# Patient Record
Sex: FEMALE | Race: WHITE | Employment: OTHER | URBAN - METROPOLITAN AREA
[De-identification: names, ages, dates, MRNs, and addresses within clinical notes are randomized per-mention and may not be internally consistent; named-entity substitution may affect disease eponyms.]

---

## 2017-01-11 ENCOUNTER — TELEPHONE (OUTPATIENT)
Dept: INTERNAL MEDICINE CLINIC | Age: 82
End: 2017-01-11

## 2017-01-11 NOTE — TELEPHONE ENCOUNTER
Pt's daughter(Marisol Orozco) stated pt is feeling weak all over and would like an appt for this afternoon.  Best contact number  [phone].        Message received & copied from [facility]

## 2017-01-12 NOTE — TELEPHONE ENCOUNTER
Unable to reach daughter. [place] Vargas I spoke with states she was [facility]. I agree with plan.

## 2017-03-03 ENCOUNTER — OFFICE VISIT (OUTPATIENT)
Dept: INTERNAL MEDICINE CLINIC | Age: 82
End: 2017-03-03

## 2017-03-03 VITALS
HEIGHT: 60 IN | OXYGEN SATURATION: 96 % | WEIGHT: 127 LBS | DIASTOLIC BLOOD PRESSURE: 75 MMHG | SYSTOLIC BLOOD PRESSURE: 150 MMHG | BODY MASS INDEX: 24.94 KG/M2 | TEMPERATURE: 97.7 F | HEART RATE: 96 BPM

## 2017-03-03 DIAGNOSIS — R05.9 COUGH: Primary | ICD-10-CM

## 2017-03-03 RX ORDER — AZITHROMYCIN 250 MG/1
250 TABLET, FILM COATED ORAL SEE ADMIN INSTRUCTIONS
Qty: 6 TAB | Refills: 0 | Status: SHIPPED | OUTPATIENT
Start: 2017-03-03 | End: 2017-03-08

## 2017-03-03 NOTE — PROGRESS NOTES
HISTORY OF PRESENT ILLNESS  Ramandeep Hunter is a 80 y.o. female. HPI   Pt here with daughter c/o dry cough x 1 week  Some sob in mornings d/t frequent cough  No f/c  Some mild sore throat and hoarseness but improving  Taking mucinex otc  Never a smoker  Appetite ok    Patient Active Problem List    Diagnosis Date Noted    Laceration of left arm with complication 06/00/4107    Dementia without behavioral disturbance 04/01/2016    Hypothyroidism 01/02/2014    Depression 01/02/2014     Current Outpatient Prescriptions   Medication Sig Dispense Refill    azithromycin (ZITHROMAX) 250 mg tablet Take 1 Tab by mouth See Admin Instructions for 5 days. 6 Tab 0    levothyroxine (SYNTHROID) 75 mcg tablet TAKE 1 TABLET DAILY BEFORE BREAKFAST 90 Tab 1    clonazePAM (KLONOPIN) 0.5 mg tablet Take 0.25 mg by mouth nightly.  PARoxetine (PAXIL) 30 mg tablet Take 40 mg by mouth nightly.  lithium CR (ESKALITH CR) 450 mg CR tablet Take 450 mg by mouth nightly.  traMADol (ULTRAM) 50 mg tablet Take 1 Tab by mouth every six (6) hours as needed for Pain. Max Daily Amount: 200 mg. 40 Tab 0    acetaminophen (TYLENOL EXTRA STRENGTH) 500 mg tablet Take 500 mg by mouth every six (6) hours as needed for Pain. Allergies   Allergen Reactions    Sulfa (Sulfonamide Antibiotics) Hives           ROS    Physical Exam   Constitutional: She appears well-developed and well-nourished. Appears stated age, frail elderly, nad   HENT:   Mouth/Throat: Oropharynx is clear and moist.   Cardiovascular: Normal rate, regular rhythm and normal heart sounds. Exam reveals no gallop and no friction rub. No murmur heard. Pulmonary/Chest: Effort normal and breath sounds normal. No respiratory distress. She has no wheezes. She has no rales. She exhibits no tenderness. Abdominal: Soft. Bowel sounds are normal.   Musculoskeletal: She exhibits no edema. Neurological: She is alert. Psychiatric: She has a normal mood and affect. Nursing note and vitals reviewed. ASSESSMENT and PLAN  Florentino Ramirez was seen today for cough and hoarse. Diagnoses and all orders for this visit:    Cough   zpak   Continue otc mucinex   To call or return if not improved   Advised f/u PCP in 2 weeks and get pneumonia vaccine    Other orders  -     azithromycin (ZITHROMAX) 250 mg tablet; Take 1 Tab by mouth See Admin Instructions for 5 days. Follow-up Disposition:  Return in about 2 weeks (around 3/17/2017) for f/u cough, pneumonia shot.

## 2017-03-03 NOTE — MR AVS SNAPSHOT
Visit Information Date & Time Provider Department Dept. Phone Encounter #  
 3/3/2017 11:15 AM Brianan Ferrer, [address] [facility] [phone] [id number] Follow-up Instructions Return in about 2 weeks (around 3/17/2017) for f/u cough, pneumonia shot. Upcoming Health Maintenance Date Due ZOSTER VACCINE AGE 60> 7/22/1986 GLAUCOMA SCREENING Q2Y 7/22/1991 OSTEOPOROSIS SCREENING (DEXA) 7/22/1991 Pneumococcal 65+ Low/Medium Risk (1 of 2 - PCV13) 7/22/1991 MEDICARE YEARLY EXAM 7/22/1991 INFLUENZA AGE 9 TO ADULT 8/1/2016 DTaP/Tdap/Td series (2 - Td) 7/25/2026 Allergies as of 3/3/2017  Review Complete On: 3/3/2017 By: Brianna Ferrer MD  
  
 Severity Noted Reaction Type Reactions Sulfa (Sulfonamide Antibiotics)  07/28/2010    Hives Current Immunizations  Never Reviewed Name Date Tdap 7/25/2016  1:23 PM  
  
 Not reviewed this visit You Were Diagnosed With   
  
 Codes Comments Cough    -  Primary ICD-10-CM: C32 ICD-9-CM: 798. 2 Vitals BP  
  
  
  
  
  
 150/75 (BP 1 Location: Left arm, BP Patient Position: Sitting) BMI and BSA Data Body Mass Index Body Surface Area  
 24.8 kg/m 2 1.56 m 2 Preferred Pharmacy Pharmacy Name Phone [facility] [address]  [address] [phone] Your Updated Medication List  
  
   
This list is accurate as of: 3/3/17 11:57 AM.  Always use your most recent med list.  
  
  
  
  
 azithromycin 250 mg tablet Commonly known as:  Arthea Bruch Take 1 Tab by mouth See Admin Instructions for 5 days. KlonoPIN 0.5 mg tablet Generic drug:  clonazePAM  
Take 0.25 mg by mouth nightly. levothyroxine 75 mcg tablet Commonly known as:  SYNTHROID  
TAKE 1 TABLET DAILY BEFORE BREAKFAST  
  
 lithium carbonate  mg CR tablet Commonly known as:  ESKALITH CR Take 450 mg by mouth nightly. PAXIL 30 mg tablet Generic drug:  PARoxetine Take 40 mg by mouth nightly. traMADol 50 mg tablet Commonly known as:  ULTRAM  
Take 1 Tab by mouth every six (6) hours as needed for Pain. Max Daily Amount: 200 mg.  
  
 TYLENOL EXTRA STRENGTH 500 mg tablet Generic drug:  acetaminophen Take 500 mg by mouth every six (6) hours as needed for Pain. Prescriptions Sent to Pharmacy Refills  
 azithromycin (ZITHROMAX) 250 mg tablet 0 Sig: Take 1 Tab by mouth See Admin Instructions for 5 days. Class: Normal  
 Pharmacy: [facility] [address] [place] [address] #: [phone] Route: Oral  
  
Follow-up Instructions Return in about 2 weeks (around 3/17/2017) for f/u cough, pneumonia shot. Introducing [facility]! Aniya Mac introduces [employer] patient portal. Now you can access parts of your medical record, email your doctor's office, and request medication refills online. 1. In your internet browser, go to [email or web address] 2. Click on the First Time User? Click Here link in the Sign In box. You will see the New Member Sign Up page. 3. Enter your [employer] Access Code exactly as it appears below. You will not need to use this code after youve completed the sign-up process. If you do not sign up before the expiration date, you must request a new code. · [employer] Access Code: [email or web address] Expires: 3/15/2017  1:11 PM 
 
4. Enter the last four digits of your Social Security Number (xxxx) and Date of Birth (mm/dd/yyyy) as indicated and click Submit. You will be taken to the next sign-up page. 5. Create a [employer] ID. This will be your [employer] login ID and cannot be changed, so think of one that is secure and easy to remember. 6. Create a [employer] password. You can change your password at any time. 7. Enter your Password Reset Question and Answer. This can be used at a later time if you forget your password. 8. Enter your e-mail address. You will receive e-mail notification when new information is available in [address] 9. Click Sign Up. You can now view and download portions of your medical record. 10. Click the Download Summary menu link to download a portable copy of your medical information. If you have questions, please visit the Frequently Asked Questions section of the [employer] website. Remember, [employer] is NOT to be used for urgent needs. For medical emergencies, dial 911. Now available from your iPhone and Android! Please provide this summary of care documentation to your next provider. Your primary care clinician is listed as Javan David. If you have any questions after today's visit, please call [phone].

## 2017-03-06 ENCOUNTER — OFFICE VISIT (OUTPATIENT)
Dept: INTERNAL MEDICINE CLINIC | Age: 82
End: 2017-03-06

## 2017-03-06 VITALS
DIASTOLIC BLOOD PRESSURE: 88 MMHG | BODY MASS INDEX: 25.32 KG/M2 | TEMPERATURE: 97.9 F | OXYGEN SATURATION: 94 % | RESPIRATION RATE: 14 BRPM | HEART RATE: 69 BPM | SYSTOLIC BLOOD PRESSURE: 152 MMHG | HEIGHT: 60 IN | WEIGHT: 129 LBS

## 2017-03-06 DIAGNOSIS — Z00.00 ROUTINE GENERAL MEDICAL EXAMINATION AT A HEALTH CARE FACILITY: Primary | ICD-10-CM

## 2017-03-06 DIAGNOSIS — Z23 ENCOUNTER FOR IMMUNIZATION: ICD-10-CM

## 2017-03-06 RX ORDER — CHOLECALCIFEROL (VITAMIN D3) 125 MCG
1 CAPSULE ORAL DAILY
COMMUNITY
End: 2019-06-25

## 2017-03-06 RX ORDER — CLONAZEPAM 0.5 MG/1
0.5 TABLET ORAL EVERY EVENING
Status: ON HOLD | COMMUNITY
End: 2019-03-21 | Stop reason: SDUPTHER

## 2017-03-06 RX ORDER — BISMUTH SUBSALICYLATE 262 MG
1 TABLET,CHEWABLE ORAL DAILY
COMMUNITY
End: 2020-01-16

## 2017-03-06 RX ORDER — PAROXETINE HYDROCHLORIDE 40 MG/1
40 TABLET, FILM COATED ORAL DAILY
COMMUNITY
Start: 2017-01-24 | End: 2019-06-25 | Stop reason: DRUGHIGH

## 2017-03-06 RX ORDER — BENZONATATE 200 MG/1
200 CAPSULE ORAL
Qty: 21 CAP | Refills: 0 | Status: SHIPPED | OUTPATIENT
Start: 2017-03-06 | End: 2017-03-13

## 2017-03-06 NOTE — PATIENT INSTRUCTIONS
Medicare Part B Preventive Services Limitations Recommendation Scheduled   Bone Mass Measurement  (age 72 & older, biennial) Requires diagnosis related to osteoporosis or estrogen deficiency. Biennial benefit unless patient has history of long-term glucocorticoid tx or baseline is needed because initial test was by other method Last:      Every 2 years Completed   Cardiovascular Screening Blood Tests (every 5 years)  Total cholesterol, HDL, Triglycerides Order as a panel if possible Last:    Every 5 years Next:  Consider   Colorectal Cancer Screening  -Fecal occult blood test (annual)  -Flexible sigmoidoscopy (5y)  -Screening colonoscopy (10y)  -Barium Enema  Last:    Every 5-10 years depending on your colonoscopy result Next:  Completed   Counseling to Prevent Tobacco Use (up to 8 sessions per year)  - Counseling greater than 3 and up to 10 minutes  - Counseling greater than 10 minutes Patients must be asymptomatic of tobacco-related conditions to receive as preventive service N/A N/A   Diabetes Screening Tests (at least every 3 years, Medicare covers annually or at 6-month intervals for prediabetic patients)    Fasting blood sugar (FBS) or glucose tolerance test (GTT) Patient must be diagnosed with one of the following:  -Hypertension, Dyslipidemia, obesity, previous impaired FBS or GTT  Or any two of the following: overweight, FH of diabetes, age ? 72, history of gestational diabetes, birth of baby weighing more than 9 pounds Last: 3/31/2016    Every 3 years, but checked yearly with labs Next: 3/2017   Diabetes Self-Management Training (DSMT) (no USPSTF recommendation) Requires referral by treating physician for patient with diabetes or renal disease. 10 hours of initial DSMT session of no less than 30 minutes each in a continuous 12-month period. 2 hours of follow-up DSMT in subsequent years.  N/A N/A   Glaucoma Screening (no USPSTF recommendation) Diabetes mellitus, family history, , age 48 or over,  American, age 72 or over Last:     Every year Next:  No longer interested   Human Immunodeficiency Virus (HIV) Screening (annually for increased risk patients)  HIV-1 and HIV-2 by EIA, WENCESLAO, rapid antibody test, or oral mucosa transudate Patient must be at increased risk for HIV infection per USPSTF guidelines or pregnant. Tests covered annually for patients at increased risk. Pregnant patients may receive up to 3 test during pregnancy. N/A N/A   Medical Nutrition Therapy (MNT) (for diabetes or renal disease not recommended schedule) Requires referral by treating physician for patient with diabetes or renal disease. Can be provided in same year as diabetes self-management training (DSMT), and CMS recommends medical nutrition therapy take place after DSMT. Up to 3 hours for initial year and 2 hours in subsequent years. N/A  N/A   Seasonal Influenza Vaccination (annually)  Last: 10/12/2016    Every Fall Please get one this Fall   Shingles Vaccination A shingles vaccine is also recommended once in a lifetime after age 61   Next:   Pneumococcal Vaccination (once after 72)  Last:  Next:  Prevnar 15 due, 1 year later Pneumovax due   Hepatitis B Vaccinations (if medium/high risk) Medium/high risk factors:  End-stage renal disease,  Hemophiliacs who received Factor VIII or IX concentrates, Clients of institutions for the mentally retarded, Persons who live in the same house as a HepB virus carrier, Homosexual men, Illicit injectable drug abusers. N/A N/A   Screening Mammography (biennial age 54-69)? Annually (age 36 or over) Completed Completed   Screening Pap Tests and Pelvic Examination (up to age 72 and after 72 if unknown history or abnormal study last 10 years) Every 25 months except high risk Completed Completed   Ultrasound Screening for Abdominal Aortic Aneurysm (AAA) (once) Patient must be referred through IPPE and not have had a screening for abdominal aortic aneurysm before under Medicare. Limited to patients who meet one of the following criteria:  - Men who are 73-68 years old and have smoked more than 100 cigarettes in their lifetime.  -Anyone with a FH of AAA  -Anyone recommended for screening by USPSTF N/A N/A   N/A:  Not applicable   Vaccine Information Statement    Pneumococcal Polysaccharide Vaccine: What You Need to Know    Many Vaccine Information Statements are available in [place] and other languages. See www.immunize.org/vis. Hojas de información Sobre Vacunas están disponibles en español y en muchos otros idiomas. [facility]ite [email or web address]. 1. Why get vaccinated? Vaccination can protect older adults (and some children and younger adults) from pneumococcal disease. Pneumococcal disease is caused by bacteria that can spread from person to person through close contact. It can cause ear infections, and it can also lead to more serious infections of the:   Lungs (pneumonia),   Blood (bacteremia), and   Covering of the brain and spinal cord (meningitis). Meningitis can cause deafness and brain damage, and it can be fatal.      Anyone can get pneumococcal disease, but children under 3years of age, people with certain medical conditions, adults over 72years of age, and cigarette smokers are at the highest risk. About 18,000 older adults die each year from pneumococcal disease in the [place]. Treatment of pneumococcal infections with penicillin and other drugs used to be more effective. But some strains of the disease have become resistant to these drugs. This makes prevention of the disease, through vaccination, even more important. 2. Pneumococcal polysaccharide vaccine (PPSV23)    Pneumococcal polysaccharide vaccine (PPSV23) protects against 23 types of pneumococcal bacteria. It will not prevent all pneumococcal disease.     PPSV23 is recommended for:   All adults 72years of age and older,   Anyone 2 through 59years of age with certain long-term health problems,   Anyone 2 through 59years of age with a weakened immune system,   Adults 23 through 59years of age who smoke cigarettes or have asthma. Most people need only one dose of PPSV. A second dose is recommended for certain high-risk groups. People 72 and older should get a dose even if they have gotten one or more doses of the vaccine before they turned 65. Your healthcare provider can give you more information about these recommendations. Most healthy adults develop protection within 2 to 3 weeks of getting the shot. 3. Some people should not get this vaccine     Anyone who has had a life-threatening allergic reaction to PPSV should not get another dose.  Anyone who has a severe allergy to any component of PPSV should not receive it. Tell your provider if you have any severe allergies.  Anyone who is moderately or severely ill when the shot is scheduled may be asked to wait until they recover before getting the vaccine. Someone with a mild illness can usually be vaccinated.  Children less than 3years of age should not receive this vaccine.  There is no evidence that PPSV is harmful to either a pregnant woman or to her fetus. However, as a precaution, women who need the vaccine should be vaccinated before becoming pregnant, if possible. 4. Risks of a vaccine reaction    With any medicine, including vaccines, there is a chance of side effects. These are usually mild and go away on their own, but serious reactions are also possible. About half of people who get PPSV have mild side effects, such as redness or pain where the shot is given, which go away within about two days. Less than 1 out of 100 people develop a fever, muscle aches, or more severe local reactions. Problems that could happen after any vaccine:     People sometimes faint after a medical procedure, including vaccination.  Sitting or lying down for about 15 minutes can help prevent fainting, and injuries caused by a fall. Tell your doctor if you feel dizzy, or have vision changes or ringing in the ears.  Some people get severe pain in the shoulder and have difficulty moving the arm where a shot was given. This happens very rarely.  Any medication can cause a severe allergic reaction. Such reactions from a vaccine are very rare, estimated at about 1 in a million doses, and would happen within a few minutes to a few hours after the vaccination. As with any medicine, there is a very remote chance of a vaccine causing a serious injury or death. The safety of vaccines is always being monitored. For more information, visit: www.cdc.gov/vaccinesafety/     5. What if there is a serious reaction? What should I look for? Look for anything that concerns you, such as signs of a severe allergic reaction, very high fever, or unusual behavior. Signs of a severe allergic reaction can include hives, swelling of the face and throat, difficulty breathing, a fast heartbeat, dizziness, and weakness. These would usually start a few minutes to a few hours after the vaccination. What should I do? If you think it is a severe allergic reaction or other emergency that cant wait, call 9-1-1 or get to the nearest hospital. Otherwise, call your doctor. Afterward, the reaction should be reported to the Vaccine Adverse Event Reporting System (VAERS). Your doctor might file this report, or you can do it yourself through the VAERS web site at www.vaers. hhs.gov, or by calling 1-288.489.4590. VAERS does not give medical advice. 6. How can I learn more?  Ask your doctor. He or she can give you the vaccine package insert or suggest other sources of information.  Call your local or state health department.    Contact the Centers for Disease Control and Prevention (CDC):  - Call 3-114.613.4281 (1-800-CDC-INFO) or  - Visit CDCs website at www.cdc.gov/vaccines    Vaccine Information Statement PPSV   04/24/2015    Department of Health and Human Services  Centers for Disease Control and Prevention    Office Use Only    Vaccine Information Statement     Pneumococcal Conjugate Vaccine (PCV13): What You Need to Know    Many Vaccine Information Statements are available in Filipino and other languages. See www.immunize.org/vis. Hojas de información Sobre Vacunas están disponibles en español y en muchos otros idiomas. Visite www.immunize.org/vis. 1. Why get vaccinated? Vaccination can protect both children and adults from pneumococcal disease. Pneumococcal disease is caused by bacteria that can spread from person to person through close contact. It can cause ear infections, and it can also lead to more serious infections of the:   Lungs (pneumonia),   Blood (bacteremia), and   Covering of the brain and spinal cord (meningitis). Pneumococcal pneumonia is most common among adults. Pneumococcal meningitis can cause deafness and brain damage, and it kills about 1 child in 10 who get it. Anyone can get pneumococcal disease, but children under 3years of age and adults 72 years and older, people with certain medical conditions, and cigarette smokers are at the highest risk. Before there was a vaccine, the [facility] saw:   more than 700 cases of meningitis,   about 13,000 blood infections,   about 5 million ear infections, and   about 200 deaths  in children under 5 each year from pneumococcal disease. Since vaccine became available, severe pneumococcal disease in these children has fallen by 88%. About 18,000 older adults die of pneumococcal disease each year in the [place]. Treatment of pneumococcal infections with penicillin and other drugs is not as effective as it used to be, because some strains of the disease have become resistant to these drugs. This makes prevention of the disease, through vaccination, even more important.     2. PCV13 vaccine    Pneumococcal conjugate vaccine (called PCV13) protects against 13 types of pneumococcal bacteria. PCV13 is routinely given to children at 2, 4, 6, and 1515 months of age. It is also recommended for children and adults 3to 59years of age with certain health conditions, and for all adults 72years of age and older. Your doctor can give you details. 3. Some people should not get this vaccine    Anyone who has ever had a life-threatening allergic reaction to a dose of this vaccine, to an earlier pneumococcal vaccine called PCV7, or to any vaccine containing diphtheria toxoid (for example, DTaP), should not get PCV13. Anyone with a severe allergy to any component of PCV13 should not get the vaccine. Tell your doctor if the person being vaccinated has any severe allergies. If the person scheduled for vaccination is not feeling well, your healthcare provider might decide to reschedule the shot on another day. 4. Risks of a vaccine reaction    With any medicine, including vaccines, there is a chance of reactions. These are usually mild and go away on their own, but serious reactions are also possible. Problems reported following PCV13 varied by age and dose in the series. The most common problems reported among children were:    About half became drowsy after the shot, had a temporary loss of appetite, or had redness or tenderness where the shot was given.  About 1 out of 3 had swelling where the shot was given.  About 1 out of 3 had a mild fever, and about 1 in 20 had a fever over 102.2°F.   Up to about 8 out of 10 became fussy or irritable. Adults have reported pain, redness, and swelling where the shot was given; also mild fever, fatigue, headache, chills, or muscle pain. [employer] children who get PCV13 along with inactivated flu vaccine at the same time may be at increased risk for seizures caused by fever. Ask your doctor for more information.      Problems that could happen after any vaccine:     People sometimes faint after a medical procedure, including vaccination. Sitting or lying down for about 15 minutes can help prevent fainting, and injuries caused by a fall. Tell your doctor if you feel dizzy, or have vision changes or ringing in the ears.  Some older children and adults get severe pain in the shoulder and have difficulty moving the arm where a shot was given. This happens very rarely.  Any medication can cause a severe allergic reaction. Such reactions from a vaccine are very rare, estimated at about 1 in a million doses, and would happen within a few minutes to a few hours after the vaccination. As with any medicine, there is a very small chance of a vaccine causing a serious injury or death. The safety of vaccines is always being monitored. For more information, visit: www.cdc.gov/vaccinesafety/     5. What if there is a serious reaction? What should I look for?  Look for anything that concerns you, such as signs of a severe allergic reaction, very high fever, or unusual behavior. Signs of a severe allergic reaction can include hives, swelling of the face and throat, difficulty breathing, a fast heartbeat, dizziness, and weakness  usually within a few minutes to a few hours after the vaccination. What should I do?  If you think it is a severe allergic reaction or other emergency that cant wait, call 9-1-1 or get the person to the nearest hospital. Otherwise, call your doctor. Reactions should be reported to the Vaccine Adverse Event Reporting System (VAERS). Your doctor should file this report, or you can do it yourself through the VAERS web site at www.vaers. hhs.gov, or by calling 8-288.539.5321. VAERS does not give medical advice. 6. The National Vaccine Injury Compensation Program    The [facility] Vaccine Injury Compensation Program (VICP) is a federal program that was created to compensate people who may have been injured by certain vaccines.     Persons who believe they may have been injured by a vaccine can learn about the program and about filing a claim by calling 7-843.731.9567 or visiting the [address] website at www.[facility].gov/vaccinecompensation. There is a time limit to file a claim for compensation. 7. How can I learn more?  Ask your healthcare provider. He or she can give you the vaccine package insert or suggest other sources of information.  Call your local or state health department.  Contact the Centers for Disease Control and Prevention (CDC):  - Call 3-158.646.3762 (1-800-CDC-INFO) or  - Visit CDCs website at www.cdc.gov/vaccines    Vaccine Information Statement   PCV13 Vaccine   11/5/2015   [address] RADHA Houser [id number]    Department of Health and Human Services  Centers for Disease Control and Prevention    Office Use Only       Shingles Vaccine: What You Need to Know  What is shingles? Shingles is a painful skin rash, often with blisters. It is also called herpes zoster, or just zoster. A shingles rash usually appears on one side of the face or body and lasts from 2 to 4 weeks. Its main symptom is pain, which can be quite severe. Other symptoms of shingles can include fever, headache, chills and upset stomach. Very rarely, a shingles infection can lead to pneumonia, hearing problems, blindness, brain inflammation (encephalitis) or death. For about 1 person in 5, severe pain can continue even long after the rash clears up. This is called post-herpetic neuralgia. Shingles is caused by the varicella zoster virus, the same virus that causes chickenpox. Only someone who has had chickenpoxor, rarely, has gotten chickenpox vaccinecan get shingles. The virus stays in your body, and can cause shingles many years later. You can't catch shingles from another person with shingles. However, a person who has never had chickenpox (or chickenpox vaccine) could get chickenpox from someone with shingles. This is not very common.   Shingles is far more common in people 48years of age and older than in younger people. It is also more common in people whose immune systems are weakened because of a disease such as cancer, or drugs such as steroids or chemotherapy. At least 1 million people a year in the [facility] get shingles. Shingles vaccine  A vaccine for shingles was licensed in 6139. In clinical trials, the vaccine reduced the risk of shingles by 50%. It can also reduce pain in people who still get shingles after being vaccinated. A single dose of shingles vaccine is recommended for adults 61years of age and older. Some people should not get shingles vaccine or should wait  A person should not get shingles vaccine who:  · Has ever had a life-threatening allergic reaction to gelatin, the antibiotic neomycin, or any other component of shingles vaccine. Tell your doctor if you have any severe allergies. · Has a weakened immune system because of current:  ¨ AIDS or another disease that affects the immune system. ¨ Treatment with drugs that affect the immune system, such as prolonged use of high-dose steroids. ¨ Cancer treatment such as radiation or chemotherapy. ¨ Cancer affecting the bone marrow or lymphatic system, such as leukemia or lymphoma. · Is pregnant, or might be pregnant. Women should not become pregnant until at least 4 weeks after getting shingles vaccine. Someone with a minor acute illness, such as a cold, may be vaccinated. But anyone with a moderate or severe acute illness should usually wait until they recover before getting the vaccine. This includes anyone with a temperature of 101.3° F or higher. What are the risks from shingles vaccine? A vaccine, like any medicine, could possibly cause serious problems, such as severe allergic reactions. However, the risk of a vaccine causing serious harm, or death, is extremely small. No serious problems have been identified with shingles vaccine.   Mild problems  · Redness, soreness, swelling, or itching at the site of the injection (about 1 person in 3)  · Headache (about 1 person in 79)  Like all vaccines, shingles vaccine is being closely monitored for unusual or severe problems. What if there is a serious reaction? What should I look for? · Look for anything that concerns you, such as signs of a severe allergic reaction, very high fever, or behavior changes. Signs of a severe allergic reaction can include hives, swelling of the face and throat, difficulty breathing, a fast heartbeat, dizziness, and weakness. These would start a few minutes to a few hours after the vaccination. What should I do? · If you think it is a severe allergic reaction or other emergency that can't wait, call 9-1-1 or get the person to the nearest hospital. Otherwise, call your doctor. · Afterward, the reaction should be reported to the Vaccine Adverse Event Reporting System (VAERS). Your doctor might file this report, or you can do it yourself through the VAERS web site at www.vaers. [facility].gov, or by calling 8-300.108.3969. VAERS is only for reporting reactions. They do not give medical advice. How can I learn more? · Ask your doctor. · Call your local or state health department. · Contact the Centers for Disease Control and Prevention (CDC):  ¨ Call 2-579.178.8789 (1-800-CDC-INFO) or  ¨ Visit CDC's website at www.cdc.gov/vaccines  Vaccine Information Statement  Shingles Vaccine  (10/6/2009)  Department of Health and Human Services  Centers for Disease Control and Prevention  Many Vaccine Information Statements are available in Malagasy and other languages. See www.immunize.org/vis. Muchas hojas de información sobre vacunas están disponibles en español y en otros idiomas. Visite www.immunize.org/vis.   Care instructions adapted under license by your healthcare professional. If you have questions about a medical condition or this instruction, always ask your healthcare professional. [employer] disclaims any warranty or liability for your use of this information.

## 2017-03-06 NOTE — PROGRESS NOTES
Dr. Daniel Bustos referred , 7/22/1926, a 80 y.o. female for a Medicare Annual Wellness Visit (AWV). Patient verbalized agreement for her daughter, Alvin Abdi, to participate. This is an Initial Medicare Annual Wellness Exam (AWV) (Performed 12 months after IPPE or effective date of Medicare Part B enrollment, Once in a lifetime)    I have reviewed the patient's medical history in detail and updated the computerized patient record. History     Past Medical History:   Diagnosis Date    Dementia     Depression     psychiatrist: Dr Harsha Dickey (on Lithium)    Fracture of wrist     slipped and fx left wrist, surgery scheduled 12/21/16    Full dentures     upper and lower    Hypothyroid       Past Surgical History:   Procedure Laterality Date    HX KYPHOPLASTY  2013   [place] [employer]  12/2016    Wrist surgery     Current Outpatient Prescriptions   Medication Sig Dispense Refill    clonazePAM (KLONOPIN) 0.5 mg tablet Take 0.5 mg by mouth every evening.  multivitamin (ONE A DAY) tablet Take 1 Tab by mouth daily.  cholecalciferol, vitamin D3, (VITAMIN D3) 2,000 unit tab Take 1 Tab by mouth daily.  azithromycin (ZITHROMAX) 250 mg tablet Take 1 Tab by mouth See Admin Instructions for 5 days. 6 Tab 0    levothyroxine (SYNTHROID) 75 mcg tablet TAKE 1 TABLET DAILY BEFORE BREAKFAST 90 Tab 1    lithium CR (ESKALITH CR) 450 mg CR tablet Take 450 mg by mouth nightly.  PARoxetine (PAXIL) 40 mg tablet Take 40 mg by mouth daily.        Allergies   Allergen Reactions    Sulfa (Sulfonamide Antibiotics) Hives     Family History   Problem Relation Age of Onset    Other Mother      Inefficient wound healing    Cancer Sister      Stomach CA    Diabetes Brother     Alzheimer Sister     Hypertension Daughter     Thyroid Disease Daughter     Other Son      Hip replacement    Depression Son     Liver Disease Son     Diabetes Son     Heart Disease Son     Depression Son      Social History   Substance Use Topics    Smoking status: Former Smoker    Smokeless tobacco: Never Used    Alcohol use No     Patient Active Problem List   Diagnosis Code    Hypothyroidism E03.9    Depression F32.9    Dementia without behavioral disturbance F03.90    Laceration of left arm with complication U44.586U         Depression Risk Factor Screening:   No flowsheet data found. Current diagnosis of Depression, being treated with Lithium 450 mg/day and Paroxetine 40 mg/day. Alcohol Risk Factor Screening: On any occasion during the past 3 months, have you had more than 3 drinks containing alcohol? No    Do you average more than 7 drinks per week? No    Functional Ability and Level of Safety:     Hearing Loss   mild-to-moderate  States that she has had her hearing evaluated but did not want to pursue. Activities of Daily Living   Partial assistance. Requires assistance with: see table. Patient stated that she can take her medications, but her daughter assists with putting them into a pill box and giving them to her. ADL Assessment 3/6/2017   Feeding yourself No Help Needed   Getting from bed to chair No Help Needed   Getting dressed No Help Needed   Bathing or showering No Help Needed   Walk across the room (includes cane/walker) No Help Needed   Using the telphone No Help Needed   Taking your medications Help Needed   Preparing meals Help Needed   Managing money (expenses/bills) Help Needed   Moderately strenuous housework (laundry) No Help Needed   Shopping for personal items (toiletries/medicines) Help Needed   Shopping for groceries Help Needed   Driving Help Needed   Climbing a flight of stairs No Help Needed   Getting to places beyond walking distances No Help Needed       Fall Risk     Fall Risk Assessment, last 12 mths 3/6/2017   Able to walk? Yes   Fall in past 12 months? Yes   Fall with injury?  Yes   Number of falls in past 12 months 3   Fall Risk Score 4     Abuse Screen   Patient is not abused   Abuse Screening Questionnaire 3/6/2017   Do you ever feel afraid of your partner? N   Are you in a relationship with someone who physically or mentally threatens you? N   Is it safe for you to go home? Y       Review of Systems   Not required    Physical Examination     No exam data present    Evaluation of Cognitive Function:  Mood/affect:  neutral  Appearance: age appropriate, casually dressed and within normal Limits  Family member/caregiver input: Daughter was with patient. Patient has a documented diagnosis of dementia. No exam performed today, Medicare Annual Wellness Visit. Patient Care Team:  Nelson Jay MD as PCP - General (Family Practice)  Larry Mckinley MD as Surgeon (General Surgery)  [facility]. Jory Meadows MD (Orthopedic Surgery)  Dr. Margie Lazaro (Psychiatry)    Advice/Referrals/Counseling   Education and counseling provided:  Pneumococcal Vaccine  Cardiovascular screening blood test  Bone mass measurement (DEXA)  Screening for glaucoma  Diabetes screening test      Assessment/Plan       ICD-10-CM ICD-9-CM    1. Routine general medical examination at a health care facility Z00.00 V70.0    2. Encounter for immunization Z23 V03.89 ADMIN PNEUMOCOCCAL VACCINE      PNEUMOCOCCAL CONJ VACCINE 13 VALENT IM   3. Reviewed medications and side effects in detail. A.  Med Rec completed  Medications Discontinued During This Encounter   Medication Reason    traMADol (ULTRAM) 50 mg tablet Not A Current Medication    acetaminophen (TYLENOL EXTRA STRENGTH) 500 mg tablet Not A Current Medication    clonazePAM (KLONOPIN) 0.5 mg tablet Formulary Change    PARoxetine (PAXIL) 30 mg tablet Formulary Change     4. Immunizations:   A.  PCV13:  Recommended to receive today during the visit. Provided to patient. Tolerated injection. Administered in the left deltoid, 0.5 ml PCV13. B.  PPSV23:  Due 1 year from today. C. Influenza:  Received 10/2016. D.  Shingles:  Recommended for patient to consider. Provided information in the AVS.  5.  Preventive Screenings:   A. DEXA:  Declines further screenings. B.  Glaucoma Screening:  Declines further screenings. C.  Mammogram/Pap/Pelvic:  Declines further screenings. 6.  Labs:   A.  A1C:  Due at next visit. B.  BMP:  Due at next visit. C.  Lipid Panel:  Consider checking at next visit. 7.   Advanced Directive: On file, 12/20/16. 8.  Cough:  Patient's last day of azithromycin is tomorrow. Patient and daughter state that she is still coughing. Predominantly in the morning, but denies sputum production. No fever today. States that she is feeling better. Discussed with Alexandra Hernández MD.  Christel Arreola from Dr. Alexandra Hernández to send Tessalon Pearls 200 mg, 1 cap tid prn cough, qty 21 to the pharmacy ([facility] on [address]). Provided education to patient that the cough medicine may make her sleepy. Patient's daughter stated that at times patient has difficulty swallowing medications. Requested for her daughter to contact the office if she has difficulty following. She sees Dr. Gloria Jones next week for 2 week follow up from the acute visit. Patient and daughter verbalized understanding of information presented. Answered all of the patient and daughter's questions. AVS handed and reviewed with patient and daughter.     Татьяна Camarillo, PHARMD, BCACP

## 2017-03-06 NOTE — MR AVS SNAPSHOT
Visit Information Date & Time Provider Department Dept. Phone Encounter #  
 3/6/2017  3:00 PM Kim Lane, [address] [phone] [id number] Your Appointments 3/17/2017  2:00 PM  
ACUTE CARE with Liz Shah MD  
[facility]) Appt Note: 2 week follow up cough pneumonia shot  
 [facility] [address] [id number]  
[address] [facility] [address] [place] [address] Upcoming Health Maintenance Date Due ZOSTER VACCINE AGE 60> 7/22/1986 GLAUCOMA SCREENING Q2Y 7/22/1991 OSTEOPOROSIS SCREENING (DEXA) 7/22/1991 Pneumococcal 65+ Low/Medium Risk (1 of 2 - PCV13) 7/22/1991 INFLUENZA AGE 9 TO ADULT 8/1/2016 MEDICARE YEARLY EXAM 3/7/2018 DTaP/Tdap/Td series (2 - Td) 7/25/2026 Allergies as of 3/6/2017  Review Complete On: 3/6/2017 By: Kim Lane, PHARMD  
  
 Severity Noted Reaction Type Reactions Sulfa (Sulfonamide Antibiotics)  07/28/2010    Hives Current Immunizations  Reviewed on 3/6/2017 Name Date Pneumococcal Conjugate (PCV-13) 3/6/2017 Tdap 7/25/2016  1:23 PM  
  
 Reviewed by Kim Lane, PHARMD on 3/6/2017 at  3:25 PM  
 Reviewed by Kim Lane PHARMD on 3/6/2017 at  3:49 PM  
You Were Diagnosed With   
  
 Codes Comments Routine general medical examination at a health care facility    -  Primary ICD-10-CM: Z00.00 ICD-9-CM: V70.0 Encounter for immunization     ICD-10-CM: X70 ICD-9-CM: V03.89 Vitals BP Pulse Temp Resp Height(growth percentile) Weight(growth percentile) 152/88 (BP 1 Location: Left arm, BP Patient Position: Sitting) 69 97.9 °F (36.6 °C) (Oral) 14 5' (1.524 m) 129 lb (58.5 kg) SpO2 BMI OB Status Smoking Status 94% 25.19 kg/m2 Postmenopausal Former Smoker BMI and BSA Data  Body Mass Index Body Surface Area  
 25.19 kg/m 2 1.57 m 2  
  
  
 Preferred Pharmacy Pharmacy Name Phone [facility] [address], [address] [place] [address] Aric Mello [phone] Your Updated Medication List  
  
   
This list is accurate as of: 3/6/17  3:50 PM.  Always use your most recent med list.  
  
  
  
  
 azithromycin 250 mg tablet Commonly known as:  Debora Abu Take 1 Tab by mouth See Admin Instructions for 5 days. benzonatate 200 mg capsule Commonly known as:  TESSALON Take 1 Cap by mouth three (3) times daily as needed for Cough for up to 7 days. clonazePAM 0.5 mg tablet Commonly known as:  Eliza Broaden Take 0.5 mg by mouth every evening. levothyroxine 75 mcg tablet Commonly known as:  SYNTHROID  
TAKE 1 TABLET DAILY BEFORE BREAKFAST  
  
 lithium carbonate  mg CR tablet Commonly known as:  ESKALITH CR Take 450 mg by mouth nightly. multivitamin tablet Commonly known as:  ONE A DAY Take 1 Tab by mouth daily. PARoxetine 40 mg tablet Commonly known as:  PAXIL Take 40 mg by mouth daily. VITAMIN D3 2,000 unit Tab Generic drug:  cholecalciferol (vitamin D3) Take 1 Tab by mouth daily. Prescriptions Sent to Pharmacy Refills  
 benzonatate (TESSALON) 200 mg capsule 0 Sig: Take 1 Cap by mouth three (3) times daily as needed for Cough for up to 7 days. Class: Normal  
 Pharmacy: [facility] [address] [place] [address] Ph #: [phone] Route: Oral  
  
We Performed the Following ADMIN PNEUMOCOCCAL VACCINE [ [facility]] PNEUMOCOCCAL CONJ VACCINE 13 VALENT IM [id number] CPT(R)] Patient Instructions Medicare Part B Preventive Services Limitations Recommendation Scheduled Bone Mass Measurement 
(age 72 & older, biennial) Requires diagnosis related to osteoporosis or estrogen deficiency.  Biennial benefit unless patient has history of long-term glucocorticoid tx or baseline is needed because initial test was by other method Last:   
 
Every 2 years Completed Cardiovascular Screening Blood Tests (every 5 years) Total cholesterol, HDL, Triglycerides Order as a panel if possible Last: 
 
Every 5 years Next:  Consider Colorectal Cancer Screening 
-Fecal occult blood test (annual) -Flexible sigmoidoscopy (5y) 
-Screening colonoscopy (10y) -Barium Enema  Last: 
 
Every 5-10 years depending on your colonoscopy result Next:  Completed Counseling to Prevent Tobacco Use (up to 8 sessions per year) - Counseling greater than 3 and up to 10 minutes - Counseling greater than 10 minutes Patients must be asymptomatic of tobacco-related conditions to receive as preventive service N/A N/A Diabetes Screening Tests (at least every 3 years, Medicare covers annually or at 6-month intervals for prediabetic patients) Fasting blood sugar (FBS) or glucose tolerance test (GTT) Patient must be diagnosed with one of the following: 
-Hypertension, Dyslipidemia, obesity, previous impaired FBS or GTT 
Or any two of the following: overweight, FH of diabetes, age ? 72, history of gestational diabetes, birth of baby weighing more than 9 pounds Last: 3/31/2016 Every 3 years, but checked yearly with labs Next: 3/2017 Diabetes Self-Management Training (DSMT) (no USPSTF recommendation) Requires referral by treating physician for patient with diabetes or renal disease. 10 hours of initial DSMT session of no less than 30 minutes each in a continuous 12-month period. 2 hours of follow-up DSMT in subsequent years. N/A N/A Glaucoma Screening (no USPSTF recommendation) Diabetes mellitus, family history, , age 48 or over,  American, age 72 or over Last:  
 
Every year Next:  No longer interested Human Immunodeficiency Virus (HIV) Screening (annually for increased risk patients) HIV-1 and HIV-2 by EIA, WENCESLAO, rapid antibody test, or oral mucosa transudate Patient must be at increased risk for HIV infection per USPSTF guidelines or pregnant. Tests covered annually for patients at increased risk. Pregnant patients may receive up to 3 test during pregnancy. N/A N/A Medical Nutrition Therapy (MNT) (for diabetes or renal disease not recommended schedule) Requires referral by treating physician for patient with diabetes or renal disease. Can be provided in same year as diabetes self-management training (DSMT), and CMS recommends medical nutrition therapy take place after DSMT. Up to 3 hours for initial year and 2 hours in subsequent years. N/A  N/A Seasonal Influenza Vaccination (annually)  Last: 10/12/2016 Every Fall Please get one this Fall Shingles Vaccination A shingles vaccine is also recommended once in a lifetime after age 61   Next:  
Pneumococcal Vaccination (once after 72)  Last:  Next:  Prevnar 13 due, 1 year later Pneumovax due Hepatitis B Vaccinations (if medium/high risk) Medium/high risk factors:  End-stage renal disease, Hemophiliacs who received Factor VIII or IX concentrates, Clients of institutions for the mentally retarded, Persons who live in the same house as a HepB virus carrier, Homosexual men, Illicit injectable drug abusers. N/A N/A Screening Mammography (biennial age 54-69)? Annually (age 36 or over) Completed Completed Screening Pap Tests and Pelvic Examination (up to age 72 and after 72 if unknown history or abnormal study last 10 years) Every 24 months except high risk Completed Completed Ultrasound Screening for Abdominal Aortic Aneurysm (AAA) (once) Patient must be referred through IPPE and not have had a screening for abdominal aortic aneurysm before under Medicare.   Limited to patients who meet one of the following criteria: 
- Men who are 73-68 years old and have smoked more than 100 cigarettes in their lifetime. 
-Anyone with a FH of AAA 
-Anyone recommended for screening by USPSTF N/A N/A  
 N/A:  Not applicable Vaccine Information Statement Pneumococcal Polysaccharide Vaccine: What You Need to Know Many Vaccine Information Statements are available in Thai and other languages. See www.immunize.org/vis. Hojas de información Sobre Vacunas están disponibles en español y en muchos otros idiomas. Visite Jennifer.si. 1. Why get vaccinated? Vaccination can protect older adults (and some children and younger adults) from pneumococcal disease. Pneumococcal disease is caused by bacteria that can spread from person to person through close contact. It can cause ear infections, and it can also lead to more serious infections of the: 
 Lungs (pneumonia),  Blood (bacteremia), and 
 Covering of the brain and spinal cord (meningitis). Meningitis can cause deafness and brain damage, and it can be fatal.   
 
Anyone can get pneumococcal disease, but children under 3years of age, people with certain medical conditions, adults over 72years of age, and cigarette smokers are at the highest risk. About 18,000 older adults die each year from pneumococcal disease in the [place]. Treatment of pneumococcal infections with penicillin and other drugs used to be more effective. But some strains of the disease have become resistant to these drugs. This makes prevention of the disease, through vaccination, even more important. 2. Pneumococcal polysaccharide vaccine (PPSV23) Pneumococcal polysaccharide vaccine (PPSV23) protects against 23 types of pneumococcal bacteria. It will not prevent all pneumococcal disease. PPSV23 is recommended for:  All adults 72years of age and older,  Anyone 2 through 59years of age with certain long-term health problems, 
 Anyone 2 through 59years of age with a weakened immune system, 
 Adults 23 through 59years of age who smoke cigarettes or have asthma. Most people need only one dose of PPSV.   A second dose is recommended for certain high-risk groups. People 72 and older should get a dose even if they have gotten one or more doses of the vaccine before they turned 65. Your healthcare provider can give you more information about these recommendations. Most healthy adults develop protection within 2 to 3 weeks of getting the shot. 3. Some people should not get this vaccine  Anyone who has had a life-threatening allergic reaction to PPSV should not get another dose.  Anyone who has a severe allergy to any component of PPSV should not receive it. Tell your provider if you have any severe allergies.  Anyone who is moderately or severely ill when the shot is scheduled may be asked to wait until they recover before getting the vaccine. Someone with a mild illness can usually be vaccinated.  Children less than 3years of age should not receive this vaccine.  There is no evidence that PPSV is harmful to either a pregnant woman or to her fetus. However, as a precaution, women who need the vaccine should be vaccinated before becoming pregnant, if possible. 4. Risks of a vaccine reaction With any medicine, including vaccines, there is a chance of side effects. These are usually mild and go away on their own, but serious reactions are also possible. About half of people who get PPSV have mild side effects, such as redness or pain where the shot is given, which go away within about two days. Less than 1 out of 100 people develop a fever, muscle aches, or more severe local reactions. Problems that could happen after any vaccine:  People sometimes faint after a medical procedure, including vaccination. Sitting or lying down for about 15 minutes can help prevent fainting, and injuries caused by a fall. Tell your doctor if you feel dizzy, or have vision changes or ringing in the ears.  
 
 Some people get severe pain in the shoulder and have difficulty moving the arm where a shot was given. This happens very rarely.  Any medication can cause a severe allergic reaction. Such reactions from a vaccine are very rare, estimated at about 1 in a million doses, and would happen within a few minutes to a few hours after the vaccination. As with any medicine, there is a very remote chance of a vaccine causing a serious injury or death. The safety of vaccines is always being monitored. For more information, visit: www.cdc.gov/vaccinesafety/  
 
5. What if there is a serious reaction? What should I look for? Look for anything that concerns you, such as signs of a severe allergic reaction, very high fever, or unusual behavior. Signs of a severe allergic reaction can include hives, swelling of the face and throat, difficulty breathing, a fast heartbeat, dizziness, and weakness. These would usually start a few minutes to a few hours after the vaccination. What should I do? If you think it is a severe allergic reaction or other emergency that cant wait, call 9-1-1 or get to the nearest hospital. Otherwise, call your doctor. Afterward, the reaction should be reported to the Vaccine Adverse Event Reporting System (VAERS). Your doctor might file this report, or you can do it yourself through the VAERS web site at www.vaers. hhs.gov, or by calling 3-248.601.1122. VAERS does not give medical advice. 6. How can I learn more?  Ask your doctor. He or she can give you the vaccine package insert or suggest other sources of information.  Call your local or state health department.  Contact the Centers for Disease Control and Prevention (CDC): 
- Call 2-140.105.4664 (1-800-CDC-INFO) or 
- Visit CDCs website at www.cdc.gov/vaccines Vaccine Information Statement PPSV  
04/24/2015 Department of [facility] and [employer] Centers for Disease Control and Prevention Office Use Only Vaccine Information Statement Pneumococcal Conjugate Vaccine (PCV13): What You Need to Know Many Vaccine Information Statements are available in Malian and other languages. See www.immunize.org/vis. Hojas de información Sobre Vacunas están disponibles en español y en muchos otros idiomas. Visite www.immunize.org/vis. 1. Why get vaccinated? Vaccination can protect both children and adults from pneumococcal disease. Pneumococcal disease is caused by bacteria that can spread from person to person through close contact. It can cause ear infections, and it can also lead to more serious infections of the: 
 Lungs (pneumonia),  Blood (bacteremia), and 
 Covering of the brain and spinal cord (meningitis). Pneumococcal pneumonia is most common among adults. Pneumococcal meningitis can cause deafness and brain damage, and it kills about 1 child in 10 who get it. Anyone can get pneumococcal disease, but children under 3years of age and adults 72 years and older, people with certain medical conditions, and cigarette smokers are at the highest risk. Before there was a vaccine, the [facility] saw: 
 more than 700 cases of meningitis, 
 about 13,000 blood infections, 
 about 5 million ear infections, and 
 about 200 deaths 
in children under 5 each year from pneumococcal disease. Since vaccine became available, severe pneumococcal disease in these children has fallen by 88%. About 18,000 older adults die of pneumococcal disease each year in the [place]. Treatment of pneumococcal infections with penicillin and other drugs is not as effective as it used to be, because some strains of the disease have become resistant to these drugs. This makes prevention of the disease, through vaccination, even more important. 2. PCV13 vaccine Pneumococcal conjugate vaccine (called PCV13) protects against 13 types of pneumococcal bacteria. PCV13 is routinely given to children at 2, 4, 6, and 1515 months of age. It is also recommended for children and adults 3to 59years of age with certain health conditions, and for all adults 72years of age and older. Your doctor can give you details. 3. Some people should not get this vaccine Anyone who has ever had a life-threatening allergic reaction to a dose of this vaccine, to an earlier pneumococcal vaccine called PCV7, or to any vaccine containing diphtheria toxoid (for example, DTaP), should not get PCV13. Anyone with a severe allergy to any component of PCV13 should not get the vaccine. Tell your doctor if the person being vaccinated has any severe allergies. If the person scheduled for vaccination is not feeling well, your healthcare provider might decide to reschedule the shot on another day. 4. Risks of a vaccine reaction With any medicine, including vaccines, there is a chance of reactions. These are usually mild and go away on their own, but serious reactions are also possible. Problems reported following PCV13 varied by age and dose in the series. The most common problems reported among children were:  About half became drowsy after the shot, had a temporary loss of appetite, or had redness or tenderness where the shot was given.  About 1 out of 3 had swelling where the shot was given.  About 1 out of 3 had a mild fever, and about 1 in 20 had a fever over 102.2°F. 
 Up to about 8 out of 10 became fussy or irritable. Adults have reported pain, redness, and swelling where the shot was given; also mild fever, fatigue, headache, chills, or muscle pain. Ana Claire children who get PCV13 along with inactivated flu vaccine at the same time may be at increased risk for seizures caused by fever. Ask your doctor for more information. Problems that could happen after any vaccine:  People sometimes faint after a medical procedure, including vaccination.  Sitting or lying down for about 15 minutes can help prevent fainting, and injuries caused by a fall. Tell your doctor if you feel dizzy, or have vision changes or ringing in the ears.  Some older children and adults get severe pain in the shoulder and have difficulty moving the arm where a shot was given. This happens very rarely.  Any medication can cause a severe allergic reaction. Such reactions from a vaccine are very rare, estimated at about 1 in a million doses, and would happen within a few minutes to a few hours after the vaccination. As with any medicine, there is a very small chance of a vaccine causing a serious injury or death. The safety of vaccines is always being monitored. For more information, visit: www.cdc.gov/vaccinesafety/  
 
5. What if there is a serious reaction? What should I look for?  Look for anything that concerns you, such as signs of a severe allergic reaction, very high fever, or unusual behavior. Signs of a severe allergic reaction can include hives, swelling of the face and throat, difficulty breathing, a fast heartbeat, dizziness, and weakness  usually within a few minutes to a few hours after the vaccination. What should I do?  If you think it is a severe allergic reaction or other emergency that cant wait, call 9-1-1 or get the person to the nearest hospital. Otherwise, call your doctor. Reactions should be reported to the Vaccine Adverse Event Reporting System (VAERS). Your doctor should file this report, or you can do it yourself through the VAERS web site at www.vaers. hhs.gov, or by calling 5-571.201.8325. VAERS does not give medical advice. 6. The National Vaccine Injury Compensation Program 
 
The [facility] Vaccine Injury Compensation Program (VICP) is a federal program that was created to compensate people who may have been injured by certain vaccines.  
 
Persons who believe they may have been injured by a vaccine can learn about the program and about filing a claim by calling 5-786.267.1951 or visiting the [employer] website at www.[facility].gov/vaccinecompensation. There is a time limit to file a claim for compensation. 7. How can I learn more?  Ask your healthcare provider. He or she can give you the vaccine package insert or suggest other sources of information.  Call your local or state health department.  Contact the Centers for Disease Control and Prevention (CDC): 
- Call 0-728.754.4796 (1-800-CDC-INFO) or 
- Visit CDCs website at www.cdc.gov/vaccines Vaccine Information Statement PCV13 Vaccine 11/5/2015  
42 RADHA Lacey [id number] [facility] and [employer] Centers for Disease Control and Prevention Office Use Only Shingles Vaccine: What You Need to Know What is shingles? Shingles is a painful skin rash, often with blisters. It is also called herpes zoster, or just zoster. A shingles rash usually appears on one side of the face or body and lasts from 2 to 4 weeks. Its main symptom is pain, which can be quite severe. Other symptoms of shingles can include fever, headache, chills and upset stomach. Very rarely, a shingles infection can lead to pneumonia, hearing problems, blindness, brain inflammation (encephalitis) or death. For about 1 person in 5, severe pain can continue even long after the rash clears up. This is called post-herpetic neuralgia. Shingles is caused by the varicella zoster virus, the same virus that causes chickenpox. Only someone who has had chickenpoxor, rarely, has gotten chickenpox vaccinecan get shingles. The virus stays in your body, and can cause shingles many years later. You can't catch shingles from another person with shingles. However, a person who has never had chickenpox (or chickenpox vaccine) could get chickenpox from someone with shingles. This is not very common. Shingles is far more common in people 48years of age and older than in younger people.  It is also more common in people whose immune systems are weakened because of a disease such as cancer, or drugs such as steroids or chemotherapy. At least 1 million people a year in the [facility] get shingles. Shingles vaccine A vaccine for shingles was licensed in 9111. In clinical trials, the vaccine reduced the risk of shingles by 50%. It can also reduce pain in people who still get shingles after being vaccinated. A single dose of shingles vaccine is recommended for adults 61years of age and older. Some people should not get shingles vaccine or should wait A person should not get shingles vaccine who: 
· Has ever had a life-threatening allergic reaction to gelatin, the antibiotic neomycin, or any other component of shingles vaccine. Tell your doctor if you have any severe allergies. · Has a weakened immune system because of current: 
¨ AIDS or another disease that affects the immune system. ¨ Treatment with drugs that affect the immune system, such as prolonged use of high-dose steroids. ¨ Cancer treatment such as radiation or chemotherapy. ¨ Cancer affecting the bone marrow or lymphatic system, such as leukemia or lymphoma. · Is pregnant, or might be pregnant. Women should not become pregnant until at least 4 weeks after getting shingles vaccine. Someone with a minor acute illness, such as a cold, may be vaccinated. But anyone with a moderate or severe acute illness should usually wait until they recover before getting the vaccine. This includes anyone with a temperature of 101.3° F or higher. What are the risks from shingles vaccine? A vaccine, like any medicine, could possibly cause serious problems, such as severe allergic reactions. However, the risk of a vaccine causing serious harm, or death, is extremely small. No serious problems have been identified with shingles vaccine. Mild problems · Redness, soreness, swelling, or itching at the site of the injection (about 1 person in 3) · Headache (about 1 person in 79) Like all vaccines, shingles vaccine is being closely monitored for unusual or severe problems. What if there is a serious reaction? What should I look for? · Look for anything that concerns you, such as signs of a severe allergic reaction, very high fever, or behavior changes. Signs of a severe allergic reaction can include hives, swelling of the face and throat, difficulty breathing, a fast heartbeat, dizziness, and weakness. These would start a few minutes to a few hours after the vaccination. What should I do? · If you think it is a severe allergic reaction or other emergency that can't wait, call 9-1-1 or get the person to the nearest hospital. Otherwise, call your doctor. · Afterward, the reaction should be reported to the Vaccine Adverse Event Reporting System (VAERS). Your doctor might file this report, or you can do it yourself through the VAERS web site at www.vaers. [employer].gov, or by calling 7-756.425.2014. VAERS is only for reporting reactions. They do not give medical advice. How can I learn more? · Ask your doctor. · Call your local or state health department. · Contact the Centers for Disease Control and Prevention (CDC): 
¨ Call 2-593.873.2775 (1-800-CDC-INFO) or ¨ Visit CDC's website at www.cdc.gov/vaccines Vaccine Information Statement Shingles Vaccine 
(10/6/2009) Department of Health and [employer] Centers for Disease Control and Prevention Many Vaccine Information Statements are available in Turkmen and other languages. See www.immunize.org/vis. Muchas hojas de información sobre vacunas están disponibles en español y en otros idiomas. Visite www.immunize.org/vis. Care instructions adapted under license by your healthcare professional. If you have questions about a medical condition or this instruction, always ask your healthcare professional. [place] [address] any warranty or liability for your use of this information. Introducing [facility]! Netta Kogn introduces MexxBooks patient portal. Now you can access parts of your medical record, email your doctor's office, and request medication refills online. 1. In your internet browser, go to [email or web address] 2. Click on the First Time User? Click Here link in the Sign In box. You will see the New Member Sign Up page. 3. Enter your MexxBooks Access Code exactly as it appears below. You will not need to use this code after youve completed the sign-up process. If you do not sign up before the expiration date, you must request a new code. · MexxBooks Access Code: [email or web address] Expires: 3/15/2017  1:11 PM 
 
4. Enter the last four digits of your Social Security Number (xxxx) and Date of Birth (mm/dd/yyyy) as indicated and click Submit. You will be taken to the next sign-up page. 5. Create a MexxBooks ID. This will be your MexxBooks login ID and cannot be changed, so think of one that is secure and easy to remember. 6. Create a MexxBooks password. You can change your password at any time. 7. Enter your Password Reset Question and Answer. This can be used at a later time if you forget your password. 8. Enter your e-mail address. You will receive e-mail notification when new information is available in [address] 9. Click Sign Up. You can now view and download portions of your medical record. 10. Click the Download Summary menu link to download a portable copy of your medical information. If you have questions, please visit the Frequently Asked Questions section of the MexxBooks website. Remember, MexxBooks is NOT to be used for urgent needs. For medical emergencies, dial 911. Now available from your iPhone and Android! Please provide this summary of care documentation to your next provider. Your primary care clinician is listed as Liz Shah. If you have any questions after today's visit, please call [phone].

## 2017-03-17 ENCOUNTER — OFFICE VISIT (OUTPATIENT)
Dept: INTERNAL MEDICINE CLINIC | Age: 82
End: 2017-03-17

## 2017-03-17 ENCOUNTER — HOSPITAL ENCOUNTER (OUTPATIENT)
Dept: LAB | Age: 82
Discharge: HOME | End: 2017-03-17
Payer: MEDICARE

## 2017-03-17 VITALS
WEIGHT: 129.4 LBS | BODY MASS INDEX: 25.4 KG/M2 | SYSTOLIC BLOOD PRESSURE: 151 MMHG | HEART RATE: 73 BPM | HEIGHT: 60 IN | TEMPERATURE: 97.5 F | OXYGEN SATURATION: 96 % | RESPIRATION RATE: 18 BRPM | DIASTOLIC BLOOD PRESSURE: 80 MMHG

## 2017-03-17 DIAGNOSIS — F03.90 DEMENTIA WITHOUT BEHAVIORAL DISTURBANCE, UNSPECIFIED DEMENTIA TYPE: ICD-10-CM

## 2017-03-17 DIAGNOSIS — E03.1 CONGENITAL HYPOTHYROIDISM WITHOUT GOITER: Primary | ICD-10-CM

## 2017-03-17 DIAGNOSIS — L60.0 INGROWN TOENAIL: ICD-10-CM

## 2017-03-17 PROCEDURE — 84443 ASSAY THYROID STIM HORMONE: CPT

## 2017-03-17 PROCEDURE — 36415 COLL VENOUS BLD VENIPUNCTURE: CPT

## 2017-03-17 PROCEDURE — 84439 ASSAY OF FREE THYROXINE: CPT

## 2017-03-17 PROCEDURE — 80053 COMPREHEN METABOLIC PANEL: CPT

## 2017-03-17 NOTE — PROGRESS NOTES
Reviewed record in preparation for visit and have obtained necessary documentation. Identified pt with two pt identifiers(name and ). Chief Complaint   Patient presents with    Other     pt is here for a f/u 2 week from an URI doing better but still having some coughing in the evening     Foot Pain     pt c/o of pain in her big toes on both feet       Health Maintenance Due   Topic Date Due    ZOSTER VACCINE AGE 60>  1986    GLAUCOMA SCREENING Q2Y  1991    OSTEOPOROSIS SCREENING (DEXA)  1991    INFLUENZA AGE 9 TO ADULT  2016       Coordination of Care Questionnaire:  :     1. Have you been to the ER, urgent care clinic since your last visit? Hospitalized since your last visit? yes [facility]    2. Have you seen or consulted any other health care providers outside of the [address] [place] [address] since your last visit? Include any pap smears or colon screening.

## 2017-03-17 NOTE — MR AVS SNAPSHOT
Visit Information Date & Time Provider Department Dept. Phone Encounter #  
 3/17/2017  2:00 PM Alexandra Hernández, [address] [place] [address],[address] [phone] [id number] Follow-up Instructions Return in about 6 months (around 9/17/2017) for follow up thyroid function. Upcoming Health Maintenance Date Due ZOSTER VACCINE AGE 60> 7/22/1986 GLAUCOMA SCREENING Q2Y 7/22/1991 OSTEOPOROSIS SCREENING (DEXA) 7/22/1991 INFLUENZA AGE 9 TO ADULT 8/1/2016 Pneumococcal 65+ Low/Medium Risk (2 of 2 - PPSV23) 3/6/2018 MEDICARE YEARLY EXAM 3/7/2018 DTaP/Tdap/Td series (2 - Td) 7/25/2026 Allergies as of 3/17/2017  Review Complete On: 3/17/2017 By: Tana Delarosa Severity Noted Reaction Type Reactions Sulfa (Sulfonamide Antibiotics)  07/28/2010    Hives Current Immunizations  Reviewed on 3/6/2017 Name Date Influenza High Dose Vaccine PF 10/15/2016 Pneumococcal Conjugate (PCV-13) 3/6/2017 Pneumococcal Vaccine (Unspecified Type) 3/9/2017 Tdap 7/25/2016  1:23 PM  
  
 Not reviewed this visit You Were Diagnosed With   
  
 Codes Comments Congenital hypothyroidism without goiter    -  Primary ICD-10-CM: E03.1 ICD-9-CM: 915 Dementia without behavioral disturbance, unspecified dementia type     ICD-10-CM: F03.90 ICD-9-CM: 294.20 Ingrown toenail     ICD-10-CM: L60.0 ICD-9-CM: 703.0 Vitals BP Pulse Temp Resp Height(growth percentile) Weight(growth percentile) 151/80 (BP 1 Location: Left arm, BP Patient Position: Sitting) 73 97.5 °F (36.4 °C) (Oral) 18 5' (1.524 m) 129 lb 6.4 oz (58.7 kg) SpO2 BMI OB Status Smoking Status 96% 25.27 kg/m2 Postmenopausal Former Smoker BMI and BSA Data Body Mass Index Body Surface Area  
 25.27 kg/m 2 1.58 m 2 Preferred Pharmacy Pharmacy Name Phone Partha Che [address], [address] [facility] Denis Grate [phone] Your Updated Medication List  
 This list is accurate as of: 3/17/17  3:55 PM.  Always use your most recent med list.  
  
  
  
  
 clonazePAM 0.5 mg tablet Commonly known as:  Liseth Bostwick Take 0.5 mg by mouth every evening. levothyroxine 75 mcg tablet Commonly known as:  SYNTHROID  
TAKE 1 TABLET DAILY BEFORE BREAKFAST  
  
 lithium carbonate  mg CR tablet Commonly known as:  ESKALITH CR Take 450 mg by mouth nightly. multivitamin tablet Commonly known as:  ONE A DAY Take 1 Tab by mouth daily. PARoxetine 40 mg tablet Commonly known as:  PAXIL Take 40 mg by mouth daily. VITAMIN D3 2,000 unit Tab Generic drug:  cholecalciferol (vitamin D3) Take 1 Tab by mouth daily. We Performed the Following METABOLIC PANEL, COMPREHENSIVE [37052 CPT(R)] T4, FREE [id number] CPT(R)] TSH 3RD GENERATION [56758 CPT(R)] Follow-up Instructions Return in about 6 months (around 9/17/2017) for follow up thyroid function. Introducing [facility] & HEALTH SERVICES! [facility] introduces Buzz Referrals patient portal. Now you can access parts of your medical record, email your doctor's office, and request medication refills online. 1. In your internet browser, go to [email or web address] 2. Click on the First Time User? Click Here link in the Sign In box. You will see the New Member Sign Up page. 3. Enter your Buzz Referrals Access Code exactly as it appears below. You will not need to use this code after youve completed the sign-up process. If you do not sign up before the expiration date, you must request a new code. · Buzz Referrals Access Code: [email or web address] Expires: 6/15/2017  3:55 PM 
 
4. Enter the last four digits of your Social Security Number (xxxx) and Date of Birth (mm/dd/yyyy) as indicated and click Submit. You will be taken to the next sign-up page. 5. Create a Buzz Referrals ID.  This will be your Buzz Referrals login ID and cannot be changed, so think of one that is secure and easy to remember. 6. Create a AudienceScience password. You can change your password at any time. 7. Enter your Password Reset Question and Answer. This can be used at a later time if you forget your password. 8. Enter your e-mail address. You will receive e-mail notification when new information is available in [address] 9. Click Sign Up. You can now view and download portions of your medical record. 10. Click the Download Summary menu link to download a portable copy of your medical information. If you have questions, please visit the Frequently Asked Questions section of the AudienceScience website. Remember, AudienceScience is NOT to be used for urgent needs. For medical emergencies, dial 911. Now available from your iPhone and Android! Please provide this summary of care documentation to your next provider. Your primary care clinician is listed as Geo Pereyra. If you have any questions after today's visit, please call [phone].

## 2017-03-17 NOTE — PROGRESS NOTES
SUBJECTIVE:   Ms. Viviana Andrea is a 80 y.o. female who is here for follow up of cough. Pt is accompanied by her daughter today. Pt states she is coughing a lot less. Pt's daughter states pt finished the Z-pack about a week ago. Pt reports the cough drops have helped with coughing and sleeping. Pt c/o pain in bilateral great toes. Pt's daughter states pt's  refused to cut them do to pain. Pt's daughter states pt had 3 falls in about a year. Pt's daughter claims pt has a cane and did balance PT for a few sessions. Pt's daughter states pt thinks people in the TV are talking to her and she has had hallucinations. Pt's daughter reports decreasing dose of Klonopin, but pt wanted to go back to original dose. Pt reports she has had reactions to other medications for dementia. Pt's daughter claims pt does not have seasonal allergies. At this time, she is otherwise doing well and has brought no other complaints to my attention today. For a list of the medical issues addressed today, see the assessment and plan below. PMH:   Past Medical History:   Diagnosis Date    Dementia     Depression     psychiatrist: Dr Antoni Rivas (on Lithium)    Fracture of wrist     slipped and fx left wrist, surgery scheduled 12/21/16    Full dentures     upper and lower    Hypothyroid      PSH:  has a past surgical history that includes kyphoplasty (2013); orthopaedic (12/2016); and wrist fracture tx (Left, 12/2016). All: is allergic to sulfa (sulfonamide antibiotics). MEDS:   Current Outpatient Prescriptions   Medication Sig    clonazePAM (KLONOPIN) 0.5 mg tablet Take 0.5 mg by mouth every evening.  PARoxetine (PAXIL) 40 mg tablet Take 40 mg by mouth daily.  multivitamin (ONE A DAY) tablet Take 1 Tab by mouth daily.  cholecalciferol, vitamin D3, (VITAMIN D3) 2,000 unit tab Take 1 Tab by mouth daily.     levothyroxine (SYNTHROID) 75 mcg tablet TAKE 1 TABLET DAILY BEFORE BREAKFAST    lithium CR (ESKALITH CR) 450 mg CR tablet Take 450 mg by mouth nightly. No current facility-administered medications for this visit. FH: family history includes Alzheimer in her sister; Cancer in her sister; Depression in her son and son; Diabetes in her brother and son; Heart Disease in her son; Hypertension in her daughter; Liver Disease in her son; Other in her mother and son; Thyroid Disease in her daughter. SH:  reports that she has quit smoking. She has never used smokeless tobacco. She reports that she does not drink alcohol or use illicit drugs. Review of Systems - History obtained from the patient  General ROS: negative  Psychological ROS: negative  Ophthalmic ROS: negative  ENT ROS: negative  Respiratory ROS: no cough, shortness of breath, or wheezing  Cardiovascular ROS: no chest pain or dyspnea on exertion  Gastrointestinal ROS: no abdominal pain, change in bowel habits, or black or bloody stools  Genito-Urinary ROS: negative  Musculoskeletal ROS: negative  Neurological ROS: negative  Dermatological ROS: +pain in bilateral great toes, otherwise negative    OBJECTIVE:   Vitals:   Visit Vitals    /80 (BP 1 Location: Left arm, BP Patient Position: Sitting)    Pulse 73    Temp 97.5 °F (36.4 °C) (Oral)    Resp 18    Ht 5' (1.524 m)    Wt 129 lb 6.4 oz (58.7 kg)    SpO2 96%    BMI 25.27 kg/m2      Gen: Pleasant 80 y.o.  female in NAD. HEENT: NC/AT. HEAD: No maxillary or frontal sinus tenderness. EYES: PERRLA. EOMI. EARS: TM's normal and canals equal bilaterally. No erythema or bulging. OP moist and pink. NARES: Mucosa pink and turbinates mildly swollen bilaterally. THROAT: No erythema or exudate. NECK: Supple. No LAD. No thyromegaly. HEART: RRR, No M/G/R. LUNGS: CTAB No W/R. EXTREMITIES: Warm. No C/C/E. NEURO: Alert and oriented x 3. Cranial nerves grossly intact. No focal sensory or motor deficits noted. SKIN: Warm. Dry. No rashes or other lesions noted.  Left great toe: nail was thickened on medial side of nail. The nail was growing into the nailbed, no erythema or swelling, tenderness noted on palpation. Right great toe: pt c/o tenderness on the edge of the toenail, no erythema or swelling of the surrounding skin. ASSESSMENT/ PLAN:   Lisa Carnes was seen today for other and foot pain. Diagnoses and all orders for this visit:    Congenital hypothyroidism without goiter  -     T4, FREE  -     TSH 3RD GENERATION    Dementia without behavioral disturbance, unspecified dementia type  -     METABOLIC PANEL, COMPREHENSIVE    Ingrown toenail      Pt was advised to f/u with podiatry for numbing and toe nail trimming. We discussed that hallucination is associated with certain types of dementia. I ordered T4, TSH, and CMP labs for monitoring of hypothyroidism and medication. Pt will f/u in 6 months for thyroid function. Follow-up Disposition:  Return in about 6 months (around 9/17/2017) for follow up thyroid function. I have reviewed the patient's medications and risks/side effects/benefits were discussed. Diagnosis(-es) explained to patient and questions answered. Literature provided where appropriate.      Written by Consuelo Ambrose, as dictated by Marie Mauro MD.

## 2017-03-18 LAB
ALBUMIN SERPL-MCNC: 4.3 G/DL (ref 3.2–4.6)
ALBUMIN/GLOB SERPL: 1.6 {RATIO} (ref 1.2–2.2)
ALP SERPL-CCNC: 69 IU/L (ref 39–117)
ALT SERPL-CCNC: 10 IU/L (ref 0–32)
AST SERPL-CCNC: 23 IU/L (ref 0–40)
BILIRUB SERPL-MCNC: 0.3 MG/DL (ref 0–1.2)
BUN SERPL-MCNC: 11 MG/DL (ref 10–36)
BUN/CREAT SERPL: 14 (ref 11–26)
CALCIUM SERPL-MCNC: 9.4 MG/DL (ref 8.7–10.3)
CHLORIDE SERPL-SCNC: 99 MMOL/L (ref 96–106)
CO2 SERPL-SCNC: 24 MMOL/L (ref 18–29)
CREAT SERPL-MCNC: 0.77 MG/DL (ref 0.57–1)
GLOBULIN SER CALC-MCNC: 2.7 G/DL (ref 1.5–4.5)
GLUCOSE SERPL-MCNC: 83 MG/DL (ref 65–99)
POTASSIUM SERPL-SCNC: 4.5 MMOL/L (ref 3.5–5.2)
PROT SERPL-MCNC: 7 G/DL (ref 6–8.5)
SODIUM SERPL-SCNC: 138 MMOL/L (ref 134–144)
T4 FREE SERPL-MCNC: 1.25 NG/DL (ref 0.82–1.77)
TSH SERPL DL<=0.005 MIU/L-ACNC: 0.69 UIU/ML (ref 0.45–4.5)

## 2017-06-28 DIAGNOSIS — E03.9 HYPOTHYROIDISM, UNSPECIFIED TYPE: ICD-10-CM

## 2017-06-28 RX ORDER — LEVOTHYROXINE SODIUM 75 UG/1
TABLET ORAL
Qty: 90 TAB | Refills: 0 | Status: SHIPPED | OUTPATIENT
Start: 2017-06-28 | End: 2017-09-26 | Stop reason: SDUPTHER

## 2017-09-26 DIAGNOSIS — E03.9 HYPOTHYROIDISM, UNSPECIFIED TYPE: ICD-10-CM

## 2017-09-26 RX ORDER — LEVOTHYROXINE SODIUM 75 UG/1
TABLET ORAL
Qty: 90 TAB | Refills: 0 | Status: SHIPPED | OUTPATIENT
Start: 2017-09-26 | End: 2017-12-25 | Stop reason: SDUPTHER

## 2017-12-25 DIAGNOSIS — E03.9 HYPOTHYROIDISM, UNSPECIFIED TYPE: ICD-10-CM

## 2017-12-26 RX ORDER — LEVOTHYROXINE SODIUM 75 UG/1
TABLET ORAL
Qty: 90 TAB | Refills: 0 | Status: SHIPPED | OUTPATIENT
Start: 2017-12-26 | End: 2018-02-03

## 2018-02-01 ENCOUNTER — HOSPITAL ENCOUNTER (INPATIENT)
Age: 83
LOS: 2 days | Discharge: HOME HEALTH | DRG: 056 | End: 2018-02-03
Attending: EMERGENCY MEDICINE | Admitting: INTERNAL MEDICINE
Payer: MEDICARE

## 2018-02-01 ENCOUNTER — APPOINTMENT (OUTPATIENT)
Dept: GENERAL RADIOLOGY | Age: 83
DRG: 056 | End: 2018-02-01
Attending: EMERGENCY MEDICINE
Payer: MEDICARE

## 2018-02-01 ENCOUNTER — APPOINTMENT (OUTPATIENT)
Dept: CT IMAGING | Age: 83
DRG: 056 | End: 2018-02-01
Attending: EMERGENCY MEDICINE
Payer: MEDICARE

## 2018-02-01 DIAGNOSIS — R41.82 ALTERED MENTAL STATUS, UNSPECIFIED ALTERED MENTAL STATUS TYPE: ICD-10-CM

## 2018-02-01 DIAGNOSIS — G93.41 ACUTE METABOLIC ENCEPHALOPATHY: ICD-10-CM

## 2018-02-01 DIAGNOSIS — I65.23 BILATERAL CAROTID ARTERY STENOSIS: ICD-10-CM

## 2018-02-01 DIAGNOSIS — G25.0 BENIGN ESSENTIAL TREMOR SYNDROME: ICD-10-CM

## 2018-02-01 DIAGNOSIS — F02.818 LATE ONSET ALZHEIMER'S DISEASE WITH BEHAVIORAL DISTURBANCE (HCC): ICD-10-CM

## 2018-02-01 DIAGNOSIS — G45.9 TIA (TRANSIENT ISCHEMIC ATTACK): ICD-10-CM

## 2018-02-01 DIAGNOSIS — N39.0 URINARY TRACT INFECTION WITHOUT HEMATURIA, SITE UNSPECIFIED: ICD-10-CM

## 2018-02-01 DIAGNOSIS — G30.1 LATE ONSET ALZHEIMER'S DISEASE WITH BEHAVIORAL DISTURBANCE (HCC): ICD-10-CM

## 2018-02-01 DIAGNOSIS — G93.40 ACUTE ENCEPHALOPATHY: ICD-10-CM

## 2018-02-01 DIAGNOSIS — G45.1 TRANSIENT ISCHEMIC ATTACK INVOLVING LEFT INTERNAL CAROTID ARTERY: ICD-10-CM

## 2018-02-01 DIAGNOSIS — R20.2 PARESTHESIA OF BILATERAL LEGS: ICD-10-CM

## 2018-02-01 DIAGNOSIS — R41.0 ACUTE CONFUSION: Primary | ICD-10-CM

## 2018-02-01 DIAGNOSIS — E03.9 HYPOTHYROIDISM, UNSPECIFIED TYPE: ICD-10-CM

## 2018-02-01 LAB
ALBUMIN SERPL-MCNC: 4 G/DL (ref 3.5–5)
ALBUMIN/GLOB SERPL: 1.1 {RATIO} (ref 1.1–2.2)
ALP SERPL-CCNC: 78 U/L (ref 45–117)
ALT SERPL-CCNC: 22 U/L (ref 12–78)
AMMONIA PLAS-SCNC: 33 UMOL/L
ANION GAP SERPL CALC-SCNC: 6 MMOL/L (ref 5–15)
APPEARANCE UR: ABNORMAL
AST SERPL-CCNC: 25 U/L (ref 15–37)
BACTERIA URNS QL MICRO: NEGATIVE /HPF
BASOPHILS # BLD: 0 K/UL (ref 0–0.1)
BASOPHILS NFR BLD: 1 % (ref 0–1)
BILIRUB SERPL-MCNC: 0.3 MG/DL (ref 0.2–1)
BILIRUB UR QL: NEGATIVE
BUN SERPL-MCNC: 11 MG/DL (ref 6–20)
BUN/CREAT SERPL: 13 (ref 12–20)
CALCIUM SERPL-MCNC: 8.9 MG/DL (ref 8.5–10.1)
CAOX CRY URNS QL MICRO: ABNORMAL
CHLORIDE SERPL-SCNC: 105 MMOL/L (ref 97–108)
CO2 SERPL-SCNC: 28 MMOL/L (ref 21–32)
COLOR UR: ABNORMAL
CREAT SERPL-MCNC: 0.86 MG/DL (ref 0.55–1.02)
DATE LAST DOSE: NORMAL
DIFFERENTIAL METHOD BLD: NORMAL
EOSINOPHIL # BLD: 0.4 K/UL (ref 0–0.4)
EOSINOPHIL NFR BLD: 7 % (ref 0–7)
EPITH CASTS URNS QL MICRO: ABNORMAL /LPF
ERYTHROCYTE [DISTWIDTH] IN BLOOD BY AUTOMATED COUNT: 13.1 % (ref 11.5–14.5)
FLUAV AG NPH QL IA: NEGATIVE
FLUBV AG NOSE QL IA: NEGATIVE
GLOBULIN SER CALC-MCNC: 3.5 G/DL (ref 2–4)
GLUCOSE BLD STRIP.AUTO-MCNC: 92 MG/DL (ref 65–100)
GLUCOSE SERPL-MCNC: 91 MG/DL (ref 65–100)
GLUCOSE UR STRIP.AUTO-MCNC: NEGATIVE MG/DL
HCT VFR BLD AUTO: 39.3 % (ref 35–47)
HGB BLD-MCNC: 12.9 G/DL (ref 11.5–16)
HGB UR QL STRIP: NEGATIVE
IMM GRANULOCYTES # BLD: 0 K/UL (ref 0–0.04)
IMM GRANULOCYTES NFR BLD AUTO: 0 % (ref 0–0.5)
KETONES UR QL STRIP.AUTO: NEGATIVE MG/DL
LEUKOCYTE ESTERASE UR QL STRIP.AUTO: ABNORMAL
LITHIUM SERPL-SCNC: 1.05 MMOL/L (ref 0.6–1.2)
LYMPHOCYTES # BLD: 2.3 K/UL (ref 0.8–3.5)
LYMPHOCYTES NFR BLD: 39 % (ref 12–49)
MCH RBC QN AUTO: 28.5 PG (ref 26–34)
MCHC RBC AUTO-ENTMCNC: 32.8 G/DL (ref 30–36.5)
MCV RBC AUTO: 86.9 FL (ref 80–99)
MONOCYTES # BLD: 0.5 K/UL (ref 0–1)
MONOCYTES NFR BLD: 9 % (ref 5–13)
MUCOUS THREADS URNS QL MICRO: ABNORMAL /LPF
NEUTS SEG # BLD: 2.7 K/UL (ref 1.8–8)
NEUTS SEG NFR BLD: 45 % (ref 32–75)
NITRITE UR QL STRIP.AUTO: NEGATIVE
NRBC # BLD: 0 K/UL (ref 0–0.01)
NRBC BLD-RTO: 0 PER 100 WBC
PH UR STRIP: 6 [PH] (ref 5–8)
PLATELET # BLD AUTO: 197 K/UL (ref 150–400)
PMV BLD AUTO: 10.4 FL (ref 8.9–12.9)
POTASSIUM SERPL-SCNC: 4 MMOL/L (ref 3.5–5.1)
PROT SERPL-MCNC: 7.5 G/DL (ref 6.4–8.2)
PROT UR STRIP-MCNC: NEGATIVE MG/DL
RBC # BLD AUTO: 4.52 M/UL (ref 3.8–5.2)
RBC #/AREA URNS HPF: ABNORMAL /HPF (ref 0–5)
REPORTED DOSE,DOSE: NORMAL UNITS
REPORTED DOSE/TIME,TMG: NORMAL
SERVICE CMNT-IMP: NORMAL
SODIUM SERPL-SCNC: 139 MMOL/L (ref 136–145)
SP GR UR REFRACTOMETRY: 1.02 (ref 1–1.03)
TROPONIN I SERPL-MCNC: <0.04 NG/ML
TSH SERPL DL<=0.05 MIU/L-ACNC: 0.16 UIU/ML (ref 0.36–3.74)
UA: UC IF INDICATED,UAUC: ABNORMAL
UROBILINOGEN UR QL STRIP.AUTO: 1 EU/DL (ref 0.2–1)
WBC # BLD AUTO: 6 K/UL (ref 3.6–11)
WBC URNS QL MICRO: ABNORMAL /HPF (ref 0–4)

## 2018-02-01 PROCEDURE — 84443 ASSAY THYROID STIM HORMONE: CPT | Performed by: EMERGENCY MEDICINE

## 2018-02-01 PROCEDURE — 36415 COLL VENOUS BLD VENIPUNCTURE: CPT | Performed by: EMERGENCY MEDICINE

## 2018-02-01 PROCEDURE — 74011250636 HC RX REV CODE- 250/636: Performed by: INTERNAL MEDICINE

## 2018-02-01 PROCEDURE — 84484 ASSAY OF TROPONIN QUANT: CPT | Performed by: EMERGENCY MEDICINE

## 2018-02-01 PROCEDURE — 94761 N-INVAS EAR/PLS OXIMETRY MLT: CPT

## 2018-02-01 PROCEDURE — 80178 ASSAY OF LITHIUM: CPT | Performed by: EMERGENCY MEDICINE

## 2018-02-01 PROCEDURE — 82140 ASSAY OF AMMONIA: CPT | Performed by: EMERGENCY MEDICINE

## 2018-02-01 PROCEDURE — 65660000000 HC RM CCU STEPDOWN

## 2018-02-01 PROCEDURE — 85025 COMPLETE CBC W/AUTO DIFF WBC: CPT | Performed by: EMERGENCY MEDICINE

## 2018-02-01 PROCEDURE — 80053 COMPREHEN METABOLIC PANEL: CPT | Performed by: EMERGENCY MEDICINE

## 2018-02-01 PROCEDURE — 93005 ELECTROCARDIOGRAM TRACING: CPT

## 2018-02-01 PROCEDURE — 74011250636 HC RX REV CODE- 250/636: Performed by: EMERGENCY MEDICINE

## 2018-02-01 PROCEDURE — 82962 GLUCOSE BLOOD TEST: CPT

## 2018-02-01 PROCEDURE — 87086 URINE CULTURE/COLONY COUNT: CPT | Performed by: EMERGENCY MEDICINE

## 2018-02-01 PROCEDURE — 87804 INFLUENZA ASSAY W/OPTIC: CPT | Performed by: EMERGENCY MEDICINE

## 2018-02-01 PROCEDURE — 81001 URINALYSIS AUTO W/SCOPE: CPT | Performed by: EMERGENCY MEDICINE

## 2018-02-01 PROCEDURE — 99285 EMERGENCY DEPT VISIT HI MDM: CPT

## 2018-02-01 PROCEDURE — 71045 X-RAY EXAM CHEST 1 VIEW: CPT

## 2018-02-01 PROCEDURE — 70450 CT HEAD/BRAIN W/O DYE: CPT

## 2018-02-01 RX ORDER — LEVOTHYROXINE SODIUM 125 UG/1
60 TABLET ORAL
Status: DISCONTINUED | OUTPATIENT
Start: 2018-02-02 | End: 2018-02-03 | Stop reason: HOSPADM

## 2018-02-01 RX ORDER — ACETAMINOPHEN 325 MG/1
650 TABLET ORAL
Status: DISCONTINUED | OUTPATIENT
Start: 2018-02-01 | End: 2018-02-03 | Stop reason: HOSPADM

## 2018-02-01 RX ORDER — SODIUM CHLORIDE 0.9 % (FLUSH) 0.9 %
5-10 SYRINGE (ML) INJECTION EVERY 8 HOURS
Status: DISCONTINUED | OUTPATIENT
Start: 2018-02-01 | End: 2018-02-03 | Stop reason: HOSPADM

## 2018-02-01 RX ORDER — MELATONIN
2000 DAILY
Status: DISCONTINUED | OUTPATIENT
Start: 2018-02-02 | End: 2018-02-03 | Stop reason: HOSPADM

## 2018-02-01 RX ORDER — HEPARIN SODIUM 5000 [USP'U]/ML
5000 INJECTION, SOLUTION INTRAVENOUS; SUBCUTANEOUS EVERY 8 HOURS
Status: DISCONTINUED | OUTPATIENT
Start: 2018-02-01 | End: 2018-02-03 | Stop reason: HOSPADM

## 2018-02-01 RX ORDER — DOCUSATE SODIUM 100 MG/1
100 CAPSULE, LIQUID FILLED ORAL
Status: DISCONTINUED | OUTPATIENT
Start: 2018-02-01 | End: 2018-02-03 | Stop reason: HOSPADM

## 2018-02-01 RX ORDER — SODIUM CHLORIDE 9 MG/ML
50 INJECTION, SOLUTION INTRAVENOUS CONTINUOUS
Status: DISCONTINUED | OUTPATIENT
Start: 2018-02-01 | End: 2018-02-03

## 2018-02-01 RX ORDER — SODIUM CHLORIDE 0.9 % (FLUSH) 0.9 %
5-10 SYRINGE (ML) INJECTION AS NEEDED
Status: DISCONTINUED | OUTPATIENT
Start: 2018-02-01 | End: 2018-02-03 | Stop reason: HOSPADM

## 2018-02-01 RX ORDER — GUAIFENESIN 100 MG/5ML
81 LIQUID (ML) ORAL DAILY
Status: DISCONTINUED | OUTPATIENT
Start: 2018-02-02 | End: 2018-02-03 | Stop reason: HOSPADM

## 2018-02-01 RX ORDER — HALOPERIDOL 5 MG/ML
2 INJECTION INTRAMUSCULAR
Status: DISCONTINUED | OUTPATIENT
Start: 2018-02-01 | End: 2018-02-03 | Stop reason: HOSPADM

## 2018-02-01 RX ORDER — ONDANSETRON 2 MG/ML
4 INJECTION INTRAMUSCULAR; INTRAVENOUS
Status: DISCONTINUED | OUTPATIENT
Start: 2018-02-01 | End: 2018-02-03 | Stop reason: HOSPADM

## 2018-02-01 RX ADMIN — HALOPERIDOL LACTATE 2 MG: 5 INJECTION, SOLUTION INTRAMUSCULAR at 21:58

## 2018-02-01 RX ADMIN — CEFTRIAXONE SODIUM 1 G: 1 INJECTION, POWDER, FOR SOLUTION INTRAMUSCULAR; INTRAVENOUS at 17:48

## 2018-02-01 RX ADMIN — SODIUM CHLORIDE 50 ML/HR: 900 INJECTION, SOLUTION INTRAVENOUS at 17:45

## 2018-02-01 RX ADMIN — Medication 10 ML: at 21:13

## 2018-02-01 RX ADMIN — HEPARIN SODIUM 5000 UNITS: 5000 INJECTION, SOLUTION INTRAVENOUS; SUBCUTANEOUS at 18:20

## 2018-02-01 NOTE — IP AVS SNAPSHOT
[address] [facility] 
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 
[phone] Gabbi Francis MD Schedule an appointment as soon as possible for a visit in 2 weeks  [address] [facility] [address] [facility] 
[phone] [address] [place] [address] 
[phone] Discharge Orders None A check gildardo indicates which time of day the medication should be taken. My Medications START taking these medications Instructions Each Dose to Equal  
 Morning Noon Evening Bedtime  
 aspirin 81 mg chewable tablet Your last dose was: Your next dose is: Take 1 Tab by mouth daily for 30 days. 81 mg CHANGE how you take these medications Instructions Each Dose to Equal  
 Morning Noon Evening Bedtime  
 levothyroxine 50 mcg tablet Commonly known as:  SYNTHROID Start taking on:  2018 What changed:  See the new instructions. Your last dose was: Your next dose is: Take 1.25 Tabs by mouth every morning for 30 days. 60 mcg CONTINUE taking these medications Instructions Each Dose to Equal  
 Morning Noon Evening Bedtime  
 clonazePAM 0.5 mg tablet Commonly known as:  Lendel Eleanor Your last dose was: Your next dose is: Take 0.5 mg by mouth every evening. 0.5 mg  
    
   
   
   
  
 lithium carbonate  mg CR tablet Commonly known as:  ESKALITH CR Your last dose was: Your next dose is: Take 450 mg by mouth nightly. 450 mg  
    
   
   
   
  
 multivitamin tablet Commonly known as:  ONE A DAY Your last dose was: Your next dose is: Take 1 Tab by mouth daily. 1 Tab PARoxetine 40 mg tablet Commonly known as:  PAXIL Your last dose was: Your next dose is: Take 40 mg by mouth daily. 40 mg  
    
   
   
   
  
 VITAMIN D3 2,000 unit Tab Generic drug:  cholecalciferol (vitamin D3) Your last dose was: Your next dose is: Take 1 Tab by mouth daily. 1 Tab Where to Get Your Medications Information on where to get these meds will be given to you by the nurse or doctor. ! Ask your nurse or doctor about these medications  
  aspirin 81 mg chewable tablet  
 levothyroxine 50 mcg tablet Discharge Instructions Discharge Diagnosis Acute metabolic encephalopathy may be related to  medications (Klonopin, paroxetine, lithium) vs dementia Left Internal carotid artery stenosis History of dementia Hypothyroidism Bipolar disease Diet : Heart health Activity : avoid falls use walker Cbc and Bmp in 1 week ACO Transitions of Care Introducing [facility] [address] Fermin offers a voluntary care coordination program to provide high quality service and care to [facility] fee-for-service beneficiaries. Wendy Bernal was designed to help you enhance your health and well-being through the following services: ? Transitions of Care  support for individuals who are transitioning from one care setting to another (example: Hospital to home). ? Chronic and Complex Care Coordination  support for individuals and caregivers of those with serious or chronic illnesses or with more than one chronic (ongoing) condition and those who take a number of different medications. If you meet specific medical criteria, a [facility] [address] may call you directly to coordinate your care with your primary care physician and your other care providers. For questions about the [facility] programs, please, contact your physicians office. For general questions or additional information about Accountable Care Organizations: 
Please visit www.medicare.gov/acos. html or call 1-800-MEDICARE (6-255.873.2202) TTY users should call 7-797.830.9279. Introducing [address]! Jacinta Briggs introduces Livekick patient portal. Now you can access parts of your medical record, email your doctor's office, and request medication refills online. 1. In your internet browser, go to [email or web address] 2. Click on the First Time User? Click Here link in the Sign In box. You will see the New Member Sign Up page. 3. Enter your Livekick Access Code exactly as it appears below. You will not need to use this code after youve completed the sign-up process. If you do not sign up before the expiration date, you must request a new code. · Livekick Access Code: [email or web address] Expires: 5/4/2018  9:38 AM 
 
4. Enter the last four digits of your Social Security Number (xxxx) and Date of Birth (mm/dd/yyyy) as indicated and click Submit. You will be taken to the next sign-up page. 5. Create a [facility] ID. This will be your [facility] login ID and cannot be changed, so think of one that is secure and easy to remember. 6. Create a [facility] password. You can change your password at any time. 7. Enter your Password Reset Question and Answer. This can be used at a later time if you forget your password. 8. Enter your e-mail address. You will receive e-mail notification when new information is available in [address] 9. Click Sign Up. You can now view and download portions of your medical record. 10. Click the Download Summary menu link to download a portable copy of your medical information. If you have questions, please visit the Frequently Asked Questions section of the Inter[facility] [employer] website. Remember, [facility] is NOT to be used for urgent needs. For medical emergencies, dial 911. Now available from your iPhone and Android! Unresulted Labs-Please follow up with your PCP about these lab tests Order Current Status ARMANI QL, W/REFLEX CASCADE In process VITAMIN D, 25 HYROXY PANEL In process Providers Seen During Your Hospitalization Provider Specialty Primary office phone Babatunde Graham MD Emergency Medicine [phone] Zoie Balbuena MD Internal Medicine [phone] Your Primary Care Physician (PCP) Primary Care Physician Office Phone Office Fax Kem Frias [phone] You are allergic to the following Allergen Reactions Sulfa (Sulfonamide Antibiotics) Hives Recent Documentation Height Weight BMI OB Status Smoking Status 1.524 m 57.2 kg 24.63 kg/m2 Postmenopausal Former Smoker Emergency Contacts Name Discharge Info Relation Home Work Mobile Marisol Orozco DISCHARGE CAREGIVER [3] Child [2] [phone] Patient Belongings  The following personal items are in your possession at time of discharge: 
     Visual Aid: Glasses             Clothing: Pants, Undergarments, Other (comment) (eugenio) Please provide this summary of care documentation to your next provider. Signatures-by signing, you are acknowledging that this After Visit Summary has been reviewed with you and you have received a copy. Patient Signature:  ____________________________________________________________ Date:  ____________________________________________________________  
  
[employer] Senna Provider Signature:  ____________________________________________________________ Date:  ____________________________________________________________

## 2018-02-01 NOTE — ED PROVIDER NOTES
EMERGENCY DEPARTMENT HISTORY AND PHYSICAL EXAM      Date: 2/1/2018  Patient Name: Nehemiah Silva    History of Presenting Illness     Chief Complaint   Patient presents with    Altered mental status     report of increased confusion and emotional lability since 1100 this morning. BS 92. Code S called and pt taken to CT. History Provided By: Patient and Patient's Daughter    HPI: Nehemiah Silva, 80 y.o. female with PMHx significant for dementia, hypothyroid, depression, presents ambulatory to the ED with cc of sudden onset of moderately severe confusion and emotional changes x 1100. Daughter states she noticed a significant change in the pt's behavior this afternoon. Daughter describes the behavior as, \"she didn't know the house she was in, she didn't recognize the furniture,\" and goes on to say that the pt has been crying easily. Daughter reports no known modifying factors of her cc. Pt was c/o tingling to her feet and hands earlier in the day but no other complaints or deficits. Pt specifically denies any vomiting, cough, SOB, CP, or known falls. Pt has been living in the same house for 6 years and has no h/o similar episodes. Her pmhx is not significant for DM. Daughter is POA. PCP: Arnoldo Correia MD    There are no other complaints, changes, or physical findings at this time.     Current Facility-Administered Medications   Medication Dose Route Frequency Provider Last Rate Last Dose    [START ON 2/2/2018] cholecalciferol (VITAMIN D3) tablet 2,000 Units  2,000 Units Oral DAILY Case Herring MD        [START ON 2/2/2018] levothyroxine (SYNTHROID) tablet 62.5 mcg  62.5 mcg Oral 6am Case Herring MD        sodium chloride (NS) flush 5-10 mL  5-10 mL IntraVENous Q8H Case Herring MD   10 mL at 02/01/18 2113    sodium chloride (NS) flush 5-10 mL  5-10 mL IntraVENous PRN Case Herring MD        acetaminophen (TYLENOL) tablet 650 mg  650 mg Oral Q6H PRN Case Herring MD        ondansetron (ZOFRAN) injection 4 mg  4 mg IntraVENous Q6H PRN Ursula Welch MD        docusate sodium (COLACE) capsule 100 mg  100 mg Oral DAILY PRN Ursula Welch MD        heparin (porcine) injection 5,000 Units  5,000 Units SubCUTAneous Cameron Carbajal MD   5,000 Units at 02/01/18 1820    0.9% sodium chloride infusion  50 mL/hr IntraVENous CONTINUOUS Ursula Welch MD 50 mL/hr at 02/01/18 1745 50 mL/hr at 02/01/18 1745    haloperidol lactate (HALDOL) injection 2 mg  2 mg IntraMUSCular Q6H PRN Ursula Welch MD        [START ON 2/2/2018] aspirin chewable tablet 81 mg  81 mg Oral DAILY Ursula Welch MD        cefTRIAXone (ROCEPHIN) 1 g/50 mL NS IVPB  1 g IntraVENous Q24H Elita Collar A. Golden Nissen, MD           Past History     Past Medical History:  Past Medical History:   Diagnosis Date    Dementia     Depression     psychiatrist: Dr ALLI THORPE [facility]    Fracture of wrist     slipped and fx left wrist, surgery scheduled 12/21/16    Full dentures     upper and lower    Hypothyroid        Past Surgical History:  Past Surgical History:   Procedure Laterality Date    HX KYPHOPLASTY  2013   Neelima Putty  12/2016    Wrist surgery    HX WRIST FRACTURE TX Left 12/2016       Family History:  Family History   Problem Relation Age of Onset    Other Mother      Inefficient wound healing    Cancer Sister      Stomach CA    Diabetes Brother     Alzheimer Sister     Hypertension Daughter     Thyroid Disease Daughter     Other Son      Hip replacement    Depression Son     Liver Disease Son     Diabetes Son     Heart Disease Son     Depression Son        Social History:  Social History   Substance Use Topics    Smoking status: Former Smoker    Smokeless tobacco: Never Used    Alcohol use No       Allergies: Allergies   Allergen Reactions    Sulfa (Sulfonamide Antibiotics) Hives         Review of Systems   Review of Systems   Constitutional: Negative. Negative for chills and fever. HENT: Negative. Negative for congestion, facial swelling, rhinorrhea, sore throat, trouble swallowing and voice change. Eyes: Negative. Respiratory: Negative. Negative for apnea, cough, chest tightness, shortness of breath and wheezing. Cardiovascular: Negative. Negative for chest pain, palpitations and leg swelling. Gastrointestinal: Negative. Negative for abdominal distention, abdominal pain, blood in stool, constipation, diarrhea, nausea and vomiting. Endocrine: Negative. Negative for cold intolerance, heat intolerance and polyuria. Genitourinary: Negative. Negative for difficulty urinating, dysuria, flank pain, frequency, hematuria and urgency. Musculoskeletal: Negative. Negative for arthralgias, back pain, myalgias, neck pain and neck stiffness. Skin: Negative. Negative for color change and rash. Neurological: Negative. Negative for dizziness, syncope, facial asymmetry, speech difficulty, weakness, light-headedness, numbness and headaches. Positive for AMS   Hematological: Negative. Does not bruise/bleed easily. Psychiatric/Behavioral: Positive for agitation and confusion. Negative for self-injury. The patient is not nervous/anxious. Physical Exam   Physical Exam   Constitutional: She appears well-developed and well-nourished. No distress. HENT:   Head: Normocephalic and atraumatic. Mouth/Throat: Oropharynx is clear and moist. No oropharyngeal exudate. Eyes: Conjunctivae and EOM are normal. Pupils are equal, round, and reactive to light. Pupils 3 mm   Neck: Normal range of motion. Cardiovascular: Normal rate, regular rhythm and normal heart sounds. Exam reveals no gallop and no friction rub. No murmur heard. Pulmonary/Chest: Effort normal and breath sounds normal. No respiratory distress. She has no wheezes. She has no rales. She exhibits no tenderness. Abdominal: Soft. Bowel sounds are normal. She exhibits no distension and no mass. There is no tenderness.  There is no rebound and no guarding. Musculoskeletal: Normal range of motion. She exhibits no edema, tenderness or deformity. Neurological: She is alert. She displays normal reflexes. No cranial nerve deficit. She exhibits normal muscle tone. Coordination normal.   Oriented to person   Skin: Skin is warm. No rash noted. She is not diaphoretic. Psychiatric: She has a normal mood and affect. Nursing note and vitals reviewed. Diagnostic Study Results     Labs -     Recent Results (from the past 12 hour(s))   GLUCOSE, POC    Collection Time: 02/01/18  2:54 PM   Result Value Ref Range    Glucose (POC) 92 65 - 100 mg/dL    Performed by Vanessa Hartley    CBC WITH AUTOMATED DIFF    Collection Time: 02/01/18  3:31 PM   Result Value Ref Range    WBC 6.0 3.6 - 11.0 K/uL    RBC 4.52 3.80 - 5.20 M/uL    HGB 12.9 11.5 - 16.0 g/dL    HCT 39.3 35.0 - 47.0 %    MCV 86.9 80.0 - 99.0 FL    MCH 28.5 26.0 - 34.0 PG    MCHC 32.8 30.0 - 36.5 g/dL    RDW 13.1 11.5 - 14.5 %    PLATELET 005 079 - 435 K/uL    MPV 10.4 8.9 - 12.9 FL    NRBC 0.0 0  WBC    ABSOLUTE NRBC 0.00 0.00 - 0.01 K/uL    NEUTROPHILS 45 32 - 75 %    LYMPHOCYTES 39 12 - 49 %    MONOCYTES 9 5 - 13 %    EOSINOPHILS 7 0 - 7 %    BASOPHILS 1 0 - 1 %    IMMATURE GRANULOCYTES 0 0.0 - 0.5 %    ABS. NEUTROPHILS 2.7 1.8 - 8.0 K/UL    ABS. LYMPHOCYTES 2.3 0.8 - 3.5 K/UL    ABS. MONOCYTES 0.5 0.0 - 1.0 K/UL    ABS. EOSINOPHILS 0.4 0.0 - 0.4 K/UL    ABS. BASOPHILS 0.0 0.0 - 0.1 K/UL    ABS. IMM.  GRANS. 0.0 0.00 - 0.04 K/UL    DF AUTOMATED     METABOLIC PANEL, COMPREHENSIVE    Collection Time: 02/01/18  3:31 PM   Result Value Ref Range    Sodium 139 136 - 145 mmol/L    Potassium 4.0 3.5 - 5.1 mmol/L    Chloride 105 97 - 108 mmol/L    CO2 28 21 - 32 mmol/L    Anion gap 6 5 - 15 mmol/L    Glucose 91 65 - 100 mg/dL    BUN 11 6 - 20 MG/DL    Creatinine 0.86 0.55 - 1.02 MG/DL    BUN/Creatinine ratio 13 12 - 20      GFR est AA >60 >60 ml/min/1.73m2    GFR est non-AA >60 >60 ml/min/1.73m2    Calcium 8.9 8.5 - 10.1 MG/DL    Bilirubin, total 0.3 0.2 - 1.0 MG/DL    ALT (SGPT) 22 12 - 78 U/L    AST (SGOT) 25 15 - 37 U/L    Alk.  phosphatase 78 45 - 117 U/L    Protein, total 7.5 6.4 - 8.2 g/dL    Albumin 4.0 3.5 - 5.0 g/dL    Globulin 3.5 2.0 - 4.0 g/dL    A-G Ratio 1.1 1.1 - 2.2     URINALYSIS W/ REFLEX CULTURE    Collection Time: 02/01/18  3:31 PM   Result Value Ref Range    Color YELLOW/STRAW      Appearance CLOUDY (A) CLEAR      Specific gravity 1.024 1.003 - 1.030      pH (UA) 6.0 5.0 - 8.0      Protein NEGATIVE  NEG mg/dL    Glucose NEGATIVE  NEG mg/dL    Ketone NEGATIVE  NEG mg/dL    Bilirubin NEGATIVE  NEG      Blood NEGATIVE  NEG      Urobilinogen 1.0 0.2 - 1.0 EU/dL    Nitrites NEGATIVE  NEG      Leukocyte Esterase MODERATE (A) NEG      WBC 20-50 0 - 4 /hpf    RBC 0-5 0 - 5 /hpf    Epithelial cells FEW FEW /lpf    Bacteria NEGATIVE  NEG /hpf    UA:UC IF INDICATED URINE CULTURE ORDERED (A) CNI      Mucus 1+ (A) NEG /lpf    CA Oxalate crystals 1+ (A) NEG   TROPONIN I    Collection Time: 02/01/18  3:31 PM   Result Value Ref Range    Troponin-I, Qt. <0.04 <0.05 ng/mL   AMMONIA    Collection Time: 02/01/18  3:40 PM   Result Value Ref Range    Ammonia 33 (H) <32 UMOL/L   INFLUENZA A & B AG (RAPID TEST)    Collection Time: 02/01/18  4:14 PM   Result Value Ref Range    Influenza A Antigen NEGATIVE  NEG      Influenza B Antigen NEGATIVE  NEG     TSH 3RD GENERATION    Collection Time: 02/01/18  4:14 PM   Result Value Ref Range    TSH 0.16 (L) 0.36 - 3.74 uIU/mL   EKG, 12 LEAD, SUBSEQUENT    Collection Time: 02/01/18  6:03 PM   Result Value Ref Range    Ventricular Rate 82 BPM    Atrial Rate 82 BPM    P-R Interval 212 ms    QRS Duration 84 ms    Q-T Interval 384 ms    QTC Calculation (Bezet) 448 ms    Calculated P Axis -8 degrees    Calculated R Axis -7 degrees    Calculated T Axis 1 degrees    Diagnosis       Sinus rhythm with 1st degree AV block  Nonspecific ST and T wave abnormality  Abnormal ECG  When compared with ECG of 20-DEC-2016 11:07,  No significant change was found         Radiologic Studies -   XR CHEST SNGL V   Final Result      CT CODE NEURO HEAD WO CONTRAST   Final Result      MRI BRAIN WO CONT    (Results Pending)   DUPLEX CAROTID BILATERAL    (Results Pending)     CT Results  (Last 48 hours)               02/01/18 1507  CT CODE NEURO HEAD WO CONTRAST Final result    Impression:  Impression:    1. No evidence of acute infarct or intracranial hemorrhage. 2. Minimal periventricular white matter disease is likely secondary to chronic   small vessel ischemic changes. Narrative: Indication:  increased confusion since 1100        Comparison: CT 7/25/2016       Findings: 5 mm axial images were obtained from the skull base through the   vertex. CT dose reduction was achieved through the use of a standardized protocol   tailored for this examination and automatic exposure control for dose   modulation. The ventricles and cortical sulci are prominent, compatible with age related   volume loss. There is no evidence of intracranial hemorrhage, mass, mass effect,   or acute infarct. There is periventricular white matter disease. No extra-axial   fluid collections are seen. The visualized paranasal sinuses and mastoid air   cells are clear. The orbital structures are unremarkable. No osseous   abnormalities are seen. CXR Results  (Last 48 hours)               02/01/18 1607  XR CHEST SNGL V Final result    Impression:  IMPRESSION: No evidence of acute cardiopulmonary process. Narrative:  INDICATION:  AMS        COMPARISON: 10/1/2013       FINDINGS: Single AP portable view of the chest obtained at 348 demonstrates a   stable cardiomediastinal silhouette. There is a large hiatal hernia. The lungs   are clear bilaterally. No osseous abnormalities are seen.                    Medical Decision Making   I am the first provider for this patient. I reviewed the vital signs, available nursing notes, past medical history, past surgical history, family history and social history. Vital Signs-Reviewed the patient's vital signs. Patient Vitals for the past 12 hrs:   Temp Pulse Resp BP SpO2   02/01/18 1942 96.8 °F (36 °C) (!) 102 18 - 95 %   02/01/18 1909 - 80 18 133/67 98 %   02/01/18 1837 - 81 20 - 95 %   02/01/18 1800 - 83 - 164/59 94 %   02/01/18 1702 - - - - 97 %   02/01/18 1700 - - - 158/86 93 %   02/01/18 1600 - - - 147/52 98 %   02/01/18 1520 97.4 °F (36.3 °C) 83 16 189/68 100 %       Pulse Oximetry Analysis - 100% on RA    Cardiac Monitor:   Rate: 80 bpm  Rhythm: Normal Sinus Rhythm      EKG interpretation:   EKG, 12 LEAD, SUBSEQUENT    Collection Time: 02/01/18  6:03 PM   Result Value Ref Range    Ventricular Rate 82 BPM    Atrial Rate 82 BPM    P-R Interval 212 ms    QRS Duration 84 ms    Q-T Interval 384 ms    QTC Calculation (Bezet) 448 ms    Calculated P Axis -8 degrees    Calculated R Axis -7 degrees    Calculated T Axis 1 degrees    Diagnosis       Sinus rhythm with 1st degree AV block  Nonspecific ST and T wave abnormality  Abnormal ECG  When compared with ECG of 20-DEC-2016 11:07,  No significant change was found         Written by Claudean Carrier, ED Scribe, as dictated by Adriana Curran MD.    Records Reviewed: Nursing Notes, Old Medical Records, Previous electrocardiograms, Previous Radiology Studies and Previous Laboratory Studies    Provider Notes (Medical Decision Making):   DDx: CVA, TIA, UTI, metabolic encephalopathy; electrolyte disturbance, medication reaction    Pt is a 80 y.o. F with h/o dementia presenting with acute encephalopathy, resolved slurring of speech and emotional lability; code S called in triage; no focal deficits appreciated; will proceed with CTH, labs, UA, EKG and consult Neuro for additional recs. Pt is on several sedative meds which could be a factor in altered sensorium.     ED Course:   Initial assessment performed. The patients presenting problems have been discussed, and they are in agreement with the care plan formulated and outlined with them. I have encouraged them to ask questions as they arise throughout their visit. Tabatha Evans  7/22/1926  Arrival time to ED: [address]: 3359  Physician at Bedside: [address] Time: 3603  ACT Page: [id number]  ACT Call Back: [phone]  Cancel Code S: n/a    Consult Note:  3:37 PM  De Pantoja MD spoke with Silvia Amanda MD,  Specialty: neurology  Discussed pt's hx, disposition, and available diagnostic and imaging results. Reviewed care plans. Consultant agrees with plans as outlined. Dr. Vikki Layton states pt is not a tPA candidate d/t other comorbidity, likely metabolic encephalopathy vs delirium, less likely TIA/CVA as pt without gross neuro deficits. Written by Estee Rivera, ED Scribe, as dictated by De Pantoja MD.    Consult Note:   4:55 PM  De Pantoja MD spoke with Mellissa Alexis MD   Specialty: Hospitalist  Discussed pt's hx, disposition, and available diagnostic and imaging results. Reviewed care plans. Consultant will evaluate pt for admission. Written by Estee Rivera, JOHN Scribe, as dictated by De Pantoja MD.        CRITICAL CARE NOTE :    11:00 AM      IMPENDING DETERIORATION -CNS, Metabolic and Renal    ASSOCIATED RISK FACTORS - Dysrhythmia, Metabolic changes and CNS Decompensation    MANAGEMENT- Bedside Assessment and Supervision of Care    INTERPRETATION -  Xrays, CT Scan, ECG, Blood Pressure and Cardiac Output Measures     INTERVENTIONS - vascular control, Neurologic interventions  and Metobolic interventions    CASE REVIEW - Hospitalist, Medical Sub-Specialist, Nursing and Family    TREATMENT RESPONSE -Stable    PERFORMED BY - Self      NOTES   :      I have spent 45 minutes of critical care time involved in lab review, consultations with specialist, family decision- making, bedside attention and documentation.  During this entire length of time I was immediately available to the patient . Drew Pearce MD    Critical Care: The reason for providing this level of medical care for this critically ill patient was due to a critical illness that impaired one or more vital organ systems, such that there was a high probability of imminent or life threatening deterioration in the patient's condition. This care involved high complexity decision making to assess, manipulate, and support vital system functions, to treat this degree of vital organ system failure, and to prevent further life threatening deterioration of the patients condition. Drew Pearce MD    Disposition:    1. Admit to Matt Issa MD.    Admit Note:  4:56 PM  Pt is being admitted by Dr. Matt Issa, hospitalist. The results of their tests and reason(s) for their admission have been discussed with pt and/or available family. They convey agreement and understanding for the need to be admitted and for admission diagnosis. PLAN:  1. Current Discharge Medication List        2. Follow-up Information     None        Return to ED if worse     Diagnosis     Clinical Impression:   1. Acute confusion    2. Acute encephalopathy    3. Urinary tract infection without hematuria, site unspecified    4. Paresthesia of bilateral legs    5. Hypothyroidism, unspecified type        Attestations: This note is prepared by Evelina Mcdermott, acting as Scribe for Drew Pearce MD.       The scribe's documentation has been prepared under my direction and personally reviewed by me in its entirety. I confirm that the note above accurately reflects all work, treatment, procedures, and medical decision making performed by me. This note will not be viewable in [address]

## 2018-02-01 NOTE — H&P
Brief note    Acute metabolic encephalopathy  ?  UTI  R/o TIA although less likely  Iatrogenic hyperthyroid

## 2018-02-01 NOTE — ED TRIAGE NOTES
Pt with hx of moderately advanced dementia at baseline has been experiencing increased confusion and emotoinal lability. Onset 1100 this morning. Pt is easily angered or sad. Pt does not recognize her home or her room. Pt may or may not recognize people at baseline, but she did identify her daughter by name.

## 2018-02-02 ENCOUNTER — APPOINTMENT (OUTPATIENT)
Dept: CT IMAGING | Age: 83
DRG: 056 | End: 2018-02-02
Attending: PSYCHIATRY & NEUROLOGY
Payer: MEDICARE

## 2018-02-02 ENCOUNTER — APPOINTMENT (OUTPATIENT)
Dept: MRI IMAGING | Age: 83
DRG: 056 | End: 2018-02-02
Attending: INTERNAL MEDICINE
Payer: MEDICARE

## 2018-02-02 PROBLEM — I65.23 BILATERAL CAROTID ARTERY STENOSIS: Status: ACTIVE | Noted: 2018-02-02

## 2018-02-02 PROBLEM — F02.818 LATE ONSET ALZHEIMER'S DISEASE WITH BEHAVIORAL DISTURBANCE (HCC): Status: ACTIVE | Noted: 2018-02-02

## 2018-02-02 PROBLEM — R41.82 ALTERED MENTAL STATUS, UNSPECIFIED: Status: ACTIVE | Noted: 2018-02-02

## 2018-02-02 PROBLEM — G25.0 BENIGN ESSENTIAL TREMOR SYNDROME: Status: ACTIVE | Noted: 2018-02-02

## 2018-02-02 PROBLEM — G45.1 TRANSIENT ISCHEMIC ATTACK INVOLVING LEFT INTERNAL CAROTID ARTERY: Status: ACTIVE | Noted: 2018-02-02

## 2018-02-02 PROBLEM — G30.1 LATE ONSET ALZHEIMER'S DISEASE WITH BEHAVIORAL DISTURBANCE (HCC): Status: ACTIVE | Noted: 2018-02-02

## 2018-02-02 LAB
ANION GAP SERPL CALC-SCNC: 9 MMOL/L (ref 5–15)
ATRIAL RATE: 82 BPM
BASOPHILS # BLD: 0 K/UL (ref 0–0.1)
BASOPHILS NFR BLD: 1 % (ref 0–1)
BUN SERPL-MCNC: 10 MG/DL (ref 6–20)
BUN/CREAT SERPL: 13 (ref 12–20)
CALCIUM SERPL-MCNC: 8.6 MG/DL (ref 8.5–10.1)
CALCULATED P AXIS, ECG09: -8 DEGREES
CALCULATED R AXIS, ECG10: -7 DEGREES
CALCULATED T AXIS, ECG11: 1 DEGREES
CHLORIDE SERPL-SCNC: 109 MMOL/L (ref 97–108)
CO2 SERPL-SCNC: 24 MMOL/L (ref 21–32)
CREAT SERPL-MCNC: 0.75 MG/DL (ref 0.55–1.02)
DATE LAST DOSE: NORMAL
DIAGNOSIS, 93000: NORMAL
DIFFERENTIAL METHOD BLD: ABNORMAL
EOSINOPHIL # BLD: 0.3 K/UL (ref 0–0.4)
EOSINOPHIL NFR BLD: 4 % (ref 0–7)
ERYTHROCYTE [DISTWIDTH] IN BLOOD BY AUTOMATED COUNT: 12.8 % (ref 11.5–14.5)
ERYTHROCYTE [SEDIMENTATION RATE] IN BLOOD: 7 MM/HR (ref 0–30)
FOLATE SERPL-MCNC: 40 NG/ML (ref 5–21)
GLUCOSE SERPL-MCNC: 97 MG/DL (ref 65–100)
HCT VFR BLD AUTO: 34.9 % (ref 35–47)
HGB BLD-MCNC: 11.4 G/DL (ref 11.5–16)
IMM GRANULOCYTES # BLD: 0 K/UL (ref 0–0.04)
IMM GRANULOCYTES NFR BLD AUTO: 0 % (ref 0–0.5)
LITHIUM SERPL-SCNC: 0.82 MMOL/L (ref 0.6–1.2)
LYMPHOCYTES # BLD: 2.3 K/UL (ref 0.8–3.5)
LYMPHOCYTES NFR BLD: 35 % (ref 12–49)
MCH RBC QN AUTO: 28.1 PG (ref 26–34)
MCHC RBC AUTO-ENTMCNC: 32.7 G/DL (ref 30–36.5)
MCV RBC AUTO: 86.2 FL (ref 80–99)
MONOCYTES # BLD: 0.6 K/UL (ref 0–1)
MONOCYTES NFR BLD: 9 % (ref 5–13)
NEUTS SEG # BLD: 3.4 K/UL (ref 1.8–8)
NEUTS SEG NFR BLD: 52 % (ref 32–75)
NRBC # BLD: 0 K/UL (ref 0–0.01)
NRBC BLD-RTO: 0 PER 100 WBC
P-R INTERVAL, ECG05: 212 MS
PLATELET # BLD AUTO: 185 K/UL (ref 150–400)
PMV BLD AUTO: 10.3 FL (ref 8.9–12.9)
POTASSIUM SERPL-SCNC: 3.9 MMOL/L (ref 3.5–5.1)
Q-T INTERVAL, ECG07: 384 MS
QRS DURATION, ECG06: 84 MS
QTC CALCULATION (BEZET), ECG08: 448 MS
RBC # BLD AUTO: 4.05 M/UL (ref 3.8–5.2)
REPORTED DOSE,DOSE: NORMAL UNITS
REPORTED DOSE/TIME,TMG: NORMAL
SODIUM SERPL-SCNC: 142 MMOL/L (ref 136–145)
VENTRICULAR RATE, ECG03: 82 BPM
VIT B12 SERPL-MCNC: 493 PG/ML (ref 211–911)
WBC # BLD AUTO: 6.6 K/UL (ref 3.6–11)

## 2018-02-02 PROCEDURE — 85652 RBC SED RATE AUTOMATED: CPT | Performed by: PSYCHIATRY & NEUROLOGY

## 2018-02-02 PROCEDURE — 36415 COLL VENOUS BLD VENIPUNCTURE: CPT | Performed by: INTERNAL MEDICINE

## 2018-02-02 PROCEDURE — 82746 ASSAY OF FOLIC ACID SERUM: CPT | Performed by: PSYCHIATRY & NEUROLOGY

## 2018-02-02 PROCEDURE — 74011250637 HC RX REV CODE- 250/637: Performed by: INTERNAL MEDICINE

## 2018-02-02 PROCEDURE — 80048 BASIC METABOLIC PNL TOTAL CA: CPT | Performed by: INTERNAL MEDICINE

## 2018-02-02 PROCEDURE — 93880 EXTRACRANIAL BILAT STUDY: CPT

## 2018-02-02 PROCEDURE — 70450 CT HEAD/BRAIN W/O DYE: CPT

## 2018-02-02 PROCEDURE — 86038 ANTINUCLEAR ANTIBODIES: CPT | Performed by: PSYCHIATRY & NEUROLOGY

## 2018-02-02 PROCEDURE — 85025 COMPLETE CBC W/AUTO DIFF WBC: CPT | Performed by: INTERNAL MEDICINE

## 2018-02-02 PROCEDURE — 97535 SELF CARE MNGMENT TRAINING: CPT | Performed by: OCCUPATIONAL THERAPIST

## 2018-02-02 PROCEDURE — 74011250636 HC RX REV CODE- 250/636: Performed by: INTERNAL MEDICINE

## 2018-02-02 PROCEDURE — 74011250636 HC RX REV CODE- 250/636: Performed by: PSYCHIATRY & NEUROLOGY

## 2018-02-02 PROCEDURE — 82607 VITAMIN B-12: CPT | Performed by: PSYCHIATRY & NEUROLOGY

## 2018-02-02 PROCEDURE — 93306 TTE W/DOPPLER COMPLETE: CPT

## 2018-02-02 PROCEDURE — 82306 VITAMIN D 25 HYDROXY: CPT | Performed by: PSYCHIATRY & NEUROLOGY

## 2018-02-02 PROCEDURE — 65660000000 HC RM CCU STEPDOWN

## 2018-02-02 PROCEDURE — 80178 ASSAY OF LITHIUM: CPT | Performed by: INTERNAL MEDICINE

## 2018-02-02 PROCEDURE — 97166 OT EVAL MOD COMPLEX 45 MIN: CPT | Performed by: OCCUPATIONAL THERAPIST

## 2018-02-02 RX ORDER — LORAZEPAM 2 MG/ML
1-2 INJECTION INTRAMUSCULAR
Status: DISCONTINUED | OUTPATIENT
Start: 2018-02-02 | End: 2018-02-03 | Stop reason: HOSPADM

## 2018-02-02 RX ORDER — LITHIUM CARBONATE 450 MG/1
450 TABLET ORAL
Status: DISCONTINUED | OUTPATIENT
Start: 2018-02-02 | End: 2018-02-03 | Stop reason: HOSPADM

## 2018-02-02 RX ORDER — PAROXETINE HYDROCHLORIDE 20 MG/1
40 TABLET, FILM COATED ORAL DAILY
Status: DISCONTINUED | OUTPATIENT
Start: 2018-02-03 | End: 2018-02-03 | Stop reason: HOSPADM

## 2018-02-02 RX ADMIN — Medication 10 ML: at 12:52

## 2018-02-02 RX ADMIN — CEFTRIAXONE SODIUM 1 G: 1 INJECTION, POWDER, FOR SOLUTION INTRAMUSCULAR; INTRAVENOUS at 17:38

## 2018-02-02 RX ADMIN — HALOPERIDOL LACTATE 2 MG: 5 INJECTION, SOLUTION INTRAMUSCULAR at 07:00

## 2018-02-02 RX ADMIN — LORAZEPAM 1 MG: 2 INJECTION INTRAMUSCULAR; INTRAVENOUS at 12:47

## 2018-02-02 RX ADMIN — LITHIUM CARBONATE 450 MG: 450 TABLET, EXTENDED RELEASE ORAL at 23:48

## 2018-02-02 RX ADMIN — HEPARIN SODIUM 5000 UNITS: 5000 INJECTION, SOLUTION INTRAVENOUS; SUBCUTANEOUS at 02:57

## 2018-02-02 RX ADMIN — LEVOTHYROXINE SODIUM 62.5 MCG: 125 TABLET ORAL at 10:00

## 2018-02-02 RX ADMIN — HEPARIN SODIUM 5000 UNITS: 5000 INJECTION, SOLUTION INTRAVENOUS; SUBCUTANEOUS at 10:01

## 2018-02-02 RX ADMIN — LORAZEPAM 1 MG: 2 INJECTION INTRAMUSCULAR; INTRAVENOUS at 13:33

## 2018-02-02 RX ADMIN — Medication 10 ML: at 06:59

## 2018-02-02 RX ADMIN — VITAMIN D, TAB 1000IU (100/BT) 2000 UNITS: 25 TAB at 10:00

## 2018-02-02 RX ADMIN — Medication 10 ML: at 23:49

## 2018-02-02 RX ADMIN — ASPIRIN 81 MG 81 MG: 81 TABLET ORAL at 09:59

## 2018-02-02 RX ADMIN — HEPARIN SODIUM 5000 UNITS: 5000 INJECTION, SOLUTION INTRAVENOUS; SUBCUTANEOUS at 17:50

## 2018-02-02 NOTE — CONSULTS
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 right-handed  female we are asked to evaluate today for an episode of confusion and agitation that occurred yesterday, without any focal neurological symptoms, and was seeing her as a new patient to us, for this new problem, at the request of Dr. Karlene Kelley. Patient has known Alzheimer's disease, but is on no cognitive enhancing agents at this time. She got confused and agitated yesterday, had difficulty recognizing family members, and was having more difficulty with crying and some tingling in her feet does not bother her for a while. She has some difficulty with her speech but that is back to normal now. On arrival she had a CT scan of the brain that was normal, and showed no acute processes. We will try to get an MRI scan. She has been started on aspirin therapy and I would continue her on that empirically. She seemed back to her more normal baseline, and is refusing her test, and says she wants to go home. We attempted an MRI scan but she could not cooperate despite Ativan, so repeated her CT scan and it shows no change, and carotid Dopplers showed no significant stenosis, and I think he can probably go home just on aspirin therapy to follow-up in our office in 2-4 weeks time for her dementia and agitation and cerebrovascular disease. Again she has had no focal deficits, no complaints of headache, fever, meningismus, no focal weakness no sensory loss, and she ambulates with a cane. Her daughter is here and she lives with her daughter and she has 24-hour care at home. Metabolic panels all sent off for treatable causes of her dementia.     Past Medical History:   Diagnosis Date    Dementia     Depression     psychiatrist: Dr Douglas Glover (on Lithium)    Fracture of wrist slipped and fx left wrist, surgery scheduled 12/21/16    Full dentures     upper and lower    Hypothyroid       Past Surgical History:   Procedure Laterality Date    HX KYPHOPLASTY  2013   Mariah Hinders  12/2016    Wrist surgery    HX WRIST FRACTURE TX Left 12/2016     Family History   Problem Relation Age of Onset    Other Mother      Inefficient wound healing    Cancer Sister      Stomach CA    Diabetes Brother     Alzheimer Sister     Hypertension Daughter     Thyroid Disease Daughter     Other Son      Hip replacement    Depression Son     Liver Disease Son     Diabetes Son     Heart Disease Son     Depression Son       Social History   Substance Use Topics    Smoking status: Former Smoker    Smokeless tobacco: Never Used    Alcohol use No         Current Facility-Administered Medications:     LORazepam (ATIVAN) injection 1-2 mg, 1-2 mg, IntraVENous, Q6H PRN, Michelle Cantu MD, 1 mg at 02/02/18 1333    lithium carbonate CR (ESKALITH CR) tablet 450 mg, 450 mg, Oral, QHS, Jillian Mon MD    [START ON 2/3/2018] PARoxetine (PAXIL) tablet 40 mg, 40 mg, Oral, DAILY, Jillian Mon MD    cholecalciferol (VITAMIN D3) tablet 2,000 Units, 2,000 Units, Oral, DAILY, Jillian Mon MD, 2,000 Units at 02/02/18 1000    levothyroxine (SYNTHROID) tablet 62.5 mcg, 62.5 mcg, Oral, 6am, Jillian Mon MD, 62.5 mcg at 02/02/18 1000    sodium chloride (NS) flush 5-10 mL, 5-10 mL, IntraVENous, Q8H, Jillian Mon MD, 10 mL at 02/02/18 0659    sodium chloride (NS) flush 5-10 mL, 5-10 mL, IntraVENous, PRN, Jillian Mon MD, 10 mL at 02/02/18 1252    acetaminophen (TYLENOL) tablet 650 mg, 650 mg, Oral, Q6H PRN, Jillian Mon MD    ondansetron (ZOFRAN) injection 4 mg, 4 mg, IntraVENous, Q6H PRN, Jillian Mon MD    docusate sodium (COLACE) capsule 100 mg, 100 mg, Oral, DAILY PRN, Jillian Mon MD    heparin (porcine) injection 5,000 Units, 5,000 Units, SubCUTAneous, Q8H, Jillian Mon MD, 5,000 Units at 02/02/18 1750    0.9% sodium chloride infusion, 50 mL/hr, IntraVENous, CONTINUOUS, Lyle Mckeon MD, Last Rate: 50 mL/hr at 02/01/18 1745, 50 mL/hr at 02/01/18 1745    haloperidol lactate (HALDOL) injection 2 mg, 2 mg, IntraMUSCular, Q6H PRN, Lyle Mckeon MD, 2 mg at 02/02/18 0700    aspirin chewable tablet 81 mg, 81 mg, Oral, DAILY, Lyle Mckeon MD, 81 mg at 02/02/18 0959    cefTRIAXone (ROCEPHIN) 1 g/50 mL NS IVPB, 1 g, IntraVENous, Q24H, Marino Zuniga MD, 1 g at 02/02/18 1738        Allergies   Allergen Reactions    Sulfa (Sulfonamide Antibiotics) Hives        Review of Systems:  Review of systems not obtained due to patient factors. Vitals:    02/02/18 0325 02/02/18 0834 02/02/18 1528 02/02/18 1923   BP: 127/49 137/62 128/45 142/57   Pulse: 64 69 65 73   Resp: 18 18 18 18   Temp: 97.6 °F (36.4 °C) 97.5 °F (36.4 °C) 98.3 °F (36.8 °C) 98.1 °F (36.7 °C)   SpO2: 94% 94% 93% 94%   Weight:       Height:         Objective:     I      NEUROLOGICAL EXAM:    Appearance: The patient is well developed, well nourished, provides a incoherent history and is in no acute distress. Mental Status: Oriented to place and person, and not the date or the president, cognitive function is abnormal and speech is fluent and no aphasia or dysarthria. Mood and affect appropriate. Cranial Nerves:   Intact visual fields. Fundi are benign. ALFONSO, EOM's full, no nystagmus, no ptosis. Facial sensation is normal. Corneal reflexes are not tested. Facial movement is symmetric. Hearing is abnormal bilaterally. Palate is midline with normal sternocleidomastoid and trapezius muscles are normal. Tongue is midline. Neck without meningismus or bruits  Temporal arteries are not tender or enlarged   Motor:  4/5 strength in upper and lower proximal and distal muscles. Normal bulk and tone. No fasciculations.    Reflexes:   Deep tendon reflexes 1+/4 and symmetrical.  No babinski or clonus present   Sensory:   Normal to touch, pinprick and vibration and temperature decreased in both feet. DSS is intact   Gait:  Not tested gait. Tremor:    Mild bilateral intention upper extremity tremor noted. Cerebellar:  No cerebellar signs present. Neurovascular:  Normal heart sounds and regular rhythm, peripheral pulses decreased, and no carotid bruits. Assessment:       ICD-10-CM ICD-9-CM    1. Acute confusion R41.0 293.0    2. Acute encephalopathy G93.40 348.30    3. Urinary tract infection without hematuria, site unspecified N39.0 599.0    4. Paresthesia of bilateral legs R20.2 782.0    5. Hypothyroidism, unspecified type E03.9 244.9    6. TIA (transient ischemic attack) G45.9 435.9 CANCELED: MRA BRAIN WO CONT      CANCELED: MRA BRAIN WO CONT     Active Problems:    Acute metabolic encephalopathy (5/2/7488)      Altered mental status, unspecified (2/2/2018)      Bilateral carotid artery stenosis (2/2/2018)      Transient ischemic attack involving left internal carotid artery (2/2/2018)      Late onset Alzheimer's disease with behavioral disturbance (2/2/2018)      Benign essential tremor syndrome (2/2/2018)        Plan:     Matthew Salcedo is a 80 y.o. right-handed  female we are asked to evaluate today for an episode of confusion and agitation that occurred yesterday, without any focal neurological symptoms, and was seeing her as a new patient to us, for this new problem, at the request of Dr. Megan Maria. Patient has known Alzheimer's disease, but is on no cognitive enhancing agents at this time. She got confused and agitated yesterday, had difficulty recognizing family members, and was having more difficulty with crying and some tingling in her feet does not bother her for a while. She has some difficulty with her speech but that is back to normal now. On arrival she had a CT scan of the brain that was normal, and showed no acute processes. We will try to get an MRI scan.   She has been started on aspirin therapy and I would continue her on that empirically. She seemed back to her more normal baseline, and is refusing her test, and says she wants to go home. We attempted an MRI scan but she could not cooperate despite Ativan, so repeated her CT scan and it shows no change, and carotid Dopplers showed no significant stenosis, and I think he can probably go home just on aspirin therapy to follow-up in our office in 2-4 weeks time for her dementia and agitation and cerebrovascular disease. Again she has had no focal deficits, no complaints of headache, fever, meningismus, no focal weakness no sensory loss, and she ambulates with a cane. Her daughter is here and she lives with her daughter and she has 24-hour care at home. Metabolic panels all sent off for treatable causes of her dementia.   Patient just has agitated dementia, no strokelike symptoms, she can go home on aspirin therapy to follow-up in our office in 2-4 weeks time and discuss cognitive enhancing agents  We will follow as needed for now, call if we can help      Signed By: Ana Parada MD     February 2, 2018       CC: Ha Fernandez MD  FAX: [phone]

## 2018-02-02 NOTE — PROGRESS NOTES
.Bedside and Verbal shift change report given to Eliazar Mendez RN (oncoming nurse) by Jolynn Fraser RN (offgoing nurse). Report included the following information SBAR.     Zone Phone:   [phone]        Significant changes during shift:  attempted MRI, pt could not tolerate, CT neg           Patient Information     Anjel Bernard  80 y.o.  2/1/2018  2:58 PM by Destiny Fiore MD. Anjel Bernard was admitted from Home     Problem List          Patient Active Problem List     Diagnosis Date Noted    Acute metabolic encephalopathy 57/34/3821    Laceration of left arm with complication 09/95/1666    Dementia without behavioral disturbance 04/01/2016    Hypothyroidism 01/02/2014    Depression 01/02/2014           Past Medical History:   Diagnosis Date    Dementia      Depression       psychiatrist: Dr Stacia Johnson (on Lithium)    Fracture of wrist       slipped and fx left wrist, surgery scheduled 12/21/16    Full dentures       upper and lower    Hypothyroid              Core Measures:     CVA: Yes Yes  CHF:No No  PNA:No No     Post Op Surgical (If Applicable):      Number times ambulated in hallway past shift:  0  Number of times OOB to chair past shift:   2  NG Tube: No  Incentive Spirometer: No  Drains: No   Volume  0  Dressing Present:  No  Flatus: yes     Activity Status:     OOB to Chair yes  Ambulated this shift Yes   Bed Rest No     Supplemental O2: (If Applicable)     NC No  NRB No  Venti-mask No  On 0 Liters/min        LINES AND DRAINS:     Central Line? NO  PICC LINE? No   Urinary Catheter? No   DVT prophylaxis:     DVT prophylaxis Med- No  DVT prophylaxis SCD or ANDREA- Refused      Wounds: (If Applicable)     Wounds- No     Location 0     Patient Safety:     Falls Score Total Score: 4  Safety Level_______  Bed Alarm On? Yes  Sitter?  No     Plan for upcoming shift: Safety, hydration, abx           Discharge Plan: Yes probably tomorrow     Active Consults:  IP CONSULT TO NEUROLOGY  IP CONSULT TO HOSPITALIST

## 2018-02-02 NOTE — PROGRESS NOTES
[facility]  Preliminary Report:  Carotid Duplex Scan    Right:  Mild plaque noted in the right carotid system. Right ICA velocities suggest less than 50% diameter reduction. Right vertebral artery flow is antegrade. Left:  Severe plaque noted in the left carotid system. Left ICA velocities suggest 50-69% diameter reduction, (high end of range). Left vertebral artery flow is antegrade (resistive). Final report to follow.

## 2018-02-02 NOTE — ROUTINE PROCESS
TRANSFER - OUT REPORT:    Verbal report given to CORIN Martinez(name) on [address]  being transferred to [facility](unit) for routine progression of care       Report consisted of patients Situation, Background, Assessment and   Recommendations(SBAR). Information from the following report(s) SBAR, ED Summary, [facility] and Recent Results was reviewed with the receiving nurse. Lines:   Peripheral IV 02/01/18 Left Antecubital (Active)   Site Assessment Clean, dry, & intact 2/1/2018  3:36 PM   Phlebitis Assessment 0 2/1/2018  3:36 PM   Infiltration Assessment 0 2/1/2018  3:36 PM   Dressing Status Clean, dry, & intact 2/1/2018  3:36 PM   Dressing Type Tape;Transparent 2/1/2018  3:36 PM   Hub Color/Line Status Pink;Flushed 2/1/2018  3:36 PM   Action Taken Blood drawn 2/1/2018  3:36 PM        Opportunity for questions and clarification was provided.

## 2018-02-02 NOTE — H&P
Hospitalist Admission Note    NAME: Placido La   :  1926   MRN:  [id number]     Date/Time:  2018 7:36 AM    Patient PCP: Christine Sepulveda MD  ________________________________________________________________________    My assessment of this patient's clinical condition and my plan of care is as follows. Assessment / Plan:  Acute metabolic encephalopathy may be related to UTI versus medications (Klonopin, paroxetine, lithium)  Though she does not have any voiding symptoms are suprapubic pain due to increased confusion we will go ahead and treat with ceftriaxone.  Follow urine culture results. Hold all medications that can cause encephalopathy including Klonopin, Paxil, lithium. Due to reported episode of abnormal speech we will rule out TIA.  Check MRI of the brain.  Check ultrasound carotids and 2D echocardiogram.  Check TSH, fasting lipid profile and hemoglobin A1c. Start aspirin.  Neurology has been consulted.  Start neuro checks. History of dementia and at risk for delirium  We will start Haldol as needed for agitation next    Hypothyroidism  Resume levothyroxine but will decrease dose to 60 mcg as TSH is low at 0.16    Bipolar disease  We will hold lithium until mental status improves.  Check lithium level.         Code Status: Full but DTR will discuss with her mom and make a decision soon  Surrogate Decision Maker: DTR Eiffe    DVT Prophylaxis: heparin  GI Prophylaxis: not indicated    Baseline: independent        Subjective:   CHIEF COMPLAINT: confusion    HISTORY OF PRESENT ILLNESS:     This is a 70-year-old female with past medical history of dementia, hypothyroidism is coming to the hospital with chief complaints of increased confusion since 11 AM today.  Patient at baseline has dementia and is not a very good historian so information is obtained by talking to patient's daughter as well.  At baseline patient can normally recognize family members, her surroundings and can feed herself but since this morning she is acting more confused.  She did not know that she was in her house. Jacek Meneses also was not able to recognize her familiar surroundings. Jacek Meneses is also crying more frequently. Jacek Meneses has been weak since the last 1 month.  She is also reporting some bilateral tingling in her feet since the last 1 month.  She was also noted to have some abnormal speech this morning but that is currently resolved right now. Jacek Meneses did not report any headache.  She denied fever or chills.  Rest of the information regarding history of present illness is limited as she is a very poor historian.      On arrival to the hospital, she had a CT of the brain which did not reveal any acute process.  She had abnormal urinalysis for which she was started on empiric ceftriaxone. We were asked to admit for work up and evaluation of the above problems.      Past Medical History:   Diagnosis Date    Dementia     Depression     psychiatrist: Dr Alfredo Kong (on Lithium)    Fracture of wrist     slipped and fx left wrist, surgery scheduled 12/21/16    Full dentures     upper and lower    Hypothyroid         Past Surgical History:   Procedure Laterality Date    HX [facility]PHOPLASTY  2013   [facility]  12/2016    Wrist surgery    HX WRIST FRACTURE TX Left 12/2016       Social History   Substance Use Topics    Smoking status: Former Smoker    Smokeless tobacco: Never Used    Alcohol use No        Family History   Problem Relation Age of Onset    Other Mother      Inefficient wound healing    Cancer Sister      Stomach CA    Diabetes Brother     Alzheimer Sister     Hypertension Daughter     Thyroid Disease Daughter     Other Son      Hip replacement    Depression Son     Liver Disease Son     Diabetes Son     Heart Disease Son     Depression Son      Allergies   Allergen Reactions    Sulfa (Sulfonamide Antibiotics) Hives        Prior to Admission medications    Medication Sig Start Date End Date Taking? Authorizing Provider   levothyroxine (SYNTHROID) 75 mcg tablet TAKE 1 TABLET DAILY BEFORE BREAKFAST 12/26/17  Yes Pooja Thompson MD   clonazePAM (KLONOPIN) 0.5 mg tablet Take 0.5 mg by mouth every evening. Yes Historical Provider   PARoxetine (PAXIL) 40 mg tablet Take 40 mg by mouth daily. 1/24/17  Yes Historical Provider   multivitamin (ONE A DAY) tablet Take 1 Tab by mouth daily. Yes Historical Provider   cholecalciferol, vitamin D3, (VITAMIN D3) 2,000 unit tab Take 1 Tab by mouth daily. Yes Historical Provider   lithium CR (ESKALITH CR) 450 mg CR tablet Take 450 mg by mouth nightly. Yes Historical Provider       REVIEW OF SYSTEMS:     I am not able to complete the review of systems because:    The patient is intubated and sedated    The patient has altered mental status due to his acute medical problems    The patient has baseline aphasia from prior stroke(s)   y The patient has baseline dementia and is not reliable historian    The patient is in acute medical distress and unable to provide information           Total of 12 systems reviewed as follows:       POSITIVE= underlined text  Negative = text not underlined  General:  fever, chills, sweats, generalized weakness, weight loss/gain,      loss of appetite   Eyes:    blurred vision, eye pain, loss of vision, double vision  ENT:    rhinorrhea, pharyngitis   Respiratory:   cough, sputum production, SOB, SHERMAN, wheezing, pleuritic pain   Cardiology:   chest pain, palpitations, orthopnea, PND, edema, syncope   Gastrointestinal:  abdominal pain , N/V, diarrhea, dysphagia, constipation, bleeding   Genitourinary:  frequency, urgency, dysuria, hematuria, incontinence   Muskuloskeletal :  arthralgia, myalgia, back pain  Hematology:  easy bruising, nose or gum bleeding, lymphadenopathy   Dermatological: rash, ulceration, pruritis, color change / jaundice  Endocrine:   hot flashes or polydipsia   Neurological:  headache, dizziness, confusion, focal weakness, paresthesia,     Speech difficulties, memory loss, gait difficulty  Psychological: Feelings of anxiety, depression, agitation    Objective:   VITALS:    Visit Vitals    /49 (BP 1 Location: Left arm, BP Patient Position: At rest)    Pulse 64    Temp 97.6 °F (36.4 °C)    Resp 18    Ht 5' (1.524 m)    Wt 57.2 kg (126 lb 1.7 oz)    SpO2 94%    BMI 24.63 kg/m2       PHYSICAL EXAM:    General:    no distress, appears stated age. HEENT: Atraumatic, anicteric sclerae, pink conjunctivae     No oral ulcers, mucosa moist  Neck:  Supple, symmetrical,  thyroid: non tender  Lungs:   Clear to auscultation bilaterally. No Wheezing or Rhonchi. No rales. Chest wall:  No tenderness  No Accessory muscle use. Heart:   Regular  rhythm,  No  murmur   No edema  Abdomen:   Soft, non-tender. Not distended. Bowel sounds normal  Extremities: No cyanosis. No clubbing,      Skin turgor normal, Capillary refill normal, Radial dial pulse 2+  Skin:     Not pale. Not Jaundiced  No rashes   Psych:  Good insight. Not depressed. Not anxious or agitated. Neurologic: EOMs intact. No facial asymmetry. No aphasia or slurred speech. Symmetrical strength, Sensation grossly intact.  Alert and oriented X 0.     _______________________________________________________________________  Care Plan discussed with:    Comments   Patient y    Family  y    RN y    Care Manager                    Consultant:      _______________________________________________________________________  Expected  Disposition:   Home with Family y   HH/PT/OT/RN    SNF/LTC    THU    ________________________________________________________________________  TOTAL TIME:  61  Minutes    Critical Care Provided     Minutes non procedure based      Comments    y Reviewed previous records   >50% of visit spent in counseling and coordination of care y Discussion with patient and/or family and questions answered ________________________________________________________________________  Signed: Emilie Martins MD    Procedures: see electronic medical records for all procedures/Xrays and details which were not copied into this note but were reviewed prior to creation of Plan. LAB DATA REVIEWED:    Recent Results (from the past 24 hour(s))   GLUCOSE, POC    Collection Time: 02/01/18  2:54 PM   Result Value Ref Range    Glucose (POC) 92 65 - 100 mg/dL    Performed by Yunior Pierre    CBC WITH AUTOMATED DIFF    Collection Time: 02/01/18  3:31 PM   Result Value Ref Range    WBC 6.0 3.6 - 11.0 K/uL    RBC 4.52 3.80 - 5.20 M/uL    HGB 12.9 11.5 - 16.0 g/dL    HCT 39.3 35.0 - 47.0 %    MCV 86.9 80.0 - 99.0 FL    MCH 28.5 26.0 - 34.0 PG    MCHC 32.8 30.0 - 36.5 g/dL    RDW 13.1 11.5 - 14.5 %    PLATELET 222 083 - 733 K/uL    MPV 10.4 8.9 - 12.9 FL    NRBC 0.0 0  WBC    ABSOLUTE NRBC 0.00 0.00 - 0.01 K/uL    NEUTROPHILS 45 32 - 75 %    LYMPHOCYTES 39 12 - 49 %    MONOCYTES 9 5 - 13 %    EOSINOPHILS 7 0 - 7 %    BASOPHILS 1 0 - 1 %    IMMATURE GRANULOCYTES 0 0.0 - 0.5 %    ABS. NEUTROPHILS 2.7 1.8 - 8.0 K/UL    ABS. LYMPHOCYTES 2.3 0.8 - 3.5 K/UL    ABS. MONOCYTES 0.5 0.0 - 1.0 K/UL    ABS. EOSINOPHILS 0.4 0.0 - 0.4 K/UL    ABS. BASOPHILS 0.0 0.0 - 0.1 K/UL    ABS. IMM. GRANS. 0.0 0.00 - 0.04 K/UL    DF AUTOMATED     METABOLIC PANEL, COMPREHENSIVE    Collection Time: 02/01/18  3:31 PM   Result Value Ref Range    Sodium 139 136 - 145 mmol/L    Potassium 4.0 3.5 - 5.1 mmol/L    Chloride 105 97 - 108 mmol/L    CO2 28 21 - 32 mmol/L    Anion gap 6 5 - 15 mmol/L    Glucose 91 65 - 100 mg/dL    BUN 11 6 - 20 MG/DL    Creatinine 0.86 0.55 - 1.02 MG/DL    BUN/Creatinine ratio 13 12 - 20      GFR est AA >60 >60 ml/min/1.73m2    GFR est non-AA >60 >60 ml/min/1.73m2    Calcium 8.9 8.5 - 10.1 MG/DL    Bilirubin, total 0.3 0.2 - 1.0 MG/DL    ALT (SGPT) 22 12 - 78 U/L    AST (SGOT) 25 15 - 37 U/L    Alk.  phosphatase 78 45 - 117 U/L Protein, total 7.5 6.4 - 8.2 g/dL    Albumin 4.0 3.5 - 5.0 g/dL    Globulin 3.5 2.0 - 4.0 g/dL    A-G Ratio 1.1 1.1 - 2.2     URINALYSIS W/ REFLEX CULTURE    Collection Time: 02/01/18  3:31 PM   Result Value Ref Range    Color YELLOW/STRAW      Appearance CLOUDY (A) CLEAR      Specific gravity 1.024 1.003 - 1.030      pH (UA) 6.0 5.0 - 8.0      Protein NEGATIVE  NEG mg/dL    Glucose NEGATIVE  NEG mg/dL    Ketone NEGATIVE  NEG mg/dL    Bilirubin NEGATIVE  NEG      Blood NEGATIVE  NEG      Urobilinogen 1.0 0.2 - 1.0 EU/dL    Nitrites NEGATIVE  NEG      Leukocyte Esterase MODERATE (A) NEG      WBC 20-50 0 - 4 /hpf    RBC 0-5 0 - 5 /hpf    Epithelial cells FEW FEW /lpf    Bacteria NEGATIVE  NEG /hpf    UA:UC IF INDICATED URINE CULTURE ORDERED (A) CNI      Mucus 1+ (A) NEG /lpf    CA Oxalate crystals 1+ (A) NEG   TROPONIN I    Collection Time: 02/01/18  3:31 PM   Result Value Ref Range    Troponin-I, Qt. <0.04 <0.05 ng/mL   AMMONIA    Collection Time: 02/01/18  3:40 PM   Result Value Ref Range    Ammonia 33 (H) <32 UMOL/L   INFLUENZA A & B AG (RAPID TEST)    Collection Time: 02/01/18  4:14 PM   Result Value Ref Range    Influenza A Antigen NEGATIVE  NEG      Influenza B Antigen NEGATIVE  NEG     LITHIUM    Collection Time: 02/01/18  4:14 PM   Result Value Ref Range    Lithium level 1.05 0.60 - 1.20 MMOL/L    Reported dose date: NOT PERFORMED      Reported dose time: NOT PERFORMED      Reported dose: NOT PERFORMED UNITS   TSH 3RD GENERATION    Collection Time: 02/01/18  4:14 PM   Result Value Ref Range    TSH 0.16 (L) 0.36 - 3.74 uIU/mL   EKG, 12 LEAD, SUBSEQUENT    Collection Time: 02/01/18  6:03 PM   Result Value Ref Range    Ventricular Rate 82 BPM    Atrial Rate 82 BPM    P-R Interval 212 ms    QRS Duration 84 ms    Q-T Interval 384 ms    QTC Calculation (Bezet) 448 ms    Calculated P Axis -8 degrees    Calculated R Axis -7 degrees    Calculated T Axis 1 degrees    Diagnosis       Sinus rhythm with 1st degree AV block  Nonspecific ST and T wave abnormality  Abnormal ECG  When compared with ECG of 20-DEC-2016 11:07,  No significant change was found     METABOLIC PANEL, BASIC    Collection Time: 02/02/18  3:08 AM   Result Value Ref Range    Sodium 142 136 - 145 mmol/L    Potassium 3.9 3.5 - 5.1 mmol/L    Chloride 109 (H) 97 - 108 mmol/L    CO2 24 21 - 32 mmol/L    Anion gap 9 5 - 15 mmol/L    Glucose 97 65 - 100 mg/dL    BUN 10 6 - 20 MG/DL    Creatinine 0.75 0.55 - 1.02 MG/DL    BUN/Creatinine ratio 13 12 - 20      GFR est AA >60 >60 ml/min/1.73m2    GFR est non-AA >60 >60 ml/min/1.73m2    Calcium 8.6 8.5 - 10.1 MG/DL   CBC WITH AUTOMATED DIFF    Collection Time: 02/02/18  3:08 AM   Result Value Ref Range    WBC 6.6 3.6 - 11.0 K/uL    RBC 4.05 3.80 - 5.20 M/uL    HGB 11.4 (L) 11.5 - 16.0 g/dL    HCT 34.9 (L) 35.0 - 47.0 %    MCV 86.2 80.0 - 99.0 FL    MCH 28.1 26.0 - 34.0 PG    MCHC 32.7 30.0 - 36.5 g/dL    RDW 12.8 11.5 - 14.5 %    PLATELET 917 085 - 153 K/uL    MPV 10.3 8.9 - 12.9 FL    NRBC 0.0 0  WBC    ABSOLUTE NRBC 0.00 0.00 - 0.01 K/uL    NEUTROPHILS 52 32 - 75 %    LYMPHOCYTES 35 12 - 49 %    MONOCYTES 9 5 - 13 %    EOSINOPHILS 4 0 - 7 %    BASOPHILS 1 0 - 1 %    IMMATURE GRANULOCYTES 0 0.0 - 0.5 %    ABS. NEUTROPHILS 3.4 1.8 - 8.0 K/UL    ABS. LYMPHOCYTES 2.3 0.8 - 3.5 K/UL    ABS. MONOCYTES 0.6 0.0 - 1.0 K/UL    ABS. EOSINOPHILS 0.3 0.0 - 0.4 K/UL    ABS. BASOPHILS 0.0 0.0 - 0.1 K/UL    ABS. IMM.  GRANS. 0.0 0.00 - 0.04 K/UL    DF AUTOMATED     LITHIUM    Collection Time: 02/02/18  3:08 AM   Result Value Ref Range    Lithium level 0.82 0.60 - 1.20 MMOL/L    Reported dose date: NOT PROVIDED      Reported dose time: NOT PROVIDED      Reported dose: NOT PROVIDED UNITS

## 2018-02-02 NOTE — PROGRESS NOTES
* No surgery found *  * No surgery found *  Bedside and Verbal shift change report given to Aroldo Barker RN (oncoming nurse) by Bety Hussein RN (offgoing nurse). Report included the following information [facility]:   [phone]      Significant changes during shift:  Admission, confused, agitated, haldol 2 mg IV given        Patient Information    Mik Hester  80 y.o.  2/1/2018  2:58 PM by Lyle Mckeon MD. Mik Hester was admitted from Home    Problem List    Patient Active Problem List    Diagnosis Date Noted    Acute metabolic encephalopathy 90/14/2992    Laceration of left arm with complication 04/83/6670    Dementia without behavioral disturbance 04/01/2016    Hypothyroidism 01/02/2014    Depression 01/02/2014     Past Medical History:   Diagnosis Date    Dementia     Depression     psychiatrist: Dr Marybelle Apley (on Lithium)    Fracture of wrist     slipped and fx left wrist, surgery scheduled 12/21/16    Full dentures     upper and lower    Hypothyroid          Core Measures:    CVA: Yes Yes  CHF:No No  PNA:No No    Post Op Surgical (If Applicable):     Number times ambulated in hallway past shift:  0  Number of times OOB to chair past shift:   0  NG Tube: No  Incentive Spirometer: No  Drains: No   Volume  0  Dressing Present:  No  Flatus:  Not applicable    Activity Status:    OOB to Chair No  Ambulated this shift Yes   Bed Rest No    Supplemental O2: (If Applicable)    NC No  NRB No  Venti-mask No  On 0 Liters/min      LINES AND DRAINS:    Central Line? NO  PICC LINE? No   Urinary Catheter? No   DVT prophylaxis:    DVT prophylaxis Med- No  DVT prophylaxis SCD or ANDREA- Refused     Wounds: (If Applicable)    Wounds- No    Location 0    Patient Safety:    Falls Score Total Score: 4  Safety Level_______  Bed Alarm On? Yes  Sitter?  No    Plan for upcoming shift: Safety, hydration        Discharge Plan: Yes TBD    Active Consults:  IP CONSULT TO NEUROLOGY  IP CONSULT TO HOSPITALIST

## 2018-02-02 NOTE — ACP (ADVANCE CARE PLANNING)
Responded to In Basket AMD request. Found pt sleeping and accompanied by daughter. Engaged with daughter who stated that she didn't believe pt was able to supply wishes for AMD at this point in time. Stated that pt had listed daughter who lives with her as MPOA a while back. Provided and explained informational booklet and AMD form to look over. Stated that pt has defined her living will and organ donation wishes in the past insisting that she does not want to donate organs but would like to be cremated. Stepped away once pt taken down to CT, was awoken but did not respond to statements posed by daughter.  will check back tomorrow and speak with RN to see if pt is appropriate to complete an AMD at this time. DUKE Jiang. Farhad Kenyon

## 2018-02-02 NOTE — PROGRESS NOTES
Spiritual Care Assessment/Progress Notes    Reba Yo [id number]  [id number]    7/22/1926  80 y.o.  female    Patient Telephone Number: [phone] (home)   Lutheran Affiliation: Other   Language: English   Extended Emergency Contact Information  Primary Emergency Contact: [address] [place] Phone: [phone]  Mobile Phone: [phone]  Relation: Child   Patient Active Problem List    Diagnosis Date Noted    Acute metabolic encephalopathy 89/63/9633    Laceration of left arm with complication 16/87/8248    Dementia without behavioral disturbance 04/01/2016    Hypothyroidism 01/02/2014    Depression 01/02/2014        Date: 2/2/2018       Level of Lutheran/Spiritual Activity:  []         Involved in augie tradition/spiritual practice    []         Not involved in augie tradition/spiritual practice  [x]         Spiritually oriented    []         Claims no spiritual orientation    []         seeking spiritual identity  []         Feels alienated from Christian practice/tradition  []         Feels angry about Christian practice/tradition  []         Spirituality/Christian tradition a resource for coping at this time.   []         Not able to assess due to medical condition    Services Provided Today:  []         crisis intervention    []         reading Scriptures  [x]         spiritual assessment    []         prayer  [x]         empathic listening/emotional support  []         rites and rituals (cite in comments)  []         life review     []         Christian support  []         theological development   []         advocacy  []         ethical dialog     []         blessing  []         bereavement support    [x]         support to family  []         anticipatory grief support   [x]         help with AMD  []         spiritual guidance    []         meditation      Spiritual Care Needs  [x]         Emotional Support  []         Spiritual/Lutheran Care  [] Loss/Adjustment  []         Advocacy/Referral                /Ethics  []         No needs expressed at               this time  []         Other: (note in               comments)  Spiritual Care Plan  [x]         Follow up visits with               pt/family  []         Provide materials  []         Schedule sacraments  []         Contact Community               Clergy  []         Follow up as needed  []         Other: (note in               comments)     Responded to In Basket AMD request. Found pt sleeping and accompanied by daughter. Engaged with daughter who stated that she didn't believe pt was able to supply wishes for AMD at this point in time. Stated that pt had listed daughter who lives with her as MPOA a while back. Provided and explained informational booklet and AMD form to look over. Stated that pt has defined her living will and organ donation wishes in the past insisting that she does not want to donate organs but would like to be cremated. Stepped away once pt taken down to CT, was awoken but did not respond to statements posed by daughter.  will check back tomorrow and speak with RN to see if pt is appropriate to complete an AMD at this time. DUKE [place]. Farhad Kenyon

## 2018-02-02 NOTE — PROGRESS NOTES
.Bedside and Verbal shift change report given to Radha Ramos RN (oncoming nurse) by Myriam García RN (offgoing nurse). Report included the following information [facility] Phone:   [phone]        Significant changes during shift:  Admission, confused, agitated, haldol 2 mg IV given           Patient Information     Torito Callahan  80 y.o.  2/1/2018  2:58 PM by Aishwarya Mancini MD. Torito Callahan was admitted from Home     Problem List          Patient Active Problem List     Diagnosis Date Noted    Acute metabolic encephalopathy 81/25/4823    Laceration of left arm with complication 56/38/9454    Dementia without behavioral disturbance 04/01/2016    Hypothyroidism 01/02/2014    Depression 01/02/2014           Past Medical History:   Diagnosis Date    Dementia      Depression       psychiatrist: Dr Zeny Benavides (on Lithium)    Fracture of wrist       slipped and fx left wrist, surgery scheduled 12/21/16    Full dentures       upper and lower    Hypothyroid              Core Measures:     CVA: Yes Yes  CHF:No No  PNA:No No     Post Op Surgical (If Applicable):      Number times ambulated in hallway past shift:  0  Number of times OOB to chair past shift:   0  NG Tube: No  Incentive Spirometer: No  Drains: No   Volume  0  Dressing Present:  No  Flatus:  Not applicable     Activity Status:     OOB to Chair No  Ambulated this shift Yes   Bed Rest No     Supplemental O2: (If Applicable)     NC No  NRB No  Venti-mask No  On 0 Liters/min        LINES AND DRAINS:     Central Line? NO  PICC LINE? No   Urinary Catheter? No   DVT prophylaxis:     DVT prophylaxis Med- No  DVT prophylaxis SCD or ANDREA- Refused      Wounds: (If Applicable)     Wounds- No     Location 0     Patient Safety:     Falls Score Total Score: 4  Safety Level_______  Bed Alarm On? Yes  Sitter?  No     Plan for upcoming shift: Safety, hydration           Discharge Plan: Yes TBD     Active Consults:  IP CONSULT TO NEUROLOGY  IP CONSULT TO HOSPITALIST

## 2018-02-02 NOTE — PROGRESS NOTES
Problem: Self Care Deficits Care Plan (Adult)  Goal: *Acute Goals and Plan of Care (Insert Text)  Occupational Therapy Goals:  Initiated 2/2/2018  1. Patient will perform grooming standing with supervision/set-up within 7 days. 2. Patient will perform toileting with supervision/set-up within 7 days. 3. Patient will perform lower body dressing with supervision/set-up within 7 days. 4. Patient will transfer from toilet with supervision/set-up using the least restrictive device and appropriate durable medical equipment within 7 days. Occupational Therapy EVALUATION  Patient: Nehemiah Silva (42 y.o. female)  Date: 2/2/2018  Primary Diagnosis: Acute metabolic encephalopathy        Precautions: fall       ASSESSMENT :  Based on the objective data described below, the patient presents with significant confusion and was very restless and had limited attention and command following. Pt had just returned from testing and was standing at bedside with nursing staff. Therapist intervened as pt needed to go to the restroom and needed to be seen for OT eval.  Min hand held assist needed for mobility to bathroom. Once in bathroom pt was forgetting to pull down pants/underwear and attempting to sit down despite not being aligned with toilet. Therapist needed to cue pt to remember to pull down pants and therapist barely was able to get pants down prior to pt sitting on commode which pt needed assist to shift hips completely onto toilet. Able to void without accident then was standing forgetting to wipe or pull up pants. Wiped beka areas in standing with min assist for balance and moderate assist to pull over hips. Reminder to wash hands and pt needed therapist to put soap on hands. Returned pt to bed at end of session. Pt would not allow roll belt to be replaced but family reports that she will not wear it and will be with pt at all times and nurse is aware of this.   Therapist did strap belt on patient but she kept taking device off. Family is adamant that pt will return to home with family support. Pt has 24/7 assist at baseline and family is prepared to care for patient. Recommend home health and pt will need significant physical assist at discharge. Educated pt on gait belt but pt did not like wearing it during therapy today so family declined belt at this time. Patient will benefit from skilled intervention to address the above impairments. Patients rehabilitation potential is considered to be Fair vs. guarded pending mentation improvement  Factors which may influence rehabilitation potential include:   []             None noted  [x]             Mental ability/status  []             Medical condition  []             Home/family situation and support systems  []             Safety awareness  []             Pain tolerance/management  []             Other:      PLAN :  Recommendations and Planned Interventions:  [x]               Self Care Training                  [x]        Therapeutic Activities  [x]               Functional Mobility Training    []        Cognitive Retraining  [x]               Therapeutic Exercises           []        Endurance Activities  [x]               Balance Training                   []        Neuromuscular Re-Education  []               Visual/Perceptual Training     [x]   Home Safety Training  [x]               Patient Education                 [x]        Family Training/Education  []               Other (comment):    Frequency/Duration: Patient will be followed by occupational therapy 4 times a week to address goals. Discharge Recommendations: Home Health with 24/7 significant physical assist  Further Equipment Recommendations for Discharge: TBD     SUBJECTIVE:   Patient stated I don't want this.     OBJECTIVE DATA SUMMARY:   HISTORY:   Past Medical History:   Diagnosis Date    Dementia     Depression     psychiatrist: Dr Tom Giles (on Lithium)    Fracture of wrist slipped and fx left wrist, surgery scheduled 12/21/16    Full dentures     upper and lower    Hypothyroid      Past Surgical History:   Procedure Laterality Date    HX KYPHOPLASTY  2013   Alfonzo Hua  12/2016    Wrist surgery    HX WRIST FRACTURE TX Left 12/2016       Prior Level of Function/Environment/Context: ambulated reluctantly with SPC (cane stands up on its own), reluctantly uses shower chair; will not use rolling walker; has some confusion at baseline but this has increased recently; family manages all meds; falls recently 2-3; home bound mostly; performs all ADLS on her own; has 82/9 care  Expanded or extensive additional review of patient history:     Home Situation  Home Environment: Private residence  # Steps to Enter: 4  Rails to Enter: Yes  One/Two Story Residence: One story  Living Alone: No  Support Systems: Family member(s) (daughter)  Patient Expects to be Discharged to[de-identified] Private residence  Current DME Used/Available at Home: [address], straight, Shower chair, Commode, bedside  Tub or Shower Type: Shower  []  Right hand dominant   []  Left hand dominant Ambidextrous     EXAMINATION OF PERFORMANCE DEFICITS:  Cognitive/Behavioral Status:  Neurologic State: Alert  Orientation Level: Oriented to person;Disoriented to time;Disoriented to situation;Disoriented to place  Cognition: Decreased attention/concentration;Decreased command following; Impulsive;Memory loss;Poor safety awareness  Perception: Appears intact  Perseveration: Perseverates during mobility  Safety/Judgement: Lack of insight into deficits    Hearing:   Auditory  Auditory Impairment: Hard of hearing, left side, Hard of hearing, right side    Vision/Perceptual:                                Corrective Lenses: Glasses    Range of Motion:    AROM: Generally decreased, functional                         Strength:    Strength: Generally decreased, functional                Coordination:  Coordination: Generally decreased, functional  Fine Motor Skills-Upper: Left Intact; Right Intact    Gross Motor Skills-Upper: Left Impaired;Right Impaired    Tone & Sensation:    Tone: Normal  Sensation:  (unable to accurately test)                      Balance:  Sitting: Impaired  Sitting - Static: Fair (occasional)  Sitting - Dynamic: Fair (occasional)  Standing: Impaired  Standing - Static: Constant support; Fair (moments of poor)  Standing - Dynamic : Poor    Functional Mobility and Transfers for ADLs:  Bed Mobility:  Sit to Supine: Total assistance (more related to confusion)    Transfers:  Sit to Stand: Minimum assistance; Additional time  Stand to Sit: Moderate assistance (slow decent)  Bed to Chair: Minimum assistance (occasional moderate hand held assist)  Toilet Transfer : Moderate assistance (attempting to sit prior to completion of alignment)    ADL Assessment:  Feeding: Setup;Stand-by assistance    Oral Facial Hygiene/Grooming: Minimum assistance    Bathing: Maximum assistance    Upper Body Dressing: Maximum assistance    Lower Body Dressing: Maximum assistance    Toileting: Moderate assistance                ADL Intervention and task modifications:     See assessment  Cognitive Retraining  Safety/Judgement: Lack of insight into deficits    Functional Measure:  Barthel Index:    Bathin  Bladder: 10  Bowels: 10  Groomin  Dressin  Feedin  Mobility: 0  Stairs: 0  Toilet Use: 0  Transfer (Bed to Chair and Back): 10  Total: 40       Barthel and G-code impairment scale:  Percentage of impairment CH  0% CI  1-19% CJ  20-39% CK  40-59% CL  60-79% CM  80-99% CN  100%   Barthel Score 0-100 100 99-80 79-60 59-40 20-39 1-19   0   Barthel Score 0-20 20 17-19 13-16 9-12 5-8 1-4 0      The Barthel ADL Index: Guidelines  1. The index should be used as a record of what a patient does, not as a record of what a patient could do.   2. The main aim is to establish degree of independence from any help, physical or verbal, however minor and for whatever reason. 3. The need for supervision renders the patient not independent. 4. A patient's performance should be established using the best available evidence. Asking the patient, friends/relatives and nurses are the usual sources, but direct observation and common sense are also important. However direct testing is not needed. 5. Usually the patient's performance over the preceding 24-48 hours is important, but occasionally longer periods will be relevant. 6. Middle categories imply that the patient supplies over 50 per cent of the effort. 7. Use of aids to be independent is allowed. Cheng Hull., Barthel, DJoeW. (7969). Functional evaluation: the Barthel Index. [address] [facility] (14)2. Vaishnavi Greco tawana MEJIA Wagner, Amanda Lucero.Yoan., [facility], [address] [place] [address] (1999). Measuring the change indisability after inpatient rehabilitation; comparison of the responsiveness of the Barthel Index and Functional [place] Measure. Journal of Neurology, Neurosurgery, and Psychiatry, 66(4), 533-156. Appleton Antwan, N.[place].A, RANJEET Hansen, & Boris Powell MNAOMI. (2004.) Assessment of post-stroke quality of life in cost-effectiveness studies: The usefulness of the Barthel Index and the EuroQoL-5D. Quality of Life Research, 13, 504-72         G codes: In compliance with CMSs Claims Based Outcome Reporting, the following G-code set was chosen for this patient based on their primary functional limitation being treated: The outcome measure chosen to determine the severity of the functional limitation was the barthel with a score of 40/100 which was correlated with the impairment scale. ?  Self Care:     - CURRENT STATUS: CK - 40%-59% impaired, limited or restricted    - GOAL STATUS: CJ - 20%-39% impaired, limited or restricted    - D/C STATUS:  ---------------To be determined---------------     Occupational Therapy Evaluation Charge Determination   History Examination Decision-Making   MEDIUM Complexity : Expanded review of history including physical, cognitive and psychosocial  history  HIGH Complexity : 5 or more performance deficits relating to physical, cognitive , or psychosocial skils that result in activity limitations and / or participation restrictions HIGH Complexity : Patient presents with comorbidities that affect occupational performance. Signifigant modification of tasks or assistance (eg, physical or verbal) with assessment (s) is necessary to enable patient to complete evaluation       Based on the above components, the patient evaluation is determined to be of the following complexity level: MEDIUM  Pain:  Pain Scale 1: Numeric (0 - 10)  Pain Intensity 1: 0              Activity Tolerance:     Please refer to the flowsheet for vital signs taken during this treatment. After treatment:   [] Patient left in no apparent distress sitting up in chair  [x] Patient left in no apparent distress in bed  [x] Call bell left within reach  [x] Nursing notified  [x] Caregiver present  [x] Bed alarm activated    COMMUNICATION/EDUCATION:   The patients plan of care was discussed with: Physical Therapist, Registered Nurse and patient and her family. [] Home safety education was provided and the patient/caregiver indicated understanding. [x] Patient participated as able in goal setting and plan of care. [x] Patient agree to work toward stated goals and plan of care. [] Patient understands intent and goals of therapy, but is neutral about his/her participation. [x] Patient is unable to participate in goal setting and plan of care. This patients plan of care is appropriate for delegation to [facility].     Thank you for this referral.  Mercedes Garza, OTR/L

## 2018-02-02 NOTE — PROGRESS NOTES
TRANSFER - IN REPORT:    Verbal report received from [address] [place] [address] RN(name) on Gwendolyn Harris  being received from ER(unit) for routine progression of care      Report consisted of patients Situation, Background, Assessment and   Recommendations(SBAR). Information from the following report(s) SBAR, Kardex, Recent Results and Med Rec Status was reviewed with the receiving nurse. Opportunity for questions and clarification was provided. Assessment completed upon patients arrival to unit and care assumed.

## 2018-02-02 NOTE — PROCEDURES
[facility]  *** FINAL REPORT ***    Name: Paula Cherry  MRN: [id number]    Inpatient  : 1926  HIS Order #: [id number]  [id number] [facility] Visit #: [id number]  Date: 2018    TYPE OF TEST: Cerebrovascular Duplex    REASON FOR TEST  Arterial occlusion    Right Carotid:-             Proximal               Mid                 Distal  cm/s  Systolic  Diastolic  Systolic  Diastolic  Systolic  Diastolic  CCA:     67.5       9.0                            63.0      12.0  Bulb:  ECA:    147.0       0.0  ICA:     87.0      21.0       68.0      13.0       78.0      16.0  ICA/CCA:  1.4       1.8    ICA Stenosis: <50%    Right Vertebral:-  Finding: Antegrade  Sys:       58.0  Katie:        7.0    Right Subclavian: Normal    Left Carotid:-            Proximal                Mid                 Distal  cm/s  Systolic  Diastolic  Systolic  Diastolic  Systolic  Diastolic  CCA:     18.0       8.0                            64.0      12.0  Bulb:  ECA:    250.0       0.0  ICA:    225.0      32.0      147.0      27.0       89.0      21.0  ICA/CCA:  3.5       2.7    ICA Stenosis: 50-69%    Left Vertebral:-  Finding: Resistant  Sys:       73.0  Katie:        0.0    Left Subclavian: Normal    INTERPRETATION/FINDINGS  PROCEDURE:  Carotid Duplex Examination using B-mode, color and  spectral Doppler of the extracranial cerebrovascular arteries. 1. <50% stenosis in the right internal carotid artery. 2. 50-69% stenosis in the left internal carotid artery (High end of  range). 3. No significant stenosis in the external carotid arteries  bilaterally. 4. Antegrade flow in the right vertebral artery. 5. Resistant flow in the left vertebral artery. 6. Normal flow in both subclavian arteries. Plaque Morphology:  1. Heterogeneous plaque in the bulb and right ICA. 2. Heterogeneous plaque in the bulb and left ICA.     ADDITIONAL COMMENTS    I have personally reviewed the data relevant to the interpretation of  this  study. TECHNOLOGIST: Jamaica Garcia. REGINALDO Larson, RDMS  Signed: 02/02/2018 11:49 AM    PHYSICIAN: Jason Glynn MD  Signed: 02/02/2018 02:55 PM

## 2018-02-02 NOTE — PROGRESS NOTES
Patient off floor at this time. Will defer PT services and continue to follow.      Casey Wilkinson, PT, DPT, Kristin Huerta

## 2018-02-02 NOTE — PROGRESS NOTES
Hospitalist Progress Note    NAME: Pamela De La Cruz   :  1926   MRN:  [id number]       Assessment / Plan:  Acute metabolic encephalopathy may be related to UTI versus medications (Klonopin, paroxetine, lithium) improved  Cont ceftriaxone and follow urine Cx result  Resume lithium and Paxil as mental status is better. Lithium level is normal.   Due to reported episode of abnormal speech we will rule out TIA. Pending MRI of brain, echo and us carotids. Cont  aspirin.    D/w Dr Ramos Ashley from neuro  Start diet    History of dementia and at risk for delirium  On  Haldol as needed for agitation     Hypothyroidism  Resume levothyroxine but will decrease dose to 60 mcg as TSH is low at 0.16    Bipolar disease  Resume lithium as level is normal     Disp: Possible DC in am tomorow           Code Status: Full but DTR will discuss with her mom and make a decision soon  Surrogate Decision Maker: DTR Eiffe     DVT Prophylaxis: heparin  GI Prophylaxis: not indicated     Baseline: independent                                    Body mass index is 24.63 kg/(m^2). Recommended Disposition: Home w/Family     Subjective:     Chief Complaint / Reason for Physician Visit  Mental status is much better today. Per dtr she is closer to her base line except that she is more tearful. No weakness, tingling or numbness. Was agitated last nite and required haldol    Review of Systems:  Symptom Y/N Comments  Symptom Y/N Comments   Fever/Chills n   Chest Pain n    Poor Appetite    Edema     Cough n   Abdominal Pain n    Sputum    Joint Pain     SOB/SHERMAN n   Pruritis/Rash     Nausea/vomit    Tolerating PT/OT     Diarrhea n   Tolerating Diet     Constipation    Other       Could NOT obtain due to:      Objective:     VITALS:   Last 24hrs VS reviewed since prior progress note.  Most recent are:  Patient Vitals for the past 24 hrs:   Temp Pulse Resp BP SpO2   18 0834 97.5 °F (36.4 °C) 69 18 137/62 94 %   18 0325 97.6 °F (36.4 °C) 64 18 127/49 94 %   02/01/18 2302 98 °F (36.7 °C) 82 18 128/56 95 %   02/01/18 1942 96.8 °F (36 °C) (!) 102 18 - 95 %   02/01/18 1909 - 80 18 133/67 98 %   02/01/18 1837 - 81 20 - 95 %   02/01/18 1800 - 83 - 164/59 94 %   02/01/18 1702 - - - - 97 %   02/01/18 1700 - - - 158/86 93 %   02/01/18 1600 - - - 147/52 98 %   02/01/18 1520 97.4 °F (36.3 °C) 83 16 189/68 100 %     No intake or output data in the 24 hours ending 02/02/18 1354     PHYSICAL EXAM:  General: cooperative, no acute distress    EENT:  EOMI. Anicteric sclerae. MMM  Resp:  CTA bilaterally, no wheezing or rales. No accessory muscle use  CV:  Regular  rhythm,  No edema  GI:  Soft, Non distended, Non tender.  +Bowel sounds  Neurologic:  Alert and wake,  normal speech,   Psych:   Not anxious nor agitated  Skin:  No rashes.   No jaundice    Reviewed most current lab test results and cultures  YES  Reviewed most current radiology test results   YES  Review and summation of old records today    NO  Reviewed patient's current orders and MAR    YES  PMH/SH reviewed - no change compared to H&P          Current Facility-Administered Medications:     LORazepam (ATIVAN) injection 1-2 mg, 1-2 mg, IntraVENous, Q6H PRN, Sejal Mcclelland MD, 1 mg at 02/02/18 1333    lithium carbonate CR (ESKALITH CR) tablet 450 mg, 450 mg, Oral, QHS, Shivani Bazzi MD    [START ON 2/3/2018] PARoxetine (PAXIL) tablet 40 mg, 40 mg, Oral, DAILY, Shivani Bazzi MD    cholecalciferol (VITAMIN D3) tablet 2,000 Units, 2,000 Units, Oral, DAILY, Shivani Bazzi MD, 2,000 Units at 02/02/18 1000    levothyroxine (SYNTHROID) tablet 62.5 mcg, 62.5 mcg, Oral, 6am, Shivani Bazzi MD, 62.5 mcg at 02/02/18 1000    sodium chloride (NS) flush 5-10 mL, 5-10 mL, IntraVENous, Q8H, Shivani Bazzi MD, 10 mL at 02/02/18 0659    sodium chloride (NS) flush 5-10 mL, 5-10 mL, IntraVENous, PRN, Shivani Bazzi MD, 10 mL at 02/02/18 1252    acetaminophen (TYLENOL) tablet 650 mg, 650 mg, Oral, Q6H PRN, Raz ALLEN Viviane Egan MD    ondansetron [facility]) injection 4 mg, 4 mg, IntraVENous, Q6H PRN, Man Kilgore MD    docusate sodium (COLACE) capsule 100 mg, 100 mg, Oral, DAILY PRN, Man Kilgore MD    heparin (porcine) injection 5,000 Units, 5,000 Units, SubCUTAneous, Q8H, Man Kilgore MD, 5,000 Units at 02/02/18 1001    0.9% sodium chloride infusion, 50 mL/hr, IntraVENous, CONTINUOUS, Man Kilgore MD, Last Rate: 50 mL/hr at 02/01/18 1745, 50 mL/hr at 02/01/18 1745    haloperidol lactate (HALDOL) injection 2 mg, 2 mg, IntraMUSCular, Q6H PRN, Man Kilgore MD, 2 mg at 02/02/18 0700    aspirin chewable tablet 81 mg, 81 mg, Oral, DAILY, Man Kilgore MD, 81 mg at 02/02/18 0959    cefTRIAXone (ROCEPHIN) 1 g/50 mL NS IVPB, 1 g, IntraVENous, Q24H, Marino Harper MD  ________________________________________________________________________  Care Plan discussed with:    Comments   Patient y    Family  y    RN y    Care Manager     Consultant                        Multidiciplinary team rounds were held today with , nursing, pharmacist and clinical coordinator. Patient's plan of care was discussed; medications were reviewed and discharge planning was addressed. ________________________________________________________________________  Total NON critical care TIME:  35    Minutes    Total CRITICAL CARE TIME Spent:   Minutes non procedure based      Comments   >50% of visit spent in counseling and coordination of care     ________________________________________________________________________  aMn Kilgore MD     Procedures: see electronic medical records for all procedures/Xrays and details which were not copied into this note but were reviewed prior to creation of Plan. LABS:  I reviewed today's most current labs and imaging studies.   Pertinent labs include:  Recent Labs      02/02/18   0308  02/01/18   1531   WBC  6.6  6.0   HGB  11.4*  12.9   HCT  34.9*  39.3   PLT  185  197     Recent Labs 02/02/18   0308  02/01/18   1531   NA  142  139   K  3.9  4.0   CL  109*  105   CO2  24  28   GLU  97  91   BUN  10  11   CREA  0.75  0.86   CA  8.6  8.9   ALB   --   4.0   TBILI   --   0.3   SGOT   --   25   ALT   --   22       Signed: Antelmo Tinajero MD

## 2018-02-02 NOTE — PROGRESS NOTES
Pt is a 80 y.o  female admitted with Acute Metabolic Encephalopathy. CM met with pt's daughter as pt was down for a test. Demographic information verified and all is correct. Pt lives with her daughter in a 1 story home with 2 steps to the entrance. Prior to admission, pt was independent with her ADL's and needed some assistance with her IADL's. Pt has a cane and had home health in the past. Pt has gone to [facility] for balance training. Preferred pharmacy is [facility]. Pt's daughter can transport pt home by car. CM discussed therapy's recommendation for home health and pt's daughter was in agreement. CM provided pt's daughter with a list of home health companies and she chose [facility] home [facility]. FOC form completed and referral sent via [facility]. CM will continue to follow pt for discharge planning needs. Care Management Interventions  PCP Verified by CM: Yes (Dr. Milana Meckel )  Mode of Transport at Discharge: Other (see comment) (pt's daughter can transport by car)  Transition of Care Consult (CM Consult): Discharge Planning, [address]: Yes  Discharge Durable Medical Equipment: No (cane)  Physical Therapy Consult: Yes  Occupational Therapy Consult: Yes  Speech Therapy Consult: No  Current Support Network:  Other, Own Home (lives with daughter in a 1 story home with 2 steps to the entrance)  Confirm Follow Up Transport: Family  Plan discussed with Pt/Family/Caregiver: Yes  Freedom of Choice Offered: Yes  Discharge Location  Discharge Placement: Home with home health    Reba Wilson, [address]

## 2018-02-03 ENCOUNTER — HOME HEALTH ADMISSION (OUTPATIENT)
Dept: HOME HEALTH SERVICES | Facility: HOME HEALTH | Age: 83
End: 2018-02-03
Payer: MEDICARE

## 2018-02-03 VITALS
DIASTOLIC BLOOD PRESSURE: 62 MMHG | WEIGHT: 126.1 LBS | OXYGEN SATURATION: 96 % | RESPIRATION RATE: 18 BRPM | SYSTOLIC BLOOD PRESSURE: 161 MMHG | BODY MASS INDEX: 24.76 KG/M2 | HEART RATE: 70 BPM | TEMPERATURE: 98.1 F | HEIGHT: 60 IN

## 2018-02-03 LAB
BACTERIA SPEC CULT: NORMAL
CC UR VC: NORMAL
SERVICE CMNT-IMP: NORMAL

## 2018-02-03 PROCEDURE — 74011250636 HC RX REV CODE- 250/636: Performed by: INTERNAL MEDICINE

## 2018-02-03 PROCEDURE — 74011250637 HC RX REV CODE- 250/637: Performed by: INTERNAL MEDICINE

## 2018-02-03 RX ORDER — GUAIFENESIN 100 MG/5ML
81 LIQUID (ML) ORAL DAILY
Qty: 30 TAB | Refills: 0 | Status: SHIPPED | OUTPATIENT
Start: 2018-02-03 | End: 2018-03-05

## 2018-02-03 RX ORDER — LEVOTHYROXINE SODIUM 50 UG/1
60 TABLET ORAL
Qty: 38 TAB | Refills: 0 | Status: SHIPPED | OUTPATIENT
Start: 2018-02-04 | End: 2018-03-06

## 2018-02-03 RX ADMIN — ASPIRIN 81 MG 81 MG: 81 TABLET ORAL at 08:51

## 2018-02-03 RX ADMIN — SODIUM CHLORIDE 50 ML/HR: 900 INJECTION, SOLUTION INTRAVENOUS at 07:45

## 2018-02-03 RX ADMIN — HEPARIN SODIUM 5000 UNITS: 5000 INJECTION, SOLUTION INTRAVENOUS; SUBCUTANEOUS at 03:28

## 2018-02-03 RX ADMIN — VITAMIN D, TAB 1000IU (100/BT) 2000 UNITS: 25 TAB at 08:51

## 2018-02-03 RX ADMIN — PAROXETINE 40 MG: 20 TABLET, FILM COATED ORAL at 08:51

## 2018-02-03 RX ADMIN — LEVOTHYROXINE SODIUM 62.5 MCG: 125 TABLET ORAL at 07:04

## 2018-02-03 NOTE — PROGRESS NOTES
Went to go hang another bag of Normal Saline. Noticed that the bag of Rocephin was full. . Was hung on day shift at 1700. Not sure if patient ever got the dose. It did look a little yellowish. Sandra Samuels the day shift nurse.

## 2018-02-03 NOTE — ROUTINE PROCESS
KYLER received call from RN requesting F/U with [facility] referral. CM contacted [address] [facility] [address] who reported [facility] has been authorized and will start upon Pt D/C. KYLER made RN aware of this information. KYLER updated D/C information providing [phone] contact information for F/U.     Oscar Delarosa [facility]   Ext # [phone]

## 2018-02-03 NOTE — PROGRESS NOTES
Called pharmacist talked to Lopez Cast and advised her about the Rocephin I did not think patient received and she advised to hang a dose and reschedule it for 24 hours. Called Dr. Suraj Golden and he said he saw my note and to hang it now and reschedule it for 24 hours. Magdalena Serra the pharmacist of this and she said she would take care of this.

## 2018-02-03 NOTE — DISCHARGE INSTRUCTIONS
Discharge Diagnosis  Acute metabolic encephalopathy may be related to  medications (Klonopin, paroxetine, lithium) vs dementia  Left Internal carotid artery stenosis  History of dementia  Hypothyroidism  Bipolar disease    Diet : Heart health  Activity : avoid falls use walker    Cbc and Bmp in 1 week

## 2018-02-03 NOTE — PROGRESS NOTES
Antimicrobial Note    Indication:  UTI  Regimen:  Rocephin 1gm IV 2/1, Rocephin 1 gm IV not infused; Re-entered Rocephin 1gm IV q24h to begin 2/3 9am.  MD aware. Stop date planned (not presently entered) may need adjustment to provide desired consecutive days of therapy.     Thank you,  Jackie Astudillo, [facility]

## 2018-02-05 ENCOUNTER — HOME CARE VISIT (OUTPATIENT)
Dept: SCHEDULING | Facility: HOME HEALTH | Age: 83
End: 2018-02-05
Payer: MEDICARE

## 2018-02-05 LAB
ANA SER QL: NEGATIVE
SEE BELOW:, 164879: NORMAL

## 2018-02-05 PROCEDURE — 400013 HH SOC

## 2018-02-05 PROCEDURE — 3331090001 HH PPS REVENUE CREDIT

## 2018-02-05 PROCEDURE — 3331090003 HH PPS REVENUE ADJ

## 2018-02-05 PROCEDURE — 3331090002 HH PPS REVENUE DEBIT

## 2018-02-05 PROCEDURE — G0151 HHCP-SERV OF PT,EA 15 MIN: HCPCS

## 2018-02-05 NOTE — DISCHARGE SUMMARY
Hospitalist Discharge Summary     Patient ID:  Elza Valladares  [id number]  80 y.o.  7/22/1926    PCP on record: Gabe Schumacher MD    Admit date: 2/1/2018  Discharge date and time: 2/5/2018      DISCHARGE DIAGNOSIS:    Acute metabolic encephalopathy may be related to dementia vs  medications (Klonopin, paroxetine, lithium) improved  History of dementia and at risk for delirium  Hypothyroidism  decrease dose to 60 mcg as TSH is low at 0.16  Bipolar disease         CONSULTATIONS:  IP CONSULT TO NEUROLOGY    Excerpted HPI from H&P of Zoie Balbuena MD:  This is a 80-year-old female with past medical history of dementia, hypothyroidism is coming to the hospital with chief complaints of increased confusion since 11 AM today.  Patient at baseline has dementia and is not a very good historian so information is obtained by talking to patient's daughter as well.  At baseline patient can normally recognize family members, her surroundings and can feed herself but since this morning she is acting more confused.  She did not know that she was in her house. Roslyn Gordillo also was not able to recognize her familiar surroundings. Roslyn Gordillo is also crying more frequently. Roslyn Gordillo has been weak since the last 1 month.  She is also reporting some bilateral tingling in her feet since the last 1 month.  She was also noted to have some abnormal speech this morning but that is currently resolved right now. Roslyn Gordillo did not report any headache.  She denied fever or chills.  Rest of the information regarding history of present illness is limited as she is a very poor historian.      ______________________________________________________________________  DISCHARGE SUMMARY/HOSPITAL COURSE:  for full details see H&P, daily progress notes, labs, consult notes. Acute metabolic encephalopathy may be related to dementia vs  medications (Klonopin, paroxetine, lithium)  She was admitted and had following work up.  UA was abnormal but urine cx was negative. All  medications that can cause encephalopathy including Klonopin, Paxil, lithium were held with improvement of mental status to base line. Mri of brain could not be done due to anxiety. US carotid showed left ic 69% stenosis and neuro recommended out pt follow up,  echo  normal. Neurology recommended to start asa. Li level was normal so all her meds were restarted and she tolerated them well with out any problems. She was back to her normal, her vitals were stable and lab work was at base line so she was discharged for out pt follow up. Left IC 69 % stenosis  Out pt f.u recommended by Dr Renata Devine     History of dementia and at risk for delirium  Out pt follow up    Hypothyroidism  Resume levothyroxine but will decrease dose to 60 mcg as TSH is low at 0.16    Bipolar disease  Li level was normal      _______________________________________________________________________  Patient seen and examined by me on discharge day. Pertinent Findings:  Gen:    Not in distress  Chest: Clear lungs  CVS:   Regular rhythm. No edema  Abd:  Soft, not distended, not tender  Neuro:  Alert, awake   _______________________________________________________________________  DISCHARGE MEDICATIONS:   Discharge Medication List as of 2/3/2018  9:38 AM      START taking these medications    Details   aspirin 81 mg chewable tablet Take 1 Tab by mouth daily for 30 days. , Print, Disp-30 Tab, R-0         CONTINUE these medications which have CHANGED    Details   levothyroxine (SYNTHROID) 50 mcg tablet Take 1.25 Tabs by mouth every morning for 30 days. , Print, Disp-38 Tab, R-0         CONTINUE these medications which have NOT CHANGED    Details   clonazePAM (KLONOPIN) 0.5 mg tablet Take 0.5 mg by mouth every evening., Historical Med      PARoxetine (PAXIL) 40 mg tablet Take 40 mg by mouth daily. , Historical Med      multivitamin (ONE A DAY) tablet Take 1 Tab by mouth daily. , Historical Med      cholecalciferol, vitamin D3, (VITAMIN D3) 2,000 unit tab Take 1 Tab by mouth daily. , Historical Med      lithium CR (ESKALITH CR) 450 mg CR tablet Take 450 mg by mouth nightly., Historical Med             My Recommended Diet, Activity, Wound Care, and follow-up labs are listed in the patient's Discharge Insturctions which I have personally completed and reviewed.     _______________________________________________________________________  DISPOSITION:    Home with Family: y   Home with HH/PT/OT/RN:    SNF/LTC:    THU:    OTHER:        Condition at Discharge:  Stable  _______________________________________________________________________  Follow up with:   PCP : Gill Garcia MD  Follow-up Information     Follow up With Details Comments Av. Jazlyn Rabago MD Schedule an appointment as soon as possible for a visit in 1 week  [address] [facility]  [address] [place], [place] [address]jennifery      Arvind Beck MD Schedule an appointment as soon as possible for a visit in 2 weeks  [address]  [address] [place] [address] [address]  [address]  [phone]              Total time in minutes spent coordinating this discharge (includes going over instructions, follow-up, prescriptions, and preparing report for sign off to her PCP) :  35  minutes    Signed:  Gerhard Arora MD

## 2018-02-06 ENCOUNTER — HOME CARE VISIT (OUTPATIENT)
Dept: HOME HEALTH SERVICES | Facility: HOME HEALTH | Age: 83
End: 2018-02-06
Payer: MEDICARE

## 2018-02-07 ENCOUNTER — PATIENT OUTREACH (OUTPATIENT)
Dept: INTERNAL MEDICINE CLINIC | Age: 83
End: 2018-02-07

## 2018-02-07 NOTE — PROGRESS NOTES
[address] - AFD - 2/7/2018    Nuvia Storey is a 80 y.o. female   This patient was received as a referral from inpatient admission   Inpatient RRAT Score: 17  Patient's challenges to self management identified:  ineffective coping, lack of knowledge about disease, level of motivation, medication management, support system, utilization of services    Medication Management:  good adherence, good understanding, experiencing side effects    Summary of patients top three problems:     Problem 1: Progressive dementia: pt becomes agitated and angry when family attempts to add other caregivers or placement in care facility. Pt is oriented to name only but frequently refuses medication or care     Problem 2: Lack of coping: Pt refuses to participate in therapy or group activities, pt denies her own care needs, pt has lost ability to differentiate what is on the TV from reality. Problem 3: Caregiver exhaustion: Pt's dtr has not identified support groups or resources. Pt's dtr has not succeeded in adding additional care support to pt's routine. Pt's dtr is pt's lifeline and only connection to care. Patients motivational level on a scale of 0-10: 2  Advance Care Planning:   Patient was offered the opportunity to discuss advance care planning:  no     Does patient have an Advance Directive:  yes   If no, did you provide information on Advance Care Planning?  no     [employer], Referrals, and Durable Medical Equipment: cane and walker, all other referrals refused    Follow up appointments:  Dr. Leonides Cervantes - 2/21/18 - 2282  Goals      Identification of barriers to adherence to a plan of care such as inability to afford medications, lack of insurance, lack of transportation, etc.            AFD - 2/7/2018  Confusion and agitation  Interview conducted with pt's dtr. Pt is easily agitated and became angry at what pt felt was \"unnessesary\" stranger \"presuming\" to offer assistance.    Pt confuses what is on the television with reality - fears \"getting shot\" during, insulted by talk shows \"asking impertinent questions\", or fear of  seeking legal actions against the family. Per dtr - pt is oriented to name only  Pt refuses to take medications recommended by psychiatrist.  Simon Thomas will research caregiver support on site for [facility] and [facility]. Dtr will interview facilities with memory units and discuss pt's agitated response to previous attempts at placement.  Supportive resources in place to maintain patient in the community             AFD - 2/7/2018  Pt becomes agitated with caregivers and family when family attempts to hire additional caregivers. Pt is unaware that she has health issues or has any issues needing additional care. Family has attempted place pt in assisted living. Dtr stated that she wept constantly and begged endlessly to be \"home\" and \"out of this place\". Dtr could not say where pt though she was. Family has made no attempts to investigate in-pt memory units, adult day care, respite care, or other support services. Family does not know how to deal with pt when she becomes agitated and angry. Pt will not accept psych meds. P - Pt's dtr will begin to investigate possible sources of support and placement: starting with [facility] and [facility].  Understands red flags post discharge. AFD - 2/7/2018  Safety  Pt's dtr estimates pt has fallen aprox 8 times in the last year. Pt has cane and walker. Pt absolutely refuses to use the walker. Pt will use the cane when someone is present to remind her. Pt is occasionally left alone in the house \"for no more than an hour\"  Pt has suffered a broken wrist in a fall this year. P - Pt's dtr will consult with MD for ways to assist pt to accept additional care. Patient verbalized understanding of all information discussed.  Patient has this Nurse Navigators contact information for any further questions, concerns, or needs.

## 2018-02-08 LAB
25(OH)D2 SERPL-MCNC: <1 NG/ML
25(OH)D3 SERPL-MCNC: 35 NG/ML
25(OH)D3+25(OH)D2 SERPL-MCNC: 35 NG/ML

## 2018-02-13 ENCOUNTER — TELEPHONE (OUTPATIENT)
Dept: INTERNAL MEDICINE CLINIC | Age: 83
End: 2018-02-13

## 2018-02-13 VITALS
TEMPERATURE: 98.8 F | SYSTOLIC BLOOD PRESSURE: 160 MMHG | RESPIRATION RATE: 18 BRPM | OXYGEN SATURATION: 96 % | DIASTOLIC BLOOD PRESSURE: 70 MMHG | HEART RATE: 83 BPM

## 2018-02-13 NOTE — TELEPHONE ENCOUNTER
HIPAA verified spoke with patient's daughter Cori Upton , she is aware of lab orders and  Will bring patient in on Thursday.

## 2018-02-15 ENCOUNTER — HOSPITAL ENCOUNTER (OUTPATIENT)
Dept: LAB | Age: 83
Discharge: HOME | End: 2018-02-15
Payer: MEDICARE

## 2018-02-15 ENCOUNTER — APPOINTMENT (OUTPATIENT)
Dept: INTERNAL MEDICINE CLINIC | Age: 83
End: 2018-02-15

## 2018-02-15 PROCEDURE — 36415 COLL VENOUS BLD VENIPUNCTURE: CPT

## 2018-02-15 PROCEDURE — 80053 COMPREHEN METABOLIC PANEL: CPT

## 2018-02-15 PROCEDURE — 85025 COMPLETE CBC W/AUTO DIFF WBC: CPT

## 2018-02-21 ENCOUNTER — OFFICE VISIT (OUTPATIENT)
Dept: INTERNAL MEDICINE CLINIC | Age: 83
End: 2018-02-21

## 2018-02-21 VITALS
DIASTOLIC BLOOD PRESSURE: 72 MMHG | RESPIRATION RATE: 18 BRPM | TEMPERATURE: 98.1 F | HEIGHT: 60 IN | OXYGEN SATURATION: 95 % | SYSTOLIC BLOOD PRESSURE: 136 MMHG | WEIGHT: 122 LBS | HEART RATE: 69 BPM | BODY MASS INDEX: 23.95 KG/M2

## 2018-02-21 DIAGNOSIS — F03.90 DEMENTIA WITHOUT BEHAVIORAL DISTURBANCE, UNSPECIFIED DEMENTIA TYPE: Primary | ICD-10-CM

## 2018-02-21 DIAGNOSIS — E03.1 CONGENITAL HYPOTHYROIDISM WITHOUT GOITER: ICD-10-CM

## 2018-02-21 RX ORDER — LEVOTHYROXINE SODIUM 75 UG/1
75 TABLET ORAL
COMMUNITY
Start: 2017-12-26 | End: 2019-03-22 | Stop reason: SDUPTHER

## 2018-02-21 NOTE — MR AVS SNAPSHOT
[address] [facility] 
[phone] Patient: Johnny Worley MRN: [id number] Visit Information Date & Time Provider Department Dept. Phone Encounter #  
 2018  3:30 PM Bibi Wyatt, [address] [phone] [id number] Follow-up Instructions Return in about 3 months (around 2018) for follow up thyroid. Your Appointments 3/6/2018  1:30 PM  
Follow Up with Dandre Colvin NP [facility]) Appt Note: [address], 
[address], [address] [address]  
[address] [facility], [address], [address] [address] Upcoming Health Maintenance Date Due ZOSTER VACCINE AGE 60> 1986 GLAUCOMA SCREENING Q2Y 1991 OSTEOPOROSIS SCREENING (DEXA) 1991 Influenza Age 5 to Adult 2017 MEDICARE YEARLY EXAM 3/7/2018 DTaP/Tdap/Td series (2 - Td) 2026 Allergies as of 2018  Review Complete On: 2018 By: Darion Hubbard MD  
  
 Severity Noted Reaction Type Reactions Sulfa (Sulfonamide Antibiotics)  2010    Hives Current Immunizations  Reviewed on 3/6/2017 Name Date Influenza High Dose Vaccine PF 10/15/2016 Pneumococcal Conjugate (PCV-13) 3/6/2017 Pneumococcal Vaccine (Unspecified Type) 3/9/2017 Tdap 2016  1:23 PM  
  
 Not reviewed this visit You Were Diagnosed With   
  
 Codes Comments Dementia without behavioral disturbance, unspecified dementia type    -  Primary ICD-10-CM: F03.90 ICD-9-CM: 294.20 Congenital hypothyroidism without goiter     ICD-10-CM: E03.1 ICD-9-CM: 443 Vitals BP Pulse Temp Resp Height(growth percentile) Weight(growth percentile)  136/72 (BP 1 Location: Right arm, BP Patient Position: Sitting) 69 98.1 °F (36.7 °C) (Oral) 18 5' (1.524 m) 122 lb (55.3 kg) SpO2 BMI OB Status Smoking Status 95% 23.83 kg/m2 Postmenopausal Former Smoker BMI and BSA Data Body Mass Index Body Surface Area  
 23.83 kg/m 2 1.53 m 2 Preferred Pharmacy Pharmacy Name Phone Aaliyah Jaffe [address] , [address] [place] [address] Mahendra Tenorio [phone] Your Updated Medication List  
  
   
This list is accurate as of 2/21/18  5:29 PM.  Always use your most recent med list.  
  
  
  
  
 aspirin 81 mg chewable tablet Take 1 Tab by mouth daily for 30 days. clonazePAM 0.5 mg tablet Commonly known as:  Gib Loge Take 0.5 mg by mouth every evening. * levothyroxine 75 mcg tablet Commonly known as:  SYNTHROID * levothyroxine 50 mcg tablet Commonly known as:  SYNTHROID Take 1.25 Tabs by mouth every morning for 30 days. lithium carbonate  mg CR tablet Commonly known as:  ESKALITH CR Take 450 mg by mouth nightly. multivitamin tablet Commonly known as:  ONE A DAY Take 1 Tab by mouth daily. PARoxetine 40 mg tablet Commonly known as:  PAXIL Take 40 mg by mouth daily. VITAMIN D3 2,000 unit Tab Generic drug:  cholecalciferol (vitamin D3) Take 1 Tab by mouth daily. * Notice: This list has 2 medication(s) that are the same as other medications prescribed for you. Read the directions carefully, and ask your doctor or other care provider to review them with you. We Performed the Following T4, FREE [id number] CPT(R)] TSH 3RD GENERATION [43134 CPT(R)] Follow-up Instructions Return in about 3 months (around 5/21/2018) for follow up thyroid. Introducing [facility]! Ruchi Castelan introduces [employer] patient portal. Now you can access parts of your medical record, email your doctor's office, and request medication refills online.    
 
1. In your internet browser, go to [email or web address] 2. Click on the First Time User? Click Here link in the Sign In box. You will see the New Member Sign Up page. 3. Enter your [employer] Access Code exactly as it appears below. You will not need to use this code after youve completed the sign-up process. If you do not sign up before the expiration date, you must request a new code. · [employer] Access Code: [email or web address] Expires: 5/4/2018  9:38 AM 
 
4. Enter the last four digits of your Social Security Number (xxxx) and Date of Birth (mm/dd/yyyy) as indicated and click Submit. You will be taken to the next sign-up page. 5. Create a [employer] ID. This will be your [employer] login ID and cannot be changed, so think of one that is secure and easy to remember. 6. Create a [employer] password. You can change your password at any time. 7. Enter your Password Reset Question and Answer. This can be used at a later time if you forget your password. 8. Enter your e-mail address. You will receive e-mail notification when new information is available in [address] 9. Click Sign Up. You can now view and download portions of your medical record. 10. Click the Download Summary menu link to download a portable copy of your medical information. If you have questions, please visit the Frequently Asked Questions section of the [employer] website. Remember, [employer] is NOT to be used for urgent needs. For medical emergencies, dial 911. Now available from your iPhone and Android! Please provide this summary of care documentation to your next provider. Your primary care clinician is listed as Roz Bethea. If you have any questions after today's visit, please call [phone].

## 2018-02-21 NOTE — PROGRESS NOTES
Chief Complaint   Patient presents with   [facility] Follow Up     encephalapathy     1. Have you been to the ER, urgent care clinic since your last visit? Hospitalized since your last visit? Yes When: 02/2018 Where: [facility] Reason for visit: encephalopathy    2. Have you seen or consulted any other health care providers outside of the [address] [place] [address] since your last visit? Include any pap smears or colon screening.  No

## 2018-02-21 NOTE — PROGRESS NOTES
SUBJECTIVE:   Ms. Vonnie Bernard is a 80 y.o. female who is here for follow up of routine medical issues. Pt reports she is feeling okay, eating well, and doing her exercises. Her daughter took her to the hospital on 2/1/2018 because she had increased confusion/sundowning sx. She was diagnosed with acute metabolic encephalopathy that may be related to dementia. Pt is taking Paxil daily. She had an abnomal UA in the hospital, but the culture showed no significant growth. Labs reviewed with pt today showed her CBC was normal on 2/15/2018. Her CMP showed her glucose was slightly elevated at 100 and GFR was low a 54. Pt reports she wants to stop using the cane, despite her balance issues. The pt's daughter reports she declined home health. After some discussion, the pt's accepted home health. Pt follows up her neurologist... on 2/24/2018. At this time, she is otherwise doing well and has brought no other complaints to my attention today. For a list of the medical issues addressed today, see the assessment and plan below. PMH:   Past Medical History:   Diagnosis Date    Dementia     Depression     psychiatrist: Dr Fortino Hoff (on Lithium)    Fracture of wrist     slipped and fx left wrist, surgery scheduled 12/21/16    Full dentures     upper and lower    Hypothyroid      PSH:  has a past surgical history that includes hx kyphoplasty (2013); hx orthopaedic (12/2016); and hx wrist fracture tx (Left, 12/2016). All: is allergic to sulfa (sulfonamide antibiotics). MEDS:   Current Outpatient Prescriptions   Medication Sig    levothyroxine (SYNTHROID) 50 mcg tablet Take 1.25 Tabs by mouth every morning for 30 days.  aspirin 81 mg chewable tablet Take 1 Tab by mouth daily for 30 days.  clonazePAM (KLONOPIN) 0.5 mg tablet Take 0.5 mg by mouth every evening.  PARoxetine (PAXIL) 40 mg tablet Take 40 mg by mouth daily.  multivitamin (ONE A DAY) tablet Take 1 Tab by mouth daily.     cholecalciferol, vitamin D3, (VITAMIN D3) 2,000 unit tab Take 1 Tab by mouth daily.  lithium CR (ESKALITH CR) 450 mg CR tablet Take 450 mg by mouth nightly.  levothyroxine (SYNTHROID) 75 mcg tablet      No current facility-administered medications for this visit. FH: family history includes Alzheimer in her sister; Cancer in her sister; Depression in her son and son; Diabetes in her brother and son; Heart Disease in her son; Hypertension in her daughter; Liver Disease in her son; Other in her mother and son; Thyroid Disease in her daughter. SH:  reports that she has quit smoking. She has never used smokeless tobacco. She reports that she does not drink alcohol or use illicit drugs. Review of Systems - History obtained from the patient  General ROS: sundowning sx no fever, chills, fatigue, body aches  Psychological ROS: no change in anxiety, depression, SI/HI  Ophthalmic ROS: no blurred vision, myopia, double vision  ENT ROS: no dysphagia, otalgia, otorrhea, rhinorrhea, post nasal drip  Respiratory ROS: no cough, shortness of breath, or wheezing  Cardiovascular ROS: no chest pain or dyspnea on exertion  Gastrointestinal ROS: no abdominal pain, change in bowel habits, or black or bloody stools  Genito-Urinary ROS: no frequency, urgency, incontinence, dysuria, hematouria  Musculoskeletal ROS: no arthralagia, myalgia  Neurological ROS: no headaches, dizziness, lightheadedness, tremors, seizures  Dermatological ROS: no rash or lesions    OBJECTIVE:   Vitals:   Visit Vitals    /72 (BP 1 Location: Right arm, BP Patient Position: Sitting)    Pulse 69    Temp 98.1 °F (36.7 °C) (Oral)    Resp 18    Ht 5' (1.524 m)    Wt 122 lb (55.3 kg)    SpO2 95%    BMI 23.83 kg/m2      Gen: Pleasant 80 y.o.  female in NAD. HEENT: PERRLA. EOMI. OP moist and pink. Neck: Supple. No LAD. HEART: RRR, No M/G/R.      LUNGS: CTAB No W/R. ABDOMEN: S, NT, ND, BS+. EXTREMITIES: Warm.  No C/C/E. MUSCULOSKELETAL: Normal ROM, muscle strength 5/5 all groups. NEURO: Alert and oriented x 3. Cranial nerves grossly intact. No focal sensory or motor deficits noted. SKIN: Warm. Dry. No rashes or other lesions noted. ASSESSMENT/ PLAN: Diagnoses and all orders for this visit:    1. Dementia without behavioral disturbance, unspecified dementia type    2. Congenital hypothyroidism without goiter  -     T4, FREE  -     TSH 3RD GENERATION        ICD-10-CM ICD-9-CM    1. Dementia without behavioral disturbance, unspecified dementia type F03.90 294.20    2. Congenital hypothyroidism without goiter E03.1 243 T4, FREE      TSH 3RD GENERATION      1. Dementia   I will read her neurologist's note so that I may stay up to date on her care. 2. Hypothyroidism  I recommended pt continue on current dose of levothyroxine. Recheck T4 and TSH. Follow-up Disposition:  Return in about 3 months (around 5/21/2018) for follow up thyroid. I have reviewed the patient's medications and risks/side effects/benefits were discussed. Diagnosis(-es) explained to patient and questions answered. Literature provided where appropriate.      Written by Merlyn Hanson, as dictated by María Jaimes MD.

## 2018-02-22 NOTE — PROGRESS NOTES
SUBJECTIVE:   Ms. Gwendolyn Harris is a 80 y.o. female who is here for follow up of routine medical issues. Pt reports she is feeling okay, eating well, and doing her exercises. Her daughter took her to the hospital on 2/1/2018 because she had increased confusion/sundowning sx. She was diagnosed with acute metabolic encephalopathy that may be related to dementia. Pt is taking Paxil daily. She had an abnomal UA in the hospital, but the culture showed no significant growth. Labs reviewed with pt today showed her CBC was normal on 2/15/2018. Her CMP showed her glucose was slightly elevated at 100 and GFR was low a 54. Pt reports she wants to stop using the cane, despite her balance issues. The pt's daughter reports she declined home health. After some discussion, the pt's accepted home health. Pt follows up her neurologist on 2/24/2018. At this time, she is otherwise doing well and has brought no other complaints to my attention today. For a list of the medical issues addressed today, see the assessment and plan below. PMH:   Past Medical History:   Diagnosis Date    Dementia     Depression     psychiatrist: Dr Deon Franz (on Lithium)    Fracture of wrist     slipped and fx left wrist, surgery scheduled 12/21/16    Full dentures     upper and lower    Hypothyroid      PSH:  has a past surgical history that includes hx kyphoplasty (2013); hx orthopaedic (12/2016); and hx wrist fracture tx (Left, 12/2016). All: is allergic to sulfa (sulfonamide antibiotics). MEDS:   Current Outpatient Prescriptions   Medication Sig    levothyroxine (SYNTHROID) 50 mcg tablet Take 1.25 Tabs by mouth every morning for 30 days.  aspirin 81 mg chewable tablet Take 1 Tab by mouth daily for 30 days.  clonazePAM (KLONOPIN) 0.5 mg tablet Take 0.5 mg by mouth every evening.  PARoxetine (PAXIL) 40 mg tablet Take 40 mg by mouth daily.  multivitamin (ONE A DAY) tablet Take 1 Tab by mouth daily.     cholecalciferol, vitamin D3, (VITAMIN D3) 2,000 unit tab Take 1 Tab by mouth daily.  lithium CR (ESKALITH CR) 450 mg CR tablet Take 450 mg by mouth nightly.  levothyroxine (SYNTHROID) 75 mcg tablet      No current facility-administered medications for this visit. FH: family history includes Alzheimer in her sister; Cancer in her sister; Depression in her son and son; Diabetes in her brother and son; Heart Disease in her son; Hypertension in her daughter; Liver Disease in her son; Other in her mother and son; Thyroid Disease in her daughter. SH:  reports that she has quit smoking. She has never used smokeless tobacco. She reports that she does not drink alcohol or use illicit drugs. Review of Systems - History obtained from the patient  General ROS: sundowning sx no fever, chills, fatigue, body aches  Psychological ROS: no change in anxiety, depression, SI/HI  Ophthalmic ROS: no blurred vision, myopia, double vision  ENT ROS: no dysphagia, otalgia, otorrhea, rhinorrhea, post nasal drip  Respiratory ROS: no cough, shortness of breath, or wheezing  Cardiovascular ROS: no chest pain or dyspnea on exertion  Gastrointestinal ROS: no abdominal pain, change in bowel habits, or black or bloody stools  Genito-Urinary ROS: no frequency, urgency, incontinence, dysuria, hematouria  Musculoskeletal ROS: no arthralagia, myalgia  Neurological ROS: no headaches, dizziness, lightheadedness, tremors, seizures  Dermatological ROS: no rash or lesions    OBJECTIVE:   Vitals:   Visit Vitals    /72 (BP 1 Location: Right arm, BP Patient Position: Sitting)    Pulse 69    Temp 98.1 °F (36.7 °C) (Oral)    Resp 18    Ht 5' (1.524 m)    Wt 122 lb (55.3 kg)    SpO2 95%    BMI 23.83 kg/m2      Gen: Pleasant 80 y.o.  female in NAD. HEENT: PERRLA. EOMI. OP moist and pink. Neck: Supple. No LAD. HEART: RRR, No M/G/R.      LUNGS: CTAB No W/R. EXTREMITIES: Warm.  No C/C/E.    MUSCULOSKELETAL: Normal ROM, muscle strength 5/5 all groups. NEURO: Alert and oriented x 3. Cranial nerves grossly intact. No focal sensory or motor deficits noted. SKIN: Warm. Dry. No rashes or other lesions noted. ASSESSMENT/ PLAN: Diagnoses and all orders for this visit:    1. Dementia without behavioral disturbance, unspecified dementia type    2. Congenital hypothyroidism without goiter  -     T4, FREE  -     TSH 3RD GENERATION        ICD-10-CM ICD-9-CM    1. Dementia without behavioral disturbance, unspecified dementia type F03.90 294.20    2. Congenital hypothyroidism without goiter E03.1 243 T4, FREE      TSH 3RD GENERATION      1. Dementia   This patient has significant dementia and is dependent on her daughter for all ADL's. She will see her neurologist    2. Hypothyroidism  I recommended pt continue on current dose of levothyroxine. Recheck T4 and TSH. Follow-up Disposition:  Return in about 3 months (around 5/21/2018) for follow up thyroid. I have reviewed the patient's medications and risks/side effects/benefits were discussed. Diagnosis(-es) explained to patient and questions answered. Literature provided where appropriate.      Written by Leonel Murphy, as dictated by Rafael Major MD.

## 2018-03-06 ENCOUNTER — APPOINTMENT (OUTPATIENT)
Dept: INTERNAL MEDICINE CLINIC | Age: 83
End: 2018-03-06

## 2018-03-06 ENCOUNTER — OFFICE VISIT (OUTPATIENT)
Dept: NEUROLOGY | Age: 83
End: 2018-03-06

## 2018-03-06 ENCOUNTER — HOSPITAL ENCOUNTER (OUTPATIENT)
Dept: LAB | Age: 83
Discharge: HOME | End: 2018-03-06
Payer: MEDICARE

## 2018-03-06 VITALS
HEART RATE: 75 BPM | BODY MASS INDEX: 24.54 KG/M2 | WEIGHT: 125 LBS | DIASTOLIC BLOOD PRESSURE: 64 MMHG | SYSTOLIC BLOOD PRESSURE: 158 MMHG | OXYGEN SATURATION: 96 % | HEIGHT: 60 IN

## 2018-03-06 DIAGNOSIS — G30.1 LATE ONSET ALZHEIMER'S DISEASE WITH BEHAVIORAL DISTURBANCE (HCC): Primary | ICD-10-CM

## 2018-03-06 DIAGNOSIS — G25.2 RESTING TREMOR: ICD-10-CM

## 2018-03-06 DIAGNOSIS — R44.0 AUDITORY HALLUCINATION: ICD-10-CM

## 2018-03-06 DIAGNOSIS — F02.818 LATE ONSET ALZHEIMER'S DISEASE WITH BEHAVIORAL DISTURBANCE (HCC): Primary | ICD-10-CM

## 2018-03-06 DIAGNOSIS — G25.0 BENIGN ESSENTIAL TREMOR SYNDROME: ICD-10-CM

## 2018-03-06 PROCEDURE — 36415 COLL VENOUS BLD VENIPUNCTURE: CPT

## 2018-03-06 PROCEDURE — 84443 ASSAY THYROID STIM HORMONE: CPT

## 2018-03-06 PROCEDURE — 84439 ASSAY OF FREE THYROXINE: CPT

## 2018-03-06 RX ORDER — RIVASTIGMINE TARTRATE 1.5 MG/1
3 CAPSULE ORAL EVERY EVENING
COMMUNITY
Start: 2018-02-24 | End: 2019-05-23 | Stop reason: ALTCHOICE

## 2018-03-06 RX ORDER — GUAIFENESIN 100 MG/5ML
81 LIQUID (ML) ORAL DAILY
COMMUNITY
End: 2019-01-18 | Stop reason: ALTCHOICE

## 2018-03-06 NOTE — LETTER
3/7/2018 12:28 PM 
 
RE:    Elza Valladares [address] [facility] [id number] Thank you for agreeing to see Aniya Fine I am referring my patient to you for evaluation of Home Health, Physical Therapy, Memory Care, . Please see her pertinent patient information below. Date:             2018 Name:  Jena Sapp 
:  1926 MRN:  [id number] PCP:  Gabe Schumacher MD 
 
Chief Complaint Patient presents with  
[facility] Follow Up  TIA  Tremors HISTORY OF PRESENT ILLNESS: 
Elza Valladares is a 80 y.o., female who presents today for follow up for dementia. She saw Dr. Marysol Faustin while hospitalized after she had an episode of confusion and agitation. She did not cooperate with MRI to rule out stroke, but CT did not show any abnormality. She is back to her prehospitalization baseline. Carotid Doppler showed near 70% stenosis of left ICA, so Dr. Marysol Faustin recommended that she be discharged on aspirin for stroke prevention. There is no real cause determined for her episode of confusion, but she does have baseline dementia. She reports that she was admitted because she had an episode of shaking. No issues with walking at home per her report, and her daughter says that she does pretty well. She uses her cane most of the time, and has not fallen. She lives with her daughter and son-in-law, someone is with her most of the time. They tried to get someone in the house to help and she sent them away. She reports that she dusts, shakes out rugs, does exercises in the evening. She does sundown per her daughter, gets more upset when she is more confused in the evenings. She does not like to be alone ever, so she follows her daughter around. She will insist that there is an upstairs to the house she lives in, there is not. She is on aspirin 81 mg now, no increase in bruising and no bleeding. She does hallucinate, this only started about a year ago. She follows with a psychiatrist for bipolar disorder, is on lithium, he put her exelon hoping it would help with hallucinations. People on the TV talk to her. She complains of her feet being numb when she first wakes up, this improves once she is up and moving around. She does not have diabetes, does have hypothyroidism that is well managed, B12 normal in hospital.  She sleeps well with her Klonopin. 
 
 
2.1.2018 neuro consult Pamela De La Cruz is a 80 y.o. right-handed  female we are asked to evaluate today for an episode of confusion and agitation that occurred yesterday, without any focal neurological symptoms, and was seeing her as a new patient to us, for this new problem, at the request of Dr. Desirae Hurley. Patient has known Alzheimer's disease, but is on no cognitive enhancing agents at this time. She got confused and agitated yesterday, had difficulty recognizing family members, and was having more difficulty with crying and some tingling in her feet does not bother her for a while. She has some difficulty with her speech but that is back to normal now. On arrival she had a CT scan of the brain that was normal, and showed no acute processes. We will try to get an MRI scan. She has been started on aspirin therapy and I would continue her on that empirically. She seemed back to her more normal baseline, and is refusing her test, and says she wants to go home. We attempted an MRI scan but she could not cooperate despite Ativan, so repeated her CT scan and it shows no change, and carotid Dopplers showed no significant stenosis, and I think he can probably go home just on aspirin therapy to follow-up in our office in 2-4 weeks time for her dementia and agitation and cerebrovascular disease.   Again she has had no focal deficits, no complaints of headache, fever, meningismus, no focal weakness no sensory loss, and she ambulates with a cane.  Her daughter is here and she lives with her daughter and she has 24-hour care at home. Metabolic panels all sent off for treatable causes of her dementia. 
  
  
Current Outpatient Prescriptions Medication Sig  levothyroxine (SYNTHROID) 75 mcg tablet  clonazePAM (KLONOPIN) 0.5 mg tablet Take 0.5 mg by mouth every evening.  PARoxetine (PAXIL) 40 mg tablet Take 40 mg by mouth daily.  multivitamin (ONE A DAY) tablet Take 1 Tab by mouth daily.  cholecalciferol, vitamin D3, (VITAMIN D3) 2,000 unit tab Take 1 Tab by mouth daily.  lithium CR (ESKALITH CR) 450 mg CR tablet Take 450 mg by mouth nightly.  rivastigmine tartrate (EXELON) 1.5 mg capsule  levothyroxine (SYNTHROID) 50 mcg tablet Take 1.25 Tabs by mouth every morning for 30 days. No current facility-administered medications for this visit. Allergies Allergen Reactions  Sulfa (Sulfonamide Antibiotics) Hives Past Medical History:  
Diagnosis Date  Dementia  Depression   
 psychiatrist: Dr Christo Puentes (on Lithium)  Fracture of wrist   
 slipped and fx left wrist, surgery scheduled 12/21/16  Full dentures   
 upper and lower  Hypothyroid Past Surgical History:  
Procedure Laterality Date  HX KYPHOPLASTY  2013  HX ORTHOPAEDIC  12/2016 Wrist surgery  HX WRIST FRACTURE TX Left 12/2016 Social History Social History  Marital status:  Spouse name: N/A  
 Number of children: N/A  
 Years of education: N/A Occupational History  Not on file. Social History Main Topics  Smoking status: Former Smoker  Smokeless tobacco: Never Used  Alcohol use No  
 Drug use: No  
 Sexual activity: Not Currently Other Topics Concern  Not on file Social History Narrative Family History Problem Relation Age of Onset Nina Caldwell Other Mother Inefficient wound healing  Cancer Sister Stomach CA  
 Diabetes Brother  Alzheimer Sister  Hypertension Daughter  Thyroid Disease Daughter  Other Son Hip replacement  Depression Son  Liver Disease Son  Diabetes Son   
 Heart Disease Son  Depression Son PHYSICAL EXAMINATION:   
Visit Vitals  /64  Pulse 75  Ht 5' (1.524 m)  Wt 125 lb (56.7 kg)  SpO2 96%  BMI 24.41 kg/m2 General:  Well defined, nourished, and groomed individual in no acute distress. Neck: Supple, nontender, no bruits, no pain with resistance to active range of motion. Heart: Regular rate and rhythm, no murmurs, rub, or gallop. Normal S1S2. Lungs:  Clear to auscultation bilaterally with equal chest expansion, no cough, no wheeze Musculoskeletal:  Extremities revealed no edema and had full range of motion of joints. Psych:  Good mood and bright affect NEUROLOGICAL EXAMINATION:    
Mental Status:   Alert and oriented to self, location, president. Not to year. Does not know how an orange and a banana are similar (the shells are similar, they have seeds, outside they are quite a bit different). 1/3 recall, unable to successfully draw a clock. Cranial Nerves:   
II, III, IV, VI:  Visual acuity grossly intact. Extra-ocular movements are full and fluid. No ptosis or nystagmus. V-XII: Hearing is grossly intact. Facial features are symmetric, with normal sensation and strength. The palate rises symmetrically and the tongue protrudes midline. Sternocleidomastoids 5/5. Motor Examination: Normal tone, bulk, and strength, 5/5 muscle strength throughout. Unable to test for cogwheel rigidity, unable to relax fully Coordination:  Finger to nose testing was normal.   + resting head tremor, very mild left hand resting hand tremor, bilateral hand intention tremor. Mild bilateral bradykinesia Gait and Station:  Steady while walking without cane, no shuffling and gait is not narrow based. Normal arm swing. No pronator drift.    No muscle wasting or fasciculations noted. ASSESSMENT AND PLAN 
  ICD-10-CM ICD-9-CM 1. Late onset Alzheimer's disease with behavioral disturbance G30.1 331.0 REFERRAL TO HOME HEALTH  
 F02.81 294.11   
2. Benign essential tremor syndrome G25.0 333.1 REFERRAL TO HOME HEALTH 3. Auditory hallucination R44.0 780.1 4. Resting tremor R25.9 66.0   
 
49-year-old female seen in follow-up for hospitalization for agitation and confusion, she has returned to her baseline. Her baseline is demented with auditory hallucinations for the past year. She does sundown, gets more confused in the evening but not aggressive or terribly agitated. She sleeps well with Klonopin. She is on lithium for bipolar disorder, follows with psychiatrist who recently added Exelon for hallucinations. She is not falling, does not want any home health but her daughter is experiencing some caregiver strain. 1.  Continue Exelon 1.5 mg daily for memory management and loose [place] 2. Continue to follow with psychiatry for hallucinations 3. Can continue her Klonopin at bedtime for the time being, but if she begins falling she may need to discontinue that medication 4. Referral to [facility] for memory care to see if they can help the daughter in any way, and also to see if she will do a little bit of physical therapy for fall prevention. She does not need any help with bathing, toileting, dressing. 5.  No medication for essential tremor, advised to use weighted silverware, plates, cups to help control that 6. We will continue to monitor resting tremor, may need to do Sinemet trial at some point at very low dose but currently she is walking very well so will avoid adding any new medication Follow-up in 6 months, call sooner with concerns Wilfrido Lynn NP This note was created using voice recognition software. Despite editing, there may be syntax errors. I appreciate your assistance in Ms. Mccarthy's care  and look forward to your findings and recommendations. Sincerely, Connye Riedel, NP Patient Registration [facility] Contact Information Patient ID: [id number] Last Name[address] Name: KEKE [facility] MIKE First Name: [address] [facility] Phone: [phone] Middle Name: R Employer Information Sex: F YOB: 1926 Name: Venkat Denise Social Security No.: [id number] Phone: 
Address: [address] [place] [address] (to whom statements are sent) Zip: [address] Name: ROOPA Zeng City: [facility] State: [place] Address: [address] [address] Home Phone: [phone] Phone: ( ) _______ - ______________ Work Phone: Other: 
Mobile Phone: [phone] Patient Referred by: ______________________ Marital Status:  Patient PCP: ________________________ Primary Insurance Information Insurance Plan Name: Crystal ([address] [place] [address] [address]) Address to [place] [address] Insurance Phone Number: [phone] [address] Policy Information Policy Mcintosh Patient's relationship to policy mcintosh: Self Last Name:JANE 
ID/Certification No.: [id number] First Name:EDDY Policy/Group No.: Middle Name: 
Issue Date: 07/01/[address] Exp Date: City:[place] State:[place] IER:[id number] Copay Amount: ___________________ Social Sec Number: Elham Henry Co-insurance Percent:__________________________________ YOB: 1926 Sex: ______________ Employer: RETIRED Secondary Insurance Information Insurance Plan Name:  for Life [employer] - Medicare Supplement) [facility] to Send Claims[facility] Insurance Phone Number: [phone] [address] Policy Information Policy Mcintosh Patient's relationship to policy mcintosh: Self Last Name:JANE 
ID/Certification No.: [id number] First Name:EDDY Policy/Group No.: Middle Name: Jasmine Balderas Issue Date: [address] [facility] 
 Exp Date: City: [address] State:[place] VOR:[id number] Copay Amount: ______________________ Social Sec Number: Alvina Naranjo Co-insurance Percent:__________________________________ YOB: 1926 Sex: ____________ Employer: RETIRED

## 2018-03-06 NOTE — PROGRESS NOTES
Date:             2018    Name:  Ash Powers  :  1926  MRN:  [id number]     PCP:  Ishan Chavez MD    Chief Complaint   Patient presents with   [facility] Follow Up    TIA    Tremors         HISTORY OF PRESENT ILLNESS:  Aleksandr Quiñones is a 80 y.o., female who presents today for follow up for dementia. She saw Dr. Sherry Garcia while hospitalized after she had an episode of confusion and agitation. She did not cooperate with MRI to rule out stroke, but CT did not show any abnormality. She is back to her prehospitalization baseline. Carotid Doppler showed near 70% stenosis of left ICA, so Dr. Sherry Garcia recommended that she be discharged on aspirin for stroke prevention. There is no real cause determined for her episode of confusion, but she does have baseline dementia. She reports that she was admitted because she had an episode of shaking. No issues with walking at home per her report, and her daughter says that she does pretty well. She uses her cane most of the time, and has not fallen. She lives with her daughter and son-in-law, someone is with her most of the time. They tried to get someone in the house to help and she sent them away. She reports that she dusts, shakes out rugs, does exercises in the evening. She does sundown per her daughter, gets more upset when she is more confused in the evenings. She does not like to be alone ever, so she follows her daughter around. She will insist that there is an upstairs to the house she lives in, there is not. She is on aspirin 81 mg now, no increase in bruising and no bleeding. She does hallucinate, this only started about a year ago. She follows with a psychiatrist for bipolar disorder, is on lithium, he put her exelon hoping it would help with hallucinations. People on the TV talk to her. She complains of her feet being numb when she first wakes up, this improves once she is up and moving around.   She does not have diabetes, does have hypothyroidism that is well managed, B12 normal in hospital.  She sleeps well with her Klonopin.      2.1.2018 neuro consult  Oneal Yusuf is a 80 y.o. right-handed  female we are asked to evaluate today for an episode of confusion and agitation that occurred yesterday, without any focal neurological symptoms, and was seeing her as a new patient to us, for this new problem, at the request of Dr. Howie Tripp. Patient has known Alzheimer's disease, but is on no cognitive enhancing agents at this time. She got confused and agitated yesterday, had difficulty recognizing family members, and was having more difficulty with crying and some tingling in her feet does not bother her for a while. She has some difficulty with her speech but that is back to normal now. On arrival she had a CT scan of the brain that was normal, and showed no acute processes. We will try to get an MRI scan. She has been started on aspirin therapy and I would continue her on that empirically. She seemed back to her more normal baseline, and is refusing her test, and says she wants to go home. We attempted an MRI scan but she could not cooperate despite Ativan, so repeated her CT scan and it shows no change, and carotid Dopplers showed no significant stenosis, and I think he can probably go home just on aspirin therapy to follow-up in our office in 2-4 weeks time for her dementia and agitation and cerebrovascular disease. Again she has had no focal deficits, no complaints of headache, fever, meningismus, no focal weakness no sensory loss, and she ambulates with a cane. Her daughter is here and she lives with her daughter and she has 24-hour care at home. Metabolic panels all sent off for treatable causes of her dementia.        Current Outpatient Prescriptions   Medication Sig    levothyroxine (SYNTHROID) 75 mcg tablet     clonazePAM (KLONOPIN) 0.5 mg tablet Take 0.5 mg by mouth every evening.     PARoxetine (PAXIL) 40 mg tablet Take 40 mg by mouth daily.  multivitamin (ONE A DAY) tablet Take 1 Tab by mouth daily.  cholecalciferol, vitamin D3, (VITAMIN D3) 2,000 unit tab Take 1 Tab by mouth daily.  lithium CR (ESKALITH CR) 450 mg CR tablet Take 450 mg by mouth nightly.  rivastigmine tartrate (EXELON) 1.5 mg capsule     levothyroxine (SYNTHROID) 50 mcg tablet Take 1.25 Tabs by mouth every morning for 30 days. No current facility-administered medications for this visit. Allergies   Allergen Reactions    Sulfa (Sulfonamide Antibiotics) Hives     Past Medical History:   Diagnosis Date    Dementia     Depression     psychiatrist: Dr Sherren Ip (on Lithium)    Fracture of wrist     slipped and fx left wrist, surgery scheduled 12/21/16    Full dentures     upper and lower    Hypothyroid      Past Surgical History:   Procedure Laterality Date    HX KYPHOPLASTY  2013   Josee Campbell  12/2016    Wrist surgery    HX WRIST FRACTURE TX Left 12/2016     Social History     Social History    Marital status:      Spouse name: N/A    Number of children: N/A    Years of education: N/A     Occupational History    Not on file.      Social History Main Topics    Smoking status: Former Smoker    Smokeless tobacco: Never Used    Alcohol use No    Drug use: No    Sexual activity: Not Currently     Other Topics Concern    Not on file     Social History Narrative     Family History   Problem Relation Age of Onset    Other Mother      Inefficient wound healing    Cancer Sister      Stomach CA    Diabetes Brother     Alzheimer Sister     Hypertension Daughter     Thyroid Disease Daughter     Other Son      Hip replacement    Depression Son     Liver Disease Son     Diabetes Son     Heart Disease Son     Depression Son          PHYSICAL EXAMINATION:    Visit Vitals    /64    Pulse 75    Ht 5' (1.524 m)    Wt 125 lb (56.7 kg)    SpO2 96%    BMI 24.41 kg/m2     General:  Well defined, nourished, and groomed individual in no acute distress. Neck: Supple, nontender, no bruits, no pain with resistance to active range of motion. Heart: Regular rate and rhythm, no murmurs, rub, or gallop. Normal S1S2. Lungs:  Clear to auscultation bilaterally with equal chest expansion, no cough, no wheeze  Musculoskeletal:  Extremities revealed no edema and had full range of motion of joints. Psych:  Good mood and bright affect    NEUROLOGICAL EXAMINATION:     Mental Status:   Alert and oriented to self, location, president. Not to year. Does not know how an orange and a banana are similar (the shells are similar, they have seeds, outside they are quite a bit different). 1/3 recall, unable to successfully draw a clock. Cranial Nerves:    II, III, IV, VI:  Visual acuity grossly intact. Extra-ocular movements are full and fluid. No ptosis or nystagmus. V-XII: Hearing is grossly intact. Facial features are symmetric, with normal sensation and strength. The palate rises symmetrically and the tongue protrudes midline. Sternocleidomastoids 5/5. Motor Examination: Normal tone, bulk, and strength, 5/5 muscle strength throughout. Unable to test for cogwheel rigidity, unable to relax fully  Coordination:  Finger to nose testing was normal.   + resting head tremor, very mild left hand resting hand tremor, bilateral hand intention tremor. Mild bilateral bradykinesia  Gait and Station:  Steady while walking without cane, no shuffling and gait is not narrow based. Normal arm swing. No pronator drift. No muscle wasting or fasciculations noted. ASSESSMENT AND PLAN    ICD-10-CM ICD-9-CM    1. Late onset Alzheimer's disease with behavioral disturbance G30.1 331.0 REFERRAL TO HOME HEALTH    F02.81 294.11    2. Benign essential tremor syndrome G25.0 333.1 REFERRAL TO HOME HEALTH   3. Auditory hallucination R44.0 780.1    4.  Resting tremor R25.9 66.0      68-year-old female seen in follow-up for hospitalization for agitation and confusion, she has returned to her baseline. Her baseline is demented with auditory hallucinations for the past year. She does sundown, gets more confused in the evening but not aggressive or terribly agitated. She sleeps well with Klonopin. She is on lithium for bipolar disorder, follows with psychiatrist who recently added Exelon for hallucinations. She is not falling, does not want any home health but her daughter is experiencing some caregiver strain. 1.  Continue Exelon 1.5 mg daily for memory management and loose [place]  2. Continue to follow with psychiatry for hallucinations  3. Can continue her Klonopin at bedtime for the time being, but if she begins falling she may need to discontinue that medication  4. Referral to [facility] for memory care to see if they can help the daughter in any way, and also to see if she will do a little bit of physical therapy for fall prevention. She does not need any help with bathing, toileting, dressing. 5.  No medication for essential tremor, advised to use weighted silverware, plates, cups to help control that  6. We will continue to monitor resting tremor, may need to do Sinemet trial at some point at very low dose but currently she is walking very well so will avoid adding any new medication    Follow-up in 6 months, call sooner with concerns      Kimberly Stewart NP    This note was created using voice recognition software. Despite editing, there may be syntax errors.

## 2018-03-06 NOTE — PATIENT INSTRUCTIONS
[facility]   Statement to Patients  April 1, 2014      In an effort to ensure the large volume of patient prescription refills is processed in the most efficient and expeditious manner, we are asking our patients to assist us by calling your Pharmacy for all prescription refills, this will include also your  Mail Order Pharmacy. The pharmacy will contact our office electronically to continue the refill process. Please do not wait until the last minute to call your pharmacy. We need at least 48 hours (2days) to fill prescriptions. We also encourage you to call your pharmacy before going to  your prescription to make sure it is ready. With regard to controlled substance prescription refill requests (narcotic refills) that need to be picked up at our office, we ask your cooperation by providing us with at least 72 hours (3days) notice that you will need a refill. We will not refill narcotic prescription refill requests after 4:00pm on any weekday, Monday through Thursday, or after 2:00pm on Fridays, or on the weekends. We encourage everyone to explore another way of getting your prescription refill request processed using [employer], our patient web portal through our electronic medical record system. [employer] is an efficient and effective way to communicate your medication request directly to the office and  downloadable as an phuc on your smart phone . [employer] also features a review functionality that allows you to view your medication list as well as leave messages for your physician. Are you ready to get connected? If so please review the attatched instructions or speak to any of our staff to get you set up right away! Thank you so much for your cooperation. Should you have any questions please contact our Practice Administrator.     The Physicians and Staff,  [facility]          A Healthy Lifestyle: Care Instructions  Your Care Instructions    A healthy lifestyle can help you feel good, stay at a healthy weight, and have plenty of energy for both work and play. A healthy lifestyle is something you can share with your whole family. A healthy lifestyle also can lower your risk for serious health problems, such as high blood pressure, heart disease, and diabetes. You can follow a few steps listed below to improve your health and the health of your family. Follow-up care is a key part of your treatment and safety. Be sure to make and go to all appointments, and call your doctor if you are having problems. It's also a good idea to know your test results and keep a list of the medicines you take. How can you care for yourself at home? · Do not eat too much sugar, fat, or fast foods. You can still have dessert and treats now and then. The goal is moderation. · Start small to improve your eating habits. Pay attention to portion sizes, drink less juice and soda pop, and eat more fruits and vegetables. ¨ Eat a healthy amount of food. A 3-ounce serving of meat, for example, is about the size of a deck of cards. Fill the rest of your plate with vegetables and whole grains. ¨ Limit the amount of soda and sports drinks you have every day. Drink more water when you are thirsty. ¨ Eat at least 5 servings of fruits and vegetables every day. It may seem like a lot, but it is not hard to reach this goal. A serving or helping is 1 piece of fruit, 1 cup of vegetables, or 2 cups of leafy, raw vegetables. Have an apple or some carrot sticks as an afternoon snack instead of a candy bar. Try to have fruits and/or vegetables at every meal.  · Make exercise part of your daily routine. You may want to start with simple activities, such as walking, bicycling, or slow swimming. Try to be active 30 to 60 minutes every day. You do not need to do all 30 to 60 minutes all at once. For example, you can exercise 3 times a day for 10 or 20 minutes.  Moderate exercise is safe for most people, but it is always a good idea to talk to your doctor before starting an exercise program.  · Keep moving. Collier Brett the lawn, work in the garden, or ChaCha. Take the stairs instead of the elevator at work. · If you smoke, quit. People who smoke have an increased risk for heart attack, stroke, cancer, and other lung illnesses. Quitting is hard, but there are ways to boost your chance of quitting tobacco for good. ¨ Use nicotine gum, patches, or lozenges. ¨ Ask your doctor about stop-smoking programs and medicines. ¨ Keep trying. In addition to reducing your risk of diseases in the future, you will notice some benefits soon after you stop using tobacco. If you have shortness of breath or asthma symptoms, they will likely get better within a few weeks after you quit. · Limit how much alcohol you drink. Moderate amounts of alcohol (up to 2 drinks a day for men, 1 drink a day for women) are okay. But drinking too much can lead to liver problems, high blood pressure, and other health problems. Family health  If you have a family, there are many things you can do together to improve your health. · Eat meals together as a family as often as possible. · Eat healthy foods. This includes fruits, vegetables, lean meats and dairy, and whole grains. · Include your family in your fitness plan. Most people think of activities such as jogging or tennis as the way to fitness, but there are many ways you and your family can be more active. Anything that makes you breathe hard and gets your heart pumping is exercise. Here are some tips:  ¨ Walk to do errands or to take your child to school or the bus. ¨ Go for a family bike ride after dinner instead of watching TV. Where can you learn more? Go to http://ro-lizz.info/. Enter N099 in the search box to learn more about \"A Healthy Lifestyle: Care Instructions. \"  Current as of:  May 12, 2017  Content Version: 11.4  © 8336-3467 [employer]. Care instructions adapted under license by [employer] (which disclaims liability or warranty for this information). If you have questions about a medical condition or this instruction, always ask your healthcare professional. [place] [address] any warranty or liability for your use of this information.

## 2018-03-07 LAB
T4 FREE SERPL-MCNC: 1.31 NG/DL (ref 0.82–1.77)
TSH SERPL DL<=0.005 MIU/L-ACNC: 0.32 UIU/ML (ref 0.45–4.5)

## 2018-03-08 ENCOUNTER — TELEPHONE (OUTPATIENT)
Dept: NEUROLOGY | Age: 83
End: 2018-03-08

## 2018-03-08 NOTE — TELEPHONE ENCOUNTER
Asaf Quale stated memory care is done by speech therapy and daughter does not want her mother to have phyical therapy. Verbal order was given for Speech Therapy.

## 2018-03-08 NOTE — TELEPHONE ENCOUNTER
Received call from [facility] with [facility], she would like a call back from [facility]. She stated that some changes needed to be made on the referral that was sent to them.       [phone]

## 2018-03-10 NOTE — PROGRESS NOTES
Normal FT4 and low TSH  Please advise patient's daughter to give patient one tab daily of Synthroid and 1 1/2 tab on Sat and Sun. Repeat in one month.

## 2018-03-12 ENCOUNTER — TELEPHONE (OUTPATIENT)
Dept: INTERNAL MEDICINE CLINIC | Age: 83
End: 2018-03-12

## 2018-03-12 NOTE — TELEPHONE ENCOUNTER
HIPAA verified with Daija William patient's daughter. Made aware to take 1 1/2 tablet for 2 days and repeat level in one month. No further questions or concerns ath this time.

## 2018-03-12 NOTE — TELEPHONE ENCOUNTER
----- Message from Issac Jeffery MD sent at 3/10/2018 12:00 AM EST -----  Normal FT4 and low TSH  Please advise patient's daughter to give patient one tab daily of Synthroid and 1 1/2 tab on Sat and Sun. Repeat in one month.

## 2018-03-12 NOTE — TELEPHONE ENCOUNTER
----- Message from Frances Epstein MD sent at 3/10/2018 12:00 AM EST -----  Normal FT4 and low TSH  Please advise patient's daughter to give patient one tab daily of Synthroid and 1 1/2 tab on Sat and Sun. Repeat in one month.

## 2018-03-23 ENCOUNTER — PATIENT OUTREACH (OUTPATIENT)
Dept: INTERNAL MEDICINE CLINIC | Age: 83
End: 2018-03-23

## 2018-03-23 NOTE — PROGRESS NOTES
Patient has graduated from the Transitions of Care Coordination  program on 3/23/18. Patient's symptoms are stable at this time. Patient/family has the ability to self-manage. Care management goals have been completed at this time. No further nurse navigator follow up scheduled. Pt has nurse navigator's contact information for any further questions, concerns, or needs.   Patients upcoming visits:  Future Appointments  Date Time Provider Ehsan Shi   5/21/2018 2:00 PM Ha Fernandez MD [address]   9/4/2018 10:40 AM Ana Parada MD [address] [facility] [place]

## 2018-03-26 DIAGNOSIS — E03.9 HYPOTHYROIDISM, UNSPECIFIED TYPE: ICD-10-CM

## 2018-03-27 RX ORDER — LEVOTHYROXINE SODIUM 75 UG/1
TABLET ORAL
Qty: 90 TAB | Refills: 0 | Status: SHIPPED | OUTPATIENT
Start: 2018-03-27 | End: 2018-06-25 | Stop reason: SDUPTHER

## 2018-04-02 ENCOUNTER — TELEPHONE (OUTPATIENT)
Dept: NEUROLOGY | Age: 83
End: 2018-04-02

## 2018-04-02 NOTE — TELEPHONE ENCOUNTER
Received call from [address] Marty Gomez, they just wanted to let Dr. Aly Wright know that she is being discharged from home health today.

## 2018-05-01 ENCOUNTER — TELEPHONE (OUTPATIENT)
Dept: INTERNAL MEDICINE CLINIC | Age: 83
End: 2018-05-01

## 2018-05-01 DIAGNOSIS — E03.1 CONGENITAL HYPOTHYROIDISM WITHOUT GOITER: Primary | ICD-10-CM

## 2018-05-01 NOTE — TELEPHONE ENCOUNTER
Patient's daughter, Rodri Butler, states she needs to get labs for upcoming appt on 5/21/18 to be put into system so she can bring patient prior to appt to have done to discuss at appt with Dr. Ashley Moncada. Please call when done.  Thank you

## 2018-05-07 ENCOUNTER — APPOINTMENT (OUTPATIENT)
Dept: INTERNAL MEDICINE CLINIC | Age: 83
End: 2018-05-07

## 2018-05-07 ENCOUNTER — HOSPITAL ENCOUNTER (OUTPATIENT)
Dept: LAB | Age: 83
Discharge: HOME | End: 2018-05-07
Payer: MEDICARE

## 2018-05-07 PROCEDURE — 84443 ASSAY THYROID STIM HORMONE: CPT

## 2018-05-07 PROCEDURE — 84439 ASSAY OF FREE THYROXINE: CPT

## 2018-05-07 PROCEDURE — 36415 COLL VENOUS BLD VENIPUNCTURE: CPT

## 2018-05-07 PROCEDURE — 85025 COMPLETE CBC W/AUTO DIFF WBC: CPT

## 2018-05-08 LAB
BASOPHILS # BLD AUTO: 0.1 X10E3/UL (ref 0–0.2)
BASOPHILS NFR BLD AUTO: 1 %
EOSINOPHIL # BLD AUTO: 0.4 X10E3/UL (ref 0–0.4)
EOSINOPHIL NFR BLD AUTO: 7 %
ERYTHROCYTE [DISTWIDTH] IN BLOOD BY AUTOMATED COUNT: 13.8 % (ref 12.3–15.4)
HCT VFR BLD AUTO: 39.2 % (ref 34–46.6)
HGB BLD-MCNC: 12.8 G/DL (ref 11.1–15.9)
IMM GRANULOCYTES # BLD: 0 X10E3/UL (ref 0–0.1)
IMM GRANULOCYTES NFR BLD: 0 %
LYMPHOCYTES # BLD AUTO: 1.8 X10E3/UL (ref 0.7–3.1)
LYMPHOCYTES NFR BLD AUTO: 36 %
MCH RBC QN AUTO: 27.5 PG (ref 26.6–33)
MCHC RBC AUTO-ENTMCNC: 32.7 G/DL (ref 31.5–35.7)
MCV RBC AUTO: 84 FL (ref 79–97)
MONOCYTES # BLD AUTO: 0.4 X10E3/UL (ref 0.1–0.9)
MONOCYTES NFR BLD AUTO: 8 %
NEUTROPHILS # BLD AUTO: 2.4 X10E3/UL (ref 1.4–7)
NEUTROPHILS NFR BLD AUTO: 48 %
PLATELET # BLD AUTO: 217 X10E3/UL (ref 150–379)
RBC # BLD AUTO: 4.66 X10E6/UL (ref 3.77–5.28)
T4 FREE SERPL-MCNC: 1.34 NG/DL (ref 0.82–1.77)
TSH SERPL DL<=0.005 MIU/L-ACNC: 0.28 UIU/ML (ref 0.45–4.5)
WBC # BLD AUTO: 5 X10E3/UL (ref 3.4–10.8)

## 2018-05-12 NOTE — TELEPHONE ENCOUNTER
CBC- normal  Thyroid - low TSH and normal T4    She can continue her current dose of synthroid , but take a 1/2 tablet only on Sat and Sunday.

## 2018-05-14 NOTE — TELEPHONE ENCOUNTER
Call completed to [facility], two patient identifiers verified. Lab results and change of medication reviewed. Marisol verbalized an understanding and recalled new dosage changed correctly.

## 2018-05-21 ENCOUNTER — OFFICE VISIT (OUTPATIENT)
Dept: INTERNAL MEDICINE CLINIC | Age: 83
End: 2018-05-21

## 2018-05-21 VITALS
HEART RATE: 76 BPM | WEIGHT: 123.4 LBS | RESPIRATION RATE: 16 BRPM | TEMPERATURE: 97.4 F | DIASTOLIC BLOOD PRESSURE: 95 MMHG | HEIGHT: 60 IN | SYSTOLIC BLOOD PRESSURE: 175 MMHG | OXYGEN SATURATION: 97 % | BODY MASS INDEX: 24.23 KG/M2

## 2018-05-21 DIAGNOSIS — Z00.00 MEDICARE ANNUAL WELLNESS VISIT, SUBSEQUENT: Primary | ICD-10-CM

## 2018-05-21 DIAGNOSIS — Z13.31 SCREENING FOR DEPRESSION: ICD-10-CM

## 2018-05-21 DIAGNOSIS — Z13.6 SCREENING FOR ISCHEMIC HEART DISEASE: ICD-10-CM

## 2018-05-21 DIAGNOSIS — E03.1 CONGENITAL HYPOTHYROIDISM WITHOUT GOITER: ICD-10-CM

## 2018-05-21 DIAGNOSIS — Z13.39 SCREENING FOR ALCOHOLISM: ICD-10-CM

## 2018-05-21 NOTE — PROGRESS NOTES
SUBJECTIVE:   Ms. Junior Glez is a 80 y.o. female who is here for follow up of routine medical issues. Pt present to the office today with her daughter who is the primary historian. Pt reports she is doing well apart from knee pain. Pt feels aspirin upsets her stomach. PT came to her house and decided she did not need PT, but rather help with memory. Labs on 5/7/2018 showed normal T4 and slighlty low TSH. Pt was advised to take one 0.5 tablet on Sat & Sun.      Pt's appetite has been normal (according to pt's daughter). Pt's weight in the office today is 123lb, down 2lbs since last visit. Pt's daughter reports she eats toast for breakfast and does not eat after 4pm. Pt's daughter reports she tries to get her to drink a Boost/Ensure daily with varying levels of success. Pt's daughter reports pt has sundowning issues and possible depression. Previous hearing exam showed pt has hearing deficits. Pt does not want to wear a hearing aid. PREVENTIVE:  Dexa: declined     At this time, she is otherwise doing well and has brought no other complaints to my attention today. For a list of the medical issues addressed today, see the assessment and plan below. PMH:   Past Medical History:   Diagnosis Date    Dementia     Depression     psychiatrist: Dr Byron Guthrie (on Lithium)    Fracture of wrist     slipped and fx left wrist, surgery scheduled 12/21/16    Full dentures     upper and lower    Hypothyroid      PSH:  has a past surgical history that includes hx kyphoplasty (2013); hx orthopaedic (12/2016); and hx wrist fracture tx (Left, 12/2016). All: is allergic to sulfa (sulfonamide antibiotics). MEDS:   Current Outpatient Prescriptions   Medication Sig    levothyroxine (SYNTHROID) 75 mcg tablet TAKE 1 TABLET DAILY BEFORE BREAKFAST    rivastigmine tartrate (EXELON) 1.5 mg capsule     aspirin 81 mg chewable tablet Take 81 mg by mouth daily.     levothyroxine (SYNTHROID) 75 mcg tablet     clonazePAM (KLONOPIN) 0.5 mg tablet Take 0.5 mg by mouth every evening.  multivitamin (ONE A DAY) tablet Take 1 Tab by mouth daily.  cholecalciferol, vitamin D3, (VITAMIN D3) 2,000 unit tab Take 1 Tab by mouth daily.  lithium CR (ESKALITH CR) 450 mg CR tablet Take 450 mg by mouth nightly.  PARoxetine (PAXIL) 40 mg tablet Take 40 mg by mouth daily. No current facility-administered medications for this visit. FH: family history includes Alzheimer in her sister; Cancer in her sister; Depression in her son and son; Diabetes in her brother and son; Heart Disease in her son; Hypertension in her daughter; Liver Disease in her son; Other in her mother and son; Thyroid Disease in her daughter. SH:  reports that she has quit smoking. She has never used smokeless tobacco. She reports that she does not drink alcohol or use illicit drugs. Review of Systems - History obtained from the patient  General ROS: sundowning sx no fever, chills, fatigue, body aches  Psychological ROS: no change in anxiety, depression, SI/HI  Ophthalmic ROS: no blurred vision, myopia, double vision  ENT ROS: no dysphagia, otalgia, otorrhea, rhinorrhea, post nasal drip  Respiratory ROS: no cough, shortness of breath, or wheezing  Cardiovascular ROS: no chest pain or dyspnea on exertion  Gastrointestinal ROS: no abdominal pain, change in bowel habits, or black or bloody stools  Genito-Urinary ROS: no frequency, urgency, incontinence, dysuria, hematouria  Musculoskeletal ROS: knee pain   Neurological ROS: no headaches, dizziness, lightheadedness, tremors, seizures  Dermatological ROS: no rash or lesions    OBJECTIVE:   Vitals:   Visit Vitals    BP (!) 175/95 (BP 1 Location: Left arm, BP Patient Position: Sitting)    Pulse 76    Temp 97.4 °F (36.3 °C) (Oral)    Resp 16    Ht 5' (1.524 m)    Wt 123 lb 6.4 oz (56 kg)    SpO2 97%    BMI 24.1 kg/m2      Gen: Pleasant 80 y.o.  female in NAD. HEENT: ROSSI. EOMI.  OP moist and pink.    Neck: Supple. No LAD. HEART: RRR, No M/G/R.      LUNGS: CTAB No W/R. ABDOMEN: S, NT, ND, BS+. EXTREMITIES: Warm. No C/C/E.    MUSCULOSKELETAL: Normal ROM, muscle strength 5/5 all groups. NEURO: Alert and oriented x 3. Cranial nerves grossly intact. No focal sensory or motor deficits noted. SKIN: Warm. Dry. No rashes or other lesions noted. ASSESSMENT/ PLAN: Diagnoses and all orders for this visit:    1. Medicare annual wellness visit, subsequent  -     Annual  Alcohol Screen 15 min ()    2. Screening for alcoholism  -     Annual  Alcohol Screen 15 min ()    3. Screening for depression  -     Depression Screen Annual    4. Screening for ischemic heart disease  -     Lipid Panel ([id number])        ICD-10-CM ICD-9-CM    1. Medicare annual wellness visit, subsequent Z00.00 V70.0 VA ANNUAL ALCOHOL SCREEN 15 MIN   2. Screening for alcoholism Z13.89 V79.1 VA ANNUAL ALCOHOL SCREEN 15 MIN   3. Screening for depression Z13.89 V79.0 DEPRESSION SCREEN ANNUAL   4. Screening for ischemic heart disease Z13.6 V81.0 LIPID PANEL      1. Medicare Annual Wellness Visit  See attached note. Pt has safety equipment in the shower. Pt does not currently have an ACP on file and would like information to create one.    2. Screening for Alcoholism   Pt denies any consumption of EtOH. 3. Screening for Depression  Pt denies any feelings of depression. 4. Screening for Ischemic Heart Disease  Recheck lipid panel. Regarding her thyroid, continue current regimen - whole tablet of levothyroxine Monday-Friday and one half tablet Sat & Sun.     I recommended getting a DEXA scan, but pt does not feel she needs testing. Pt's daughter reports it would be difficult to get her to the testing when she doesn't want it. Follow-up Disposition:  Return in about 6 months (around 11/21/2018) for follow up thyroid. I have reviewed the patient's medications and risks/side effects/benefits were discussed. Diagnosis(-es) explained to patient and questions answered. Literature provided where appropriate.      Written by Ashley Bañuelos, as dictated by Angeles Welch MD.

## 2018-05-21 NOTE — MR AVS SNAPSHOT
[address] [facility] 
[phone] Patient: Orlin Allen MRN: [id number] Visit Information Date & Time Provider Department Dept. Phone Encounter #  
 2018  2:00 PM Munir Vanced, [address] [facility] [address] [phone] [id number] Follow-up Instructions Return in about 6 months (around 2018) for follow up thyroid. Your Appointments 2018 10:40 AM  
Follow Up with Atul Sanabria MD  
[facility]) Appt Note: Follow up $0CP tdb 3/6/18  
 [address] [facility], 
[address], [address] [address]  
[address], [address] [facility], [address] [address] Upcoming Health Maintenance Date Due ZOSTER VACCINE AGE 60> 1986 GLAUCOMA SCREENING Q2Y 1991 Bone Densitometry (Dexa) Screening 1991 MEDICARE YEARLY EXAM 3/14/2018 Influenza Age 5 to Adult 2018 DTaP/Tdap/Td series (2 - Td) 2026 Allergies as of 2018  Review Complete On: 2018 By: Uma Kowalski Severity Noted Reaction Type Reactions Sulfa (Sulfonamide Antibiotics)  2010    Hives Current Immunizations  Reviewed on 3/6/2017 Name Date Influenza High Dose Vaccine PF 10/15/2016 Pneumococcal Conjugate (PCV-13) 3/6/2017 Pneumococcal Vaccine (Unspecified Type) 3/9/2017 Tdap 2016  1:23 PM  
  
 Not reviewed this visit You Were Diagnosed With   
  
 Codes Comments Medicare annual wellness visit, subsequent    -  Primary ICD-10-CM: Z00.00 ICD-9-CM: V70.0 Screening for alcoholism     ICD-10-CM: Z13.89 ICD-9-CM: V79.1 Screening for depression     ICD-10-CM: Z13.89 ICD-9-CM: V79.0 Screening for ischemic heart disease     ICD-10-CM: Z13.6 ICD-9-CM: V81.0 Vitals BP Pulse Temp Resp Height(growth percentile) Weight(growth percentile) (!) 175/95 (BP 1 Location: Left arm, BP Patient Position: Sitting) 76 97.4 °F (36.3 °C) (Oral) 16 5' (1.524 m) 123 lb 6.4 oz (56 kg) SpO2 BMI OB Status Smoking Status 97% 24.1 kg/m2 Postmenopausal Former Smoker Vitals History BMI and BSA Data Body Mass Index Body Surface Area  
 24.1 kg/m 2 1.54 m 2 Preferred Pharmacy Pharmacy Name Phone Javy Dorman, [facility] [phone] Your Updated Medication List  
  
   
This list is accurate as of 5/21/18  3:01 PM.  Always use your most recent med list.  
  
  
  
  
 aspirin 81 mg chewable tablet Take 81 mg by mouth daily. clonazePAM 0.5 mg tablet Commonly known as:  Beverely Hack Take 0.5 mg by mouth every evening. * levothyroxine 75 mcg tablet Commonly known as:  SYNTHROID * levothyroxine 75 mcg tablet Commonly known as:  SYNTHROID  
TAKE 1 TABLET DAILY BEFORE BREAKFAST  
  
 lithium carbonate  mg CR tablet Commonly known as:  ESKALITH CR Take 450 mg by mouth nightly. multivitamin tablet Commonly known as:  ONE A DAY Take 1 Tab by mouth daily. PARoxetine 40 mg tablet Commonly known as:  PAXIL Take 40 mg by mouth daily. rivastigmine tartrate 1.5 mg capsule Commonly known as:  EXELON  
  
 VITAMIN D3 2,000 unit Tab Generic drug:  cholecalciferol (vitamin D3) Take 1 Tab by mouth daily. * Notice: This list has 2 medication(s) that are the same as other medications prescribed for you. Read the directions carefully, and ask your doctor or other care provider to review them with you. We Performed the Following Micki 68 [[id number] [facility]] LIPID PANEL [11249 CPT(R)] MA ANNUAL ALCOHOL SCREEN 15 MIN [id number] [facility]] Follow-up Instructions Return in about 6 months (around 11/21/2018) for follow up thyroid. Patient Instructions Medicare Wellness Visit, Female The best way to live healthy is to have a lifestyle where you eat a well-balanced diet, exercise regularly, limit alcohol use, and quit all forms of tobacco/nicotine, if applicable. Regular preventive services are another way to keep healthy. Preventive services (vaccines, screening tests, monitoring & exams) can help personalize your care plan, which helps you manage your own care. Screening tests can find health problems at the earliest stages, when they are easiest to treat. [facility] follows the current, evidence-based guidelines published by the [facility] ([facility]) when recommending preventive services for our patients. Because we follow these guidelines, sometimes recommendations change over time as research supports it. (For example, mammograms used to be recommended annually. Even though Medicare will still pay for an annual mammogram, the newer guidelines recommend a mammogram every two years for women of average risk.) Of course, you and your provider may decide to screen more often for some diseases, based on your risk and co-morbidities (chronic disease you are already diagnosed with). Preventive services for you include: - Medicare offers their members a free annual wellness visit, which is time for you and your primary care provider to discuss and plan for your preventive service needs. Take advantage of this benefit every year! 
 
-All people over age 72 should receive the recommended pneumonia vaccines. Current USPSTF guidelines recommend a series of two vaccines for the best pneumonia protection.  
 
-All adults should have a yearly flu vaccine and a tetanus vaccine every 10 years. All adults age 61 years should receive a shingles vaccine once in their lifetime. -A bone mass density test is recommended when a woman turns 65 to screen for osteoporosis. This test is only recommended once as a screening. Some providers will use this same test as a disease monitoring tool if you already have osteoporosis. -All adults age 38-68 years who are overweight should have a diabetes screening test once every three years.  
 
-Other screening tests & preventive services for persons with diabetes include: an eye exam to screen for diabetic retinopathy, a kidney function test, a foot exam, and stricter control over your cholesterol.  
 
-Cardiovascular screening for adults with routine risk involves an electrocardiogram (ECG) at intervals determined by the provider.  
 
-Colorectal cancer screenings should be done for adults age 54-65 years with normal risk. There are a number of acceptable methods of screening for this type of cancer. Each test has its own benefits and drawbacks. Discuss with your provider what is most appropriate for you during your annual wellness visit. The different tests include: colonoscopy (considered the best screening method), a fecal occult blood test, a fecal DNA test, and sigmoidoscopy. -Breast cancer screenings are recommended every other year for women of normal risk age 54-69 years.  
 
-Cervical cancer screenings for women over age 72 are only recommended with certain risk factors.  
 
-All adults born between [facility] should be screened once for Hepatitis C. Here is a list of your current Health Maintenance items (your personalized list of preventive services) with a due date: 
Health Maintenance Due Topic Date Due  Shingles Vaccine  05/22/1986  Glaucoma Screening   07/22/1991  Bone Mineral Density   07/22/1991 [employer] Annual Well Visit  03/14/2018 Introducing \[facility]\"" & HEALTH SERVICES!    
 [facility] introduces "[employer] patient portal. Now you can access parts of your medical record, email your doctor's office, and request medication refills online. 1. In your internet browser, go to [email or web address] 2. Click on the First Time User? Click Here link in the Sign In box. You will see the New Member Sign Up page. 3. Enter your X[employer] Access Code exactly as it appears below. You will not need to use this code after youve completed the sign-up process. If you do not sign up before the expiration date, you must request a new code. · [employer] Access Code: [email or web address] Expires: 8/19/2018  2:58 PM 
 
4. Enter the last four digits of your Social Security Number (xxxx) and Date of Birth (mm/dd/yyyy) as indicated and click Submit. You will be taken to the next sign-up page. 5. Create a XATA ID. This will be your XATA login ID and cannot be changed, so think of one that is secure and easy to remember. 6. Create a X[employer] password. You can change your password at any time. 7. Enter your Password Reset Question and Answer. This can be used at a later time if you forget your password. 8. Enter your e-mail address. You will receive e-mail notification when new information is available in [address] 9. Click Sign Up. You can now view and download portions of your medical record. 10. Click the Download Summary menu link to download a portable copy of your medical information. If you have questions, please visit the Frequently Asked Questions section of the [employer] website. Remember, XATA is NOT to be used for urgent needs. For medical emergencies, dial 911. Now available from your iPhone and Android! Please provide this summary of care documentation to your next provider. Your primary care clinician is listed as Hanane Moncada. If you have any questions after today's visit, please call [phone].

## 2018-05-21 NOTE — PATIENT INSTRUCTIONS
Medicare Wellness Visit, Female    The best way to live healthy is to have a lifestyle where you eat a well-balanced diet, exercise regularly, limit alcohol use, and quit all forms of tobacco/nicotine, if applicable. Regular preventive services are another way to keep healthy. Preventive services (vaccines, screening tests, monitoring & exams) can help personalize your care plan, which helps you manage your own care. Screening tests can find health problems at the earliest stages, when they are easiest to treat. [facility] follows the current, evidence-based guidelines published by the [facility] ([facility]) when recommending preventive services for our patients. Because we follow these guidelines, sometimes recommendations change over time as research supports it. (For example, mammograms used to be recommended annually. Even though Medicare will still pay for an annual mammogram, the newer guidelines recommend a mammogram every two years for women of average risk.)    Of course, you and your provider may decide to screen more often for some diseases, based on your risk and co-morbidities (chronic disease you are already diagnosed with). Preventive services for you include:    - Medicare offers their members a free annual wellness visit, which is time for you and your primary care provider to discuss and plan for your preventive service needs. Take advantage of this benefit every year!    -All people over age 72 should receive the recommended pneumonia vaccines. Current USPSTF guidelines recommend a series of two vaccines for the best pneumonia protection.     -All adults should have a yearly flu vaccine and a tetanus vaccine every 10 years. All adults age 61 years should receive a shingles vaccine once in their lifetime.      -A bone mass density test is recommended when a woman turns 65 to screen for osteoporosis.  This test is only recommended once as a screening. Some providers will use this same test as a disease monitoring tool if you already have osteoporosis. -All adults age 38-68 years who are overweight should have a diabetes screening test once every three years.     -Other screening tests & preventive services for persons with diabetes include: an eye exam to screen for diabetic retinopathy, a kidney function test, a foot exam, and stricter control over your cholesterol.     -Cardiovascular screening for adults with routine risk involves an electrocardiogram (ECG) at intervals determined by the provider.     -Colorectal cancer screenings should be done for adults age 54-65 years with normal risk. There are a number of acceptable methods of screening for this type of cancer. Each test has its own benefits and drawbacks. Discuss with your provider what is most appropriate for you during your annual wellness visit. The different tests include: colonoscopy (considered the best screening method), a fecal occult blood test, a fecal DNA test, and sigmoidoscopy. -Breast cancer screenings are recommended every other year for women of normal risk age 54-69 years.     -Cervical cancer screenings for women over age 72 are only recommended with certain risk factors.     -All adults born between [facility] should be screened once for Hepatitis C.      Here is a list of your current Health Maintenance items (your personalized list of preventive services) with a due date:  Health Maintenance Due   Topic Date Due    Shingles Vaccine  05/22/1986    Glaucoma Screening   07/22/1991    Bone Mineral Density   07/22/1991    Annual Well Visit  03/14/2018

## 2018-05-21 NOTE — PROGRESS NOTES
This is the Subsequent Medicare Annual Wellness Exam, performed 12 months or more after the Initial AWV or the last Subsequent AWV    I have reviewed the patient's medical history in detail and updated the computerized patient record. History     Past Medical History:   Diagnosis Date    Dementia     Depression     psychiatrist: Dr Domenico Estrada (on Lithium)    Fracture of wrist     slipped and fx left wrist, surgery scheduled 12/21/16    Full dentures     upper and lower    Hypothyroid       Past Surgical History:   Procedure Laterality Date    HX KYPHOPLASTY  2013    HX ORTHOPAEDIC  12/2016    Wrist surgery    HX WRIST FRACTURE TX Left 12/2016     Current Outpatient Prescriptions   Medication Sig Dispense Refill    levothyroxine (SYNTHROID) 75 mcg tablet TAKE 1 TABLET DAILY BEFORE BREAKFAST 90 Tab 0    rivastigmine tartrate (EXELON) 1.5 mg capsule       aspirin 81 mg chewable tablet Take 81 mg by mouth daily.  levothyroxine (SYNTHROID) 75 mcg tablet       clonazePAM (KLONOPIN) 0.5 mg tablet Take 0.5 mg by mouth every evening.  multivitamin (ONE A DAY) tablet Take 1 Tab by mouth daily.  cholecalciferol, vitamin D3, (VITAMIN D3) 2,000 unit tab Take 1 Tab by mouth daily.  lithium CR (ESKALITH CR) 450 mg CR tablet Take 450 mg by mouth nightly.  PARoxetine (PAXIL) 40 mg tablet Take 40 mg by mouth daily.        Allergies   Allergen Reactions    Sulfa (Sulfonamide Antibiotics) Hives     Family History   Problem Relation Age of Onset    Other Mother      Inefficient wound healing    Cancer Sister      Stomach CA    Diabetes Brother     Alzheimer Sister     Hypertension Daughter     Thyroid Disease Daughter     Other Son      Hip replacement    Depression Son     Liver Disease Son     Diabetes Son     Heart Disease Son     Depression Son      Social History   Substance Use Topics    Smoking status: Former Smoker    Smokeless tobacco: Never Used    Alcohol use No Patient Active Problem List   Diagnosis Code    Hypothyroidism E03.9    Depression F32.9    Dementia without behavioral disturbance F03.90    Laceration of left arm with complication Y39.693A    Acute metabolic encephalopathy N15.29    Altered mental status, unspecified R41.82    Bilateral carotid artery stenosis I65.23    Transient ischemic attack involving left internal carotid artery G45.1    Late onset Alzheimer's disease with behavioral disturbance G30.1, F02.81    Benign essential tremor syndrome G25.0       Depression Risk Factor Screening:     PHQ over the last two weeks 3/12/2015   PHQ Not Done Active Diagnosis of Depression or Bipolar Disorder     Alcohol Risk Factor Screening: You do not drink alcohol or very rarely. Functional Ability and Level of Safety:   Hearing Loss  Hearing is good. The patient needs further evaluation. Activities of Daily Living  The home contains: handrails and grab bars  Patient needs help with:  transportation, shopping, preparing meals, managing medications, managing money and walking    Fall Risk  Fall Risk Assessment, last 12 mths 2/7/2018   Able to walk? Yes   Fall in past 12 months? Yes   Fall with injury? Yes   Number of falls in past 12 months 6   Fall Risk Score 7       Abuse Screen  Patient is not abused    Cognitive Screening   Evaluation of Cognitive Function:  Has your family/caregiver stated any concerns about your memory: yes  Normal, Abnormal    Patient Care Team   Patient Care Team:  Munir Escalante MD as PCP - General (Family Practice)  Griffin Chambers MD as Surgeon (General Surgery)  Ivan Hernandez.  Natasha Graff MD (Orthopedic Surgery)  Dr. Evangelina Chauhan (Psychiatry)  James Broderick, RN as Ambulatory Care Navigator (Internal Medicine)    Assessment/Plan   Education and counseling provided:  PREVENTIVE:  Colonoscopy: (pt estimates 5 years ago)  Pap: 5/25/2016 - normal   Mammogram: due on/after 7/31/2017  Dexa: due     Medicare Wellness Visit: Pt denies any changes to their hearing, vision, or memory. Pt reports feeling safe in his home and does not have any safety equipment. Pt reports she is extremely active. Pt has a POA and will bring in a copy to be scanned into her chart. Pt specifically denies changes in vision or hearing, trouble with swallowing or taste, CP, SOB, heartburn or upset stomach, change in bowel habits, problems urinating, unusual joint or muscle pains, numbness or tingling in extremities, or skin lesions of concern. Diagnoses and all orders for this visit:    1. Medicare annual wellness visit, subsequent  -     Annual  Alcohol Screen 15 min ()    2. Screening for alcoholism  -     Annual  Alcohol Screen 15 min ()    3. Screening for depression  -     Depression Screen Annual    4.  Screening for ischemic heart disease  -     Lipid Panel ([id number])      Health Maintenance Due   Topic Date Due    ZOSTER VACCINE AGE 60>  05/22/1986    GLAUCOMA SCREENING Q2Y  07/22/1991    Bone Densitometry (Dexa) Screening  07/22/1991    MEDICARE YEARLY EXAM  03/14/2018

## 2018-05-21 NOTE — PROGRESS NOTES
1. Have you been to the ER, urgent care clinic since your last visit? Hospitalized since your last visit?no  2. Have you seen or consulted any other health care providers outside of the [address] [place] [address] since your last visit? Include any pap smears or colon screening.  no

## 2018-06-25 DIAGNOSIS — E03.9 HYPOTHYROIDISM, UNSPECIFIED TYPE: ICD-10-CM

## 2018-06-25 RX ORDER — LEVOTHYROXINE SODIUM 75 UG/1
TABLET ORAL
Qty: 90 TAB | Refills: 0 | Status: SHIPPED | OUTPATIENT
Start: 2018-06-25 | End: 2018-09-23 | Stop reason: SDUPTHER

## 2018-09-04 ENCOUNTER — OFFICE VISIT (OUTPATIENT)
Dept: NEUROLOGY | Age: 83
End: 2018-09-04

## 2018-09-04 VITALS
WEIGHT: 121 LBS | DIASTOLIC BLOOD PRESSURE: 70 MMHG | SYSTOLIC BLOOD PRESSURE: 135 MMHG | HEART RATE: 84 BPM | HEIGHT: 60 IN | BODY MASS INDEX: 23.75 KG/M2

## 2018-09-04 DIAGNOSIS — G45.1 TRANSIENT ISCHEMIC ATTACK INVOLVING LEFT INTERNAL CAROTID ARTERY: ICD-10-CM

## 2018-09-04 DIAGNOSIS — R44.0 AUDITORY HALLUCINATIONS: ICD-10-CM

## 2018-09-04 DIAGNOSIS — G30.1 LATE ONSET ALZHEIMER'S DISEASE WITH BEHAVIORAL DISTURBANCE (HCC): Primary | ICD-10-CM

## 2018-09-04 DIAGNOSIS — F02.818 LATE ONSET ALZHEIMER'S DISEASE WITH BEHAVIORAL DISTURBANCE (HCC): Primary | ICD-10-CM

## 2018-09-04 DIAGNOSIS — R41.82 ALTERED MENTAL STATUS, UNSPECIFIED ALTERED MENTAL STATUS TYPE: ICD-10-CM

## 2018-09-04 DIAGNOSIS — G25.0 BENIGN ESSENTIAL TREMOR SYNDROME: ICD-10-CM

## 2018-09-04 RX ORDER — MEMANTINE HYDROCHLORIDE 5 MG/1
5 TABLET ORAL 2 TIMES DAILY
Qty: 60 TAB | Refills: 11 | Status: SHIPPED | OUTPATIENT
Start: 2018-09-04 | End: 2018-09-17 | Stop reason: SDUPTHER

## 2018-09-04 NOTE — LETTER
9/4/2018 9:15 PM 
 
Patient:  Obed Alford YOB: 1926 Date of Visit: 9/4/2018 Dear No Recipients: Thank you for referring Ms. Ruth Arzola to me for evaluation/treatment. Below are the relevant portions of my assessment and plan of care. Consult REFERRED BY: 
Mansoor Jain MD 
 
CHIEF COMPLAINT: Auditory and visual hallucinations and dementia Subjective:  
 
Obed Alford is a 80 y.o.  female we are seeing at the request of Dr. Daniel Bustos for evaluation as a new patient to me in the office, for persistent auditory and visual hallucinations, and dementia, for which she will not take medication. This has been persistent for about a year, slowly seemingly getting worse, and the auditory hallucinations are mainly music and singing in the morning that aggravate the patient, and other every day and actually associated with some visual hallucinations more in the late afternoon and evening of seeing people around her also. She is also demented and had a complete evaluation in the hospital in February for these problems, the CT scan that just showed atrophic changes, patient uncooperative for MRI scan, and carotid Doppler showed no significant stenosis, and EEG was ordered but she would not take that either. She does take Exelon 1.5 mg twice a day for dementia, and does take that, but refused to take Klonopin, and refused to see a psychiatrist.  She already has a bipolar disorder and is on lithium for that. Is also on Paxil and thyroid and vitamins and vitamin D supposedly. Weeks had a long talk with the patient, and after we told her the Namenda might help her hallucinations, she is agreeable to try that once twice a day at least for now. Hopefully that will help with her behavior, because she has tried quetiapine and another neuroleptic both of which made her worse. She has not had any new focal neurologic deficits.   There have been no other clear new neurologic findings, no focal weakness, no headache, no sensory loss, no fever, no meningismus, and no family history of similar problems. She becoming very hard to handle at home. The family is looking for any help taking it. Past Medical History:  
Diagnosis Date  Dementia  Depression   
 psychiatrist: Dr Michelle Fuentes (on Lithium)  Fracture of wrist   
 slipped and fx left wrist, surgery scheduled 12/21/16  Full dentures   
 upper and lower  Hypothyroid Past Surgical History:  
Procedure Laterality Date  HX KYPHOPLASTY  2013  HX ORTHOPAEDIC  12/2016 Wrist surgery  HX WRIST FRACTURE TX Left 12/2016 Family History Problem Relation Age of Onset Kwesi Sullivan Other Mother Inefficient wound healing  Cancer Sister Stomach CA  
 Diabetes Brother  Alzheimer Sister  Hypertension Daughter  Thyroid Disease Daughter  Other Son Hip replacement  Depression Son  Liver Disease Son  Diabetes Son   
 Heart Disease Son  Depression Son   
  
Social History Substance Use Topics  Smoking status: Former Smoker  Smokeless tobacco: Never Used  Alcohol use No  
   
 
Current Outpatient Prescriptions:  
  memantine (NAMENDA) 5 mg tablet, Take 1 Tab by mouth two (2) times a day., Disp: 60 Tab, Rfl: 11 
  levothyroxine (SYNTHROID) 75 mcg tablet, TAKE 1 TABLET DAILY BEFORE BREAKFAST, Disp: 90 Tab, Rfl: 0 
  rivastigmine tartrate (EXELON) 1.5 mg capsule, , Disp: , Rfl:  
  aspirin 81 mg chewable tablet, Take 81 mg by mouth daily. , Disp: , Rfl:  
  clonazePAM (KLONOPIN) 0.5 mg tablet, Take 0.5 mg by mouth every evening., Disp: , Rfl:  
  PARoxetine (PAXIL) 40 mg tablet, Take 40 mg by mouth daily. , Disp: , Rfl:  
  multivitamin (ONE A DAY) tablet, Take 1 Tab by mouth daily. , Disp: , Rfl:  
  cholecalciferol, vitamin D3, (VITAMIN D3) 2,000 unit tab, Take 1 Tab by mouth daily. , Disp: , Rfl:  
   lithium CR (ESKALITH CR) 450 mg CR tablet, Take 450 mg by mouth nightly., Disp: , Rfl:  
  levothyroxine (SYNTHROID) 75 mcg tablet, , Disp: , Rfl:  
 
 
 
Allergies Allergen Reactions  Sulfa (Sulfonamide Antibiotics) Hives Review of Systems: 
Review of systems not obtained due to patient factors. Vitals:  
 09/04/18 1033 BP: 135/70 Pulse: 84 Weight: 121 lb (54.9 kg) Height: 5' (1.524 m) Objective: I 
 
 
NEUROLOGICAL EXAM: 
  
Appearance: The patient is well developed, well nourished, provides an incoherent history and is in no acute distress. Mental Status: Oriented to place and person, and not the date or the president, cognitive function is abnormal and speech is fluent and no aphasia or dysarthria. Mood and affect appropriate. Cranial Nerves:   Intact visual fields. Fundi are benign. ALFONSO, EOM's full, no nystagmus, no ptosis. Facial sensation is normal. Corneal reflexes are not tested. Facial movement is symmetric. Hearing is abnormal bilaterally. Palate is midline with normal sternocleidomastoid and trapezius muscles are normal. Tongue is midline. Neck without meningismus or bruits Temporal arteries are not tender or enlarged Motor:  4/5 strength in upper and lower proximal and distal muscles. Normal bulk and tone. No fasciculations. Reflexes:   Deep tendon reflexes 1+/4 and symmetrical. 
No babinski or clonus present Sensory:   Normal to touch, pinprick and vibration and temperature decreased in both feet. DSS is intact Gait:  Not tested gait. Tremor:    Mild bilateral intention upper extremity tremor noted. Cerebellar:  No cerebellar signs present. Neurovascular:  Normal heart sounds and regular rhythm, peripheral pulses decreased, and no carotid bruits.  
  
 
 
   
   
   
   
 
 
 
Assessment: ICD-10-CM ICD-9-CM 1. Late onset Alzheimer's disease with behavioral disturbance G30.1 331.0 memantine (NAMENDA) 5 mg tablet  F02.81 294.11   
 2. Benign essential tremor syndrome G25.0 333.1 memantine (NAMENDA) 5 mg tablet 3. Transient ischemic attack involving left internal carotid artery G45.1 435.8 memantine (NAMENDA) 5 mg tablet 4. Auditory hallucinations R44.0 780.1 memantine (NAMENDA) 5 mg tablet 5. Altered mental status, unspecified altered mental status type R41.82 780.97 memantine (NAMENDA) 5 mg tablet Active Problems: * No active hospital problems. * 
 
 
Plan:  
 
Patient with dementia associated with visual and auditory hallucinations that are very bothersome to the patient and the family, but for which she will take medication or has had side effects to Seroquel and another neuroleptic, and she is already on lithium and possibly clonazepam and Paxil and already sees a psychiatrist 
We will try her on Namenda and she agrees to try 5 mg twice a day, and hopefully this will help her memory loss and some of her agitation and may be some of the hallucinations Gabapentin or Depakote or Lamictal may be other options in the future. Very difficult case, 35 minutes spent with the patient and the daughter counseling her, reviewing all of her records from the hospital and workup there, and discussing her diagnosis prognosis and further treatment evaluation Follow-up in 6 months time or earlier as needed. Signed By: Armando Munson MD   
 September 4, 2018 CC: Suis Cárdenas MD 
FAX: [phone] This note will not be viewable in [address] If you have questions, please do not hesitate to call me. I look forward to following Ms. Mccarthy along with you. Sincerely, Armando Munson MD

## 2018-09-04 NOTE — MR AVS SNAPSHOT
[address] [facility], 
[address], [address] [facility] 
[phone] Patient: Sterling Magaña MRN: [id number] Visit Information Date & Time Provider Department Dept. Phone Encounter #  
 2018 10:40 AM Varinder Lucero MD [facility] at [facility] [phone] [id number] Follow-up Instructions Return in about 6 months (around 3/4/2019). Upcoming Health Maintenance Date Due ZOSTER VACCINE AGE 60> 1986 GLAUCOMA SCREENING Q2Y 1991 Bone Densitometry (Dexa) Screening 1991 Influenza Age 5 to Adult 2018 MEDICARE YEARLY EXAM 2019 DTaP/Tdap/Td series (2 - Td) 2026 Allergies as of 2018  Review Complete On: 2018 By: Varinder Lucero MD  
  
 Severity Noted Reaction Type Reactions Sulfa (Sulfonamide Antibiotics)  2010    Hives Current Immunizations  Reviewed on 3/6/2017 Name Date Influenza High Dose Vaccine PF 10/15/2016 Pneumococcal Conjugate (PCV-13) 3/6/2017 Pneumococcal Vaccine (Unspecified Type) 3/9/2017 Tdap 2016  1:23 PM  
  
 Not reviewed this visit You Were Diagnosed With   
  
 Codes Comments Late onset Alzheimer's disease with behavioral disturbance    -  Primary ICD-10-CM: G30.1, F02.81 ICD-9-CM: 331.0, 294.11 Benign essential tremor syndrome     ICD-10-CM: G25.0 ICD-9-CM: 333.1 Transient ischemic attack involving left internal carotid artery     ICD-10-CM: G45.1 ICD-9-CM: 435.8 Auditory hallucinations     ICD-10-CM: R44.0 ICD-9-CM: 780. 1 Altered mental status, unspecified altered mental status type     ICD-10-CM: R41.82 
ICD-9-CM: 780.97 Vitals BP Pulse Height(growth percentile) Weight(growth percentile) BMI OB Status 135/70 84 5' (1.524 m) 121 lb (54.9 kg) 23.63 kg/m2 Postmenopausal  
 Smoking Status Former Smoker BMI and BSA Data Body Mass Index Body Surface Area  
 23.63 kg/m 2 1.52 m 2 Preferred Pharmacy Pharmacy Name Phone [address] [place] [facility] [phone] Your Updated Medication List  
  
   
This list is accurate as of 9/4/18 11:02 AM.  Always use your most recent med list.  
  
  
  
  
 aspirin 81 mg chewable tablet Take 81 mg by mouth daily. clonazePAM 0.5 mg tablet Commonly known as:  Karrin Reamer Take 0.5 mg by mouth every evening. * levothyroxine 75 mcg tablet Commonly known as:  SYNTHROID * levothyroxine 75 mcg tablet Commonly known as:  SYNTHROID  
TAKE 1 TABLET DAILY BEFORE BREAKFAST  
  
 lithium carbonate  mg CR tablet Commonly known as:  ESKALITH CR Take 450 mg by mouth nightly. memantine 5 mg tablet Commonly known as:  [place] Take 1 Tab by mouth two (2) times a day. multivitamin tablet Commonly known as:  ONE A DAY Take 1 Tab by mouth daily. PARoxetine 40 mg tablet Commonly known as:  PAXIL Take 40 mg by mouth daily. rivastigmine tartrate 1.5 mg capsule Commonly known as:  EXELON  
  
 VITAMIN D3 2,000 unit Tab Generic drug:  cholecalciferol (vitamin D3) Take 1 Tab by mouth daily. * Notice: This list has 2 medication(s) that are the same as other medications prescribed for you. Read the directions carefully, and ask your doctor or other care provider to review them with you. Prescriptions Sent to Pharmacy Refills  
 memantine (NAMENDA) 5 mg tablet 11 Sig: Take 1 Tab by mouth two (2) times a day. Class: Normal  
 Pharmacy: [address] [place] [facility] Ph #: [phone] Route: Oral  
  
Follow-up Instructions Return in about 6 months (around 3/4/2019). Introducing [facility]!    
 Laila Wolff introduces [employer] patient portal. Now you can access parts of your medical record, email your doctor's office, and request medication refills online. 1. In your internet browser, go to [email or web address] 2. Click on the First Time User? Click Here link in the Sign In box. You will see the New Member Sign Up page. 3. Enter your [employer] Access Code exactly as it appears below. You will not need to use this code after youve completed the sign-up process. If you do not sign up before the expiration date, you must request a new code. · [employer] Access Code: [email or web address] Expires: 12/3/2018 11:02 AM 
 
4. Enter the last four digits of your Social Security Number (xxxx) and Date of Birth (mm/dd/yyyy) as indicated and click Submit. You will be taken to the next sign-up page. 5. Create a [employer] ID. This will be your [employer] login ID and cannot be changed, so think of one that is secure and easy to remember. 6. Create a [employer] password. You can change your password at any time. 7. Enter your Password Reset Question and Answer. This can be used at a later time if you forget your password. 8. Enter your e-mail address. You will receive e-mail notification when new information is available in [address] 9. Click Sign Up. You can now view and download portions of your medical record. 10. Click the Download Summary menu link to download a portable copy of your medical information. If you have questions, please visit the Frequently Asked Questions section of the [employer] website. Remember, [employer] is NOT to be used for urgent needs. For medical emergencies, dial 911. Now available from your iPhone and Android! Please provide this summary of care documentation to your next provider. Your primary care clinician is listed as Belkis Espinosa. If you have any questions after today's visit, please call [phone].

## 2018-09-05 NOTE — PROGRESS NOTES
Consult REFERRED BY: 
Omar Carbajal MD 
 
CHIEF COMPLAINT: Auditory and visual hallucinations and dementia Subjective:  
 
Luis Miguel Hart is a 80 y.o.  female we are seeing at the request of Dr. Carin Lee for evaluation as a new patient to me in the office, for persistent auditory and visual hallucinations, and dementia, for which she will not take medication. This has been persistent for about a year, slowly seemingly getting worse, and the auditory hallucinations are mainly music and singing in the morning that aggravate the patient, and other every day and actually associated with some visual hallucinations more in the late afternoon and evening of seeing people around her also. She is also demented and had a complete evaluation in the hospital in February for these problems, the CT scan that just showed atrophic changes, patient uncooperative for MRI scan, and carotid Doppler showed no significant stenosis, and EEG was ordered but she would not take that either. She does take Exelon 1.5 mg twice a day for dementia, and does take that, but refused to take Klonopin, and refused to see a psychiatrist.  She already has a bipolar disorder and is on lithium for that. Is also on Paxil and thyroid and vitamins and vitamin D supposedly. Weeks had a long talk with the patient, and after we told her the Namenda might help her hallucinations, she is agreeable to try that once twice a day at least for now. Hopefully that will help with her behavior, because she has tried quetiapine and another neuroleptic both of which made her worse. She has not had any new focal neurologic deficits. There have been no other clear new neurologic findings, no focal weakness, no headache, no sensory loss, no fever, no meningismus, and no family history of similar problems. She becoming very hard to handle at home. The family is looking for any help taking it. Past Medical History:  
Diagnosis Date  Dementia  Depression   
 psychiatrist: Dr Tom Casanova (on Lithium)  Fracture of wrist   
 slipped and fx left wrist, surgery scheduled 12/21/16  Full dentures   
 upper and lower  Hypothyroid Past Surgical History:  
Procedure Laterality Date  HX KYPHOPLASTY  2013  HX ORTHOPAEDIC  12/2016 Wrist surgery  HX WRIST FRACTURE TX Left 12/2016 Family History Problem Relation Age of Onset 24 [facility] Other Mother Inefficient wound healing  Cancer Sister Stomach CA  
 Diabetes Brother  Alzheimer Sister  Hypertension Daughter  Thyroid Disease Daughter  Other Son Hip replacement  Depression Son  Liver Disease Son  Diabetes Son   
 Heart Disease Son  Depression Son   
  
Social History Substance Use Topics  Smoking status: Former Smoker  Smokeless tobacco: Never Used  Alcohol use No  
   
 
Current Outpatient Prescriptions:  
  memantine (NAMENDA) 5 mg tablet, Take 1 Tab by mouth two (2) times a day., Disp: 60 Tab, Rfl: 11 
  levothyroxine (SYNTHROID) 75 mcg tablet, TAKE 1 TABLET DAILY BEFORE BREAKFAST, Disp: 90 Tab, Rfl: 0 
  rivastigmine tartrate (EXELON) 1.5 mg capsule, , Disp: , Rfl:  
  aspirin 81 mg chewable tablet, Take 81 mg by mouth daily. , Disp: , Rfl:  
  clonazePAM (KLONOPIN) 0.5 mg tablet, Take 0.5 mg by mouth every evening., Disp: , Rfl:  
  PARoxetine (PAXIL) 40 mg tablet, Take 40 mg by mouth daily. , Disp: , Rfl:  
  multivitamin (ONE A DAY) tablet, Take 1 Tab by mouth daily. , Disp: , Rfl:  
  cholecalciferol, vitamin D3, (VITAMIN D3) 2,000 unit tab, Take 1 Tab by mouth daily. , Disp: , Rfl:  
  lithium CR (ESKALITH CR) 450 mg CR tablet, Take 450 mg by mouth nightly., Disp: , Rfl:  
  levothyroxine (SYNTHROID) 75 mcg tablet, , Disp: , Rfl:  
 
 
 
Allergies Allergen Reactions  Sulfa (Sulfonamide Antibiotics) Hives Review of Systems: 
Review of systems not obtained due to patient factors. Vitals:  
 09/04/18 1033 BP: 135/70 Pulse: 84 Weight: 121 lb (54.9 kg) Height: 5' (1.524 m) Objective: I 
 
 
NEUROLOGICAL EXAM: 
  
Appearance: The patient is well developed, well nourished, provides an incoherent history and is in no acute distress. Mental Status: Oriented to place and person, and not the date or the president, cognitive function is abnormal and speech is fluent and no aphasia or dysarthria. Mood and affect appropriate. Cranial Nerves:   Intact visual fields. Fundi are benign. ALFONSO, EOM's full, no nystagmus, no ptosis. Facial sensation is normal. Corneal reflexes are not tested. Facial movement is symmetric. Hearing is abnormal bilaterally. Palate is midline with normal sternocleidomastoid and trapezius muscles are normal. Tongue is midline. Neck without meningismus or bruits Temporal arteries are not tender or enlarged Motor:  4/5 strength in upper and lower proximal and distal muscles. Normal bulk and tone. No fasciculations. Reflexes:   Deep tendon reflexes 1+/4 and symmetrical. 
No babinski or clonus present Sensory:   Normal to touch, pinprick and vibration and temperature decreased in both feet. DSS is intact Gait:  Not tested gait. Tremor:    Mild bilateral intention upper extremity tremor noted. Cerebellar:  No cerebellar signs present. Neurovascular:  Normal heart sounds and regular rhythm, peripheral pulses decreased, and no carotid bruits.  
  
 
 
   
   
   
   
 
 
 
Assessment: ICD-10-CM ICD-9-CM 1. Late onset Alzheimer's disease with behavioral disturbance G30.1 331.0 memantine (NAMENDA) 5 mg tablet F02.81 294.11   
2. Benign essential tremor syndrome G25.0 333.1 memantine (NAMENDA) 5 mg tablet 3. Transient ischemic attack involving left internal carotid artery G45.1 435.8 memantine (NAMENDA) 5 mg tablet 4. Auditory hallucinations R44.0 780.1 memantine (NAMENDA) 5 mg tablet 5.  Altered mental status, unspecified altered mental status type R41.82 780.97 memantine (NAMENDA) 5 mg tablet Active Problems: * No active hospital problems. * 
 
 
Plan:  
 
Patient with dementia associated with visual and auditory hallucinations that are very bothersome to the patient and the family, but for which she will take medication or has had side effects to Seroquel and another neuroleptic, and she is already on lithium and possibly clonazepam and Paxil and already sees a psychiatrist 
We will try her on Namenda and she agrees to try 5 mg twice a day, and hopefully this will help her memory loss and some of her agitation and may be some of the hallucinations Gabapentin or Depakote or Lamictal may be other options in the future. Very difficult case, 35 minutes spent with the patient and the daughter counseling her, reviewing all of her records from the hospital and workup there, and discussing her diagnosis prognosis and further treatment evaluation Follow-up in 6 months time or earlier as needed. Signed By: Aislinn Mae MD   
 September 4, 2018 CC: West Gallegos MD 
FAX: [phone] This note will not be viewable in [address]

## 2018-09-17 DIAGNOSIS — G45.1 TRANSIENT ISCHEMIC ATTACK INVOLVING LEFT INTERNAL CAROTID ARTERY: ICD-10-CM

## 2018-09-17 DIAGNOSIS — R44.0 AUDITORY HALLUCINATIONS: ICD-10-CM

## 2018-09-17 DIAGNOSIS — F02.818 LATE ONSET ALZHEIMER'S DISEASE WITH BEHAVIORAL DISTURBANCE (HCC): ICD-10-CM

## 2018-09-17 DIAGNOSIS — R41.82 ALTERED MENTAL STATUS, UNSPECIFIED ALTERED MENTAL STATUS TYPE: ICD-10-CM

## 2018-09-17 DIAGNOSIS — G25.0 BENIGN ESSENTIAL TREMOR SYNDROME: ICD-10-CM

## 2018-09-17 DIAGNOSIS — G30.1 LATE ONSET ALZHEIMER'S DISEASE WITH BEHAVIORAL DISTURBANCE (HCC): ICD-10-CM

## 2018-09-17 RX ORDER — MEMANTINE HYDROCHLORIDE 5 MG/1
5 TABLET ORAL 2 TIMES DAILY
Qty: 180 TAB | Refills: 3 | Status: ON HOLD | OUTPATIENT
Start: 2018-09-17 | End: 2019-05-14 | Stop reason: DRUGHIGH

## 2018-09-17 NOTE — TELEPHONE ENCOUNTER
Future Appointments  Date Time Provider Ehsan Yuridia   4/23/2019 3:40 PM Светлана Arshad MD [address]                         Last Appointment My Department:  9/4/2018    Please advise of refill below. Requesting 90 day supply    Requested Prescriptions     Pending Prescriptions Disp Refills    memantine (NAMENDA) 5 mg tablet 180 Tab 3     Sig: Take 1 Tab by mouth two (2) times a day.

## 2018-09-23 DIAGNOSIS — E03.9 HYPOTHYROIDISM, UNSPECIFIED TYPE: ICD-10-CM

## 2018-09-23 RX ORDER — LEVOTHYROXINE SODIUM 75 UG/1
TABLET ORAL
Qty: 90 TAB | Refills: 0 | Status: SHIPPED | OUTPATIENT
Start: 2018-09-23 | End: 2018-12-22 | Stop reason: SDUPTHER

## 2018-12-22 DIAGNOSIS — E03.9 HYPOTHYROIDISM, UNSPECIFIED TYPE: ICD-10-CM

## 2018-12-25 RX ORDER — LEVOTHYROXINE SODIUM 75 UG/1
TABLET ORAL
Qty: 90 TAB | Refills: 0 | Status: SHIPPED | OUTPATIENT
Start: 2018-12-25 | End: 2019-01-18 | Stop reason: SDUPTHER

## 2019-01-18 ENCOUNTER — HOSPITAL ENCOUNTER (OUTPATIENT)
Dept: LAB | Age: 84
Discharge: HOME | End: 2019-01-18
Payer: MEDICARE

## 2019-01-18 ENCOUNTER — OFFICE VISIT (OUTPATIENT)
Dept: INTERNAL MEDICINE CLINIC | Age: 84
End: 2019-01-18

## 2019-01-18 VITALS
OXYGEN SATURATION: 96 % | SYSTOLIC BLOOD PRESSURE: 151 MMHG | WEIGHT: 116 LBS | HEIGHT: 60 IN | TEMPERATURE: 97.8 F | DIASTOLIC BLOOD PRESSURE: 77 MMHG | RESPIRATION RATE: 16 BRPM | BODY MASS INDEX: 22.78 KG/M2 | HEART RATE: 72 BPM

## 2019-01-18 DIAGNOSIS — E03.1 CONGENITAL HYPOTHYROIDISM WITHOUT GOITER: Primary | ICD-10-CM

## 2019-01-18 DIAGNOSIS — G25.0 BENIGN ESSENTIAL TREMOR SYNDROME: ICD-10-CM

## 2019-01-18 DIAGNOSIS — R63.4 WEIGHT LOSS: ICD-10-CM

## 2019-01-18 DIAGNOSIS — G30.1 LATE ONSET ALZHEIMER'S DISEASE WITH BEHAVIORAL DISTURBANCE (HCC): ICD-10-CM

## 2019-01-18 DIAGNOSIS — F02.818 LATE ONSET ALZHEIMER'S DISEASE WITH BEHAVIORAL DISTURBANCE (HCC): ICD-10-CM

## 2019-01-18 PROCEDURE — 84443 ASSAY THYROID STIM HORMONE: CPT

## 2019-01-18 PROCEDURE — 36415 COLL VENOUS BLD VENIPUNCTURE: CPT

## 2019-01-18 PROCEDURE — 80053 COMPREHEN METABOLIC PANEL: CPT

## 2019-01-18 PROCEDURE — 85025 COMPLETE CBC W/AUTO DIFF WBC: CPT

## 2019-01-18 PROCEDURE — 84439 ASSAY OF FREE THYROXINE: CPT

## 2019-01-18 NOTE — PROGRESS NOTES
SUBJECTIVE:  
Ms. Ondina Wall is a 80 y.o. female who is here with her daughter for follow up of routine medical issues. Pt c/o increased tremors in her hands. Pt's daughter reports it has affected her ability to eat and drink. Pt last saw Dr. Isabel Saravia (neurology) on 9/4/18. Pt has a follow-up appointment on 4/23/19. Pt's daughter reports pt has been trying to lose weight, which pt's daughter has advised her against doing. Pt's weight in the office today is 116lb, down 5lb x 9/18. Pt uses a cup with two handles, a cup with a lid, and a cup with a straw, but pt's daughter states they are still having difficulty eating her hydrated. Pt drinks juice and coffee. Pt's daughter reports it takes her all morning to drink 1/2 cup of juice and she keeps a water bottle beside pt during the day, but it goes untouched. Pt's daughter reports pt drinks ensure/boost 2x/week; she has tried to get her to drink it nightly, but pt does not want to gain weight. Pt does not like popsicles and states fruit cups are too hard for her to eat. Pt states she does not like using her cane. Pt believes prolia will allow her to walk without the cane. Pt's daughter reports they ensure pt uses her cane and with the cane she has not had difficulties in the past 6mos. Pt denies worsening balance. Pt's BP in the office today was 151/77 and on manual recheck was 138/70. Mood: Pt reports her mood is mostly good. She stays active throughout the day with activities such as dusting. Pt reports she has difficulty sleeping at night. Pt's daughter notes pt will nap during the day. Thyroid: Pt has been taking 0.5 tabs of levothyroxine 75mcg on Saturday and Sunday and a full dose on weekdays. Pt's 5/18 T4 was normal and TSH was low. At this time, she is otherwise doing well and has brought no other complaints to my attention today. For a list of the medical issues addressed today, see the assessment and plan below.  
 
PMH:  
 Past Medical History:  
Diagnosis Date  Dementia  Depression   
 psychiatrist: Dr Sushila Bhat (on Lithium)  Fracture of wrist   
 slipped and fx left wrist, surgery scheduled 12/21/16  Full dentures   
 upper and lower  Hypothyroid PSH:  has a past surgical history that includes hx kyphoplasty (2013); hx orthopaedic (12/2016); and hx wrist fracture tx (Left, 12/2016). All: is allergic to sulfa (sulfonamide antibiotics). MEDS:  
Current Outpatient Medications Medication Sig  
 memantine (NAMENDA) 5 mg tablet Take 1 Tab by mouth two (2) times a day.  rivastigmine tartrate (EXELON) 1.5 mg capsule  levothyroxine (SYNTHROID) 75 mcg tablet  clonazePAM (KLONOPIN) 0.5 mg tablet Take 0.5 mg by mouth every evening.  PARoxetine (PAXIL) 40 mg tablet Take 40 mg by mouth daily.  multivitamin (ONE A DAY) tablet Take 1 Tab by mouth daily.  cholecalciferol, vitamin D3, (VITAMIN D3) 2,000 unit tab Take 1 Tab by mouth daily.  lithium CR (ESKALITH CR) 450 mg CR tablet Take 450 mg by mouth nightly. No current facility-administered medications for this visit. FH: family history includes Alzheimer in her sister; Cancer in her sister; Depression in her son and son; Diabetes in her brother and son; Heart Disease in her son; Hypertension in her daughter; Liver Disease in her son; Other in her mother and son; Thyroid Disease in her daughter. SH:  reports that she has quit smoking. she has never used smokeless tobacco. She reports that she does not drink alcohol or use drugs. Review of Systems - History obtained from the patient General ROS: no fever, chills, fatigue, body aches Psychological ROS: no change in anxiety, depression, SI/HI Ophthalmic ROS: no blurred vision, myopia, double vision ENT ROS: no dysphagia, otalgia, otorrhea, rhinorrhea, post nasal drip Respiratory ROS: no cough, shortness of breath, or wheezing Cardiovascular ROS: no chest pain or dyspnea on exertion Gastrointestinal ROS: no abdominal pain, change in bowel habits, or black or bloody stools Genito-Urinary ROS: no frequency, urgency, incontinence, dysuria, hematuria Musculoskeletal ROS: no arthralgia, myalgia Neurological ROS: tremors (BL hands), no headaches, dizziness, lightheadedness, seizures Dermatological ROS: no rash or lesions OBJECTIVE:  
Vitals:  
Visit Vitals /77 (BP 1 Location: Left arm, BP Patient Position: Sitting) Pulse 72 Temp 97.8 °F (36.6 °C) (Oral) Resp 16 Ht 5' (1.524 m) Wt 116 lb (52.6 kg) SpO2 96% BMI 22.65 kg/m² Gen: Pleasant 80 y.o.  female in NAD. HEENT: PERRLA. EOMI. OP moist and pink. Neck: Supple. No LAD. HEART: RRR, No M/G/R.     
LUNGS: CTAB No W/R. EXTREMITIES: Warm. No C/C/E. NEURO: Alert and oriented x 3. Cranial nerves grossly intact. No focal sensory or motor deficits noted. SKIN: Warm. Dry. No rashes or other lesions noted. ASSESSMENT/ PLAN: Diagnoses and all orders for this visit: 1. Congenital hypothyroidism without goiter -     T4, FREE 
-     TSH 3RD GENERATION 
-     METABOLIC PANEL, COMPREHENSIVE 
-     CBC WITH AUTOMATED DIFF 2. Benign essential tremor syndrome 3. Late onset Alzheimer's disease with behavioral disturbance 4. Weight loss -     METABOLIC PANEL, COMPREHENSIVE 
-     CBC WITH AUTOMATED DIFF 
 
 
  ICD-10-CM ICD-9-CM 1. Congenital hypothyroidism without goiter E03.1 243 T4, FREE  
   TSH 3RD GENERATION  
   METABOLIC PANEL, COMPREHENSIVE  
   CBC WITH AUTOMATED DIFF 2. Benign essential tremor syndrome G25.0 333.1 3. Late onset Alzheimer's disease with behavioral disturbance G30.1 331.0 F02.81 294.11   
4. Weight loss E92.8 425.03 METABOLIC PANEL, COMPREHENSIVE  
   CBC WITH AUTOMATED DIFF 1. Congenital Hypothyroidism I recommended pt continue on current dose of levothyroxine. Recheck T4, TSH, CMP, and CBC. 2. Benign essential tremor syndrome Pt has a follow-up appointment with Dr. Isabel Saravia (neurology) on 4/23/19. I advised her to follow-up sooner if the problem worsens. 3. Late onset Alzheimer's disease with behavioral disturbance Pt follows with neurology (Dr. Isabel Saravia). She has an upcoming appointment on 4/23/19. 
 
4. Weight loss I encouraged pt to use ensure/boost nightly and advised her against further weight loss. I recommended her daughter mix ensure into smoothies and milkshakes to make it more appetizing. Recheck CMP and CBC for further evaluation. I asked pt's daughter to monitor her BP at home to determine her baseline range. I encouraged pt to pick out a new cane that is in her favorite color or decorated to make it more appealing to her to use. Follow-up Disposition: 
Return in about 6 months (around 7/18/2019) for thyroid. I have reviewed the patient's medications and risks/side effects/benefits were discussed. Diagnosis(-es) explained to patient and questions answered. Literature provided where appropriate.   
 
Written by Marcelino Holstein, as dictated by Susan Pearson MD.

## 2019-01-19 LAB
ALBUMIN SERPL-MCNC: 4.6 G/DL (ref 3.2–4.6)
ALBUMIN/GLOB SERPL: 1.7 {RATIO} (ref 1.2–2.2)
ALP SERPL-CCNC: 67 IU/L (ref 39–117)
ALT SERPL-CCNC: 14 IU/L (ref 0–32)
AST SERPL-CCNC: 25 IU/L (ref 0–40)
BASOPHILS # BLD AUTO: 0.1 X10E3/UL (ref 0–0.2)
BASOPHILS NFR BLD AUTO: 1 %
BILIRUB SERPL-MCNC: 0.4 MG/DL (ref 0–1.2)
BUN SERPL-MCNC: 14 MG/DL (ref 10–36)
BUN/CREAT SERPL: 14 (ref 12–28)
CALCIUM SERPL-MCNC: 9.7 MG/DL (ref 8.7–10.3)
CHLORIDE SERPL-SCNC: 102 MMOL/L (ref 96–106)
CO2 SERPL-SCNC: 22 MMOL/L (ref 20–29)
CREAT SERPL-MCNC: 0.99 MG/DL (ref 0.57–1)
EOSINOPHIL # BLD AUTO: 0.4 X10E3/UL (ref 0–0.4)
EOSINOPHIL NFR BLD AUTO: 6 %
ERYTHROCYTE [DISTWIDTH] IN BLOOD BY AUTOMATED COUNT: 13.6 % (ref 12.3–15.4)
GLOBULIN SER CALC-MCNC: 2.7 G/DL (ref 1.5–4.5)
GLUCOSE SERPL-MCNC: 68 MG/DL (ref 65–99)
HCT VFR BLD AUTO: 41.5 % (ref 34–46.6)
HGB BLD-MCNC: 13.6 G/DL (ref 11.1–15.9)
IMM GRANULOCYTES # BLD AUTO: 0 X10E3/UL (ref 0–0.1)
IMM GRANULOCYTES NFR BLD AUTO: 0 %
LYMPHOCYTES # BLD AUTO: 2 X10E3/UL (ref 0.7–3.1)
LYMPHOCYTES NFR BLD AUTO: 32 %
MCH RBC QN AUTO: 28.7 PG (ref 26.6–33)
MCHC RBC AUTO-ENTMCNC: 32.8 G/DL (ref 31.5–35.7)
MCV RBC AUTO: 88 FL (ref 79–97)
MONOCYTES # BLD AUTO: 0.6 X10E3/UL (ref 0.1–0.9)
MONOCYTES NFR BLD AUTO: 9 %
NEUTROPHILS # BLD AUTO: 3.2 X10E3/UL (ref 1.4–7)
NEUTROPHILS NFR BLD AUTO: 52 %
PLATELET # BLD AUTO: 208 X10E3/UL (ref 150–379)
POTASSIUM SERPL-SCNC: 4.5 MMOL/L (ref 3.5–5.2)
PROT SERPL-MCNC: 7.3 G/DL (ref 6–8.5)
RBC # BLD AUTO: 4.74 X10E6/UL (ref 3.77–5.28)
SODIUM SERPL-SCNC: 141 MMOL/L (ref 134–144)
T4 FREE SERPL-MCNC: 1.3 NG/DL (ref 0.82–1.77)
TSH SERPL DL<=0.005 MIU/L-ACNC: 1.63 UIU/ML (ref 0.45–4.5)
WBC # BLD AUTO: 6.2 X10E3/UL (ref 3.4–10.8)

## 2019-01-25 NOTE — PROGRESS NOTES
HIPAA verified with patient's daughter Zac Maya, labs reviewed and patient to continue current medications.

## 2019-01-25 NOTE — PROGRESS NOTES
Please send a results letter. Thyroid function is normal 
CBC-Normal red and white blood cell levels. CMP-Normal electrolyte levels,  and liver function. Slight decrease in GFR. Hydrate prior to future labs. Repeat metabolic panel in 3 months.

## 2019-03-14 ENCOUNTER — HOSPITAL ENCOUNTER (OUTPATIENT)
Dept: LAB | Age: 84
Discharge: HOME | End: 2019-03-14
Payer: MEDICARE

## 2019-03-14 DIAGNOSIS — R89.9 ABNORMAL LABORATORY TEST RESULT: ICD-10-CM

## 2019-03-14 PROCEDURE — 80061 LIPID PANEL: CPT

## 2019-03-14 PROCEDURE — 36415 COLL VENOUS BLD VENIPUNCTURE: CPT

## 2019-03-14 PROCEDURE — 80053 COMPREHEN METABOLIC PANEL: CPT

## 2019-03-15 LAB
ALBUMIN SERPL-MCNC: 4.5 G/DL (ref 3.2–4.6)
ALBUMIN/GLOB SERPL: 1.7 {RATIO} (ref 1.2–2.2)
ALP SERPL-CCNC: 63 IU/L (ref 39–117)
ALT SERPL-CCNC: 14 IU/L (ref 0–32)
AST SERPL-CCNC: 26 IU/L (ref 0–40)
BILIRUB SERPL-MCNC: 0.4 MG/DL (ref 0–1.2)
BUN SERPL-MCNC: 15 MG/DL (ref 10–36)
BUN/CREAT SERPL: 15 (ref 12–28)
CALCIUM SERPL-MCNC: 9.8 MG/DL (ref 8.7–10.3)
CHLORIDE SERPL-SCNC: 106 MMOL/L (ref 96–106)
CHOLEST SERPL-MCNC: 299 MG/DL (ref 100–199)
CO2 SERPL-SCNC: 19 MMOL/L (ref 20–29)
COMMENT, 011824: ABNORMAL
CREAT SERPL-MCNC: 0.99 MG/DL (ref 0.57–1)
GLOBULIN SER CALC-MCNC: 2.6 G/DL (ref 1.5–4.5)
GLUCOSE SERPL-MCNC: 116 MG/DL (ref 65–99)
HDLC SERPL-MCNC: 58 MG/DL
LDLC SERPL CALC-MCNC: 204 MG/DL (ref 0–99)
POTASSIUM SERPL-SCNC: 4.4 MMOL/L (ref 3.5–5.2)
PROT SERPL-MCNC: 7.1 G/DL (ref 6–8.5)
SODIUM SERPL-SCNC: 141 MMOL/L (ref 134–144)
TRIGL SERPL-MCNC: 183 MG/DL (ref 0–149)
VLDLC SERPL CALC-MCNC: 37 MG/DL (ref 5–40)

## 2019-03-15 NOTE — PROGRESS NOTES
Please inform the family that the patient has a very high cholesterol and ldl level. I do not recommend prescription medication at this time due to her age and other health problems. CMP-Normal electrolyte levels except for a elevation in glucose levels, and normal liver function. You have some mild renal insufficiency.

## 2019-03-17 ENCOUNTER — HOSPITAL ENCOUNTER (INPATIENT)
Age: 84
LOS: 3 days | Discharge: NURSING, REHAB OR LONG-TERM CARE | DRG: 480 | End: 2019-03-21
Attending: EMERGENCY MEDICINE | Admitting: INTERNAL MEDICINE
Payer: MEDICARE

## 2019-03-17 ENCOUNTER — APPOINTMENT (OUTPATIENT)
Dept: GENERAL RADIOLOGY | Age: 84
DRG: 480 | End: 2019-03-17
Attending: EMERGENCY MEDICINE
Payer: MEDICARE

## 2019-03-17 DIAGNOSIS — G30.1 LATE ONSET ALZHEIMER'S DISEASE WITH BEHAVIORAL DISTURBANCE (HCC): ICD-10-CM

## 2019-03-17 DIAGNOSIS — F02.818 LATE ONSET ALZHEIMER'S DISEASE WITH BEHAVIORAL DISTURBANCE (HCC): ICD-10-CM

## 2019-03-17 DIAGNOSIS — S72.142A CLOSED DISPLACED INTERTROCHANTERIC FRACTURE OF LEFT FEMUR, INITIAL ENCOUNTER (HCC): Primary | ICD-10-CM

## 2019-03-17 DIAGNOSIS — S72.002A CLOSED FRACTURE OF LEFT HIP, INITIAL ENCOUNTER (HCC): ICD-10-CM

## 2019-03-17 PROCEDURE — 71045 X-RAY EXAM CHEST 1 VIEW: CPT

## 2019-03-17 PROCEDURE — 73502 X-RAY EXAM HIP UNI 2-3 VIEWS: CPT

## 2019-03-17 PROCEDURE — 99284 EMERGENCY DEPT VISIT MOD MDM: CPT

## 2019-03-17 RX ORDER — AMOXICILLIN 250 MG
2 CAPSULE ORAL 2 TIMES DAILY
Status: DISCONTINUED | OUTPATIENT
Start: 2019-03-18 | End: 2019-03-19 | Stop reason: SDUPTHER

## 2019-03-17 RX ORDER — NALOXONE HYDROCHLORIDE 0.4 MG/ML
0.4 INJECTION, SOLUTION INTRAMUSCULAR; INTRAVENOUS; SUBCUTANEOUS AS NEEDED
Status: DISCONTINUED | OUTPATIENT
Start: 2019-03-17 | End: 2019-03-19 | Stop reason: SDUPTHER

## 2019-03-17 RX ORDER — SODIUM CHLORIDE 0.9 % (FLUSH) 0.9 %
5-40 SYRINGE (ML) INJECTION AS NEEDED
Status: DISCONTINUED | OUTPATIENT
Start: 2019-03-17 | End: 2019-03-19 | Stop reason: SDUPTHER

## 2019-03-17 RX ORDER — MORPHINE SULFATE 2 MG/ML
1 INJECTION, SOLUTION INTRAMUSCULAR; INTRAVENOUS
Status: DISPENSED | OUTPATIENT
Start: 2019-03-17 | End: 2019-03-18

## 2019-03-17 RX ORDER — SODIUM CHLORIDE 0.9 % (FLUSH) 0.9 %
5-40 SYRINGE (ML) INJECTION EVERY 8 HOURS
Status: DISCONTINUED | OUTPATIENT
Start: 2019-03-17 | End: 2019-03-19 | Stop reason: SDUPTHER

## 2019-03-17 RX ORDER — ACETAMINOPHEN 325 MG/1
650 TABLET ORAL EVERY 6 HOURS
Status: DISCONTINUED | OUTPATIENT
Start: 2019-03-17 | End: 2019-03-19 | Stop reason: SDUPTHER

## 2019-03-17 RX ORDER — ONDANSETRON 2 MG/ML
4 INJECTION INTRAMUSCULAR; INTRAVENOUS
Status: DISCONTINUED | OUTPATIENT
Start: 2019-03-17 | End: 2019-03-19 | Stop reason: SDUPTHER

## 2019-03-17 RX ORDER — OXYCODONE HYDROCHLORIDE 5 MG/1
2.5 TABLET ORAL
Status: DISCONTINUED | OUTPATIENT
Start: 2019-03-17 | End: 2019-03-19 | Stop reason: SDUPTHER

## 2019-03-18 ENCOUNTER — ANESTHESIA EVENT (OUTPATIENT)
Dept: SURGERY | Age: 84
DRG: 480 | End: 2019-03-18
Payer: MEDICARE

## 2019-03-18 ENCOUNTER — APPOINTMENT (OUTPATIENT)
Dept: GENERAL RADIOLOGY | Age: 84
DRG: 480 | End: 2019-03-18
Attending: ORTHOPAEDIC SURGERY
Payer: MEDICARE

## 2019-03-18 ENCOUNTER — TELEPHONE (OUTPATIENT)
Dept: INTERNAL MEDICINE CLINIC | Age: 84
End: 2019-03-18

## 2019-03-18 ENCOUNTER — ANESTHESIA (OUTPATIENT)
Dept: SURGERY | Age: 84
DRG: 480 | End: 2019-03-18
Payer: MEDICARE

## 2019-03-18 PROBLEM — F31.9 BIPOLAR DISORDER (HCC): Status: ACTIVE | Noted: 2019-03-18

## 2019-03-18 PROBLEM — S72.009A HIP FRACTURE (HCC): Status: ACTIVE | Noted: 2019-03-18

## 2019-03-18 LAB
ALBUMIN SERPL-MCNC: 3.5 G/DL (ref 3.5–5)
ALBUMIN/GLOB SERPL: 1.1 {RATIO} (ref 1.1–2.2)
ALP SERPL-CCNC: 62 U/L (ref 45–117)
ALT SERPL-CCNC: 18 U/L (ref 12–78)
ANION GAP SERPL CALC-SCNC: 4 MMOL/L (ref 5–15)
APPEARANCE UR: CLEAR
AST SERPL-CCNC: 20 U/L (ref 15–37)
ATRIAL RATE: 82 BPM
BACTERIA URNS QL MICRO: NEGATIVE /HPF
BASOPHILS # BLD: 0.1 K/UL (ref 0–0.1)
BASOPHILS NFR BLD: 0 % (ref 0–1)
BILIRUB SERPL-MCNC: 0.4 MG/DL (ref 0.2–1)
BILIRUB UR QL: NEGATIVE
BUN SERPL-MCNC: 15 MG/DL (ref 6–20)
BUN/CREAT SERPL: 18 (ref 12–20)
CALCIUM SERPL-MCNC: 8.5 MG/DL (ref 8.5–10.1)
CALCULATED P AXIS, ECG09: -19 DEGREES
CALCULATED R AXIS, ECG10: -13 DEGREES
CALCULATED T AXIS, ECG11: -33 DEGREES
CHLORIDE SERPL-SCNC: 109 MMOL/L (ref 97–108)
CO2 SERPL-SCNC: 27 MMOL/L (ref 21–32)
COLOR UR: ABNORMAL
CREAT SERPL-MCNC: 0.84 MG/DL (ref 0.55–1.02)
DIAGNOSIS, 93000: NORMAL
DIFFERENTIAL METHOD BLD: ABNORMAL
EOSINOPHIL # BLD: 0.1 K/UL (ref 0–0.4)
EOSINOPHIL NFR BLD: 1 % (ref 0–7)
EPITH CASTS URNS QL MICRO: ABNORMAL /LPF
ERYTHROCYTE [DISTWIDTH] IN BLOOD BY AUTOMATED COUNT: 13.2 % (ref 11.5–14.5)
GLOBULIN SER CALC-MCNC: 3.1 G/DL (ref 2–4)
GLUCOSE SERPL-MCNC: 149 MG/DL (ref 65–100)
GLUCOSE UR STRIP.AUTO-MCNC: NEGATIVE MG/DL
HCT VFR BLD AUTO: 33.7 % (ref 35–47)
HGB BLD-MCNC: 11.1 G/DL (ref 11.5–16)
HGB UR QL STRIP: ABNORMAL
IMM GRANULOCYTES # BLD AUTO: 0.1 K/UL (ref 0–0.04)
IMM GRANULOCYTES NFR BLD AUTO: 0 % (ref 0–0.5)
INR PPP: 1.1 (ref 0.9–1.1)
KETONES UR QL STRIP.AUTO: NEGATIVE MG/DL
LEUKOCYTE ESTERASE UR QL STRIP.AUTO: ABNORMAL
LYMPHOCYTES # BLD: 1.5 K/UL (ref 0.8–3.5)
LYMPHOCYTES NFR BLD: 11 % (ref 12–49)
MCH RBC QN AUTO: 29.2 PG (ref 26–34)
MCHC RBC AUTO-ENTMCNC: 32.9 G/DL (ref 30–36.5)
MCV RBC AUTO: 88.7 FL (ref 80–99)
MONOCYTES # BLD: 1 K/UL (ref 0–1)
MONOCYTES NFR BLD: 7 % (ref 5–13)
NEUTS SEG # BLD: 10.9 K/UL (ref 1.8–8)
NEUTS SEG NFR BLD: 81 % (ref 32–75)
NITRITE UR QL STRIP.AUTO: NEGATIVE
NRBC # BLD: 0 K/UL (ref 0–0.01)
NRBC BLD-RTO: 0 PER 100 WBC
P-R INTERVAL, ECG05: 182 MS
PH UR STRIP: 5.5 [PH] (ref 5–8)
PLATELET # BLD AUTO: 210 K/UL (ref 150–400)
PMV BLD AUTO: 10.7 FL (ref 8.9–12.9)
POTASSIUM SERPL-SCNC: 4 MMOL/L (ref 3.5–5.1)
PROT SERPL-MCNC: 6.6 G/DL (ref 6.4–8.2)
PROT UR STRIP-MCNC: NEGATIVE MG/DL
PROTHROMBIN TIME: 10.8 SEC (ref 9–11.1)
Q-T INTERVAL, ECG07: 378 MS
QRS DURATION, ECG06: 76 MS
QTC CALCULATION (BEZET), ECG08: 441 MS
RBC # BLD AUTO: 3.8 M/UL (ref 3.8–5.2)
RBC #/AREA URNS HPF: ABNORMAL /HPF (ref 0–5)
SODIUM SERPL-SCNC: 140 MMOL/L (ref 136–145)
SP GR UR REFRACTOMETRY: 1.02 (ref 1–1.03)
UA: UC IF INDICATED,UAUC: ABNORMAL
UROBILINOGEN UR QL STRIP.AUTO: 0.2 EU/DL (ref 0.2–1)
VENTRICULAR RATE, ECG03: 82 BPM
WBC # BLD AUTO: 13.5 K/UL (ref 3.6–11)
WBC URNS QL MICRO: ABNORMAL /HPF (ref 0–4)

## 2019-03-18 PROCEDURE — 77030016474 HC BIT DRL QC3 SYNT -C: Performed by: ORTHOPAEDIC SURGERY

## 2019-03-18 PROCEDURE — C1769 GUIDE WIRE: HCPCS | Performed by: ORTHOPAEDIC SURGERY

## 2019-03-18 PROCEDURE — 76210000006 HC OR PH I REC 0.5 TO 1 HR: Performed by: ORTHOPAEDIC SURGERY

## 2019-03-18 PROCEDURE — 76060000033 HC ANESTHESIA 1 TO 1.5 HR: Performed by: ORTHOPAEDIC SURGERY

## 2019-03-18 PROCEDURE — 77030008684 HC TU ET CUF COVD -B: Performed by: ANESTHESIOLOGY

## 2019-03-18 PROCEDURE — 77030026438 HC STYL ET INTUB CARD -A: Performed by: ANESTHESIOLOGY

## 2019-03-18 PROCEDURE — 0QS736Z REPOSITION LEFT UPPER FEMUR WITH INTRAMEDULLARY INTERNAL FIXATION DEVICE, PERCUTANEOUS APPROACH: ICD-10-PCS | Performed by: ORTHOPAEDIC SURGERY

## 2019-03-18 PROCEDURE — 36415 COLL VENOUS BLD VENIPUNCTURE: CPT

## 2019-03-18 PROCEDURE — 76000 FLUOROSCOPY <1 HR PHYS/QHP: CPT

## 2019-03-18 PROCEDURE — 85025 COMPLETE CBC W/AUTO DIFF WBC: CPT

## 2019-03-18 PROCEDURE — 85610 PROTHROMBIN TIME: CPT

## 2019-03-18 PROCEDURE — 80053 COMPREHEN METABOLIC PANEL: CPT

## 2019-03-18 PROCEDURE — 96374 THER/PROPH/DIAG INJ IV PUSH: CPT

## 2019-03-18 PROCEDURE — 76010000149 HC OR TIME 1 TO 1.5 HR: Performed by: ORTHOPAEDIC SURGERY

## 2019-03-18 PROCEDURE — 65270000029 HC RM PRIVATE

## 2019-03-18 PROCEDURE — 74011250636 HC RX REV CODE- 250/636: Performed by: ORTHOPAEDIC SURGERY

## 2019-03-18 PROCEDURE — 93005 ELECTROCARDIOGRAM TRACING: CPT

## 2019-03-18 PROCEDURE — 74011250636 HC RX REV CODE- 250/636

## 2019-03-18 PROCEDURE — 74011250637 HC RX REV CODE- 250/637: Performed by: PHYSICIAN ASSISTANT

## 2019-03-18 PROCEDURE — 94760 N-INVAS EAR/PLS OXIMETRY 1: CPT

## 2019-03-18 PROCEDURE — 77030018836 HC SOL IRR NACL ICUM -A: Performed by: ORTHOPAEDIC SURGERY

## 2019-03-18 PROCEDURE — C1713 ANCHOR/SCREW BN/BN,TIS/BN: HCPCS | Performed by: ORTHOPAEDIC SURGERY

## 2019-03-18 PROCEDURE — 74011000250 HC RX REV CODE- 250

## 2019-03-18 PROCEDURE — 77030020788: Performed by: ORTHOPAEDIC SURGERY

## 2019-03-18 PROCEDURE — 86923 COMPATIBILITY TEST ELECTRIC: CPT

## 2019-03-18 PROCEDURE — 74011250636 HC RX REV CODE- 250/636: Performed by: EMERGENCY MEDICINE

## 2019-03-18 PROCEDURE — 77030019908 HC STETH ESOPH SIMS -A: Performed by: ANESTHESIOLOGY

## 2019-03-18 PROCEDURE — 74011250636 HC RX REV CODE- 250/636: Performed by: PHYSICIAN ASSISTANT

## 2019-03-18 PROCEDURE — 73501 X-RAY EXAM HIP UNI 1 VIEW: CPT

## 2019-03-18 PROCEDURE — 77030031139 HC SUT VCRL2 J&J -A: Performed by: ORTHOPAEDIC SURGERY

## 2019-03-18 PROCEDURE — 86900 BLOOD TYPING SEROLOGIC ABO: CPT

## 2019-03-18 PROCEDURE — 81001 URINALYSIS AUTO W/SCOPE: CPT

## 2019-03-18 PROCEDURE — 77030008771 HC TU NG SALEM SUMP -A: Performed by: ANESTHESIOLOGY

## 2019-03-18 PROCEDURE — 77030032490 HC SLV COMPR SCD KNE COVD -B: Performed by: ORTHOPAEDIC SURGERY

## 2019-03-18 PROCEDURE — 77030013079 HC BLNKT BAIR HGGR 3M -A: Performed by: ANESTHESIOLOGY

## 2019-03-18 DEVICE — NAIL IM L235MM DIA11MM 130DEG SHT L PROX FEM GRN TI CANN: Type: IMPLANTABLE DEVICE | Site: FEMUR | Status: FUNCTIONAL

## 2019-03-18 DEVICE — IMPLANTABLE DEVICE: Type: IMPLANTABLE DEVICE | Site: FEMUR | Status: FUNCTIONAL

## 2019-03-18 DEVICE — SCREW BNE L34MM DIA5MM NONSTERILE TIB LT GRN TI ST CANN LOK: Type: IMPLANTABLE DEVICE | Site: FEMUR | Status: FUNCTIONAL

## 2019-03-18 RX ORDER — NEOSTIGMINE METHYLSULFATE 1 MG/ML
INJECTION INTRAVENOUS AS NEEDED
Status: DISCONTINUED | OUTPATIENT
Start: 2019-03-18 | End: 2019-03-18 | Stop reason: HOSPADM

## 2019-03-18 RX ORDER — OXYCODONE HYDROCHLORIDE 5 MG/1
5 TABLET ORAL
Status: DISCONTINUED | OUTPATIENT
Start: 2019-03-18 | End: 2019-03-21 | Stop reason: HOSPADM

## 2019-03-18 RX ORDER — SODIUM CHLORIDE 0.9 % (FLUSH) 0.9 %
5-40 SYRINGE (ML) INJECTION EVERY 8 HOURS
Status: DISCONTINUED | OUTPATIENT
Start: 2019-03-19 | End: 2019-03-21 | Stop reason: HOSPADM

## 2019-03-18 RX ORDER — ASPIRIN 325 MG
325 TABLET, DELAYED RELEASE (ENTERIC COATED) ORAL 2 TIMES DAILY
Status: DISCONTINUED | OUTPATIENT
Start: 2019-03-19 | End: 2019-03-21 | Stop reason: HOSPADM

## 2019-03-18 RX ORDER — FERROUS SULFATE, DRIED 160(50) MG
1 TABLET, EXTENDED RELEASE ORAL
Status: DISCONTINUED | OUTPATIENT
Start: 2019-03-19 | End: 2019-03-21 | Stop reason: HOSPADM

## 2019-03-18 RX ORDER — LITHIUM CARBONATE 450 MG/1
450 TABLET ORAL
Status: DISCONTINUED | OUTPATIENT
Start: 2019-03-18 | End: 2019-03-19

## 2019-03-18 RX ORDER — POLYETHYLENE GLYCOL 3350 17 G/17G
17 POWDER, FOR SOLUTION ORAL DAILY
Status: DISCONTINUED | OUTPATIENT
Start: 2019-03-19 | End: 2019-03-21 | Stop reason: HOSPADM

## 2019-03-18 RX ORDER — CEFAZOLIN SODIUM/WATER 2 G/20 ML
2 SYRINGE (ML) INTRAVENOUS EVERY 8 HOURS
Status: COMPLETED | OUTPATIENT
Start: 2019-03-19 | End: 2019-03-19

## 2019-03-18 RX ORDER — ONDANSETRON 2 MG/ML
4 INJECTION INTRAMUSCULAR; INTRAVENOUS AS NEEDED
Status: DISCONTINUED | OUTPATIENT
Start: 2019-03-18 | End: 2019-03-18 | Stop reason: HOSPADM

## 2019-03-18 RX ORDER — HYDROMORPHONE HYDROCHLORIDE 2 MG/ML
INJECTION, SOLUTION INTRAMUSCULAR; INTRAVENOUS; SUBCUTANEOUS AS NEEDED
Status: DISCONTINUED | OUTPATIENT
Start: 2019-03-18 | End: 2019-03-18 | Stop reason: HOSPADM

## 2019-03-18 RX ORDER — MIDAZOLAM HYDROCHLORIDE 1 MG/ML
1 INJECTION, SOLUTION INTRAMUSCULAR; INTRAVENOUS AS NEEDED
Status: DISCONTINUED | OUTPATIENT
Start: 2019-03-18 | End: 2019-03-18 | Stop reason: HOSPADM

## 2019-03-18 RX ORDER — MORPHINE SULFATE 2 MG/ML
1 INJECTION, SOLUTION INTRAMUSCULAR; INTRAVENOUS
Status: ACTIVE | OUTPATIENT
Start: 2019-03-18 | End: 2019-03-19

## 2019-03-18 RX ORDER — NALOXONE HYDROCHLORIDE 0.4 MG/ML
0.4 INJECTION, SOLUTION INTRAMUSCULAR; INTRAVENOUS; SUBCUTANEOUS AS NEEDED
Status: DISCONTINUED | OUTPATIENT
Start: 2019-03-18 | End: 2019-03-21 | Stop reason: HOSPADM

## 2019-03-18 RX ORDER — ACETAMINOPHEN 325 MG/1
650 TABLET ORAL
Status: DISCONTINUED | OUTPATIENT
Start: 2019-03-18 | End: 2019-03-21 | Stop reason: HOSPADM

## 2019-03-18 RX ORDER — CEFAZOLIN SODIUM/WATER 2 G/20 ML
2 SYRINGE (ML) INTRAVENOUS ONCE
Status: DISCONTINUED | OUTPATIENT
Start: 2019-03-18 | End: 2019-03-18 | Stop reason: SDUPTHER

## 2019-03-18 RX ORDER — PROPOFOL 10 MG/ML
INJECTION, EMULSION INTRAVENOUS AS NEEDED
Status: DISCONTINUED | OUTPATIENT
Start: 2019-03-18 | End: 2019-03-18 | Stop reason: HOSPADM

## 2019-03-18 RX ORDER — FENTANYL CITRATE 50 UG/ML
50 INJECTION, SOLUTION INTRAMUSCULAR; INTRAVENOUS AS NEEDED
Status: DISCONTINUED | OUTPATIENT
Start: 2019-03-18 | End: 2019-03-18 | Stop reason: HOSPADM

## 2019-03-18 RX ORDER — LIDOCAINE HYDROCHLORIDE 10 MG/ML
0.1 INJECTION, SOLUTION EPIDURAL; INFILTRATION; INTRACAUDAL; PERINEURAL AS NEEDED
Status: DISCONTINUED | OUTPATIENT
Start: 2019-03-18 | End: 2019-03-18 | Stop reason: HOSPADM

## 2019-03-18 RX ORDER — SODIUM CHLORIDE, SODIUM LACTATE, POTASSIUM CHLORIDE, CALCIUM CHLORIDE 600; 310; 30; 20 MG/100ML; MG/100ML; MG/100ML; MG/100ML
INJECTION, SOLUTION INTRAVENOUS
Status: DISCONTINUED | OUTPATIENT
Start: 2019-03-18 | End: 2019-03-18 | Stop reason: HOSPADM

## 2019-03-18 RX ORDER — ROCURONIUM BROMIDE 10 MG/ML
INJECTION, SOLUTION INTRAVENOUS AS NEEDED
Status: DISCONTINUED | OUTPATIENT
Start: 2019-03-18 | End: 2019-03-18 | Stop reason: HOSPADM

## 2019-03-18 RX ORDER — SODIUM CHLORIDE 0.9 % (FLUSH) 0.9 %
5-40 SYRINGE (ML) INJECTION AS NEEDED
Status: DISCONTINUED | OUTPATIENT
Start: 2019-03-18 | End: 2019-03-21 | Stop reason: HOSPADM

## 2019-03-18 RX ORDER — ACETAMINOPHEN 325 MG/1
650 TABLET ORAL EVERY 6 HOURS
Status: DISCONTINUED | OUTPATIENT
Start: 2019-03-19 | End: 2019-03-21 | Stop reason: HOSPADM

## 2019-03-18 RX ORDER — RIVASTIGMINE TARTRATE 1.5 MG/1
3 CAPSULE ORAL EVERY EVENING
Status: DISCONTINUED | OUTPATIENT
Start: 2019-03-19 | End: 2019-03-21 | Stop reason: HOSPADM

## 2019-03-18 RX ORDER — CLONAZEPAM 1 MG/1
0.25 TABLET ORAL EVERY EVENING
Status: DISCONTINUED | OUTPATIENT
Start: 2019-03-19 | End: 2019-03-21 | Stop reason: HOSPADM

## 2019-03-18 RX ORDER — CEFAZOLIN SODIUM 1 G/3ML
INJECTION, POWDER, FOR SOLUTION INTRAMUSCULAR; INTRAVENOUS AS NEEDED
Status: DISCONTINUED | OUTPATIENT
Start: 2019-03-18 | End: 2019-03-18 | Stop reason: HOSPADM

## 2019-03-18 RX ORDER — SODIUM CHLORIDE 9 MG/ML
125 INJECTION, SOLUTION INTRAVENOUS CONTINUOUS
Status: DISPENSED | OUTPATIENT
Start: 2019-03-18 | End: 2019-03-19

## 2019-03-18 RX ORDER — FENTANYL CITRATE 50 UG/ML
INJECTION, SOLUTION INTRAMUSCULAR; INTRAVENOUS AS NEEDED
Status: DISCONTINUED | OUTPATIENT
Start: 2019-03-18 | End: 2019-03-18 | Stop reason: HOSPADM

## 2019-03-18 RX ORDER — MEMANTINE HYDROCHLORIDE 5 MG/1
5 TABLET ORAL DAILY
Status: DISCONTINUED | OUTPATIENT
Start: 2019-03-19 | End: 2019-03-21 | Stop reason: HOSPADM

## 2019-03-18 RX ORDER — SODIUM CHLORIDE 0.9 % (FLUSH) 0.9 %
5-40 SYRINGE (ML) INJECTION AS NEEDED
Status: DISCONTINUED | OUTPATIENT
Start: 2019-03-18 | End: 2019-03-19 | Stop reason: SDUPTHER

## 2019-03-18 RX ORDER — GLYCOPYRROLATE 0.2 MG/ML
INJECTION INTRAMUSCULAR; INTRAVENOUS AS NEEDED
Status: DISCONTINUED | OUTPATIENT
Start: 2019-03-18 | End: 2019-03-18 | Stop reason: HOSPADM

## 2019-03-18 RX ORDER — DIPHENHYDRAMINE HYDROCHLORIDE 50 MG/ML
12.5 INJECTION, SOLUTION INTRAMUSCULAR; INTRAVENOUS AS NEEDED
Status: DISCONTINUED | OUTPATIENT
Start: 2019-03-18 | End: 2019-03-18 | Stop reason: HOSPADM

## 2019-03-18 RX ORDER — ONDANSETRON 2 MG/ML
4 INJECTION INTRAMUSCULAR; INTRAVENOUS
Status: DISCONTINUED | OUTPATIENT
Start: 2019-03-18 | End: 2019-03-19 | Stop reason: SDUPTHER

## 2019-03-18 RX ORDER — FACIAL-BODY WIPES
10 EACH TOPICAL DAILY PRN
Status: DISCONTINUED | OUTPATIENT
Start: 2019-03-20 | End: 2019-03-21 | Stop reason: HOSPADM

## 2019-03-18 RX ORDER — DEXAMETHASONE SODIUM PHOSPHATE 4 MG/ML
INJECTION, SOLUTION INTRA-ARTICULAR; INTRALESIONAL; INTRAMUSCULAR; INTRAVENOUS; SOFT TISSUE AS NEEDED
Status: DISCONTINUED | OUTPATIENT
Start: 2019-03-18 | End: 2019-03-18 | Stop reason: HOSPADM

## 2019-03-18 RX ORDER — SODIUM CHLORIDE 9 MG/ML
75 INJECTION, SOLUTION INTRAVENOUS CONTINUOUS
Status: DISCONTINUED | OUTPATIENT
Start: 2019-03-18 | End: 2019-03-19 | Stop reason: SDUPTHER

## 2019-03-18 RX ORDER — HYDROMORPHONE HYDROCHLORIDE 1 MG/ML
0.5 INJECTION, SOLUTION INTRAMUSCULAR; INTRAVENOUS; SUBCUTANEOUS
Status: DISCONTINUED | OUTPATIENT
Start: 2019-03-18 | End: 2019-03-18 | Stop reason: HOSPADM

## 2019-03-18 RX ORDER — HYDROCODONE BITARTRATE AND ACETAMINOPHEN 5; 325 MG/1; MG/1
1 TABLET ORAL AS NEEDED
Status: DISCONTINUED | OUTPATIENT
Start: 2019-03-18 | End: 2019-03-18 | Stop reason: HOSPADM

## 2019-03-18 RX ORDER — MIDAZOLAM HYDROCHLORIDE 1 MG/ML
0.5 INJECTION, SOLUTION INTRAMUSCULAR; INTRAVENOUS
Status: DISCONTINUED | OUTPATIENT
Start: 2019-03-18 | End: 2019-03-18 | Stop reason: HOSPADM

## 2019-03-18 RX ORDER — ONDANSETRON 4 MG/1
4 TABLET, ORALLY DISINTEGRATING ORAL
Status: DISCONTINUED | OUTPATIENT
Start: 2019-03-18 | End: 2019-03-21 | Stop reason: HOSPADM

## 2019-03-18 RX ORDER — ONDANSETRON 2 MG/ML
4 INJECTION INTRAMUSCULAR; INTRAVENOUS
Status: ACTIVE | OUTPATIENT
Start: 2019-03-18 | End: 2019-03-19

## 2019-03-18 RX ORDER — LEVOTHYROXINE SODIUM 75 UG/1
75 TABLET ORAL
Status: DISCONTINUED | OUTPATIENT
Start: 2019-03-19 | End: 2019-03-21 | Stop reason: HOSPADM

## 2019-03-18 RX ORDER — FENTANYL CITRATE 50 UG/ML
25 INJECTION, SOLUTION INTRAMUSCULAR; INTRAVENOUS
Status: DISCONTINUED | OUTPATIENT
Start: 2019-03-18 | End: 2019-03-18 | Stop reason: HOSPADM

## 2019-03-18 RX ORDER — OXYCODONE HYDROCHLORIDE 5 MG/1
2.5 TABLET ORAL
Status: DISCONTINUED | OUTPATIENT
Start: 2019-03-18 | End: 2019-03-21 | Stop reason: HOSPADM

## 2019-03-18 RX ORDER — ONDANSETRON 2 MG/ML
INJECTION INTRAMUSCULAR; INTRAVENOUS AS NEEDED
Status: DISCONTINUED | OUTPATIENT
Start: 2019-03-18 | End: 2019-03-18 | Stop reason: HOSPADM

## 2019-03-18 RX ORDER — SODIUM CHLORIDE 0.9 % (FLUSH) 0.9 %
5-40 SYRINGE (ML) INJECTION EVERY 8 HOURS
Status: DISCONTINUED | OUTPATIENT
Start: 2019-03-19 | End: 2019-03-19 | Stop reason: SDUPTHER

## 2019-03-18 RX ORDER — LIDOCAINE HYDROCHLORIDE 20 MG/ML
INJECTION, SOLUTION EPIDURAL; INFILTRATION; INTRACAUDAL; PERINEURAL AS NEEDED
Status: DISCONTINUED | OUTPATIENT
Start: 2019-03-18 | End: 2019-03-18 | Stop reason: HOSPADM

## 2019-03-18 RX ORDER — AMOXICILLIN 250 MG
1 CAPSULE ORAL 2 TIMES DAILY
Status: DISCONTINUED | OUTPATIENT
Start: 2019-03-19 | End: 2019-03-21 | Stop reason: HOSPADM

## 2019-03-18 RX ORDER — PAROXETINE HYDROCHLORIDE 20 MG/1
40 TABLET, FILM COATED ORAL DAILY
Status: DISCONTINUED | OUTPATIENT
Start: 2019-03-19 | End: 2019-03-21 | Stop reason: HOSPADM

## 2019-03-18 RX ORDER — ACETAMINOPHEN 10 MG/ML
INJECTION, SOLUTION INTRAVENOUS AS NEEDED
Status: DISCONTINUED | OUTPATIENT
Start: 2019-03-18 | End: 2019-03-18 | Stop reason: HOSPADM

## 2019-03-18 RX ORDER — CEFAZOLIN SODIUM/WATER 2 G/20 ML
2 SYRINGE (ML) INTRAVENOUS ONCE
Status: DISCONTINUED | OUTPATIENT
Start: 2019-03-18 | End: 2019-03-18

## 2019-03-18 RX ADMIN — ONDANSETRON 4 MG: 2 INJECTION INTRAMUSCULAR; INTRAVENOUS at 01:20

## 2019-03-18 RX ADMIN — MORPHINE SULFATE 1 MG: 2 INJECTION, SOLUTION INTRAMUSCULAR; INTRAVENOUS at 04:54

## 2019-03-18 RX ADMIN — MORPHINE SULFATE 1 MG: 2 INJECTION, SOLUTION INTRAMUSCULAR; INTRAVENOUS at 18:04

## 2019-03-18 RX ADMIN — ACETAMINOPHEN 1000 MG: 10 INJECTION, SOLUTION INTRAVENOUS at 21:09

## 2019-03-18 RX ADMIN — ACETAMINOPHEN 650 MG: 325 TABLET ORAL at 01:21

## 2019-03-18 RX ADMIN — CEFAZOLIN SODIUM 2 G: 1 INJECTION, POWDER, FOR SOLUTION INTRAMUSCULAR; INTRAVENOUS at 21:04

## 2019-03-18 RX ADMIN — Medication 10 ML: at 15:02

## 2019-03-18 RX ADMIN — SODIUM CHLORIDE 75 ML/HR: 900 INJECTION, SOLUTION INTRAVENOUS at 15:02

## 2019-03-18 RX ADMIN — SODIUM CHLORIDE 125 ML/HR: 900 INJECTION, SOLUTION INTRAVENOUS at 23:00

## 2019-03-18 RX ADMIN — ONDANSETRON 4 MG: 2 INJECTION INTRAMUSCULAR; INTRAVENOUS at 21:27

## 2019-03-18 RX ADMIN — HYDROMORPHONE HYDROCHLORIDE 0.1 MG: 2 INJECTION, SOLUTION INTRAMUSCULAR; INTRAVENOUS; SUBCUTANEOUS at 21:36

## 2019-03-18 RX ADMIN — LIDOCAINE HYDROCHLORIDE 60 MG: 20 INJECTION, SOLUTION EPIDURAL; INFILTRATION; INTRACAUDAL; PERINEURAL at 21:00

## 2019-03-18 RX ADMIN — ROCURONIUM BROMIDE 20 MG: 10 INJECTION, SOLUTION INTRAVENOUS at 21:20

## 2019-03-18 RX ADMIN — OXYCODONE HYDROCHLORIDE 2.5 MG: 5 TABLET ORAL at 08:20

## 2019-03-18 RX ADMIN — PROPOFOL 100 MG: 10 INJECTION, EMULSION INTRAVENOUS at 21:00

## 2019-03-18 RX ADMIN — FENTANYL CITRATE 50 MCG: 50 INJECTION, SOLUTION INTRAMUSCULAR; INTRAVENOUS at 21:07

## 2019-03-18 RX ADMIN — MORPHINE SULFATE 1 MG: 2 INJECTION, SOLUTION INTRAMUSCULAR; INTRAVENOUS at 14:30

## 2019-03-18 RX ADMIN — SENNOSIDES,DOCUSATE SODIUM 2 TABLET: 50; 8.6 TABLET, FILM COATED ORAL at 08:21

## 2019-03-18 RX ADMIN — SODIUM CHLORIDE, SODIUM LACTATE, POTASSIUM CHLORIDE, CALCIUM CHLORIDE: 600; 310; 30; 20 INJECTION, SOLUTION INTRAVENOUS at 20:54

## 2019-03-18 RX ADMIN — GLYCOPYRROLATE 0.3 MG: 0.2 INJECTION INTRAMUSCULAR; INTRAVENOUS at 21:46

## 2019-03-18 RX ADMIN — FENTANYL CITRATE 50 MCG: 50 INJECTION, SOLUTION INTRAMUSCULAR; INTRAVENOUS at 21:00

## 2019-03-18 RX ADMIN — ROCURONIUM BROMIDE 10 MG: 10 INJECTION, SOLUTION INTRAVENOUS at 21:07

## 2019-03-18 RX ADMIN — DEXAMETHASONE SODIUM PHOSPHATE 8 MG: 4 INJECTION, SOLUTION INTRA-ARTICULAR; INTRALESIONAL; INTRAMUSCULAR; INTRAVENOUS; SOFT TISSUE at 21:35

## 2019-03-18 RX ADMIN — NEOSTIGMINE METHYLSULFATE 3 MG: 1 INJECTION INTRAVENOUS at 21:46

## 2019-03-18 NOTE — ED TRIAGE NOTES
81 y/o frail appearing female presents s/p ground level fall around 2200 today. Unwitnessed, but family member heard her fall. She was unable to bear weight on left lower extremity d/t pain and weakness. EMS administered 100 mcg of fentanyl and 4 mg of zofran in route, which has partially relieved the symptoms. Extremity is warm to touch with intact sensation. Shortening and slight external rotation observed. Pain from left superior iliac crest radiating down towards superior patella.  No other signs of trauma

## 2019-03-18 NOTE — PROGRESS NOTES
Bedside and Verbal shift change report given to Maida Prlupe Eldridge (oncoming nurse) by Daniella Jauregui RN (offgoing nurse). Report included the following information SBAR, Kardex, Intake/Output, MAR and Recent Results.

## 2019-03-18 NOTE — PROGRESS NOTES
Patient is awaiting surgery for her left femoral fracture. She remains n.p.o. She seems to indicate her pain is currently controlled. She says the medications have \"slowed \"her down. She lives with her daughter and has dementia. She denies any shortness of breath or chest pain. Updated daughter at the bedside. Her urinalysis does not indicate infection. Have placed her on gentle hydration preoperatively. We will follow her H&H closely. Please see this morning's H&P for further care plan details.

## 2019-03-18 NOTE — CONSULTS
ORTHOPAEDIC CONSULT NOTE    Subjective:     Date of Consultation:  March 18, 2019      Roslyn Harris is a 80 y.o. female who is being seen for left hip fracture. Pt reports GLF this evening. Ambulates at baseline with cane. Lives with daughter. States pt does well, is able to dress and feed herself w/o assistance. Notes pt has confusion.      Patient Active Problem List    Diagnosis Date Noted    Hip fracture ([facility] [address] 03/18/2019    Bipolar disorder ([facility] [address] 03/18/2019    Auditory hallucinations 09/04/2018    Altered mental status, unspecified 02/02/2018    Bilateral carotid artery stenosis 02/02/2018    Transient ischemic attack involving left internal carotid artery 02/02/2018    Late onset Alzheimer's disease with behavioral disturbance 02/02/2018    Benign essential tremor syndrome 02/02/2018    Acute metabolic encephalopathy 97/80/3801    Laceration of left arm with complication 94/81/8138    Hypothyroidism 01/02/2014    Depression 01/02/2014     Family History   Problem Relation Age of Onset    Other Mother         Inefficient wound healing    Cancer Sister         Stomach CA    Diabetes Brother     Alzheimer Sister     Hypertension Daughter     Thyroid Disease Daughter     Other Son         Hip replacement    Depression Son     Liver Disease Son     Diabetes Son     Heart Disease Son     Depression Son       Social History     Tobacco Use    Smoking status: Former Smoker    Smokeless tobacco: Never Used   Substance Use Topics    Alcohol use: No     Past Medical History:   Diagnosis Date    Dementia     Depression     psychiatrist: Dr Scout Gillespie (on Lithium)    Fracture of wrist     slipped and fx left wrist, surgery scheduled 12/21/16    Full dentures     upper and lower    Hypothyroid       Past Surgical History:   Procedure Laterality Date    HX KYPHOPLASTY  2013   Sena Boron  12/2016    Wrist surgery    HX WRIST FRACTURE TX Left 12/2016      Prior to Admission medications    Medication Sig Start Date End Date Taking? Authorizing Provider   memantine (NAMENDA) 5 mg tablet Take 1 Tab by mouth two (2) times a day. Patient taking differently: Take 5 mg by mouth daily. 9/17/18   Prieto Osman MD   rivastigmine tartrate (EXELON) 1.5 mg capsule Take 3 mg by mouth every evening. 2/24/18   Provider, Historical   levothyroxine (SYNTHROID) 75 mcg tablet  12/26/17   Provider, Historical   clonazePAM (KLONOPIN) 0.5 mg tablet Take 0.5 mg by mouth every evening. Provider, Historical   PARoxetine (PAXIL) 40 mg tablet Take 40 mg by mouth daily. 1/24/17   Provider, Historical   multivitamin (ONE A DAY) tablet Take 1 Tab by mouth daily. Provider, Historical   cholecalciferol, vitamin D3, (VITAMIN D3) 2,000 unit tab Take 1 Tab by mouth daily. Provider, Historical   lithium CR (ESKALITH CR) 450 mg CR tablet Take 450 mg by mouth nightly. Provider, Historical     Current Facility-Administered Medications   Medication Dose Route Frequency    sodium chloride (NS) flush 5-40 mL  5-40 mL IntraVENous Q8H    sodium chloride (NS) flush 5-40 mL  5-40 mL IntraVENous PRN    acetaminophen (TYLENOL) tablet 650 mg  650 mg Oral Q6H    oxyCODONE IR (ROXICODONE) tablet 2.5 mg  2.5 mg Oral Q4H PRN    naloxone (NARCAN) injection 0.4 mg  0.4 mg IntraVENous PRN    ondansetron (ZOFRAN) injection 4 mg  4 mg IntraVENous Q4H PRN    senna-docusate (PERICOLACE) 8.6-50 mg per tablet 2 Tab  2 Tab Oral BID    morphine injection 1 mg  1 mg IntraVENous Q2H PRN     Current Outpatient Medications   Medication Sig    memantine (NAMENDA) 5 mg tablet Take 1 Tab by mouth two (2) times a day. (Patient taking differently: Take 5 mg by mouth daily.)    rivastigmine tartrate (EXELON) 1.5 mg capsule Take 3 mg by mouth every evening.  levothyroxine (SYNTHROID) 75 mcg tablet     clonazePAM (KLONOPIN) 0.5 mg tablet Take 0.5 mg by mouth every evening.     PARoxetine (PAXIL) 40 mg tablet Take 40 mg by mouth daily.    multivitamin (ONE A DAY) tablet Take 1 Tab by mouth daily.  cholecalciferol, vitamin D3, (VITAMIN D3) 2,000 unit tab Take 1 Tab by mouth daily.  lithium CR (ESKALITH CR) 450 mg CR tablet Take 450 mg by mouth nightly. Allergies   Allergen Reactions    Sulfa (Sulfonamide Antibiotics) Hives        Review of Systems:  A comprehensive review of systems was negative except for that written in the HPI. Mental Status: mild dementia    Objective:     Patient Vitals for the past 8 hrs:   BP Temp Pulse Resp SpO2 Height Weight   19 2134 138/86 98 °F (36.7 °C) 71 13 92 % 5' (1.524 m) 52.2 kg (115 lb)     Temp (24hrs), Av °F (36.7 °C), Min:98 °F (36.7 °C), Max:98 °F (36.7 °C)      Gen: Well-developed,  in no acute distress , frail  HEENT: Pink conjunctivae, hearing intact to voice, moist mucous membranes   Neck: Supple  Resp: No respiratory distress   Card: RRR, palpable distal pulse-equal bilaterally, birsk cap refill all distal digits   Abd:  non-distended  Musc: LLE shortened. No sing of LE VTE. Active plant/dorsiflexion  Skin: No skin breakdown noted. Skin warm, pink, dry  Neuro: Cranial nerves are grossly intact, no focal motor weakness, follows commands appropriately   Psych: fair insight, oriented to person, place, alert    Imaging Review: XR HIP LT W OR WO PELV 2-3 VWS   INDICATION: Trauma.  COMPARISON: None.   FINDINGS: An AP view of the pelvis and a frogleg lateral view of the left hip  demonstrate acute comminuted intertrochanteric fracture with varus angulation. There is mild degenerative disease of the left hip. Vascular calcifications. Degenerative changes of the lower lumbar spine. There is vertebroplasty cement  noted in the lower thoracic/upper lumbar vertebra. IMPRESSION: Acute comminuted displaced intertrochanteric left femur fracture  with varus angulation.     Labs:   Recent Results (from the past 24 hour(s))   METABOLIC PANEL, COMPREHENSIVE    Collection Time: 19 1:03 AM   Result Value Ref Range    Sodium 140 136 - 145 mmol/L    Potassium 4.0 3.5 - 5.1 mmol/L    Chloride 109 (H) 97 - 108 mmol/L    CO2 27 21 - 32 mmol/L    Anion gap 4 (L) 5 - 15 mmol/L    Glucose 149 (H) 65 - 100 mg/dL    BUN 15 6 - 20 MG/DL    Creatinine 0.84 0.55 - 1.02 MG/DL    BUN/Creatinine ratio 18 12 - 20      GFR est AA >60 >60 ml/min/1.73m2    GFR est non-AA >60 >60 ml/min/1.73m2    Calcium 8.5 8.5 - 10.1 MG/DL    Bilirubin, total 0.4 0.2 - 1.0 MG/DL    ALT (SGPT) 18 12 - 78 U/L    AST (SGOT) 20 15 - 37 U/L    Alk. phosphatase 62 45 - 117 U/L    Protein, total 6.6 6.4 - 8.2 g/dL    Albumin 3.5 3.5 - 5.0 g/dL    Globulin 3.1 2.0 - 4.0 g/dL    A-G Ratio 1.1 1.1 - 2.2     CBC WITH AUTOMATED DIFF    Collection Time: 03/18/19  1:03 AM   Result Value Ref Range    WBC 13.5 (H) 3.6 - 11.0 K/uL    RBC 3.80 3.80 - 5.20 M/uL    HGB 11.1 (L) 11.5 - 16.0 g/dL    HCT 33.7 (L) 35.0 - 47.0 %    MCV 88.7 80.0 - 99.0 FL    MCH 29.2 26.0 - 34.0 PG    MCHC 32.9 30.0 - 36.5 g/dL    RDW 13.2 11.5 - 14.5 %    PLATELET 875 188 - 677 K/uL    MPV 10.7 8.9 - 12.9 FL    NRBC 0.0 0  WBC    ABSOLUTE NRBC 0.00 0.00 - 0.01 K/uL    NEUTROPHILS 81 (H) 32 - 75 %    LYMPHOCYTES 11 (L) 12 - 49 %    MONOCYTES 7 5 - 13 %    EOSINOPHILS 1 0 - 7 %    BASOPHILS 0 0 - 1 %    IMMATURE GRANULOCYTES 0 0.0 - 0.5 %    ABS. NEUTROPHILS 10.9 (H) 1.8 - 8.0 K/UL    ABS. LYMPHOCYTES 1.5 0.8 - 3.5 K/UL    ABS. MONOCYTES 1.0 0.0 - 1.0 K/UL    ABS. EOSINOPHILS 0.1 0.0 - 0.4 K/UL    ABS. BASOPHILS 0.1 0.0 - 0.1 K/UL    ABS. IMM.  GRANS. 0.1 (H) 0.00 - 0.04 K/UL    DF AUTOMATED     PROTHROMBIN TIME + INR    Collection Time: 03/18/19  1:03 AM   Result Value Ref Range    INR 1.1 0.9 - 1.1      Prothrombin time 10.8 9.0 - 11.1 sec         Impression:     Patient Active Problem List    Diagnosis Date Noted    Hip fracture ([facility] 03/18/2019    Bipolar disorder ([facility] 03/18/2019    Auditory hallucinations 09/04/2018    Altered mental status, unspecified 02/02/2018    Bilateral carotid artery stenosis 02/02/2018    Transient ischemic attack involving left internal carotid artery 02/02/2018    Late onset Alzheimer's disease with behavioral disturbance 02/02/2018    Benign essential tremor syndrome 02/02/2018    Acute metabolic encephalopathy 31/41/4295    Laceration of left arm with complication 51/26/6878    Hypothyroidism 01/02/2014    Depression 01/02/2014     Active Problems:    Hypothyroidism (1/2/2014)      Depression (1/2/2014)      Late onset Alzheimer's disease with behavioral disturbance (2/2/2018)      Hip fracture ([facility] [address] (3/18/2019)      Bipolar disorder ([facility] [address] (3/18/2019)        Plan:     -  NPO after midnight- OR at the earliest convenience   -  Medicine for Pre-Operative Clearence/ Post-Operative management.     -  DVT Prophylaxis - SCDs  -  D/C planning SNF      Dr. Greg Lopez  aware and agrees with plan as above.         Anthony Ramirez PA-C  [employer]

## 2019-03-18 NOTE — PROGRESS NOTES
Physical Therapy Screening:  Services are indicated and therapy order is required. Please order PT services s/p surgical repair of hip fracture with WB status. An InBasket screening referral was triggered for physical therapy based on results obtained during the nursing admission assessment. The patients chart was reviewed and the patient is appropriate for a skilled therapy evaluation. Please order a consult for physical therapy if you are in agreement and would like an evaluation to be completed. Thank you.     Vick Hernandes, PT, DPT

## 2019-03-18 NOTE — PERIOP NOTES
TRANSFER - IN REPORT:    Verbal report received from Adonis Welsh RN(name) on [facility]  being received from Orth[facility] Room 3214(unit) for ordered procedure      Report consisted of patients Situation, Background, Assessment and   Recommendations(SBAR). Information from the following report(s) SBAR, Kardex, Intake/Output, MAR, Recent Results, Cardiac Rhythm NSR and Procedure Verification was reviewed with the receiving nurse. Opportunity for questions and clarification was provided. Assessment completed upon patients arrival to unit and care assumed.

## 2019-03-18 NOTE — ED PROVIDER NOTES
EMERGENCY DEPARTMENT HISTORY AND PHYSICAL EXAM           Date: 3/17/2019  Patient Name: Fidel Sherwood    History of Presenting Illness     Chief Complaint   Patient presents with   [facility] Fall    Hip Pain       History Provided By: Patient and Patient's Daughter    HPI: Fidel Sherwood is a 80 y.o. female, pmhx bipolar, dementia, hypothyroidism, who presents via EMS to the ED c/o complaint of ground-level fall while walking in the kitchen noted to slip. Fall was unwitnessed although patient denies loss of consciousness or hitting her head. Patient noted to have acute left hip pain. Unable to ambulate after incident. Patient with shortened left lower extremity. EMS provided 100 mcg of fentanyl and 4 mg of Zofran IV. Patient with bruising to left fifth digit as well but denies any associated pain to wrist or hand when palpated. Patient specifically denies any dizziness, weakness, recent fevers, chills, nausea, vomiting, diarrhea, abd pain, CP, SOB, urinary sxs, changes in BM, or headache. PCP: Stephanie Butler MD    Allergies: Sulfa  Patient denies any tobacco alcohol or drug use. There are no other complaints, changes, or physical findings at this time. Current Outpatient Medications   Medication Sig Dispense Refill    memantine (NAMENDA) 5 mg tablet Take 1 Tab by mouth two (2) times a day. 180 Tab 3    rivastigmine tartrate (EXELON) 1.5 mg capsule       levothyroxine (SYNTHROID) 75 mcg tablet       clonazePAM (KLONOPIN) 0.5 mg tablet Take 0.5 mg by mouth every evening.  PARoxetine (PAXIL) 40 mg tablet Take 40 mg by mouth daily.  multivitamin (ONE A DAY) tablet Take 1 Tab by mouth daily.  cholecalciferol, vitamin D3, (VITAMIN D3) 2,000 unit tab Take 1 Tab by mouth daily.  lithium CR (ESKALITH CR) 450 mg CR tablet Take 450 mg by mouth nightly.          Past History     Past Medical History:  Past Medical History:   Diagnosis Date    Dementia     Depression     psychiatrist:  Frederic Medinap (on Lithium)    Fracture of wrist     slipped and fx left wrist, surgery scheduled 12/21/16    Full dentures     upper and lower    Hypothyroid        Past Surgical History:  Past Surgical History:   Procedure Laterality Date    HX KYPHOPLASTY  2013   Sebastian Duchesne  12/2016    Wrist surgery    HX WRIST FRACTURE TX Left 12/2016       Family History:  Family History   Problem Relation Age of Onset    Other Mother         Inefficient wound healing    Cancer Sister         Stomach CA    Diabetes Brother     Alzheimer Sister     Hypertension Daughter     Thyroid Disease Daughter     Other Son         Hip replacement    Depression Son     Liver Disease Son     Diabetes Son     Heart Disease Son     Depression Son        Social History:  Social History     Tobacco Use    Smoking status: Former Smoker    Smokeless tobacco: Never Used   Substance Use Topics    Alcohol use: No    Drug use: No       Allergies: Allergies   Allergen Reactions    Sulfa (Sulfonamide Antibiotics) Hives         Review of Systems   Review of Systems   Constitutional: Negative. Negative for fever. Eyes: Negative. Respiratory: Negative. Negative for shortness of breath. Cardiovascular: Negative for chest pain. Gastrointestinal: Negative for abdominal pain, nausea and vomiting. Endocrine: Negative. Genitourinary: Negative. Negative for difficulty urinating, dysuria and hematuria. Musculoskeletal: Positive for arthralgias, gait problem and myalgias. Skin: Negative. Psychiatric/Behavioral: Negative for suicidal ideas. All other systems reviewed and are negative. Physical Exam   Physical Exam   Constitutional: She is oriented to person, place, and time. She appears well-developed and well-nourished. No distress. HENT:   Head: Normocephalic and atraumatic. Nose: Nose normal.   Eyes: Conjunctivae and EOM are normal. No scleral icterus. Neck: Normal range of motion.  No tracheal deviation present. Cardiovascular: Normal rate, regular rhythm, normal heart sounds and intact distal pulses. Exam reveals no friction rub. No murmur heard. Pulmonary/Chest: Effort normal and breath sounds normal. No stridor. No respiratory distress. She has no wheezes. She has no rales. Abdominal: Soft. Bowel sounds are normal. She exhibits no distension. There is no tenderness. There is no rebound. Musculoskeletal: She exhibits tenderness and deformity. Left hip: She exhibits decreased range of motion, tenderness, bony tenderness, swelling and deformity (shortened). Neurological: She is alert and oriented to person, place, and time. No cranial nerve deficit. Skin: Skin is warm and dry. No rash noted. She is not diaphoretic. Psychiatric: She has a normal mood and affect. Her speech is normal and behavior is normal. Judgment and thought content normal. Cognition and memory are normal.   Nursing note and vitals reviewed. Diagnostic Study Results     Labs -   No results found for this or any previous visit (from the past 12 hour(s)). Radiologic Studies -   XR HIP LT W OR WO PELV 2-3 VWS   Final Result   IMPRESSION: Acute comminuted displaced intertrochanteric left femur fracture   with varus angulation. XR CHEST PORT    (Results Pending)     CT Results  (Last 48 hours)    None        CXR Results  (Last 48 hours)    None            Medical Decision Making   I am the first provider for this patient. I reviewed the vital signs, available nursing notes, past medical history, past surgical history, family history and social history. Vital Signs-Reviewed the patient's vital signs.   Patient Vitals for the past 12 hrs:   Temp Pulse Resp BP SpO2   03/17/19 2134 98 °F (36.7 °C) 71 13 138/86 92 %       Pulse Oximetry Analysis - 922 on RA    Cardiac Monitor:   Rate: 71 bpm  Rhythm: Normal Sinus Rhythm      Records Reviewed: Nursing Notes, Old Medical Records, Previous Radiology Studies and Previous Laboratory Studies    Provider Notes (Medical Decision Making):     DDX:  Left hip fracture, left hand fracture and contusion, dislocation    Plan:  EKG ,basic lab work, UA, left hip x-ray, left hand x-ray, chest x-ray as needed    Impression:  Acute comminuted displaced intertrochanteric left femur fracture    ED Course:   Initial assessment performed. The patients presenting problems have been discussed, and they are in agreement with the care plan formulated and outlined with them. I have encouraged them to ask questions as they arise throughout their visit. I reviewed our electronic medical record system for any past medical records that were available that may contribute to the patients current condition, the nursing notes and and vital signs from today's visit    Nursing notes will be reviewed as they become available in realtime while the pt has been in the ED. Beata Vu MD    I personally reviewed pt's imaging. Official read by radiology noted above. Beata Vu MD         PROGRESS NOTE:  7736  Pt noted to have acute left hip fracture. Updated patient and family noted fracture and plan for admission with orthopedic surgery. Beata Vu MD      CONSULT NOTE:   10:23 PM  Beata Vu MD spoke with Dr. Srinivas Khan and JONA Roque,   Specialty: Sanford Khan due to Acute comminuted displaced intertrochanteric left femur fracture. Discussed pt's HPI and available diagnostic results thus far. Expressed my concerns for needed admission. Consultant recommends hospitalist admission and will eval for surgical intervention  Beata Vu MD    CONSULT NOTE:   10:49 PM  Beata Vu MD spoke with Dr. Norberto Villalpando,   Specialty: Philip Villalpando due to Acute comminuted displaced intertrochanteric left femur fracture. Discussed pt's HPI and available diagnostic results thus far. Expressed concerns for  needed admission. Consultant will evaluate for admission.   Rodri Ahuja. Tawana Colelo MD          Critical Care Time:     none    Diagnosis     Clinical Impression:   1. Closed displaced intertrochanteric fracture of left femur, initial encounter ([facility] [address]        PLAN:  1. Admit      Disposition:    ADMISSION NOTE:  10:24 PM  Patient is being admitted to the hospital by Dr. Ladi Vick. The results of their tests and reasons for their admission have been discussed with them and/or available family. They convey agreement and understanding for the need to be admitted and for their admission diagnosis. Eldon Magdaleno MD                  This note will not be viewable in [address]

## 2019-03-18 NOTE — ANESTHESIA PREPROCEDURE EVALUATION
Anesthetic History   No history of anesthetic complications            Review of Systems / Medical History  Patient summary reviewed, nursing notes reviewed and pertinent labs reviewed    Pulmonary  Within defined limits                 Neuro/Psych   Within defined limits      TIA, psychiatric history and dementia     Cardiovascular  Within defined limits            PAD    Exercise tolerance: >4 METS  Comments: Bilateral carotid artery stenosis   GI/Hepatic/Renal  Within defined limits              Endo/Other  Within defined limits    Hypothyroidism       Other Findings   Comments: Left Hip Fracture           Physical Exam    Airway  Mallampati: II  TM Distance: 4 - 6 cm  Neck ROM: normal range of motion   Mouth opening: Normal     Cardiovascular  Regular rate and rhythm,  S1 and S2 normal,  no murmur, click, rub, or gallop             Dental    Dentition: Full upper dentures and Full lower dentures     Pulmonary  Breath sounds clear to auscultation               Abdominal  GI exam deferred       Other Findings            Anesthetic Plan    ASA: 3  Anesthesia type: general    Monitoring Plan: BIS      Induction: Intravenous  Anesthetic plan and risks discussed with: Patient

## 2019-03-18 NOTE — ED NOTES
TRANSFER - OUT REPORT:    Verbal report given to [facility], RN(name) on Roslyn Harris  being transferred to Ortho(unit) for routine progression of care       Report consisted of patients Situation, Background, Assessment and   Recommendations(SBAR). Information from the following report(s) SBAR, Kardex, ED Summary, Procedure Summary, MAR and Recent Results was reviewed with the receiving nurse. Lines:       Opportunity for questions and clarification was provided.       Patient transported with:   [employer]

## 2019-03-19 LAB
ALBUMIN SERPL-MCNC: 3.1 G/DL (ref 3.5–5)
ALBUMIN/GLOB SERPL: 1 {RATIO} (ref 1.1–2.2)
ALP SERPL-CCNC: 55 U/L (ref 45–117)
ALT SERPL-CCNC: 15 U/L (ref 12–78)
ANION GAP SERPL CALC-SCNC: 8 MMOL/L (ref 5–15)
AST SERPL-CCNC: 23 U/L (ref 15–37)
BASOPHILS # BLD: 0.1 K/UL (ref 0–0.1)
BASOPHILS NFR BLD: 0 % (ref 0–1)
BILIRUB SERPL-MCNC: 0.4 MG/DL (ref 0.2–1)
BUN SERPL-MCNC: 26 MG/DL (ref 6–20)
BUN/CREAT SERPL: 17 (ref 12–20)
CALCIUM SERPL-MCNC: 8.1 MG/DL (ref 8.5–10.1)
CHLORIDE SERPL-SCNC: 109 MMOL/L (ref 97–108)
CO2 SERPL-SCNC: 21 MMOL/L (ref 21–32)
CREAT SERPL-MCNC: 1.51 MG/DL (ref 0.55–1.02)
DATE LAST DOSE: NORMAL
DIFFERENTIAL METHOD BLD: ABNORMAL
EOSINOPHIL # BLD: 0 K/UL (ref 0–0.4)
EOSINOPHIL NFR BLD: 0 % (ref 0–7)
ERYTHROCYTE [DISTWIDTH] IN BLOOD BY AUTOMATED COUNT: 13.4 % (ref 11.5–14.5)
GLOBULIN SER CALC-MCNC: 3 G/DL (ref 2–4)
GLUCOSE SERPL-MCNC: 178 MG/DL (ref 65–100)
HCT VFR BLD AUTO: 27.5 % (ref 35–47)
HGB BLD-MCNC: 8.6 G/DL (ref 11.5–16)
IMM GRANULOCYTES # BLD AUTO: 0.1 K/UL (ref 0–0.04)
IMM GRANULOCYTES NFR BLD AUTO: 1 % (ref 0–0.5)
LITHIUM SERPL-SCNC: 1.1 MMOL/L (ref 0.6–1.2)
LYMPHOCYTES # BLD: 1 K/UL (ref 0.8–3.5)
LYMPHOCYTES NFR BLD: 7 % (ref 12–49)
MAGNESIUM SERPL-MCNC: 2.1 MG/DL (ref 1.6–2.4)
MCH RBC QN AUTO: 29 PG (ref 26–34)
MCHC RBC AUTO-ENTMCNC: 31.3 G/DL (ref 30–36.5)
MCV RBC AUTO: 92.6 FL (ref 80–99)
MONOCYTES # BLD: 0.6 K/UL (ref 0–1)
MONOCYTES NFR BLD: 5 % (ref 5–13)
NEUTS SEG # BLD: 11.6 K/UL (ref 1.8–8)
NEUTS SEG NFR BLD: 87 % (ref 32–75)
NRBC # BLD: 0 K/UL (ref 0–0.01)
NRBC BLD-RTO: 0 PER 100 WBC
PHOSPHATE SERPL-MCNC: 4.5 MG/DL (ref 2.6–4.7)
PLATELET # BLD AUTO: 187 K/UL (ref 150–400)
PMV BLD AUTO: 11 FL (ref 8.9–12.9)
POTASSIUM SERPL-SCNC: 5 MMOL/L (ref 3.5–5.1)
PROT SERPL-MCNC: 6.1 G/DL (ref 6.4–8.2)
RBC # BLD AUTO: 2.97 M/UL (ref 3.8–5.2)
REPORTED DOSE,DOSE: NORMAL UNITS
REPORTED DOSE/TIME,TMG: NORMAL
SODIUM SERPL-SCNC: 138 MMOL/L (ref 136–145)
WBC # BLD AUTO: 13.4 K/UL (ref 3.6–11)

## 2019-03-19 PROCEDURE — 74011250637 HC RX REV CODE- 250/637: Performed by: PHYSICIAN ASSISTANT

## 2019-03-19 PROCEDURE — 77030020186 HC BOOT HL PROTCT SAGE -B

## 2019-03-19 PROCEDURE — 77010033678 HC OXYGEN DAILY

## 2019-03-19 PROCEDURE — 74011250637 HC RX REV CODE- 250/637: Performed by: NURSE PRACTITIONER

## 2019-03-19 PROCEDURE — 80178 ASSAY OF LITHIUM: CPT

## 2019-03-19 PROCEDURE — 84100 ASSAY OF PHOSPHORUS: CPT

## 2019-03-19 PROCEDURE — 97535 SELF CARE MNGMENT TRAINING: CPT | Performed by: OCCUPATIONAL THERAPIST

## 2019-03-19 PROCEDURE — 94760 N-INVAS EAR/PLS OXIMETRY 1: CPT

## 2019-03-19 PROCEDURE — 85025 COMPLETE CBC W/AUTO DIFF WBC: CPT

## 2019-03-19 PROCEDURE — 80053 COMPREHEN METABOLIC PANEL: CPT

## 2019-03-19 PROCEDURE — 97161 PT EVAL LOW COMPLEX 20 MIN: CPT

## 2019-03-19 PROCEDURE — 65270000029 HC RM PRIVATE

## 2019-03-19 PROCEDURE — 97165 OT EVAL LOW COMPLEX 30 MIN: CPT | Performed by: OCCUPATIONAL THERAPIST

## 2019-03-19 PROCEDURE — 74011250637 HC RX REV CODE- 250/637: Performed by: INTERNAL MEDICINE

## 2019-03-19 PROCEDURE — 74011250636 HC RX REV CODE- 250/636: Performed by: NURSE PRACTITIONER

## 2019-03-19 PROCEDURE — 36415 COLL VENOUS BLD VENIPUNCTURE: CPT

## 2019-03-19 PROCEDURE — 97530 THERAPEUTIC ACTIVITIES: CPT

## 2019-03-19 PROCEDURE — 74011250637 HC RX REV CODE- 250/637: Performed by: ORTHOPAEDIC SURGERY

## 2019-03-19 PROCEDURE — 83735 ASSAY OF MAGNESIUM: CPT

## 2019-03-19 PROCEDURE — 74011250636 HC RX REV CODE- 250/636: Performed by: ORTHOPAEDIC SURGERY

## 2019-03-19 RX ORDER — FAMOTIDINE 20 MG/1
20 TABLET, FILM COATED ORAL DAILY
Status: DISCONTINUED | OUTPATIENT
Start: 2019-03-19 | End: 2019-03-21 | Stop reason: HOSPADM

## 2019-03-19 RX ORDER — FAMOTIDINE 20 MG/1
20 TABLET, FILM COATED ORAL 2 TIMES DAILY
Status: DISCONTINUED | OUTPATIENT
Start: 2019-03-19 | End: 2019-03-19 | Stop reason: DRUGHIGH

## 2019-03-19 RX ADMIN — Medication 10 ML: at 13:33

## 2019-03-19 RX ADMIN — Medication 10 ML: at 22:25

## 2019-03-19 RX ADMIN — ASPIRIN 325 MG: 325 TABLET ORAL at 17:30

## 2019-03-19 RX ADMIN — SENNOSIDES, DOCUSATE SODIUM 1 TABLET: 50; 8.6 TABLET, FILM COATED ORAL at 17:30

## 2019-03-19 RX ADMIN — Medication 10 ML: at 15:14

## 2019-03-19 RX ADMIN — SODIUM CHLORIDE 250 ML: 900 INJECTION, SOLUTION INTRAVENOUS at 13:33

## 2019-03-19 RX ADMIN — Medication 2 G: at 13:32

## 2019-03-19 RX ADMIN — ACETAMINOPHEN 650 MG: 325 TABLET ORAL at 09:38

## 2019-03-19 RX ADMIN — RIVASTIGMINE TARTRATE 3 MG: 1.5 CAPSULE ORAL at 17:30

## 2019-03-19 RX ADMIN — CALCIUM CARBONATE 500 MG (1,250 MG)-VITAMIN D3 200 UNIT TABLET 1 TABLET: at 13:32

## 2019-03-19 RX ADMIN — ASPIRIN 325 MG: 325 TABLET ORAL at 09:38

## 2019-03-19 RX ADMIN — LEVOTHYROXINE SODIUM 75 MCG: 75 TABLET ORAL at 05:01

## 2019-03-19 RX ADMIN — CALCIUM CARBONATE 500 MG (1,250 MG)-VITAMIN D3 200 UNIT TABLET 1 TABLET: at 17:30

## 2019-03-19 RX ADMIN — ACETAMINOPHEN 650 MG: 325 TABLET ORAL at 00:30

## 2019-03-19 RX ADMIN — CLONAZEPAM 0.25 MG: 1 TABLET ORAL at 17:30

## 2019-03-19 RX ADMIN — CALCIUM CARBONATE 500 MG (1,250 MG)-VITAMIN D3 200 UNIT TABLET 1 TABLET: at 09:38

## 2019-03-19 RX ADMIN — ACETAMINOPHEN 650 MG: 325 TABLET ORAL at 05:01

## 2019-03-19 RX ADMIN — LITHIUM CARBONATE 450 MG: 450 TABLET, EXTENDED RELEASE ORAL at 00:30

## 2019-03-19 RX ADMIN — ACETAMINOPHEN 650 MG: 325 TABLET ORAL at 22:24

## 2019-03-19 RX ADMIN — MEMANTINE HYDROCHLORIDE 5 MG: 5 TABLET ORAL at 09:38

## 2019-03-19 RX ADMIN — Medication 2 G: at 05:03

## 2019-03-19 RX ADMIN — POLYETHYLENE GLYCOL 3350 17 G: 17 POWDER, FOR SOLUTION ORAL at 09:40

## 2019-03-19 RX ADMIN — ACETAMINOPHEN 650 MG: 325 TABLET ORAL at 17:31

## 2019-03-19 RX ADMIN — FAMOTIDINE 20 MG: 20 TABLET ORAL at 09:37

## 2019-03-19 RX ADMIN — PAROXETINE HYDROCHLORIDE 40 MG: 20 TABLET, FILM COATED ORAL at 09:37

## 2019-03-19 RX ADMIN — SENNOSIDES, DOCUSATE SODIUM 1 TABLET: 50; 8.6 TABLET, FILM COATED ORAL at 09:38

## 2019-03-19 RX ADMIN — OXYCODONE HYDROCHLORIDE 5 MG: 5 TABLET ORAL at 09:37

## 2019-03-19 NOTE — PROGRESS NOTES
Ortho / Neurosurgery NP Note POD# 1 s/p LEFT FEMORAL INTERTROCHANTERIC NAIL INSERTION Pt seen with Ang Umana Pt resting in bed. No complaints of nausea. Reports some pain at the left hip. Denies any shortness of breath . Answers questions appropriately. Swelling noted. VSS Afebrile. BP dropped to 71/51 during PT session this morning. Voiding status: Slater. Labs Lab Results Component Value Date/Time HGB 8.6 (L) 03/19/2019 12:50 AM  
  
Lab Results Component Value Date/Time INR 1.1 03/18/2019 01:03 AM  
  
 
Body mass index is 22.46 kg/m². : A BMI > 30 is classified as obesity and > 40 is classified as morbid obesity. Dressing c.d.i Cryotherapy in place over incision Calves soft and supple; No pain with passive stretch Sensation and motor intact SCDs for mechanical DVT proph while in bed PLAN: 
1) PT BID 
2) Aspirin 325 mg PO BID for DVT Prophylaxis 3) GI Prophylaxis - pepcid 4) Readniess for discharge: 
   [] Vital Signs stable  
 [x] Hgb stable  
 [] + Voiding  
 [x] Wound intact, drainage minimal  
 [x] Tolerating PO intake   
 [] Cleared by PT (OT if applicable) [] Stair training completed (if applicable) [] Independent / Contact Guard Assist (household distance) [] Bed mobility [] Car transfers  
  [] ADLs [x] Adequate pain control on oral medication alone PT BID Pain management SNF at discharge. [employer] student.

## 2019-03-19 NOTE — OP NOTES
[facility]  OPERATIVE REPORT    Name:  Melody Pompa  MR#:  [id number]  :  1926  ACCOUNT #:  [de-identified]  DATE OF SERVICE:  2019    PREOPERATIVE DIAGNOSIS:  Comminuted and displaced left intertrochanteric hip fracture. POSTOPERATIVE DIAGNOSIS:  Comminuted and displaced left intertrochanteric hip fracture. PROCEDURE PERFORMED:  Left hip cephalomedullary nailing. SURGEON:  Jad Graham MD    ASSISTANT:  [facility] staff. ANESTHESIA:  General.    COMPLICATIONS:  None. SPECIMENS REMOVED:  None. IMPLANTS:  synthes tfna    ESTIMATED BLOOD LOSS:  100 mL. DISPOSITION:  Stable to PACU. INDICATIONS FOR THE PROCEDURE:  The patient is a 70-year-old female who presented to the hospital after a ground-level fall. She has dementia, though we did discuss treatment with her and her family members for her left hip fracture. We were consulted for management of her hip fracture while the medical team admitted her to the hospital and medically optimized and cleared her for surgery. We discussed risks and benefits of left hip intramedullary nail. Risks of infection, blood loss, neurovascular injury, anesthesia risk, and risk secondary to the patient's comorbidities were described. Informed consent was obtained for the procedure. History and physical forms and consent forms were confirmed in her chart prior to the procedure. Her operative extremity was marked by Orthopedics. OPERATION:  The patient was taken to the OR once medically optimized and cleared for surgery. She was given sedation and intubated by Anesthesia and then she was subsequently transferred to the [facility] table. Once appropriately positioned on the [place] table, we performed sterile prepping and draping after we took x-rays to confirm appropriate reduction of her fracture. Sterile prepping and draping was performed and then we called the time-out.   The patient was identified by name, date of birth, operative site, and operative procedure. After all were in agreement that the left hip was the operative extremity, an incision was made for an approach to the tip of the greater trochanter. Careful dissection down was performed to avoid neurovascular structures. Guide pin was used to assist with appropriate positioning at the tip of the greater trochanter and then was placed into the center of the femoral canal.  This gave us our starting point for our opening reamer, which was used to breach the cortex. We then placed a size 11 Intermediate Synthes TFNA device without difficulty. No reaming was necessary as we pre-measured to ensure appropriate fit of the nail. We placed this device without difficulty and then placed a guide pin after making a small incision at the lateral hip for placement of our helical blade. Once we confirmed appropriate positioning with tip to apex distance of less than 25 mm, we pre-drilled and then placed an appropriate size helical blade without difficulty. We placed slight compression on the helical blade and then unlocked the blade to allow for further compression as she ambulates. We used AP and lateral x-rays to confirm appropriate positioning and then we moved distally to place our distal locking screw. This screw was placed without difficulty, and final pictures were taken. Thorough irrigation of the wound was performed. Closure with a combination of 0 Vicryl, 2-0 Vicryl, and staples was performed. Sterile dressings were applied, and the patient was awoken by Anesthesia. She was transferred to PACU in stable condition. She will be monitored closely and will likely be transferred to a rehab facility postsurgically. She will be in the hospital for the next two to three days and we will start her on aspirin for DVT prophylaxis.       Irma Crowley DO      [id number]  D:  03/18/2019 22:09  T:  03/18/2019 23:19  JOB #:  [id number]

## 2019-03-19 NOTE — ANESTHESIA POSTPROCEDURE EVALUATION
Procedure(s):  LEFT FEMORAL INTERTROCHANTERIC NAIL INSERTION. Anesthesia Post Evaluation        Patient location during evaluation: PACU  Note status: Adequate. Level of consciousness: responsive to verbal stimuli and sleepy but conscious  Pain management: satisfactory to patient  Airway patency: patent  Anesthetic complications: no  Cardiovascular status: acceptable  Respiratory status: acceptable  Hydration status: acceptable  Comments: +Post-Anesthesia Evaluation and Assessment    Patient: Danica Nugent MRN: [id number]  SSN: [id number]   YOB: 1926  Age: 80 y.o. Sex: female      Cardiovascular Function/Vital Signs    /62   Pulse 88   Temp 36.9 °C (98.5 °F)   Resp 14   Ht 5' (1.524 m)   Wt 52.2 kg (115 lb)   SpO2 99%   BMI 22.46 kg/m²     Patient is status post Procedure(s):  LEFT FEMORAL INTERTROCHANTERIC NAIL INSERTION. Nausea/Vomiting: Controlled. Postoperative hydration reviewed and adequate. Pain:  Pain Scale 1: FLACC (03/18/19 2230)  Pain Intensity 1: 0 (03/18/19 2230)   Managed. Neurological Status:   Neuro (WDL): Within Defined Limits (03/18/19 2215)   At baseline. Mental Status and Level of Consciousness: Arousable. Pulmonary Status:   O2 Device: Nasal cannula (03/18/19 2230)   Adequate oxygenation and airway patent. Complications related to anesthesia: None    Post-anesthesia assessment completed. No concerns.     Signed By: Edward Ziegler MD    3/18/2019  Post anesthesia nausea and vomiting:  controlled      Visit Vitals  /62   Pulse 88   Temp 36.9 °C (98.5 °F)   Resp 14   Ht 5' (1.524 m)   Wt 52.2 kg (115 lb)   SpO2 99%   BMI 22.46 kg/m²

## 2019-03-19 NOTE — PROGRESS NOTES
Pharmacy Automatic Renal Dosing Medication: Lithium  mg daily Recent Labs  
  03/19/19 
0050 03/18/19 
0103 CREA 1.51* 0.84 BUN 26* 15  
 
Creatinine Clearance (ml/min): 17 Impression/Plan:  
Lithiium  mg give 3/19/19 00:30, Level 3/19 00:50 1.1 mmole/L Level ordered by MD for 3/20 4:00. Due to acute change in renal function would change to 450 mg Q48H until renal function is back to baseline after review of level ordered for 3/20 4 am. 
 
Lithium levels should be drawn approximately 12 hours after the last dose (12-hour serum trough level) and generally collected in the morning, before the first dose of the day. Changes in the serum level per unit time are not dramatic 12 hours after the last dose. Thus, a level drawn 11 to 13 hours after the last dose, or even 10 to 14 hours, provides meaningful information. In contrast, a serum level drawn a few hours after lithium ingestion is subject to marked fluctuation if the level is drawn one hour sooner or later, leading to unreliable information. Per P&T Committee Protocol with respect to renal function. Pharmacy will continue to monitor patient daily and will make dosage adjustments based upon changing renal function.

## 2019-03-19 NOTE — PROGRESS NOTES
Hospitalist Progress Note NAME: Danica Nugent :  1926 MRN:  [id number] Assessment / Plan: 
 
Left Hip Fx due to [facility] S/p surgery 3/18 Left hip cephalomedullary nailing. Post op acute metabolic encephalopathy Acute blood loss anemia TAWANA post op with orthostatic hypotension 
-Pain Mx and DVT Px as per orthopedic surgery 
-some post op confusion, follow closely with supportive measures 
-post op anemia, transfuse if needed hgb from 11 to 8.6 today 
-post op orthostasis, IVF bolus and increase IVFs, follow BP TAWANA 
suspect multifactorial, dehydration, hypotension, anemia Mild hyperk 
-creat to 1.5 today from 0.84 
-recheck lithium level in am, and hold dose tonight 
-pharmacy to assist with further dosing if renal fxn does not improve with hydration 
-IVF bolus and increase IVFs, encourage pos 
-renal eval if needed 
-watch urine output and BP 
-watch electrolytes and K levels 
  
Leukocytosis Suspect stress related 
neg UA and CXR Follow, no fever 
  BiPolar Late onset Alzheimer's disease with behavioral disturbance - Mild Lithium level-this am 1.1 Continue Home meds. Hold lithium for now with TAWANA Will continue Clonazepam at lower dose with hold parameters for sedation Risk of sundowning perioperatively 
  
Hypothyroidism Continue synthroid 
  
Code Status: DNR/DNI Surrogate Decision Maker: Daughter 
  
DVT Prophylaxis: Per Orthopedic surgery 
  
Baseline: uses mian for ambulation 18.5 - 24.9 Normal weight / Body mass index is 22.46 kg/m². Recommended Disposition: SNF/LTC, needs 3 MNs and clearance by ortho Subjective: Chief Complaint / Reason for Physician Visit L hip pain Patient had a surgery yesterday evening. This morning she has had some increased confusion according to the daughter and had some orthostatic hypotension when they tried to get her up with therapy.   The patient says she is having some \"trouble\" cannot specify she is trying to eat her lunch.  She denies any shortness of breath or chest pain or dizziness but is confused slightly more than yesterday. Patient was evaluated at 12:45 PM 
 
Discussed with RN events overnight. Review of Systems: 
Symptom Y/N Comments  Symptom Y/N Comments Fever/Chills    Chest Pain n   
Poor Appetite    Edema Cough    Abdominal Pain Sputum    Joint Pain n   
SOB/SHERMAN n   Pruritis/Rash Nausea/vomit    Tolerating PT/OT Diarrhea    Tolerating Diet y Constipation    Other Could NOT obtain due to: confused Objective: VITALS:  
Last 24hrs VS reviewed since prior progress note. Most recent are: 
Patient Vitals for the past 24 hrs: 
 Temp Pulse Resp BP SpO2  
03/19/19 1122 98.6 °F (37 °C) 96 16 117/68 93 % 03/19/19 0902  (!) 106  117/63 97 % 03/19/19 0855  (!) 121  (!) 71/51   
03/19/19 0850  (!) 115  114/64   
03/19/19 0847  95  117/56 95 % 03/19/19 0820 98.4 °F (36.9 °C) 100 18 123/67 94 % 03/19/19 0428 98.4 °F (36.9 °C) 75 16 102/77 96 % 03/19/19 0336 98.1 °F (36.7 °C) 77 16 113/60 97 % 03/19/19 0251 98.5 °F (36.9 °C) 73 16 109/67 94 % 03/19/19 0025 98.8 °F (37.1 °C) 88 15 109/62 97 % 03/18/19 2245 98.9 °F (37.2 °C) 85 15 131/45 97 % 03/18/19 2230 98.5 °F (36.9 °C) 88 14 136/62 99 % 03/18/19 2225  87 17 120/54 99 % 03/18/19 2220  85 17  100 % 03/18/19 2215  89 15 119/53 100 % 03/18/19 2210  92 16  100 % 03/18/19 2208 98.1 °F (36.7 °C) 89 18 (!) 147/93 100 % 03/18/19 2207    (!) 147/93 (!) 79 % 03/18/19 1947 98.2 °F (36.8 °C) (!) 104 14 135/57 97 % 03/18/19 1754 98 °F (36.7 °C) (!) 101 14 139/67 95 % Intake/Output Summary (Last 24 hours) at 3/19/2019 1344 Last data filed at 3/18/2019 2155 Gross per 24 hour Intake 750 ml Output 325 ml Net 425 ml PHYSICAL EXAM: 
Patient is an elderly frail woman oriented to self and hospital but could not tell me the reason or the month today yesterday she could.   She is currently on room air she is trying to eat her lunch independently is a little slow. Conjunctiva pale mucous membranes slightly tacky cardiovascular regular rate systolic murmur no rubs or gallops. Lungs crackles. Abdomen bowel sounds present soft nontender. Extremities no clubbing or edema Reviewed most current lab test results and cultures  YES Reviewed most current radiology test results   YES Review and summation of old records today    NO Reviewed patient's current orders and MAR    YES 
PMH/SH reviewed - no change compared to H&P 
________________________________________________________________________ Care Plan discussed with: 
  Comments Patient y Family  y   
RN y   
Care Manager y Consultant     
                 y Multidiciplinary team rounds were held today with , nursing, pharmacist and clinical coordinator. Patient's plan of care was discussed; medications were reviewed and discharge planning was addressed. ________________________________________________________________________ Total NON critical care TIME:    Minutes Total CRITICAL CARE TIME Spent:   Minutes non procedure based Comments >50% of visit spent in counseling and coordination of care    
________________________________________________________________________ Mario Escalante MD  
 
Procedures: see electronic medical records for all procedures/Xrays and details which were not copied into this note but were reviewed prior to creation of Plan. LABS: 
I reviewed today's most current labs and imaging studies. Pertinent labs include: 
Recent Labs  
  03/19/19 
0050 03/18/19 
0103 WBC 13.4* 13.5* HGB 8.6* 11.1*  
HCT 27.5* 33.7*  
 210 Recent Labs  
  03/19/19 
0050 03/18/19 
0103  140  
K 5.0 4.0  
* 109* CO2 21 27 * 149* BUN 26* 15  
CREA 1.51* 0.84 CA 8.1* 8.5 MG 2.1  --   
PHOS 4.5  --   
ALB 3.1* 3.5 TBILI 0.4 0.4 SGOT 23 20  
 ALT 15 18 INR  --  1.1 Signed: Kumar Varela MD

## 2019-03-19 NOTE — PERIOP NOTES
Handoff Report from Operating Room to PACU    Report received from Keenan Traore RN  and Mayito Morales CRNA regarding Dutch Hernandez. Surgeon(s):  Jad Kemp DO  And Procedure(s) (LRB):  LEFT FEMORAL INTERTROCHANTERIC NAIL INSERTION (Left)  confirmed   with allergies, drains and dressings discussed. Anesthesia type, drugs, patient history, complications, estimated blood loss, vital signs, intake and output, and last pain medication and reversal medications were reviewed.

## 2019-03-19 NOTE — PROGRESS NOTES
Occupational Therapy Goals Initiated 3/19/2019 1. Patient will perform grooming seated EOB with supervision/set-up within 7 day(s). 2.  Patient will perform supine to sit EOB with  minimal assistance/contact guard assist in preparation for adls, within 7 day(s). 3.  Patient will perform upper body bathing with supervision/set-up within 7 day(s). 4.  Patient will perform toilet transfers with maximal assistance, using best DME  within 7 day(s). 5.  Patient will perform all aspects of toileting with minimal assistance within 7 day(s). 6.  Patient will participate in upper extremity therapeutic exercise/activities with supervision/set-up for 8 minutes within 7 day(s). Occupational Therapy EVALUATION Patient: Serg Calvillo (83 y.o. female) Date: 3/19/2019 Primary Diagnosis: Hip fracture ([address] Buddy Ugalde Procedure(s) (LRB): LEFT FEMORAL INTERTROCHANTERIC NAIL INSERTION (Left) 1 Day Post-Op Precautions: fall  WBAT 
 
ASSESSMENT : 
Based on the objective data described below, the patient presents with fear of falling, history of falls and baseline dementia, some confusion,  pain in L hip post IM nail POD 1,  Generalized weakness, baseline tremors, and decreased tolerance for activity impairing adls and functional mobility. Pt is functioning well below her reported independent baseline at minimal assistance to total assistance levels for self care and maximal assistance for bed mobility. Pt was seen at bed level this date. BP was generally stable--pt had no complaints, HR increased with activity. Pt would benefit from intensive rehab at discharge, but due to her dementia, she may feel most comfortable at home in familiar surroundings with caregivers, family, and Home Health OT and PT. Will determine discharge recommendations based on pt's progress in therapy and family wishes. . 
 
Patient will benefit from skilled intervention to address the above impairments. Patients rehabilitation potential is considered to be Good Factors which may influence rehabilitation potential include:  
[]             None noted [x]             Mental ability/status [x]             Medical condition []             Home/family situation and support systems []             Safety awareness []             Pain tolerance/management 
[]             Other: PLAN : 
Recommendations and Planned Interventions: 
[x]               Self Care Training                  [x]        Therapeutic Activities [x]               Functional Mobility Training    []        Cognitive Retraining 
[x]               Therapeutic Exercises           [x]        Endurance Activities [x]               Balance Training                   []        Neuromuscular Re-Education []               Visual/Perceptual Training     [x]   Home Safety Training 
[x]               Patient Education                 [x]        Family Training/Education []               Other (comment): Frequency/Duration: Patient will be followed by occupational therapy 5 times a week to address goals. Discharge Recommendations: Rehab vs. 24 hour assistance and HH therapies Further Equipment Recommendations for Discharge: tbd SUBJECTIVE:  
Patient stated I am in the hospital. OBJECTIVE DATA SUMMARY:  
HISTORY:  
Past Medical History:  
Diagnosis Date  Dementia  Depression   
 psychiatrist: Dr Frederic Boston (on Lithium)  Fracture of wrist   
 slipped and fx left wrist, surgery scheduled 12/21/16  Full dentures   
 upper and lower  Hypothyroid Past Surgical History:  
Procedure Laterality Date  HX KYPHOPLASTY  2013  HX ORTHOPAEDIC  12/2016 Wrist surgery  HX WRIST FRACTURE TX Left 12/2016 Prior Level of Function/Environment/Context: Pt lives with her daughter who is home with her . Pt reports that she is independent in bathing and dressing and uses a SPC during mobility.  Pt reports that she has fallen in the past.  Pt enjoys moving around her home and performs household chores-dusting. Occupations in which the patient is/was successful, what are the barriers preventing that success: dementia, medical condition Performance Patterns (routines, roles, habits, and rituals):  
Personal Interests and/or values: performs housekeeping chores Expanded or extensive additional review of patient history:  
 
Home Situation Home Environment: Private residence One/Two Story Residence: One story Living Alone: No 
Support Systems: Child(tj) Current DME Used/Available at Home: Cane, straight Hand dominance: Right EXAMINATION OF PERFORMANCE DEFICITS: 
Cognitive/Behavioral Status: 
Neurologic State: Alert Orientation Level: Oriented to person;Oriented to place(oriented to day of week and month, not year) Cognition: Follows commands; Impaired decision making;Memory loss(dementia diagnosis at baseline) Perception: Appears intact Perseveration: No perseveration noted Safety/Judgement: Decreased awareness of environment;Decreased insight into deficits Skin: incision sites LLE Edema: LLE --ice pack placed on L hip sx site Hearing: Auditory Auditory Impairment: Hard of hearing, bilateral 
 
Vision/Perceptual:   
    
    
    
  
    
    
Corrective Lenses: (no glasses present, pt needs reading glasses for newspaper) Range of Motion: BUEs:   
AROM: Generally decreased, functional 
  
  
  
  
  
  
  
Strength: BUEs: generally decreased functional 
  
  
  
 
Coordination: 
Coordination: Generally decreased, functional 
Fine Motor Skills-Upper: Left Intact; Right Intact(baseline tremors) Gross Motor Skills-Upper: Left Intact; Right Intact(functional, but has baseline tremors) Tone & Sensation: 
 
Tone: (tremors at baseline): normal 
Reports no numbness or tingling Balance: 
Sitting: Impaired Sitting - Static: Good (unsupported) Sitting - Dynamic: (not tested ) Standing: (did not attempt) Standing - Static: (did not assess) Standing - Dynamic : Fair Functional Mobility and Transfers for ADLs:Bed Mobility: 
Rolling: Moderate assistance;Assist x1 Supine to Sit: Maximum assistance Sit to Supine: Maximum assistance Scooting: Supervision; Moderate assistance(s to EOB, moderate assist to [facility]) ADL Assessment: 
Feeding: Stand-by assistance;Setup;Minimum assistance Oral Facial Hygiene/Grooming: Supervision;Stand-by assistance;Minimum assistance Bathing: Maximum assistance Upper Body Dressing: Minimum assistance Lower Body Dressing: Total assistance Toileting: Total assistance ADL Intervention and task modifications: 
  
Pt able to answer orientation questions, but does not know the year or name of hospital.  She reports that she and her daughter review the calendar and read the newspaper daily. Pt needed maximal assistance for bed mobility this date. No signficant change in BP while seated EOB Cognitive Retraining Safety/Judgement: Decreased awareness of environment;Decreased insight into deficits Functional Measure: 
Barthel Index: 
 
Bathin Bladder: 0 Bowels: 5 Groomin Dressin Feedin Mobility: 0 Stairs: 0 Toilet Use: 0 Transfer (Bed to Chair and Back): 5 Total: 20/100 Percentage of impairment  
0% 1-19% 20-39% 40-59% 60-79% 80-99% 100% Barthel Score 0-100 100 99-80 79-60 59-40 20-39 1-19 
 0 The Barthel ADL Index: Guidelines 1. The index should be used as a record of what a patient does, not as a record of what a patient could do. 2. The main aim is to establish degree of independence from any help, physical or verbal, however minor and for whatever reason. 3. The need for supervision renders the patient not independent.  
4. A patient's performance should be established using the best available evidence. Asking the patient, friends/relatives and nurses are the usual sources, but direct observation and common sense are also important. However direct testing is not needed. 5. Usually the patient's performance over the preceding 24-48 hours is important, but occasionally longer periods will be relevant. 6. Middle categories imply that the patient supplies over 50 per cent of the effort. 7. Use of aids to be independent is allowed. Richy Daniels., Barthel, D.W. ([id number]). Functional evaluation: the Barthel Index. [facility] (14)2. Brenda Weston tawana MEJIA Wagner, Kamini Ji., Desireececilio Santiago., Oscarnila Powell, [facility] (1999). Measuring the change indisability after inpatient rehabilitation; comparison of the responsiveness of the Barthel Index and Functional [place] Measure. Journal of Neurology, Neurosurgery, and Psychiatry, 66(4), 847-106. LESLI Olivia.A, RANJEET Hansen, & Pan Wallace MJoeA. (2004.) Assessment of post-stroke quality of life in cost-effectiveness studies: The usefulness of the Barthel Index and the EuroQoL-5D. [facility], 13, 106-55 Occupational Therapy Evaluation Charge Determination History Examination Decision-Making LOW Complexity : Brief history review  MEDIUM Complexity : 3-5 performance deficits relating to physical, cognitive , or psychosocial skils that result in activity limitations and / or participation restrictions MEDIUM Complexity : Patient may present with comorbidities that affect occupational performnce. Miniml to moderate modification of tasks or assistance (eg, physical or verbal ) with assesment(s) is necessary to enable patient to complete evaluation Based on the above components, the patient evaluation is determined to be of the following complexity level: LOW Pain: 
  
 pt did not rate pain. Activity Tolerance:  
BP generally stable, decreased mildly in sitting--no symptoms. HR increased in sitting. Please refer to the flowsheet for vital signs taken during this treatment. After treatment:  
[] Patient left in no apparent distress sitting up in chair 
[x] Patient left in no apparent distress in bed 
[x] Call bell left within reach [x] Nursing notified 
[] Caregiver present 
[] Bed alarm activated COMMUNICATION/EDUCATION:  
The patients plan of care was discussed with: Physical Therapist and Registered Nurse. 
[] Home safety education was provided and the patient/caregiver indicated understanding. [] Patient/family have participated as able in goal setting and plan of care. [x] Patient/family agree to work toward stated goals and plan of care. [] Patient understands intent and goals of therapy, but is neutral about his/her participation. [] Patient is unable to participate in goal setting and plan of care. This patients plan of care is appropriate for delegation to [facility]. Thank you for this referral. 
Honorio Garrido OTR/L Time Calculation: 32 mins

## 2019-03-19 NOTE — BRIEF OP NOTE
BRIEF OPERATIVE NOTE    Date of Procedure: 3/18/2019   Preoperative Diagnosis: left hip fracture  Postoperative Diagnosis: left hip fracture    Procedure(s):  LEFT FEMORAL INTERTROCHANTERIC NAIL INSERTION  Surgeon(s) and Role:     Isabel Howell DO - Primary         Surgical Assistant: none    Surgical Staff:  Circ-1: Marge Roberts  Scrub Tech-1: Gavin Fitzgerald  Event Time In Time Out   Incision Start 2124    Incision Close 2152      Anesthesia: General   Estimated Blood Loss: 100cc  Specimens: * No specimens in log *   Findings: comminuted L IT hip fx   Complications: none  Implants:   Implant Name Type Inv.  Item Serial No.  Lot No. LRB No. Used Action   NAIL LETIICA 130D [id number] LT -- TFNA STRL - SNA Nail NAIL LETICIA 130D [id number] LT -- TFNA STRL NA SYNTHES [place] [id number] Left 1 Implanted   BLADE HELCL FEN 105MM STRL -- TFNA - SNA  BLADE HELCL FEN 105MM STRL -- TFNA NA SYNTHES [place] [id number] Left 1 Implanted   SCR BNE LCK T25 F/IM NL [id number] --  - SNA Screw SCR BNE LCK T25 F/IM NL [id number] --  NA SYNTHES [place] TRAY Left 1 Implanted

## 2019-03-19 NOTE — PROGRESS NOTES
Occupational Therapy Medical record reviewed. Pt participated in OT Evaluation. Full OT note to follow. Recommend rehab at discharge vs. Home with HHOT and PT and 24 hour assistance.

## 2019-03-19 NOTE — PROGRESS NOTES
Orders received, chart reviewed and patient evaluated by physical therapy. Recommend patient  discharge to SNF pending progress with skilled acute care physical therapy. Patient with orthostatic hypotension in addition to poor tolerance of weightbearing through L LE. BP decreased to 71/51 with pt in standing position therefore pt returned to supine position in bed w/ BP stabilizing. Full evaluation to follow.      Anuradha Freedman, PT, DPT

## 2019-03-19 NOTE — PROGRESS NOTES
Problem: Mobility Impaired (Adult and Pediatric) Goal: *Acute Goals and Plan of Care (Insert Text) Physical Therapy Goals Initiated 3/19/2019 1. Patient will move from supine to sit and sit to supine , scoot up and down and roll side to side in bed with minimal assistance/contact guard assist within 7 day(s). 2.  Patient will transfer from bed to chair and chair to bed with minimal assistance/contact guard assist using the least restrictive device within 7 day(s). 3.  Patient will perform sit to stand with minimal assistance/contact guard assist within 7 day(s). 4.  Patient will ambulate with minimal assistance/contact guard assist for 25 feet with the least restrictive device within 7 day(s). physical Therapy EVALUATION Patient: Bryon Farfan (36 y.o. female) Date: 3/19/2019 Primary Diagnosis: Hip fracture ([address] Kiersten Kevin Procedure(s) (LRB): LEFT FEMORAL INTERTROCHANTERIC NAIL INSERTION (Left) 1 Day Post-Op Precautions:   WBAT 
 
ASSESSMENT : 
Based on the objective data described below, the patient presents with orthostatic hypotension, increased pain levels with poor tolerance of L LE weightbearing positions, baseline dementia/confusion, decreased endurance/activity tolerance, impaired standing balance, and overall impaired functional mobility on POD 1 following L femoral intertrochanteric nail insertion. Pt received supine in bed, oriented to self only however agreeable to participation with therapy. Overall, pt required minAx2 throughout all aspects of mobility including bed mobility, sit<>stand transfers, and brief ambulation trial with RW support. Pt c/o increased dizziness upon assuming static stance w/ BP found to have decreased to 71/51. Pt demonstrated ability to take a few steps w/ RW support and Heike x2 in order to reach higher position in bed however with poor tolerance of weightbearing through L LE secondary to pain.  BP stabilized once pt returned to supine position in bed, RN notified. At this time, recommend continued x2 person assist with use of RW during all OOB mobility. Will need further clarification of pt's prior level of function as well as available family assist before making final discharge recommendation. Anticipate that pt would function best in her familiar home environment however is requiring x2 person physical assist for safe mobility while family is likely unable to provide 24/7. Recommend SNF at this time unless significant progress demonstrated during next therapy session. Patient will benefit from skilled intervention to address the above impairments. Patients rehabilitation potential is considered to be Fair Factors which may influence rehabilitation potential include:  
[]         None noted 
[]         Mental ability/status []         Medical condition 
[]         Home/family situation and support systems 
[]         Safety awareness 
[]         Pain tolerance/management 
[]         Other: PLAN : 
Recommendations and Planned Interventions: 
[x]           Bed Mobility Training             []    Neuromuscular Re-Education 
[x]           Transfer Training                   []    Orthotic/Prosthetic Training 
[x]           Gait Training                         []    Modalities [x]           Therapeutic Exercises           []    Edema Management/Control 
[x]           Therapeutic Activities            [x]    Patient and Family Training/Education 
[]           Other (comment): Frequency/Duration: Patient will be followed by physical therapy  daily to address goals. Discharge Recommendations: Blayne Marley Further Equipment Recommendations for Discharge: tbd by facility SUBJECTIVE:  
Patient stated My hip hurts.  OBJECTIVE DATA SUMMARY:  
HISTORY:   
Past Medical History:  
Diagnosis Date  Dementia  Depression   
 psychiatrist: Dr Emerson Abad (on Lithium)  Fracture of wrist   
 slipped and fx left wrist, surgery scheduled 16  Full dentures   
 upper and lower  Hypothyroid Past Surgical History:  
Procedure Laterality Date  HX KYPHOPLASTY  2013  HX ORTHOPAEDIC  2016 Wrist surgery  HX WRIST FRACTURE TX Left 2016 Prior Level of Function/Home Situation: Pt lives at home w/ daughter. Ambulates with SPC. History of multiple falls. Limited history secondary to baseline dementia and pt oriented to self only this date Personal factors and/or comorbidities impacting plan of care: dementia Home Situation Home Environment: Private residence One/Two Story Residence: One story Living Alone: No 
Support Systems: Child(tj) Current DME Used/Available at Home: Cane, straight EXAMINATION/PRESENTATION/DECISION MAKING: Critical Behavior: 
  
  
  
  
Hearing: Auditory Auditory Impairment: Hard of hearing, bilateralSkin:  Dressing clean, dry, intact Edema: none noted Range Of Motion: 
AROM: Generally decreased, functional 
  
  
  
  
  
  
  
Strength:   
Strength: Grossly decreased, non-functional 
  
  
  
  
  
  
Tone & Sensation:  
Tone: Normal 
  
  
  
  
  
  
  
  
   
Coordination: 
Coordination: Generally decreased, functional 
Vision:  
  
Functional Mobility: 
Bed Mobility: 
Rolling: Minimum assistance;Assist x2 Supine to Sit: Minimum assistance;Assist x2 Sit to Supine: Maximum assistance;Assist x2 Scooting: Supervision Transfers: 
Sit to Stand: Minimum assistance;Assist x2 Stand to Sit: Minimum assistance;Assist x2 Balance:  
Sitting: Intact Standing: Impaired; With support Standing - Static: Constant support; Fair 
Standing - Dynamic : FairAmbulation/Gait Training:  
  
  
  
  
  
  
  
  
  
  
  
  
  
  
  
  
 
Functional Measure: 
Barthel Index: 
 
Bathin Bladder: 5 Bowels: 5 Groomin Dressin Feedin Mobility: 0 Stairs: 0 Toilet Use: 0 Transfer (Bed to Chair and Back): 5 Total: 25/100 Percentage of impairment  
0% 1-19% 20-39% 40-59% 60-79% 80-99% 100% Barthel Score 0-100 100 99-80 79-60 59-40 20-39 1-19 
 0 The Barthel ADL Index: Guidelines 1. The index should be used as a record of what a patient does, not as a record of what a patient could do. 2. The main aim is to establish degree of independence from any help, physical or verbal, however minor and for whatever reason. 3. The need for supervision renders the patient not independent. 4. A patient's performance should be established using the best available evidence. Asking the patient, friends/relatives and nurses are the usual sources, but direct observation and common sense are also important. However direct testing is not needed. 5. Usually the patient's performance over the preceding 24-48 hours is important, but occasionally longer periods will be relevant. 6. Middle categories imply that the patient supplies over 50 per cent of the effort. 7. Use of aids to be independent is allowed. Veronica Green., Barthel, D.W. ([id number]). Functional evaluation: the Barthel Index. [address] [facility] (14)2. Patricia Nuñez, MEJIA, Jaki Marinelli., Karuna Gagnon., [place], [address] [place] [address] (1999). Measuring the change indisability after inpatient rehabilitation; comparison of the responsiveness of the Barthel Index and Functional [place] Measure. Journal of Neurology, Neurosurgery, and Psychiatry, 66(4), 849-754. LESLI Larose.ABDELRAHMAN, RANJEET Hansen, & Rafiq Garza MJoeA. (2004.) Assessment of post-stroke quality of life in cost-effectiveness studies: The usefulness of the Barthel Index and the EuroQoL-5D. [facility], 13, 042-73 Physical Therapy Evaluation Charge Determination History Examination Presentation Decision-Making MEDIUM  Complexity : 1-2 comorbidities / personal factors will impact the outcome/ POC  MEDIUM Complexity : 3 Standardized tests and measures addressing body structure, function, activity limitation and / or participation in recreation  MEDIUM Complexity : Evolving with changing characteristics  MEDIUM Complexity : FOTO score of 26-74 Based on the above components, the patient evaluation is determined to be of the following complexity level: MEDIUM Pain: 
Pain Scale 1: Numeric (0 - 10) Pain Intensity 1: 0 Activity Tolerance:  
Orthostatic hypotension with BP decreasing to 71/51 in static stance, recovered with supine rest 
Please refer to the flowsheet for vital signs taken during this treatment. After treatment:  
[]         Patient left in no apparent distress sitting up in chair 
[x]         Patient left in no apparent distress in bed 
[x]         Call bell left within reach [x]         Nursing notified 
[]         Caregiver present 
[]         Bed alarm activated COMMUNICATION/EDUCATION:  
The patients plan of care was discussed with: Registered Nurse. [x]         Fall prevention education was provided and the patient/caregiver indicated understanding. [x]         Patient/family have participated as able in goal setting and plan of care. [x]         Patient/family agree to work toward stated goals and plan of care. []         Patient understands intent and goals of therapy, but is neutral about his/her participation. []         Patient is unable to participate in goal setting and plan of care. Thank you for this referral. 
Shubham Moctezuma, PT, DPT Time Calculation: 21 mins

## 2019-03-19 NOTE — CONSULTS
ORTHO CONSULT    Subjective:     Date of Consultation:  March 18, 2019    Referring Physician:  ELISSA    Victor Manuel Briseno is a 80 y.o. female who is being seen for left hip fracture. Pt reports GLF yesterday. Ambulates at baseline with cane. Lives with daughter. States pt does well, is able to dress and feed herself w/o assistance. Notes pt has confusion. Admitted by medicine and medically optimized.       Patient Active Problem List    Diagnosis Date Noted    Hip fracture ([facility] [address] 03/18/2019    Bipolar disorder ([facility] [address] 03/18/2019    Auditory hallucinations 09/04/2018    Altered mental status, unspecified 02/02/2018    Bilateral carotid artery stenosis 02/02/2018    Transient ischemic attack involving left internal carotid artery 02/02/2018    Late onset Alzheimer's disease with behavioral disturbance 02/02/2018    Benign essential tremor syndrome 02/02/2018    Acute metabolic encephalopathy 83/49/1541    Laceration of left arm with complication 76/99/1706    Hypothyroidism 01/02/2014    Depression 01/02/2014     Family History   Problem Relation Age of Onset    Other Mother         Inefficient wound healing    Cancer Sister         Stomach CA    Diabetes Brother     Alzheimer Sister     Hypertension Daughter     Thyroid Disease Daughter     Other Son         Hip replacement    Depression Son     Liver Disease Son     Diabetes Son     Heart Disease Son     Depression Son       Social History     Tobacco Use    Smoking status: Former Smoker    Smokeless tobacco: Never Used   Substance Use Topics    Alcohol use: No     Past Medical History:   Diagnosis Date    Dementia     Depression     psychiatrist: Dr Carrie Bucio (on Lithium)    Fracture of wrist     slipped and fx left wrist, surgery scheduled 12/21/16    Full dentures     upper and lower    Hypothyroid       Past Surgical History:   Procedure Laterality Date    HX KYPHOPLASTY  2013   Katerina Garcia  12/2016    Wrist surgery    HX WRIST FRACTURE TX Left 12/2016      Prior to Admission medications    Medication Sig Start Date End Date Taking? Authorizing Provider   memantine (NAMENDA) 5 mg tablet Take 1 Tab by mouth two (2) times a day. Patient taking differently: Take 5 mg by mouth daily. 9/17/18   Rivera Rico MD   rivastigmine tartrate (EXELON) 1.5 mg capsule Take 3 mg by mouth every evening. 2/24/18   Provider, Historical   levothyroxine (SYNTHROID) 75 mcg tablet  12/26/17   Provider, Historical   clonazePAM (KLONOPIN) 0.5 mg tablet Take 0.5 mg by mouth every evening. Provider, Historical   PARoxetine (PAXIL) 40 mg tablet Take 40 mg by mouth daily. 1/24/17   Provider, Historical   multivitamin (ONE A DAY) tablet Take 1 Tab by mouth daily. Provider, Historical   cholecalciferol, vitamin D3, (VITAMIN D3) 2,000 unit tab Take 1 Tab by mouth daily. Provider, Historical   lithium CR (ESKALITH CR) 450 mg CR tablet Take 450 mg by mouth nightly. Provider, Historical     Current Facility-Administered Medications   Medication Dose Route Frequency    0.9% sodium chloride infusion  75 mL/hr IntraVENous CONTINUOUS    sodium chloride (NS) flush 5-40 mL  5-40 mL IntraVENous Q8H    sodium chloride (NS) flush 5-40 mL  5-40 mL IntraVENous PRN    acetaminophen (TYLENOL) tablet 650 mg  650 mg Oral Q6H    oxyCODONE IR (ROXICODONE) tablet 2.5 mg  2.5 mg Oral Q4H PRN    naloxone (NARCAN) injection 0.4 mg  0.4 mg IntraVENous PRN    ondansetron (ZOFRAN) injection 4 mg  4 mg IntraVENous Q4H PRN    senna-docusate (PERICOLACE) 8.6-50 mg per tablet 2 Tab  2 Tab Oral BID    morphine injection 1 mg  1 mg IntraVENous Q2H PRN     Allergies   Allergen Reactions    Sulfa (Sulfonamide Antibiotics) Hives        Review of Systems:  Pertinent items are noted in HPI.     Objective:     Patient Vitals for the past 8 hrs:   BP Temp Pulse Resp SpO2   03/18/19 1947 135/57 98.2 °F (36.8 °C) (!) 104 14 97 %   03/18/19 1754 139/67 98 °F (36.7 °C) (!) 101 14 95 %     Temp (24hrs), Av.2 °F (36.8 °C), Min:98 °F (36.7 °C), Max:98.6 °F (37 °C)      Gen: Well-developed,  in no acute distress , frail  HEENT: Pink conjunctivae, hearing intact to voice, moist mucous membranes   Neck: Supple  Resp: No respiratory distress   Card: RRR, palpable distal pulse-equal bilaterally, birsk cap refill all distal digits   Abd:  non-distended  Musc: LLE shortened. No sing of LE VTE. Active plant/dorsiflexion  Skin: No skin breakdown noted. Skin warm, pink, dry  Neuro: Cranial nerves are grossly intact, no focal motor weakness, follows commands appropriately   Psych: fair insight, oriented to person, place, alert     Imaging Review: XR HIP LT W OR WO PELV 2-3 VWS   INDICATION: Trauma.  COMPARISON: None.   FINDINGS: An AP view of the pelvis and a frogleg lateral view of the left hip  demonstrate acute comminuted intertrochanteric fracture with varus angulation. There is mild degenerative disease of the left hip. Vascular calcifications. Degenerative changes of the lower lumbar spine. There is vertebroplasty cement  noted in the lower thoracic/upper lumbar vertebra. IMPRESSION: Acute comminuted displaced intertrochanteric left femur fracture  with varus angulation. Data Review   Recent Results (from the past 24 hour(s))   METABOLIC PANEL, COMPREHENSIVE    Collection Time: 19  1:03 AM   Result Value Ref Range    Sodium 140 136 - 145 mmol/L    Potassium 4.0 3.5 - 5.1 mmol/L    Chloride 109 (H) 97 - 108 mmol/L    CO2 27 21 - 32 mmol/L    Anion gap 4 (L) 5 - 15 mmol/L    Glucose 149 (H) 65 - 100 mg/dL    BUN 15 6 - 20 MG/DL    Creatinine 0.84 0.55 - 1.02 MG/DL    BUN/Creatinine ratio 18 12 - 20      GFR est AA >60 >60 ml/min/1.73m2    GFR est non-AA >60 >60 ml/min/1.73m2    Calcium 8.5 8.5 - 10.1 MG/DL    Bilirubin, total 0.4 0.2 - 1.0 MG/DL    ALT (SGPT) 18 12 - 78 U/L    AST (SGOT) 20 15 - 37 U/L    Alk.  phosphatase 62 45 - 117 U/L    Protein, total 6.6 6.4 - 8.2 g/dL    Albumin 3.5 3.5 - 5.0 g/dL    Globulin 3.1 2.0 - 4.0 g/dL    A-G Ratio 1.1 1.1 - 2.2     CBC WITH AUTOMATED DIFF    Collection Time: 03/18/19  1:03 AM   Result Value Ref Range    WBC 13.5 (H) 3.6 - 11.0 K/uL    RBC 3.80 3.80 - 5.20 M/uL    HGB 11.1 (L) 11.5 - 16.0 g/dL    HCT 33.7 (L) 35.0 - 47.0 %    MCV 88.7 80.0 - 99.0 FL    MCH 29.2 26.0 - 34.0 PG    MCHC 32.9 30.0 - 36.5 g/dL    RDW 13.2 11.5 - 14.5 %    PLATELET 770 312 - 676 K/uL    MPV 10.7 8.9 - 12.9 FL    NRBC 0.0 0  WBC    ABSOLUTE NRBC 0.00 0.00 - 0.01 K/uL    NEUTROPHILS 81 (H) 32 - 75 %    LYMPHOCYTES 11 (L) 12 - 49 %    MONOCYTES 7 5 - 13 %    EOSINOPHILS 1 0 - 7 %    BASOPHILS 0 0 - 1 %    IMMATURE GRANULOCYTES 0 0.0 - 0.5 %    ABS. NEUTROPHILS 10.9 (H) 1.8 - 8.0 K/UL    ABS. LYMPHOCYTES 1.5 0.8 - 3.5 K/UL    ABS. MONOCYTES 1.0 0.0 - 1.0 K/UL    ABS. EOSINOPHILS 0.1 0.0 - 0.4 K/UL    ABS. BASOPHILS 0.1 0.0 - 0.1 K/UL    ABS. IMM.  GRANS. 0.1 (H) 0.00 - 0.04 K/UL    DF AUTOMATED     PROTHROMBIN TIME + INR    Collection Time: 03/18/19  1:03 AM   Result Value Ref Range    INR 1.1 0.9 - 1.1      Prothrombin time 10.8 9.0 - 11.1 sec   TYPE & SCREEN    Collection Time: 03/18/19  1:03 AM   Result Value Ref Range    Crossmatch Expiration 03/21/2019     ABO/Rh(D) Alyx Eugene NEGATIVE     Antibody screen NEG    EKG, 12 LEAD, INITIAL    Collection Time: 03/18/19  3:34 AM   Result Value Ref Range    Ventricular Rate 82 BPM    Atrial Rate 82 BPM    P-R Interval 182 ms    QRS Duration 76 ms    Q-T Interval 378 ms    QTC Calculation (Bezet) 441 ms    Calculated P Axis -19 degrees    Calculated R Axis -13 degrees    Calculated T Axis -33 degrees    Diagnosis       Normal sinus rhythm  Nonspecific ST abnormality  When compared with ECG of 01-FEB-2018 18:03,  CA interval has decreased  Confirmed by Iman Fenton ([id number]) on 3/18/2019 11:33:52 AM     URINALYSIS W/ REFLEX CULTURE    Collection Time: 03/18/19 11:02 AM   Result Value Ref Range    Color YELLOW/STRAW      Appearance CLEAR CLEAR      Specific gravity 1.017 1.003 - 1.030      pH (UA) 5.5 5.0 - 8.0      Protein NEGATIVE  NEG mg/dL    Glucose NEGATIVE  NEG mg/dL    Ketone NEGATIVE  NEG mg/dL    Bilirubin NEGATIVE  NEG      Blood TRACE (A) NEG      Urobilinogen 0.2 0.2 - 1.0 EU/dL    Nitrites NEGATIVE  NEG      Leukocyte Esterase TRACE (A) NEG      WBC 0-4 0 - 4 /hpf    RBC 0-5 0 - 5 /hpf    Epithelial cells FEW FEW /lpf    Bacteria NEGATIVE  NEG /hpf    UA:UC IF INDICATED CULTURE NOT INDICATED BY UA RESULT CNI           Assessment/Plan:     -pain control  -currently npo  -scds  -bedrest until surgery  -medically optimized for surgery  -discussed L hip IMN with patient and family (  The risks of surgery were explained.   Risks of infection, blood loss, neurovascular injury, anesthesia risks, and risks secondary to patient comorbidities were explained.  )      Alannah Rubalcava DO

## 2019-03-19 NOTE — PROGRESS NOTES
Initial Nutrition Assessment:    INTERVENTIONS/RECOMMENDATIONS:   · Continue current diet, texture modifications as needed  · Ensure Enlive TID (strawberry)     ASSESSMENT:   Chart reviewed. Medically noted for Comminuted and displaced left intertrochanteric hip fracture s/p Left hip cephalomedullary nailing and PMH shown below. We discussed the role that nutrition plays in healing after a fracture/surgery, specifically focusing on protein sources at each meal as well as nutrition supplements to help meet pt protein needs. She agreed to receive ensure TID. No family at bedside this morning. Pt reports poor appetite PTA. Physicall she has temporal and clavicle muscle wasting however this may be chronic due to advanced age. Current weight is estimated, unable to accurately assess recent weight loss. Past Medical History:   Diagnosis Date    Dementia     Depression     psychiatrist: Dr Lindsey Monzon (on Lithium)    Fracture of wrist     slipped and fx left wrist, surgery scheduled 12/21/16    Full dentures     upper and lower    Hypothyroid        Diet Order: (Dental soft)  % Eaten:  No data found.   Pertinent Medications: [x]Reviewed: os-kike, miralax, pericolace  Pertinent Labs: [x]Reviewed:   Food Allergies: [x]NKFA  []Other   Last BM: PTA  Edema:        []RUE   []LUE   []RLE   [x]LLE 1+     Pressure Injury:      [] Stage I   [] Stage II   [] Stage III   [] Stage IV      Wt Readings from Last 30 Encounters:   03/17/19 52.2 kg (115 lb)   01/18/19 52.6 kg (116 lb)   09/04/18 54.9 kg (121 lb)   05/21/18 56 kg (123 lb 6.4 oz)   03/06/18 56.7 kg (125 lb)   02/21/18 55.3 kg (122 lb)   02/01/18 57.2 kg (126 lb 1.7 oz)   03/17/17 58.7 kg (129 lb 6.4 oz)   03/06/17 58.5 kg (129 lb)   03/03/17 57.6 kg (127 lb)   12/21/16 57.6 kg (127 lb)   09/01/16 56.3 kg (124 lb 3.2 oz)   08/18/16 56.2 kg (124 lb)   08/11/16 56.2 kg (124 lb)   08/03/16 56.2 kg (124 lb)   07/29/16 56 kg (123 lb 7.3 oz)   07/25/16 56.7 kg (125 lb)   03/31/16 57.3 kg (126 lb 6.4 oz)   10/23/15 59 kg (130 lb)   09/15/15 59.9 kg (132 lb)   03/12/15 59.4 kg (131 lb)   10/09/14 59.2 kg (130 lb 9.6 oz)   07/08/14 59.1 kg (130 lb 3.2 oz)   01/02/14 61.7 kg (136 lb)   10/11/13 59.9 kg (132 lb 0.9 oz)   10/11/13 61.2 kg (135 lb)   09/19/13 60.3 kg (132 lb 15 oz)   07/30/10 60.4 kg (133 lb 2.5 oz)   07/28/10 60.7 kg (133 lb 13.1 oz)       Anthropometrics:   Height: 5' (152.4 cm) Weight: 52.2 kg (115 lb)   IBW (%IBW):   ( ) UBW (%UBW):   (  %)   Last Weight Metrics:  Weight Loss Metrics 3/17/2019 1/18/2019 9/4/2018 5/21/2018 3/6/2018 2/21/2018 2/1/2018   Today's Wt 115 lb 116 lb 121 lb 123 lb 6.4 oz 125 lb 122 lb 126 lb 1.7 oz   BMI 22.46 kg/m2 22.65 kg/m2 23.63 kg/m2 24.1 kg/m2 24.41 kg/m2 23.83 kg/m2 24.63 kg/m2       BMI: Body mass index is 22.46 kg/m². This BMI is indicative of:   []Underweight    [x]Normal    []Overweight    [] Obesity   [] Extreme Obesity (BMI>40)     Estimated Nutrition Needs (Based on):   1100 Kcals/day(BMR: 850 x 1.3) , 65 g(1.2 g/kg) Protein  Carbohydrate:  At Least 130 g/day  Fluids: 1100 mL/day (1ml/kcal) or per primary team    NUTRITION DIAGNOSES:   Problem:  Increased nutrient needs      Etiology: related to fracture healing     Signs/Symptoms: as evidenced by Comminuted and displaced left intertrochanteric hip fracture s/p Left hip cephalomedullary nailing      NUTRITION INTERVENTIONS:  Meals/Snacks: General/healthful diet   Supplements: Commercial supplement              GOAL:   consume >50% of meals and ONS in 3-5 days    LEARNING NEEDS (Diet, Food/Nutrient-Drug Interaction):    [x] None Identified   [] Identified and Education Provided/Documented   [] Identified and Pt declined/was not appropriate     Cultureal, Oriental orthodox, OR Ethnic Dietary Needs:    [x] None Identified   [] Identified and Addressed     [x] Interdisciplinary Care Plan Reviewed/Documented    [x] Discharge Planning: General healthy diet with adequate kcal and protein to promote fracture healing       MONITORING /EVALUATION:      Food/Nutrient Intake Outcomes:  Total energy intake  Physical Signs/Symptoms Outcomes: Weight/weight change, Electrolyte and renal profile, Glucose profile, GI    NUTRITION RISK:    [] High              [x] Moderate           []  Low  []  Minimal/Uncompromised    PT SEEN FOR:    [x]  MD Consult: []Calorie Count      []Diabetic Diet Education        []Diet Education     []Electrolyte Management     [x]General Nutrition Management and Supplements     []Management of Tube Feeding     []TPN Recommendations    []  RN Referral:  []MST score >=2     []Enteral/Parenteral Nutrition PTA     []Pregnant: Gestational DM or Multigestation     []Pressure Ulcer/Wound Care needs        []  Low BMI  []  LOS Referral       Ghazal Calderon RDN  Pager [phone]  Weekend Pager [phone]

## 2019-03-19 NOTE — PERIOP NOTES
TRANSFER - OUT REPORT:    Verbal report given to [facility] RN(name) on Yola Nielsen  being transferred to [facility](unit) for routine post - op       Report consisted of patients Situation, Background, Assessment and   Recommendations(SBAR). Information from the following report(s) SBAR, Kardex, OR Summary, Intake/Output and MAR was reviewed with the receiving nurse. Opportunity for questions and clarification was provided.       Patient transported with:   O2 @ 2 liters  Registered Nurse

## 2019-03-20 LAB
ANION GAP SERPL CALC-SCNC: 5 MMOL/L (ref 5–15)
BASOPHILS # BLD: 0 K/UL (ref 0–0.1)
BASOPHILS NFR BLD: 0 % (ref 0–1)
BUN SERPL-MCNC: 38 MG/DL (ref 6–20)
BUN/CREAT SERPL: 32 (ref 12–20)
CALCIUM SERPL-MCNC: 8.2 MG/DL (ref 8.5–10.1)
CHLORIDE SERPL-SCNC: 112 MMOL/L (ref 97–108)
CO2 SERPL-SCNC: 26 MMOL/L (ref 21–32)
CREAT SERPL-MCNC: 1.17 MG/DL (ref 0.55–1.02)
DATE LAST DOSE: NORMAL
DIFFERENTIAL METHOD BLD: ABNORMAL
EOSINOPHIL # BLD: 0 K/UL (ref 0–0.4)
EOSINOPHIL NFR BLD: 0 % (ref 0–7)
ERYTHROCYTE [DISTWIDTH] IN BLOOD BY AUTOMATED COUNT: 13.6 % (ref 11.5–14.5)
GLUCOSE SERPL-MCNC: 139 MG/DL (ref 65–100)
HCT VFR BLD AUTO: 20 % (ref 35–47)
HCT VFR BLD AUTO: 28.2 % (ref 35–47)
HGB BLD-MCNC: 6.2 G/DL (ref 11.5–16)
HGB BLD-MCNC: 9 G/DL (ref 11.5–16)
IMM GRANULOCYTES # BLD AUTO: 0.1 K/UL (ref 0–0.04)
IMM GRANULOCYTES NFR BLD AUTO: 1 % (ref 0–0.5)
LITHIUM SERPL-SCNC: 1.11 MMOL/L (ref 0.6–1.2)
LYMPHOCYTES # BLD: 2 K/UL (ref 0.8–3.5)
LYMPHOCYTES NFR BLD: 22 % (ref 12–49)
MAGNESIUM SERPL-MCNC: 2.4 MG/DL (ref 1.6–2.4)
MCH RBC QN AUTO: 28.6 PG (ref 26–34)
MCHC RBC AUTO-ENTMCNC: 31 G/DL (ref 30–36.5)
MCV RBC AUTO: 92.2 FL (ref 80–99)
MONOCYTES # BLD: 1.3 K/UL (ref 0–1)
MONOCYTES NFR BLD: 14 % (ref 5–13)
NEUTS SEG # BLD: 5.8 K/UL (ref 1.8–8)
NEUTS SEG NFR BLD: 63 % (ref 32–75)
NRBC # BLD: 0.02 K/UL (ref 0–0.01)
NRBC BLD-RTO: 0.2 PER 100 WBC
PHOSPHATE SERPL-MCNC: 1.8 MG/DL (ref 2.6–4.7)
PLATELET # BLD AUTO: 125 K/UL (ref 150–400)
PMV BLD AUTO: 11.4 FL (ref 8.9–12.9)
POTASSIUM SERPL-SCNC: 4.3 MMOL/L (ref 3.5–5.1)
RBC # BLD AUTO: 2.17 M/UL (ref 3.8–5.2)
RBC MORPH BLD: ABNORMAL
REPORTED DOSE,DOSE: NORMAL UNITS
REPORTED DOSE/TIME,TMG: NORMAL
SODIUM SERPL-SCNC: 143 MMOL/L (ref 136–145)
WBC # BLD AUTO: 9.2 K/UL (ref 3.6–11)

## 2019-03-20 PROCEDURE — 36430 TRANSFUSION BLD/BLD COMPNT: CPT

## 2019-03-20 PROCEDURE — 74011250637 HC RX REV CODE- 250/637: Performed by: NURSE PRACTITIONER

## 2019-03-20 PROCEDURE — P9016 RBC LEUKOCYTES REDUCED: HCPCS

## 2019-03-20 PROCEDURE — 74011250637 HC RX REV CODE- 250/637: Performed by: ORTHOPAEDIC SURGERY

## 2019-03-20 PROCEDURE — 80178 ASSAY OF LITHIUM: CPT

## 2019-03-20 PROCEDURE — 84100 ASSAY OF PHOSPHORUS: CPT

## 2019-03-20 PROCEDURE — 80048 BASIC METABOLIC PNL TOTAL CA: CPT

## 2019-03-20 PROCEDURE — 51798 US URINE CAPACITY MEASURE: CPT

## 2019-03-20 PROCEDURE — 97530 THERAPEUTIC ACTIVITIES: CPT

## 2019-03-20 PROCEDURE — 74011000250 HC RX REV CODE- 250: Performed by: EMERGENCY MEDICINE

## 2019-03-20 PROCEDURE — 65270000029 HC RM PRIVATE

## 2019-03-20 PROCEDURE — 83735 ASSAY OF MAGNESIUM: CPT

## 2019-03-20 PROCEDURE — 74011250636 HC RX REV CODE- 250/636: Performed by: EMERGENCY MEDICINE

## 2019-03-20 PROCEDURE — 94760 N-INVAS EAR/PLS OXIMETRY 1: CPT

## 2019-03-20 PROCEDURE — 30233N1 TRANSFUSION OF NONAUTOLOGOUS RED BLOOD CELLS INTO PERIPHERAL VEIN, PERCUTANEOUS APPROACH: ICD-10-PCS | Performed by: EMERGENCY MEDICINE

## 2019-03-20 PROCEDURE — 85025 COMPLETE CBC W/AUTO DIFF WBC: CPT

## 2019-03-20 PROCEDURE — 74011250637 HC RX REV CODE- 250/637: Performed by: EMERGENCY MEDICINE

## 2019-03-20 PROCEDURE — 74011250637 HC RX REV CODE- 250/637: Performed by: INTERNAL MEDICINE

## 2019-03-20 PROCEDURE — 85018 HEMOGLOBIN: CPT

## 2019-03-20 PROCEDURE — 36415 COLL VENOUS BLD VENIPUNCTURE: CPT

## 2019-03-20 RX ORDER — SODIUM CHLORIDE 9 MG/ML
250 INJECTION, SOLUTION INTRAVENOUS AS NEEDED
Status: DISCONTINUED | OUTPATIENT
Start: 2019-03-20 | End: 2019-03-21 | Stop reason: HOSPADM

## 2019-03-20 RX ORDER — LITHIUM CARBONATE 450 MG/1
450 TABLET ORAL
Status: DISCONTINUED | OUTPATIENT
Start: 2019-03-20 | End: 2019-03-21 | Stop reason: HOSPADM

## 2019-03-20 RX ORDER — SODIUM CHLORIDE 9 MG/ML
50 INJECTION, SOLUTION INTRAVENOUS CONTINUOUS
Status: DISCONTINUED | OUTPATIENT
Start: 2019-03-20 | End: 2019-03-21

## 2019-03-20 RX ADMIN — BISACODYL 10 MG: 10 SUPPOSITORY RECTAL at 21:07

## 2019-03-20 RX ADMIN — FAMOTIDINE 20 MG: 20 TABLET ORAL at 09:00

## 2019-03-20 RX ADMIN — ONDANSETRON 4 MG: 4 TABLET, ORALLY DISINTEGRATING ORAL at 21:27

## 2019-03-20 RX ADMIN — ASPIRIN 325 MG: 325 TABLET ORAL at 17:16

## 2019-03-20 RX ADMIN — ASPIRIN 325 MG: 325 TABLET ORAL at 08:59

## 2019-03-20 RX ADMIN — Medication 10 ML: at 14:25

## 2019-03-20 RX ADMIN — RIVASTIGMINE TARTRATE 3 MG: 1.5 CAPSULE ORAL at 17:16

## 2019-03-20 RX ADMIN — Medication 10 ML: at 21:05

## 2019-03-20 RX ADMIN — CALCIUM CARBONATE 500 MG (1,250 MG)-VITAMIN D3 200 UNIT TABLET 1 TABLET: at 17:16

## 2019-03-20 RX ADMIN — SENNOSIDES, DOCUSATE SODIUM 1 TABLET: 50; 8.6 TABLET, FILM COATED ORAL at 08:59

## 2019-03-20 RX ADMIN — CLONAZEPAM 0.25 MG: 1 TABLET ORAL at 17:16

## 2019-03-20 RX ADMIN — ACETAMINOPHEN 650 MG: 325 TABLET ORAL at 14:25

## 2019-03-20 RX ADMIN — POLYETHYLENE GLYCOL 3350 17 G: 17 POWDER, FOR SOLUTION ORAL at 08:58

## 2019-03-20 RX ADMIN — PAROXETINE HYDROCHLORIDE 40 MG: 20 TABLET, FILM COATED ORAL at 08:59

## 2019-03-20 RX ADMIN — ACETAMINOPHEN 650 MG: 325 TABLET ORAL at 21:05

## 2019-03-20 RX ADMIN — Medication 10 ML: at 06:43

## 2019-03-20 RX ADMIN — LITHIUM CARBONATE 450 MG: 450 TABLET, EXTENDED RELEASE ORAL at 21:05

## 2019-03-20 RX ADMIN — CALCIUM CARBONATE 500 MG (1,250 MG)-VITAMIN D3 200 UNIT TABLET 1 TABLET: at 12:14

## 2019-03-20 RX ADMIN — ACETAMINOPHEN 650 MG: 325 TABLET ORAL at 08:59

## 2019-03-20 RX ADMIN — LEVOTHYROXINE SODIUM 75 MCG: 75 TABLET ORAL at 06:43

## 2019-03-20 RX ADMIN — SODIUM CHLORIDE 50 ML/HR: 900 INJECTION, SOLUTION INTRAVENOUS at 14:24

## 2019-03-20 RX ADMIN — CALCIUM CARBONATE 500 MG (1,250 MG)-VITAMIN D3 200 UNIT TABLET 1 TABLET: at 08:59

## 2019-03-20 RX ADMIN — MEMANTINE HYDROCHLORIDE 5 MG: 5 TABLET ORAL at 08:59

## 2019-03-20 RX ADMIN — SODIUM PHOSPHATE, MONOBASIC, MONOHYDRATE: 276; 142 INJECTION, SOLUTION INTRAVENOUS at 14:21

## 2019-03-20 RX ADMIN — SENNOSIDES, DOCUSATE SODIUM 1 TABLET: 50; 8.6 TABLET, FILM COATED ORAL at 17:16

## 2019-03-20 RX ADMIN — ACETAMINOPHEN 650 MG: 325 TABLET ORAL at 03:21

## 2019-03-20 NOTE — PROGRESS NOTES
CM contacted pt's dtr to see if a SNF had been chosen. CM reached dtr on her mobile number [phone]. Per pt's dtr Hatley she has chosen [facility]. CM explained FOC to dtr over the phone and dtr was agreeable to give verbal consent for the [facility] form. FOC on bedside chart. CM has sent referral to [facility] and will continue to follow. Manda Clark, [address]

## 2019-03-20 NOTE — PROGRESS NOTES
Ortho / Neurosurgery NP Note POD# 2  s/p LEFT FEMORAL INTERTROCHANTERIC NAIL INSERTION Pt resting in bed. No complaints, states that pain is well controlled Hypotension with hemoglobin of 6.2 Afebrile. Voiding status: thibodeaux removed- needs to void Labs Lab Results Component Value Date/Time HGB 6.2 (L) 03/20/2019 04:32 AM  
  
Lab Results Component Value Date/Time INR 1.1 03/18/2019 01:03 AM  
  
 
Body mass index is 22.46 kg/m². : A BMI > 30 is classified as obesity and > 40 is classified as morbid obesity. Dressing c.d.i Cryotherapy in place over incision Calves soft and supple; No pain with passive stretch Sensation and motor intact SCDs for mechanical DVT proph while in bed PLAN: 
1) PT BID 
2) Expected Acute blood loss post-op anemia Transfuse 1 unit PRBC's- will recheck hemoglobin in the morning 3) Thrombocytopenia- not on any heparin products- will recheck full CBC in the morning. 4) Aspirin 325 mg PO BID for DVT Prophylaxis 5) GI Prophylaxis - Pepcid 6) Readniess for discharge: 
   [x] Vital Signs stable  
 [] Hgb stable- labs ordered for tomorrow 
 [] + Voiding- thibodeaux removed- will f/u on voiding  
 [x] Wound intact, drainage minimal  
 [x] Tolerating PO intake   
 [] Cleared by PT (OT if applicable) [] Stair training completed (if applicable) [] Independent / Contact Guard Assist (household distance) [] Bed mobility [] Car transfers  
  [] ADLs [x] Adequate pain control on oral medication alone Additional discharge planning per primary team  
 
 
 
Ron Reza NP 
DNP, ACNP-BC, ONP-C

## 2019-03-20 NOTE — PROGRESS NOTES
Orthopedic End of Shift Note Bedside shift change report given to UMANG (oncoming nurse) by Beti Swanson (offgoing nurse). Report included the following information SBAR, Kardex, Procedure Summary, Intake/Output, MAR, Accordion, Recent Results and Med Rec Status. POD# 2 Significant issues during shiftnone Issues for Physician to [employer] Activity This Shift 
(check all that apply) [] chair 
[] dangle 
 [] bathroom 
[] bedside commode [] hallway [x] bedrest  
Nausea/Vomiting [] yes [x] no    
Voiding Status [] void [x] Slater [] I&O Bowel Movements [] yes [x] no Foot Pumps or SCD [] yes [x] no Ice Pack [x] yes    [] no Incentive Spirometer [] yes [] no Volume:     
Telemetry Monitoring   [] yes [x] no Rhythm:  
Supplemental O2 [x] yes [] no Sat off O2:   95%

## 2019-03-20 NOTE — PROGRESS NOTES
Physical Therapy Note: 
Reviewed chart with noted HGB 6.2 this am. Discussed with hakeem who is preparing to give 1 uPRBC. Patient was symptomatic of orthostatic hypotension with PT yesterday. Will follow up as able for pm pending medical stability.

## 2019-03-20 NOTE — PROGRESS NOTES
Late Entry: 
 
Yesterday CM attempted x3 to complete assessment with pt while family was in room however patient was working with PT, OT, and nursing on the 3 different attempts. CM will follow up today. Davi Portillo, [address], [address]

## 2019-03-20 NOTE — PROGRESS NOTES
Problem: Mobility Impaired (Adult and Pediatric) Goal: *Acute Goals and Plan of Care (Insert Text) Description Physical Therapy Goals Initiated 3/19/2019 1. Patient will move from supine to sit and sit to supine , scoot up and down and roll side to side in bed with minimal assistance/contact guard assist within 7 day(s). 2.  Patient will transfer from bed to chair and chair to bed with minimal assistance/contact guard assist using the least restrictive device within 7 day(s). 3.  Patient will perform sit to stand with minimal assistance/contact guard assist within 7 day(s). 4.  Patient will ambulate with minimal assistance/contact guard assist for 25 feet with the least restrictive device within 7 day(s). Outcome: Progressing Towards Goal 
 PHYSICAL THERAPY TREATMENT Patient: Eliazar Arce (14 y.o. female) Date: 3/20/2019 Diagnosis: Hip fracture ([facility] [address] [S72.009A] <principal problem not specified> Procedure(s) (LRB): LEFT FEMORAL INTERTROCHANTERIC NAIL INSERTION (Left) 2 Days Post-Op Precautions: WBAT Chart, physical therapy assessment, plan of care and goals were reviewed. ASSESSMENT: 
Patient cleared for intervention following a blood transfusion this am. Patient required additional time and volume for command following  but did so consistently. Facilitated standing to bedside RW and transfer to a chair. ~4' with maximum assist d/t difficulty tolerating WB on LLE and impaired step clearance. Patient left in bedside chair in NAD with her daughter present. Vitals stable with intervention. Progression toward goals: 
?    Improving appropriately and progressing toward goals ? Improving slowly and progressing toward goals ? Not making progress toward goals and plan of care will be adjusted PLAN: 
Patient continues to benefit from skilled intervention to address the above impairments. Continue treatment per established plan of care. Discharge Recommendations:  Rehab Further Equipment Recommendations for Discharge:  to be determined SUBJECTIVE:  
Patient stated ? It feels good to be up.? OBJECTIVE DATA SUMMARY:  
Critical Behavior: 
Neurologic State: Alert, Eyes open spontaneously Orientation Level: Oriented to person, Oriented to place Cognition: Follows commands Safety/Judgement: Decreased awareness of environment, Decreased insight into deficits Functional Mobility Training: 
Bed Mobility: 
  
Supine to Sit: Moderate assistance;Assist x2 Scooting: Moderate assistance Transfers: 
Sit to Stand: Minimum assistance;Assist x2; Additional time Balance: 
Sitting: Impaired Sitting - Static: Good (unsupported) Sitting - Dynamic: Fair (occasional) Standing: Impaired Standing - Static: Poor Standing - Dynamic : Poor Ambulation/Gait Training: 
Distance (ft): 4 Feet (ft) Assistive Device: Gait belt;Walker, rolling Ambulation - Level of Assistance: Maximum assistance Gait Abnormalities: Antalgic;Decreased step clearance;Shuffling gait Base of Support: Narrowed Stance: Left decreased Speed/Sandy: Pace decreased (<100 feet/min) Step Length: Right shortened Swing Pattern: Left asymmetrical 
  
  
  
  
  
Stairs: 
  
  
   
 
Neuro Re-Education: 
Therapeutic Exercises:  
Seated ankle pumps, LAQs x10 Pain: 
Pain Scale 1: Numeric (0 - 10) Pain Intensity 1: 0 Activity Tolerance:  
Please refer to the flowsheet for vital signs taken during this treatment. After treatment:  
?    Patient left in no apparent distress sitting up in chair ? Patient left in no apparent distress in bed 
? Call bell left within reach ? Nursing notified ? Caregiver present ? Bed alarm activated COMMUNICATION/COLLABORATION:  
The patient?s plan of care was discussed with: Registered nurse Alexsandra Henson, PT, DPT Time Calculation: 21 mins

## 2019-03-20 NOTE — PROGRESS NOTES
Hospitalist Progress Note NAME: Nisreen Mcmillan :  1926 MRN:  [id number] Assessment / Plan: 
 
Left Hip Fx due to [facility] S/p surgery 3/18 Left hip cephalomedullary nailing. Post op acute metabolic encephalopathy Acute blood loss anemia TAWANA post op with orthostatic hypotension 
-Pain Mx and DVT Px as per orthopedic surgery 
-some post op confusion, follow closely with supportive measures 
-post op anemia, transfuse if needed hgb from 11 to 8.6 to 6.2 today 
-post op orthostasis, IVF bolus and increase IVFs, follow BP, now better, but has not been up again 
-transfuse 1 unit, recheck h/h after 
-mentation seems more baseline today, follow supportively TAWANA 
suspect multifactorial, dehydration, hypotension, anemia Mild hyperk, resolved 
hypophos 
-creat to 1.5 to 1.1 today from 0.84 
-stable lithium level again in am, dose held last night, will resume lithium tonight 
-pharmacy to assist with further dosing if needed 
-transfusion today and cut IVFs, encourage pos 
-renal eval if needed 
-watch urine output and BP 
-watch electrolytes and K levels, K normal today 4.3 
-supp phos IV and recheck in am 
  
Leukocytosis Suspect stress related, resolved 
neg UA and CXR Follow, no fever 
  BiPolar Late onset Alzheimer's disease with behavioral disturbance - Mild Lithium level-this am 1.1 Continue Home meds. resume lithium TAWANA better Will continue Clonazepam at lower dose with hold parameters for sedation Risk of sundowning perioperatively 
  
Hypothyroidism Continue synthroid 
  
Code Status: DNR/DNI Surrogate Decision Maker: Daughter 
  
DVT Prophylaxis: Per Orthopedic surgery on ASA 
  
Baseline: uses mian for ambulation 18.5 - 24.9 Normal weight / Body mass index is 22.46 kg/m². Recommended Disposition: SNF/LTC, needs 3 MNs and clearance by ortho Subjective: Chief Complaint / Reason for Physician Visit L hip pain 3/19 Patient had a surgery yesterday evening. This morning she has had some increased confusion according to the daughter and had some orthostatic hypotension when they tried to get her up with therapy. The patient says she is having some \"trouble\" cannot specify she is trying to eat her lunch. She denies any shortness of breath or chest pain or dizziness but is confused slightly more than yesterday. 3/20 pt feels \"weak\" no pain, no sob. Patient was evaluated at 9:15am 
 
Discussed with RN events overnight. Review of Systems: 
Symptom Y/N Comments  Symptom Y/N Comments Fever/Chills    Chest Pain n   
Poor Appetite    Edema Cough    Abdominal Pain Sputum    Joint Pain n   
SOB/SHERMAN n   Pruritis/Rash Nausea/vomit    Tolerating PT/OT Diarrhea    Tolerating Diet y Constipation    Other Could NOT obtain due to: confused Objective: VITALS:  
Last 24hrs VS reviewed since prior progress note. Most recent are: 
Patient Vitals for the past 24 hrs: 
 Temp Pulse Resp BP SpO2  
03/20/19 1037 97.6 °F (36.4 °C) 89 18 103/53 93 % 03/20/19 1022 98.3 °F (36.8 °C) 85 18 107/54 95 % 03/20/19 0744 98.9 °F (37.2 °C) 90 18 100/59 94 % 03/20/19 0503 99 °F (37.2 °C) 92 14 105/58 93 % 03/20/19 0003 98.9 °F (37.2 °C) 97 18 105/66 99 % 03/19/19 2051 98.2 °F (36.8 °C) (!) 110 18 108/59 94 % 03/19/19 1532  (!) 135  103/63   
03/19/19 1526  (!) 115  113/62   
03/19/19 1122 98.6 °F (37 °C) 96 16 117/68 93 % Intake/Output Summary (Last 24 hours) at 3/20/2019 1110 Last data filed at 3/19/2019 2007 Gross per 24 hour Intake  Output 700 ml Net -700 ml PHYSICAL EXAM: 
Patient is an elderly frail woman oriented to self and hospital but still could not tell me the reason, she knows the month and president today  She is currently on room air, seems more baseline now.  Conjunctiva pale mucous membranes slightly tacky cardiovascular regular rate systolic murmur no rubs or gallops. Lungs crackles. Abdomen bowel sounds present soft nontender. Extremities no clubbing or edema Reviewed most current lab test results and cultures  YES Reviewed most current radiology test results   YES Review and summation of old records today    NO Reviewed patient's current orders and MAR    YES 
PMH/SH reviewed - no change compared to H&P 
________________________________________________________________________ Care Plan discussed with: 
  Comments Patient y Family RN y   
Care Manager Consultant Multidiciplinary team rounds were held today with , nursing, pharmacist and clinical coordinator. Patient's plan of care was discussed; medications were reviewed and discharge planning was addressed. ________________________________________________________________________ Total NON critical care TIME:    Minutes Total CRITICAL CARE TIME Spent:   Minutes non procedure based Comments >50% of visit spent in counseling and coordination of care    
________________________________________________________________________ Alyssa Shay MD  
 
Procedures: see electronic medical records for all procedures/Xrays and details which were not copied into this note but were reviewed prior to creation of Plan. LABS: 
I reviewed today's most current labs and imaging studies. Pertinent labs include: 
Recent Labs  
  03/20/19 
0432 03/19/19 
0050 03/18/19 
0103 WBC 9.2 13.4* 13.5* HGB 6.2* 8.6* 11.1*  
HCT 20.0* 27.5* 33.7*  
* 187 210 Recent Labs  
  03/20/19 
0432 03/19/19 
0050 03/18/19 
0103  138 140  
K 4.3 5.0 4.0  
* 109* 109* CO2 26 21 27 * 178* 149* BUN 38* 26* 15  
CREA 1.17* 1.51* 0.84 CA 8.2* 8.1* 8.5 MG 2.4 2.1  --   
PHOS 1.8* 4.5  --   
ALB  --  3.1* 3.5 TBILI  --  0.4 0.4 SGOT  --  23 20 ALT  --  15 18 INR  --   --  1.1 Signed: Alyssa Shay MD

## 2019-03-20 NOTE — PROGRESS NOTES
Reason for Admission:  Hip fracture RRAT Score: 17 Do you (patient/family) have any concerns for transition/discharge? No concerns at this time Plan for utilizing home health: no recommendations at this time Likelihood of readmission? Moderate- fall risk Transition of Care Plan:       
 
CM contacted patient's daughter Pravin Baig ([phone]) to complete assessment. Pt's dtr confirmed all demographics, emergency contact, insurance, and PCP (last seen 2 months ago). Pt uses [address] in [place] on [address]. Pt lives with daughter and son-in-law in single level home with 2 entry steps. Patient has DME including: cane, walker, raised toilet seat, and shower chair. Prior to admission patient was independent with ADL and IADLs (mechanical assistance and some human help when tired). Patient does not drive and relies on her daughter for transportation needs. Pt has used HH in the past but dtr unsure of agency used. Pt has been to a SNF in [place] in the past and has also been to [facility] for IPR. CM discussed with pt's dtr that SNF is being recommended for d/c. Pt's dtr stated that she would like to stay in the [facility], [facility] and [address] [facility] [facility] were mentioned. CM advised pt's dtr to look facilities up on Medicare web site for star ratings. Pt's dtr stated she would contact CM back as soon as she has looked into the facilities. NN message sent to Chester Soto. CM will continue to follow. Care Management Interventions PCP Verified by CM: Yes Transition of Care Consult (CM Consult): Discharge Planning Discharge Durable Medical Equipment: No 
Current Support Network: Relative's Home(pt lives with daughter and son-in-law in single level home with 2 entry steps ) Confirm Follow Up Transport: Family Plan discussed with Pt/Family/Caregiver: Yes Manda Reyes, [address]

## 2019-03-21 VITALS
BODY MASS INDEX: 22.58 KG/M2 | TEMPERATURE: 98 F | SYSTOLIC BLOOD PRESSURE: 119 MMHG | RESPIRATION RATE: 16 BRPM | WEIGHT: 115 LBS | DIASTOLIC BLOOD PRESSURE: 51 MMHG | HEART RATE: 87 BPM | HEIGHT: 60 IN | OXYGEN SATURATION: 95 %

## 2019-03-21 LAB
ANION GAP SERPL CALC-SCNC: 5 MMOL/L (ref 5–15)
BASOPHILS # BLD: 0 K/UL (ref 0–0.1)
BASOPHILS NFR BLD: 1 % (ref 0–1)
BUN SERPL-MCNC: 27 MG/DL (ref 6–20)
BUN/CREAT SERPL: 36 (ref 12–20)
CALCIUM SERPL-MCNC: 8.6 MG/DL (ref 8.5–10.1)
CHLORIDE SERPL-SCNC: 114 MMOL/L (ref 97–108)
CO2 SERPL-SCNC: 26 MMOL/L (ref 21–32)
CREAT SERPL-MCNC: 0.76 MG/DL (ref 0.55–1.02)
DIFFERENTIAL METHOD BLD: ABNORMAL
EOSINOPHIL # BLD: 0.1 K/UL (ref 0–0.4)
EOSINOPHIL NFR BLD: 2 % (ref 0–7)
ERYTHROCYTE [DISTWIDTH] IN BLOOD BY AUTOMATED COUNT: 15 % (ref 11.5–14.5)
GLUCOSE SERPL-MCNC: 122 MG/DL (ref 65–100)
HCT VFR BLD AUTO: 25.7 % (ref 35–47)
HGB BLD-MCNC: 8.3 G/DL (ref 11.5–16)
IMM GRANULOCYTES # BLD AUTO: 0 K/UL (ref 0–0.04)
IMM GRANULOCYTES NFR BLD AUTO: 0 % (ref 0–0.5)
LYMPHOCYTES # BLD: 1.9 K/UL (ref 0.8–3.5)
LYMPHOCYTES NFR BLD: 40 % (ref 12–49)
MAGNESIUM SERPL-MCNC: 2.2 MG/DL (ref 1.6–2.4)
MCH RBC QN AUTO: 29.6 PG (ref 26–34)
MCHC RBC AUTO-ENTMCNC: 32.3 G/DL (ref 30–36.5)
MCV RBC AUTO: 91.8 FL (ref 80–99)
MONOCYTES # BLD: 0.6 K/UL (ref 0–1)
MONOCYTES NFR BLD: 13 % (ref 5–13)
NEUTS BAND NFR BLD MANUAL: 5 %
NEUTS SEG # BLD: 2.1 K/UL (ref 1.8–8)
NEUTS SEG NFR BLD: 39 % (ref 32–75)
NRBC # BLD: 0.05 K/UL (ref 0–0.01)
NRBC BLD-RTO: 1.1 PER 100 WBC
PHOSPHATE SERPL-MCNC: 2.1 MG/DL (ref 2.6–4.7)
PLATELET # BLD AUTO: 114 K/UL (ref 150–400)
PMV BLD AUTO: 11.4 FL (ref 8.9–12.9)
POTASSIUM SERPL-SCNC: 4.2 MMOL/L (ref 3.5–5.1)
RBC # BLD AUTO: 2.8 M/UL (ref 3.8–5.2)
RBC MORPH BLD: ABNORMAL
SODIUM SERPL-SCNC: 145 MMOL/L (ref 136–145)
WBC # BLD AUTO: 4.7 K/UL (ref 3.6–11)

## 2019-03-21 PROCEDURE — 84100 ASSAY OF PHOSPHORUS: CPT

## 2019-03-21 PROCEDURE — 36415 COLL VENOUS BLD VENIPUNCTURE: CPT

## 2019-03-21 PROCEDURE — 83735 ASSAY OF MAGNESIUM: CPT

## 2019-03-21 PROCEDURE — 77030011943

## 2019-03-21 PROCEDURE — 74011250637 HC RX REV CODE- 250/637: Performed by: ORTHOPAEDIC SURGERY

## 2019-03-21 PROCEDURE — 74011250637 HC RX REV CODE- 250/637: Performed by: INTERNAL MEDICINE

## 2019-03-21 PROCEDURE — 97530 THERAPEUTIC ACTIVITIES: CPT

## 2019-03-21 PROCEDURE — 74011250637 HC RX REV CODE- 250/637: Performed by: EMERGENCY MEDICINE

## 2019-03-21 PROCEDURE — 51798 US URINE CAPACITY MEASURE: CPT

## 2019-03-21 PROCEDURE — 80048 BASIC METABOLIC PNL TOTAL CA: CPT

## 2019-03-21 PROCEDURE — 85025 COMPLETE CBC W/AUTO DIFF WBC: CPT

## 2019-03-21 PROCEDURE — 74011250637 HC RX REV CODE- 250/637: Performed by: NURSE PRACTITIONER

## 2019-03-21 PROCEDURE — 77030038269 HC DRN EXT URIN PURWCK BARD -A

## 2019-03-21 RX ORDER — POLYETHYLENE GLYCOL 3350 17 G/17G
17 POWDER, FOR SOLUTION ORAL DAILY
Qty: 15 PACKET | Refills: 0 | Status: SHIPPED | OUTPATIENT
Start: 2019-03-22 | End: 2019-04-06

## 2019-03-21 RX ORDER — NALOXONE HYDROCHLORIDE 4 MG/.1ML
SPRAY NASAL
Qty: 1 EACH | Refills: 0 | Status: SHIPPED | OUTPATIENT
Start: 2019-03-21 | End: 2019-04-19

## 2019-03-21 RX ORDER — AMOXICILLIN 250 MG
1 CAPSULE ORAL 2 TIMES DAILY
Qty: 30 TAB | Refills: 0 | Status: SHIPPED | OUTPATIENT
Start: 2019-03-21 | End: 2019-04-05

## 2019-03-21 RX ORDER — CLONAZEPAM 0.5 MG/1
0.5 TABLET ORAL EVERY EVENING
Qty: 5 TAB | Refills: 0 | Status: SHIPPED | OUTPATIENT
Start: 2019-03-21 | End: 2019-03-26

## 2019-03-21 RX ORDER — ASPIRIN 325 MG
325 TABLET, DELAYED RELEASE (ENTERIC COATED) ORAL 2 TIMES DAILY
Qty: 60 TAB | Refills: 0 | Status: SHIPPED | OUTPATIENT
Start: 2019-03-21 | End: 2019-04-20

## 2019-03-21 RX ORDER — OXYCODONE HYDROCHLORIDE 5 MG/1
5 TABLET ORAL
Qty: 30 TAB | Refills: 0 | Status: SHIPPED | OUTPATIENT
Start: 2019-03-21 | End: 2019-04-04

## 2019-03-21 RX ORDER — ACETAMINOPHEN 325 MG/1
650 TABLET ORAL EVERY 6 HOURS
Qty: 112 TAB | Refills: 0 | Status: SHIPPED | OUTPATIENT
Start: 2019-03-21 | End: 2019-04-04

## 2019-03-21 RX ORDER — FAMOTIDINE 20 MG/1
20 TABLET, FILM COATED ORAL DAILY
Qty: 30 TAB | Refills: 0 | Status: SHIPPED | OUTPATIENT
Start: 2019-03-22 | End: 2019-04-21

## 2019-03-21 RX ADMIN — POLYETHYLENE GLYCOL 3350 17 G: 17 POWDER, FOR SOLUTION ORAL at 08:48

## 2019-03-21 RX ADMIN — Medication 10 ML: at 14:25

## 2019-03-21 RX ADMIN — ACETAMINOPHEN 650 MG: 325 TABLET ORAL at 14:25

## 2019-03-21 RX ADMIN — CALCIUM CARBONATE 500 MG (1,250 MG)-VITAMIN D3 200 UNIT TABLET 1 TABLET: at 08:48

## 2019-03-21 RX ADMIN — ONDANSETRON 4 MG: 4 TABLET, ORALLY DISINTEGRATING ORAL at 05:51

## 2019-03-21 RX ADMIN — CALCIUM CARBONATE 500 MG (1,250 MG)-VITAMIN D3 200 UNIT TABLET 1 TABLET: at 12:55

## 2019-03-21 RX ADMIN — ASPIRIN 325 MG: 325 TABLET ORAL at 08:48

## 2019-03-21 RX ADMIN — ACETAMINOPHEN 650 MG: 325 TABLET ORAL at 08:47

## 2019-03-21 RX ADMIN — LEVOTHYROXINE SODIUM 75 MCG: 75 TABLET ORAL at 05:51

## 2019-03-21 RX ADMIN — MEMANTINE HYDROCHLORIDE 5 MG: 5 TABLET ORAL at 08:48

## 2019-03-21 RX ADMIN — Medication 10 ML: at 05:48

## 2019-03-21 RX ADMIN — DIBASIC SODIUM PHOSPHATE, MONOBASIC POTASSIUM PHOSPHATE AND MONOBASIC SODIUM PHOSPHATE 1 TABLET: 852; 155; 130 TABLET ORAL at 14:25

## 2019-03-21 RX ADMIN — PAROXETINE HYDROCHLORIDE 40 MG: 20 TABLET, FILM COATED ORAL at 08:47

## 2019-03-21 RX ADMIN — FAMOTIDINE 20 MG: 20 TABLET ORAL at 08:48

## 2019-03-21 RX ADMIN — SENNOSIDES, DOCUSATE SODIUM 1 TABLET: 50; 8.6 TABLET, FILM COATED ORAL at 08:48

## 2019-03-21 NOTE — PROGRESS NOTES
CM acknowledged d/c order. [facility] is able to accept patient today per Collin Lancaster ([facility] liaison). [facility] referral sent requesting 1:00pm transport time. [facility] is able to accommodate a 3:00pm transport time. CM has notified [facility] liaison [facility]) of transport time. CM to complete PCS at this time. UPDATE 12:39pm 
 
CM has contacted pt's dtr to let her know of 3:00pm transport time. PCS on chart. Call report [phone] CM completed needs of patient at this time. Care Management Interventions PCP Verified by CM: Yes Mode of Transport at Discharge: BLS([facility]) Transition of Care Consult (CM Consult): Discharge Planning Discharge Durable Medical Equipment: No 
Physical Therapy Consult: Yes Occupational Therapy Consult: Yes Speech Therapy Consult: No 
Current Support Network: Relative's Home, Other(pt lives with daughter and son-in-law in single level home with 2 entry steps ) Confirm Follow Up Transport: Family Plan discussed with Pt/Family/Caregiver: Yes Freedom of Choice Offered: Yes Discharge Location Discharge Placement: Skilled nursing facility([facility]) Caryle Marten, [address] [facility], [address] [facility]

## 2019-03-21 NOTE — ROUTINE PROCESS
3/21/2019 
7:53 AM 
 
Orthopedic End of Shift Note Bedside and Verbal shift change report given to CORIN navarro (oncoming nurse) by CORIN Mendieta (offgoing nurse). Report included the following information SBAR, Kardex, ED Summary, OR Summary, Procedure Summary, Intake/Output, MAR, Accordion, Recent Results and Med Rec Status. POD# 3 Significant issues during shift: Nausea/abdominal pain; poor historian; Straight cathed. Issues for Physician to address: Activity This Shift 
(check all that apply) [] chair 
[] dangle 
 [] bathroom 
[] bedside commode [] hallway 
[] bedrest  
Nausea/Vomiting [x] yes [] no    
Voiding Status [x] void [] Slater [x] I&O Bowel Movements [] yes [x] no Foot Pumps or SCD [x] yes [] no Ice Pack [x] yes    [] no Incentive Spirometer [x] yes [] no Volume:   250 Telemetry Monitoring   [] yes [x] no Rhythm:  
Supplemental O2 [] yes [x] no Sat off O2:   96%

## 2019-03-21 NOTE — ROUTINE PROCESS
Bedside and Verbal shift change report given to Conrad De Leon (oncoming nurse) by Kevin Jon (offgoing nurse). Report included the following information SBAR, Kardex, Intake/Output, MAR and Recent Results.

## 2019-03-21 NOTE — PROGRESS NOTES
Ortho / Neurosurgery NP Note POD# 2  s/p LEFT FEMORAL INTERTROCHANTERIC NAIL INSERTION Pt resting in bed. No complaints, states that pain is well controlled Hypotension with hemoglobin of 6.2 yesterday, was given 1 unit PRBC, Hemoglobin this morning 8.3. Thrombocytopenia with platelets of 045. Afebrile. Voiding status: I/O cathed overnight, will need follow up PVR before discharge to skilled nursing facility. Labs Lab Results Component Value Date/Time HGB 8.3 (L) 03/21/2019 06:10 AM  
  
Lab Results Component Value Date/Time INR 1.1 03/18/2019 01:03 AM  
  
 
Body mass index is 22.46 kg/m². : A BMI > 30 is classified as obesity and > 40 is classified as morbid obesity. Dressing c.d.i Cryotherapy in place over incision Calves soft and supple; No pain with passive stretch Sensation and motor intact SCDs for mechanical DVT proph while in bed PLAN: 
1) PT BID 
2) Expected Acute blood loss post-op anemia- replaced with 1 unit PRBC;s with increase in hemoglobin. 3) Thrombocytopenia- not on any heparin products- would recheck CBC outpatient- Dr. Augustus Hyman aware- will defer management to her. 4) Aspirin 325 mg PO BID for DVT Prophylaxis 5) GI Prophylaxis - Pepcid 6) Readniess for discharge: 
   [x] Vital Signs stable  
 [x] Hgb stable 
 [] + Voiding- thibodeaux removed- will f/u on voiding  
 [x] Wound intact, drainage minimal  
 [x] Tolerating PO intake   
 [] Cleared by PT (OT if applicable) [] Stair training completed (if applicable) [] Independent / Contact Guard Assist (household distance) [] Bed mobility [] Car transfers  
  [] ADLs [x] Adequate pain control on oral medication alone Plan to discharge to SNF per primary team.   
 
 
 
Katie Faria, NP 
DNP, ACNP-BC, ONP-C

## 2019-03-21 NOTE — PROGRESS NOTES
Medicare pt has received, reviewed, and signed 2nd IM letter informing them of their right to appeal the discharge. Signed copied has been placed on pt bedside chart. Maritza Cho [phone]

## 2019-03-21 NOTE — PROGRESS NOTES
Verbal report called to [facility] [employer]. Information included SBAR, Kardex, MAr, all recent results. Opportunity for questions given, all questions answered. IV removed per policy, catheter tip intact. Patient being transported to [facility]>

## 2019-03-21 NOTE — PROGRESS NOTES
Problem: Mobility Impaired (Adult and Pediatric) Goal: *Acute Goals and Plan of Care (Insert Text) Description Physical Therapy Goals Initiated 3/19/2019 1. Patient will move from supine to sit and sit to supine , scoot up and down and roll side to side in bed with minimal assistance/contact guard assist within 7 day(s). 2.  Patient will transfer from bed to chair and chair to bed with minimal assistance/contact guard assist using the least restrictive device within 7 day(s). 3.  Patient will perform sit to stand with minimal assistance/contact guard assist within 7 day(s). 4.  Patient will ambulate with minimal assistance/contact guard assist for 25 feet with the least restrictive device within 7 day(s). Outcome: Progressing Towards Goal 
 PHYSICAL THERAPY TREATMENT Patient: Dutch Hernandez (38 y.o. female) Date: 3/21/2019 [facility] [S72.009A] <principal problem not specified> Procedure(s) (LRB): LEFT FEMORAL INTERTROCHANTERIC NAIL INSERTION (Left) 3 Days Post-Op Precautions: WBAT Chart, physical therapy assessment, plan of care and goals were reviewed. ASSESSMENT: 
Pt cleared by nurse to mobilize. Pt received in bed spine. Pt agreeable to therapy but reported having pain. Pt performed supine to sit at min A x2 with additional time. Pt able to scoot to EOB at min A. Pt performed sit to stand transfer at min A/CGA x2. Pt able to take step over to chair 4ft with RW at min A/CGA. Pt requiring min A at times for weight shifting. Pt with improved ability to bear weight on LLE. Pt left up in chair with all needs met. Pt will benefit from SNF to improve strength and endurance for safe functional mobility. Progression toward goals: 
?    Improving appropriately and progressing toward goals ? Improving slowly and progressing toward goals ? Not making progress toward goals and plan of care will be adjusted PLAN: 
 Patient continues to benefit from skilled intervention to address the above impairments. Continue treatment per established plan of care. Discharge Recommendations:  Blayne Marley Further Equipment Recommendations for Discharge:  TBD by rehab SUBJECTIVE:  
Patient stated ? Ypu are so patient. ? OBJECTIVE DATA SUMMARY:  
Critical Behavior: 
Neurologic State: Alert Orientation Level: Oriented to person Cognition: Follows commands Safety/Judgement: Decreased awareness of environment, Decreased insight into deficits Functional Mobility Training: 
Bed Mobility: 
Rolling: Minimum assistance;Assist x2 Supine to Sit: Minimum assistance;Assist x2 Scooting: Minimum assistance Transfers: 
Sit to Stand: Minimum assistance;Contact guard assistance;Assist x2; Additional time Stand to Sit: Contact guard assistance;Assist x2 Balance: 
Sitting: Intact Sitting - Static: Good (unsupported) Sitting - Dynamic: Fair (occasional) Standing: Impaired Standing - Static: Fair;Constant support Standing - Dynamic : Poor Ambulation/Gait Training: 
Distance (ft): 4 Feet (ft) Assistive Device: Gait belt;Walker, rolling Ambulation - Level of Assistance: Moderate assistance;Minimal assistance Gait Abnormalities: Antalgic;Decreased step clearance; Step to gait(trunk flexion ) Base of Support: Narrowed Stance: Left decreased Speed/Sandy: Pace decreased (<100 feet/min) Step Length: Left shortened;Right shortened Pain: 
Pain Scale 1: Visual 
Pain Intensity 1: 3 Pain Location 1: Hip Pain Orientation 1: Left Pain Description 1: Aching Activity Tolerance: Pt with improved mobility tolerance today. After treatment:  
?    Patient left in no apparent distress sitting up in chair ? Patient left in no apparent distress in bed 
? Call bell left within reach ? Nursing notified ? Caregiver present ? Bed alarm activated COMMUNICATION/COLLABORATION:  
The patient?s plan of care was discussed with: Registered Nurse Laly Kelley PTA Time Calculation: 15 mins

## 2019-03-21 NOTE — DISCHARGE INSTRUCTIONS
Follow up appointments  Call Lucrecia Quesada at [phone] to schedule follow up appt with Dr. Janice Houser in  10 - 14 days. When to call your Orthopaedic Surgeon:  -unrelieved pain  -Signs of infection-if your incision is red; continues to have drainage; drainage has a foul odor or if you have a persistent fever over 101 degrees  -Signs of a blood clot in your leg-calf pain, tenderness, redness, swelling of lower leg    When to call your Primary Care Physician:  -Concerns about medical conditions such as diabetes, high blood pressure, asthma, congestive heart failure  -Call if blood sugars are elevated, persistent headache or dizziness, coughing or congestion, constipation or diarrhea, burning with urination, abnormal heart rate  When to call 911and go to the nearest emergency room  -acute onset of chest pain, shortness of breath, difficulty breathing    Activity - you may *** weight bear on your {LEFT/RIGHT/BI:31674} leg, use hip precautions    Incision Care - keep dressing clean and dry, change daily, may remove and shower in 3 days    Preventing blood clots - aspirin *** 325mg oral twice daily      Pain management  -take pain medication as prescribed; decrease the amount you use as your pain lessens  -avoid alcoholic beverages while taking pain medication  -Please be aware that many medications contain Tylenol. We do not want you to over medicate so please read the information below as a guide. Do not take more than 4 Grams of Tylenol in a 24 hour period. (There are 1000 milligrams in one Gram)  Percocet contains 325 mg of Tylenol per tablet (do not take more than 12 tablets in 24 hours)  Lortab contains 500 mg of Tylenol per tablet (do not take more than 8 tablets in 24 hours)  Norco contains 325 mg of Tylenol per tablet (do not take more than 12 tablets in 24 hours).   -You may place an ice bag on your affected extremity     Diet  -resume usual diet; drink plenty of fluids; eat foods high in fiber  -you may want to take a stool softener (such as Senokot-S or Colace) to prevent constipation while you are taking pain medication.   If constipation occurs, take a laxative (such as Dulcolax tablets, Milk of Magnesia, or a suppository)         [facility] Protocol (to be followed by [address])  Nursing-per physicians order  -complete head to toe assessment, vital signs  -medication reconciliation  -review pain management  -manage chronic medical conditions    Physical Therapy-per physicians order  Weight bearing status: ***      Physical Therapy  -assessment and evaluation-bed mobility; functional transfers (bed, chair, bathroom, stairs); ambulation with equipment, safety and ability to get out of house in the event of an emergency  -review and maintain weight bearing status  -discuss pain management  -review how to do ADLs      Physical therapy goals by discharge from Skilled nursing facility/ rehab / [address] [place] [address]  -Safe ambulation with walker, crutches or cane if needed (level surfaces and stairs)  -Daily performance of home exercise program  -Independent with stair climbing and car transfers          HOSPITALIST DISCHARGE INSTRUCTIONS    NAME: Almita Lenz   :  1926   MRN:  [id number]     Date/Time:  3/21/2019 10:32 AM    ADMIT DATE: 3/17/2019   DISCHARGE DATE: 3/21/2019     Attending Physician: Trev Jefferson MD    DISCHARGE DIAGNOSIS:  Left hip fracture secondary to ground-level fall  Status post left hip cephalo-medullary nailing on   Acute metabolic encephalopathy postop, resolved now baseline  Acute blood loss anemia, status post transfusion  Acute kidney injury postop secondary to hypovolemia and hypotension, resolved  Orthostatic hypotension postop, resolved  Postop urinary retention, improving hopefully will not require Slater  Mild hyperkalemia in the setting of acute kidney injury, now resolved  Hypophosphatemia  Leukocytosis, suspected stress related no infection  History of bipolar disorder, on lithium  History of Alzheimer's dementia  Hypothyroidism  DNR status  Mild thrombocytopenia        Medications: Per above medication reconciliation. Pain Management: per above medications    Recommended diet: soft diet, cardiac    Recommended activity: PT/OT Eval and Treat and See surgical instructions    Wound care: See surgical/procedure care instructions    Indwelling devices:  None follow for urinary retention symptoms    Supplemental Oxygen: None    Required Lab work: Per SNF routine, Weekly BMP and Weekly CBC mag and phos on monday    Glucose management:  None    Code status: DNR        Outside physician follow up: Follow-up Information     Follow up With Specialties Details Why Contact Info    Della Baeza DO Orthopedic Surgery Schedule an appointment as soon as possible for a visit in 2 weeks  [address] [facility] [address]  [facility]  [address] [place] [address], [place] [address], [address] [facility]   [address] [facility] [address]  [address] [phone]      Della Baeza DO Orthopedic Surgery In 1 week  [address] [facility]  [address] [facility], [place] Blakely MD [facility]   [address] [place] [address]  [phone]                 Skilled nursing facility/ SNF MD responsible for above on discharge. Information obtained by :  I understand that if any problems occur once I am at home I am to contact my physician. I understand and acknowledge receipt of the instructions indicated above.                                                                                                                                            Physician's or R.N.'s Signature                                                                  Date/Time Patient or Repres

## 2019-03-21 NOTE — DISCHARGE SUMMARY
Hospitalist Discharge Summary Patient ID: 
Dutch Hernandez [id number] 
89 y.o. 
7/22/1926 PCP on record: Juanita Juan MD 
 
Admit date: 3/17/2019 Discharge date and time: 3/21/2019 DISCHARGE DIAGNOSIS: 
 
Left hip fracture secondary to ground-level fall Status post left hip cephalo-medullary nailing on 3/18/2019 Acute metabolic encephalopathy postop, resolved now baseline Acute blood loss anemia, status post transfusion Acute kidney injury postop secondary to hypovolemia and hypotension, resolved Orthostatic hypotension postop, resolved Postop urinary retention, improving Mild hyperkalemia in the setting of acute kidney injury, now resolved Hypophosphatemia Leukocytosis, suspected stress related no infection History of bipolar disorder, on lithium History of Alzheimer's dementia Hypothyroidism DNR status Mild thrombocytopenia, f/u labs at SNF 
 
 
 
CONSULTATIONS: 
Dani TO HOSPITALIST Excerpted HPI from H&P of Paco Cheema MD: 
CHIEF COMPLAINT: left hip pain 
  
HISTORY OF PRESENT ILLNESS:    
Stephy Marrufo is a 80 y.o.  female who presents with left pain s/p fall at home. Pt has history of dementia but able to provide me with history and claims that she was walking when put her lomeli aside and she then tripped and fell. Pt denies hitting head or LOC. Pt reported 10/10 left hip pain since fall, worse with movement, constant, aching in nature. Pt denies any fever, chills, nausea, vomiting, diarrhea, chest pain, cough, problems urination. In ED pt noted to have left hip Fx, leukocytosis.  
  
We were asked to admit for work up and evaluation of the above problems.  
  
 
 
 
______________________________________________________________________ DISCHARGE SUMMARY/HOSPITAL COURSE:  for full details see H&P, daily progress notes, labs, consult notes.  Left Hip Fx due to [facility] 
 S/p surgery 3/18 Left hip cephalomedullary nailing. Post op acute metabolic encephalopathy Acute blood loss anemia TAWANA post op with orthostatic hypotension 
-Pain Mx and DVT Px as per orthopedic surgery 
-some post op confusion, follow closely with supportive measures 
-post op anemia, transfuse if needed hgb from 11 to 8.6 to 6.2 yesterday, now 8.3 
-post op orthostasis, IVF bolus and increase IVFs, follow BP, now better, did well with PT this am 
-transfuse 1 unit 3/20 
-mentation back baseline, follow supportively 
  
TAWANA 
suspect multifactorial, dehydration, hypotension, anemia Mild hyperk, resolved 
hypophos Post op urinary retention 
-creat to 1.5 to 1.1 to 0.76 today 
-stable lithium level, resume lithium last night 
-stop IVFs, encourage pos 
-watch electrolytes and K levels, K normal today 4.3 
- phos still a little low, give po supp for 3 days and recheck at facility on Monday 
-check PVR before dc, if elevated may need thibodeaux at dc with voiding trails at [facility] 
-was able to void in a commode and voided 275 and post void bladder scan was 60. Will not place thibodeaux, but will need to be monitored at the SNF for recurrent retention. 
  
Leukocytosis Suspect stress related, resolved 
neg UA and CXR Follow, no fever 
  BiPolar Late onset Alzheimer's disease with behavioral disturbance - Mild Lithium level- 1.1 Continue Home meds. resumed lithium TAWANA better Will continue Clonazepam at lower dose with hold parameters for sedation Risk of sundowning perioperatively 
  
Hypothyroidism  
Continue synthroid 
  
Code Status: DNR/DNI Surrogate Decision Maker: Daughter 
  
DVT Prophylaxis: Per Orthopedic surgery on ASA bid for 1 month 
  
Recommended Disposition:   
 
 
Patient will be discharged to [facility] rehab today once cleared by orthopedic surgery. She will need continued wound care and therapy per Joanne Pelletier recommendations. DVT prophylaxis is with aspirin.  She does have some mild thrombocytopenia which will need to be monitored with her lab work at the rehab facility. Her blood counts are stable. she is no longer hypotensive and her kidney function has returned to normal.  Her phosphorus levels are still a little low so she will go out on several days of phosphorus supplementation to be rechecked at the facility. She will need follow-up with orthopedic surgery in 1 week and her primary care physician after dc from the facility. 
 
 
_______________________________________________________________________ Patient seen and examined by me on discharge day. Pertinent Findings: 
Patient had some mild urinary retention overnight. This morning post void residual was 150, we will recheck one more time prior to her discharge. She was able to get up with therapy and did well. She complains of having to have a bowel movement had a suppository yesterday with some response. Patient is awake and alert she is oriented to self, hospitalization and she knows she is in the hospital and why. She is in no distress on room air. Mucous membranes moist cardiovascular regular rate lungs are clear abdomen bowel sounds present soft nontender extremities no clubbing cyanosis or edema 
_______________________________________________________________________ DISCHARGE MEDICATIONS:  
Current Discharge Medication List  
  
START taking these medications Details  
acetaminophen (TYLENOL) 325 mg tablet Take 2 Tabs by mouth every six (6) hours for 14 days. Qty: 112 Tab, Refills: 0  
  
aspirin delayed-release 325 mg tablet Take 1 Tab by mouth two (2) times a day for 30 days. Qty: 60 Tab, Refills: 0  
  
famotidine (PEPCID) 20 mg tablet Take 1 Tab by mouth daily for 30 days. Qty: 30 Tab, Refills: 0  
  
oxyCODONE IR (ROXICODONE) 5 mg immediate release tablet Take 1 Tab by mouth every four (4) hours as needed for Pain for up to 14 days. Max Daily Amount: 30 mg. 
Qty: 30 Tab, Refills: 0 Associated Diagnoses: Closed fracture of left hip, initial encounter ([facility])  
  
polyethylene glycol (MIRALAX) 17 gram packet Take 1 Packet by mouth daily for 15 days. Qty: 15 Packet, Refills: 0  
  
senna-docusate (PERICOLACE) 8.6-50 mg per tablet Take 1 Tab by mouth two (2) times a day for 15 days. Qty: 30 Tab, Refills: 0  
  
naloxone (NARCAN) 4 mg/actuation nasal spray Use 1 spray intranasally, then discard. Repeat with new spray every 2 min as needed for opioid overdose symptoms, alternating nostrils. Qty: 1 Each, Refills: 0 phosphorus (K PHOS NEUTRAL) 250 mg tablet Take 1 Tab by mouth two (2) times a day for 3 days. Qty: 6 Tab, Refills: 0 CONTINUE these medications which have CHANGED Details  
clonazePAM (KLONOPIN) 0.5 mg tablet Take 1 Tab by mouth every evening for 5 doses. Max Daily Amount: 0.5 mg. 
Qty: 5 Tab, Refills: 0 Associated Diagnoses: Late onset Alzheimer's disease with behavioral disturbance CONTINUE these medications which have NOT CHANGED Details  
memantine (NAMENDA) 5 mg tablet Take 1 Tab by mouth two (2) times a day. Qty: 180 Tab, Refills: 3 Associated Diagnoses: Late onset Alzheimer's disease with behavioral disturbance; Benign essential tremor syndrome; Transient ischemic attack involving left internal carotid artery; Auditory hallucinations; Altered mental status, unspecified altered mental status type  
  
rivastigmine tartrate (EXELON) 1.5 mg capsule Take 3 mg by mouth every evening. levothyroxine (SYNTHROID) 75 mcg tablet PARoxetine (PAXIL) 40 mg tablet Take 40 mg by mouth daily. multivitamin (ONE A DAY) tablet Take 1 Tab by mouth daily. cholecalciferol, vitamin D3, (VITAMIN D3) 2,000 unit tab Take 1 Tab by mouth daily. lithium CR (ESKALITH CR) 450 mg CR tablet Take 450 mg by mouth nightly.   
  
  
 
 
My Recommended Diet, Activity, Wound Care, and follow-up labs are listed in the patient's Discharge Insturctions which I have personally completed and reviewed. ______________________________________________________________________ Risk of deterioration: Moderate Condition at Discharge:  Stable 
______________________________________________________________________ Disposition SNF/LTC 
______________________________________________________________________ Care Plan discussed with:  
Patient, Family, RN, Care Manager, Consultant 
 
______________________________________________________________________ Code Status: DNR/DNI 
______________________________________________________________________ Follow up with: PCP : Sharonda Moralez MD 
Follow-up Information Follow up With Specialties Details Why Contact Lexi Mauricio DO Orthopedic Surgery Schedule an appointment as soon as possible for a visit in 2 weeks  [address] [facility] [address] 
[phone] [facility] [address] 
[phone] Marianne Mauricio DO Orthopedic Surgery In 1 week  [facility] [address] [facility] [id number] 
[phone] Sharonda Moralez MD [facility]. [address] 
[phone] Total time in minutes spent coordinating this discharge (includes going over instructions, follow-up, prescriptions, and preparing report for sign off to her PCP) :  40 minutes Signed: 
Fauzia Chandler MD

## 2019-03-21 NOTE — PROGRESS NOTES
3/20/2019 11:44 PM 
 
Patient reporting pain to bladder. Patient appears to be oriented at this time but with periods of confusion, I am uncertain if she can distinguish bladder, bowel pain. Unable to void since afternoon. Bladder scan with average of 411 ml. Unable to void at this time. Patient okay with idea for straight cath. 11:53 PM 
Patient straight cathed without difficulty. Palpated abdomen and patient voices no longer any pain in the lower quadrants. 525 out of bladder.

## 2019-03-22 ENCOUNTER — PATIENT OUTREACH (OUTPATIENT)
Dept: INTERNAL MEDICINE CLINIC | Age: 84
End: 2019-03-22

## 2019-03-22 LAB
ABO + RH BLD: NORMAL
BLD PROD TYP BPU: NORMAL
BLD PROD TYP BPU: NORMAL
BLOOD GROUP ANTIBODIES SERPL: NORMAL
BPU ID: NORMAL
BPU ID: NORMAL
CROSSMATCH RESULT,%XM: NORMAL
CROSSMATCH RESULT,%XM: NORMAL
SPECIMEN EXP DATE BLD: NORMAL
STATUS OF UNIT,%ST: NORMAL
STATUS OF UNIT,%ST: NORMAL
UNIT DIVISION, %UDIV: 0
UNIT DIVISION, %UDIV: 0

## 2019-03-22 RX ORDER — LEVOTHYROXINE SODIUM 75 UG/1
TABLET ORAL
Qty: 90 TAB | Refills: 0 | Status: SHIPPED | OUTPATIENT
Start: 2019-03-22 | End: 2019-06-20 | Stop reason: SDUPTHER

## 2019-03-22 NOTE — PROGRESS NOTES
Transition of Care Coordination/Hospital to Post Acute Facility: 
  
Date/Time:  3/22/2019 1:25 PM 
 
Patient was admitted to [facility] on 3/17/19 for treatment of Left hip fracture secondary to ground-level fall. Patient was discharged 3/21/19 to [facility] ([facility]) for continuation of care. Inpatient RRAT score: 17 Top Challenges reviewed - Status post left hip cephalo-medullary nailing on 3/18/2019 Acute metabolic encephalopathy postop, resolved now baseline Acute blood loss anemia, status post transfusion 1 units PRBCs 3/20 Acute kidney injury postop secondary to hypovolemia and hypotension, resolved Orthostatic hypotension postop, resolved Postop urinary retention, improving Mild hyperkalemia in the setting of acute kidney injury, now resolved Hypophosphatemia Leukocytosis, suspected stress related no infection 
- mild thrombocytopenia; f/u  Labs at [facility] 
 
 
- to f/u with Dr. Tracy Allred (Orthopedic Surgery) in 1-2 weeks post-discharge - Namenda dose - unclear if current ordered dose is 1 tab twice daily or 1 tab daily. Hospital discharge medication list orders 1 tab twice daily, however med rec note is also present stating patient taking differently (5 mg tab daily); NN is unable to find documentation to indicate change in dosing from twice daily to once daily, however it is noted that IP orders for this admission from 3/19-3/21 are for Namenda 5 mg tablet daily . Jessica Woodall RN advises that current SNF order is for Namenda 5 mg tablet twice daily as per hospital discharge medication list. Sergey Hubbard is prescribed/managed by Neurologist Dr. Fior Delong. NN provided Jessica Woodall RN with contact information for Dr. Fior Deolng (Neurology) to clarify Namenda dose. OP SIG:Take 1 Tab by mouth two (2) times a day. Patient taking differently: Take 5 mg by mouth daily.    
 
  
 
Method of communication with care team :chart routing, phone Nurse Navigator(NN) spoke with Nydia Malave RN to provide introduction to self and explanation of the Nurse Navigator Role. Verified name and  as patient identifiers. Discussed and reviewed  diet restrictions, discharge summary, medication reconciliation, recommendations for future lab/imaging . Per Nydia Malave RN, current SNF diet order is Regular Texture; NN reviewed hospital discharge orders and recommended diet is soft diet, cardiac. Nydia Malave RN to review SNF diet order. Confirmed that SNF did receive orders/notification for weekly bmp, cbc, mag and phos on Monday. Confirmed that patient is being monitored at [facility] for symptoms of urinary retention. Per Hospital Discharge Summary: 
Medications: Per above medication reconciliation. Pain Management: per above medications Recommended diet: soft diet, cardiac Recommended activity: PT/OT Eval and Treat and See surgical instructions Wound care: See surgical/procedure care instructions Indwelling devices: None follow for urinary retention symptoms Supplemental Oxygen: None Required Lab work: Per SNF routine, Weekly BMP and Weekly CBC mag and phos on monday Glucose management: None Code status: DNR 
 
 
ACP:  
Does the patient have a current ACP (including DDNR):  yes;Medical Power of  - on file Does the post acute facility have a copy of the patients ACP:  no; SNF to f/u with family regarding obtaining copy. Medication(s):  
New Medications at Discharge: tylenol, aspirin 325 mg tablet (take 1 tablet twice daily for 30 days), pepcid, roxicodone, miralax, pericolace, narcan nasal spray, k phos neutral 
Changed Medications at Discharge: klonopin 0.5 mg tablet (take 1 tab every evening for 5 days) Discontinued Medications at Discharge: none Med Rec during this Call Current Outpatient Medications Medication Sig  
 acetaminophen (TYLENOL) 325 mg tablet Take 2 Tabs by mouth every six (6) hours for 14 days.  aspirin delayed-release 325 mg tablet Take 1 Tab by mouth two (2) times a day for 30 days.  famotidine (PEPCID) 20 mg tablet Take 1 Tab by mouth daily for 30 days.  oxyCODONE IR (ROXICODONE) 5 mg immediate release tablet Take 1 Tab by mouth every four (4) hours as needed for Pain for up to 14 days. Max Daily Amount: 30 mg.  polyethylene glycol (MIRALAX) 17 gram packet Take 1 Packet by mouth daily for 15 days.  senna-docusate (PERICOLACE) 8.6-50 mg per tablet Take 1 Tab by mouth two (2) times a day for 15 days.  naloxone (NARCAN) 4 mg/actuation nasal spray Use 1 spray intranasally, then discard. Repeat with new spray every 2 min as needed for opioid overdose symptoms, alternating nostrils.  clonazePAM (KLONOPIN) 0.5 mg tablet Take 1 Tab by mouth every evening for 5 doses. Max Daily Amount: 0.5 mg.  phosphorus (K PHOS NEUTRAL) 250 mg tablet Take 1 Tab by mouth two (2) times a day for 3 days.  memantine (NAMENDA) 5 mg tablet Take 1 Tab by mouth two (2) times a day.  rivastigmine tartrate (EXELON) 1.5 mg capsule Take 3 mg by mouth every evening.  levothyroxine (SYNTHROID) 75 mcg tablet Take 75 mcg by mouth Daily (before breakfast).  PARoxetine (PAXIL) 40 mg tablet Take 40 mg by mouth daily.  multivitamin (ONE A DAY) tablet Take 1 Tab by mouth daily.  cholecalciferol, vitamin D3, (VITAMIN D3) 2,000 unit tab Take 1 Tab by mouth daily.  lithium CR (ESKALITH CR) 450 mg CR tablet Take 450 mg by mouth nightly. No current facility-administered medications for this visit. There are no discontinued medications. PCP/Specialist follow up:  
Future Appointments Date Time Provider Ehsan Shi 4/23/2019  3:40 PM Catherine Mario  [address] Opportunity to ask questions was provided. Contact information was provided for future reference or further questions. Will continue to monitor.

## 2019-03-27 ENCOUNTER — PATIENT OUTREACH (OUTPATIENT)
Dept: CASE MANAGEMENT | Age: 84
End: 2019-03-27

## 2019-03-27 NOTE — PROGRESS NOTES
Community Care Team documentation for patient in [facility] Initial Follow Up Patient was discharged to [address] [place] [address]. Information included in this progress note has been provided to SNF. Hospital Admission and Diagnosis: [facility]M 3/18-3/21 Left hip fracture secondary to ground-level fall   RRAT Score: 17    Advance Care Planning:  POA  on file PCP : Adonis Orozco MD 
Nurse Navigator in PCP office: [facility] Attending:  Alysha Gallardo MD 
 
Spoke with SNF team. Ensured patient arrived to SNF safely with admission packet in order. Provided needed hospital follow up appointments:  Orthopedic Surgery Jad Atwood, DO in 1-2 weeks; Neurology Shaun Jones MD 4/23 at 3:40 PM. PT/OT providing skilled therapy in SNF. Currently min assist with bed mobility. Mod assist with transfers. Ambulating 35 ft with rolling walker and min assist. Supervision with upper body bathing and dressing. Max assits with toileting and with lower body bathing and dressing. Anticipate 4 more weeks LOS. Prior to hospitalization, per inpatient CM notes patient lives with daughter and on-in-law, and has used [facility] previously. Community Care Team will follow up weekly with [facility] until discharge. Medications were not reconciled and general patient assessment was not completed during this [facility] outreach. Isaac Whittington, MSN, RN, ACNS-BC, [facility] Nurse Navigator, [address] [place] [address] [phone]

## 2019-03-29 ENCOUNTER — PATIENT OUTREACH (OUTPATIENT)
Dept: INTERNAL MEDICINE CLINIC | Age: 84
End: 2019-03-29

## 2019-03-29 NOTE — PROGRESS NOTES
Followed by CCT during SNF admission. Most recent CCT documentation reviewed by this NN. Per documentation, scheduled outpatient follow-up post-discharge includes Orthopedic Surgery Tobi Viera, DO) in 1-2 weeks and Neurology Tomasz Harley MD 4/23 at 3:40 PM. Anticipate 4 more weeks SNF length of stay.

## 2019-04-03 ENCOUNTER — PATIENT OUTREACH (OUTPATIENT)
Dept: CASE MANAGEMENT | Age: 84
End: 2019-04-03

## 2019-04-10 ENCOUNTER — PATIENT OUTREACH (OUTPATIENT)
Dept: CASE MANAGEMENT | Age: 84
End: 2019-04-10

## 2019-04-10 NOTE — PROGRESS NOTES
Community Care Team Documentation for Patient in [facility] Subsequent Follow up Patient remains at [address] ([facility]). See previous [address] Team notes. PCP : Sarita Hidalgo MD 
Nurse Navigator in PCP office: Osmin Elliott Spoke with SNF team.  Provided needed hospital follow up appointments:  Orthopedic Surgery Jad Kim, DO in 1-2 weeks; Neurology Warren Boast, MD 4/23 at 3:40 PM. PT/OT continue. Currently supervision with bed mobility. Contact guard assist  with transfers. Ascending and descending 3 stairs with contact guard assist. Ambulating 100 ft with rolling walker and contact guard assist. Setup with upper body ADLs. Min assist with lower body ADLs. Contact guard assist with toileting. Anticipate 3 more weeks LOS. Plan for discharge to home with daughter and son-in-law with [facility]. PCP follow up 5/1 at 10:30 AM. Medications were not reconciled and general patient assessment was not completed during this skilled nursing facility outreach. Lavern Whittington, MSN, RN, ACNS-BC, [facility] Nurse Navigator, [address] [place] [address] [phone]

## 2019-04-12 ENCOUNTER — PATIENT OUTREACH (OUTPATIENT)
Dept: INTERNAL MEDICINE CLINIC | Age: 84
End: 2019-04-12

## 2019-04-12 NOTE — PROGRESS NOTES
Most recent CCT documentation reviewed by this NN; Anticipate 3 more weeks SNF LOS with plan for discharge to home with daughter and son-in-law and [facility], PCP follow up currently scheduled for 5/1 at 10:30 AM.

## 2019-04-17 ENCOUNTER — PATIENT OUTREACH (OUTPATIENT)
Dept: CASE MANAGEMENT | Age: 84
End: 2019-04-17

## 2019-04-17 ENCOUNTER — HOME HEALTH ADMISSION (OUTPATIENT)
Dept: HOME HEALTH SERVICES | Facility: HOME HEALTH | Age: 84
End: 2019-04-17
Payer: MEDICARE

## 2019-04-17 NOTE — PROGRESS NOTES
Community Care Team Documentation for Patient in [facility] Discharge Note Patient has been discharged from [address] ([facility]). See previous [facility] Team notes. PCP : Sarita Hidalgo MD 
Nurse Navigator in PCP office: Robyn Elizabeth Note routed to Nurse Navigator team. 
 
Spoke with SNF team.  PT/OT last treat day 4/16 (family request). Currently Close supervision with transfers. Ascending and descending 6 stairs with supervision. Ambulating with rollator and supervision. Supervision with all ADLs and toileting. Patient was discharged 4/17 at family request to home home with daughter and son-in-law with [facility]. PCP follow up 5/1 at 10:30 AM. Community Care Team will sign off at this time. Medications were not reconciled and general patient assessment was not completed during this skilled nursing facility outreach. Lavern Whittington, MSN, RN, ACNS-BC, [facility] Nurse Navigator, [address] [place] [address] [phone]

## 2019-04-18 ENCOUNTER — TELEPHONE (OUTPATIENT)
Dept: NEUROLOGY | Age: 84
End: 2019-04-18

## 2019-04-18 NOTE — TELEPHONE ENCOUNTER
----- Message from Leonardo Izaguirre sent at 4/18/2019  1:12 PM EDT -----  Regarding: Dr. Little Naidu  Mrs. Orozco, pt's daughter, requesting to r/s cancelled appt scheduled Tuesday 04/23/2019. Pt was in the hospital recently due to broken hip and had surgery, right now pt is not able to walk. Requesting to r/s appt for next month. ALEJANDRO.   Best contact number [phone] alternated number [phone]

## 2019-04-19 ENCOUNTER — HOME CARE VISIT (OUTPATIENT)
Dept: SCHEDULING | Facility: HOME HEALTH | Age: 84
End: 2019-04-19
Payer: MEDICARE

## 2019-04-19 ENCOUNTER — TELEPHONE (OUTPATIENT)
Dept: INTERNAL MEDICINE CLINIC | Age: 84
End: 2019-04-19

## 2019-04-19 VITALS
HEART RATE: 75 BPM | SYSTOLIC BLOOD PRESSURE: 120 MMHG | DIASTOLIC BLOOD PRESSURE: 60 MMHG | RESPIRATION RATE: 16 BRPM | OXYGEN SATURATION: 98 % | TEMPERATURE: 97.7 F

## 2019-04-19 VITALS
RESPIRATION RATE: 18 BRPM | OXYGEN SATURATION: 98 % | HEART RATE: 75 BPM | DIASTOLIC BLOOD PRESSURE: 60 MMHG | SYSTOLIC BLOOD PRESSURE: 120 MMHG | TEMPERATURE: 97.7 F

## 2019-04-19 PROCEDURE — 400013 HH SOC

## 2019-04-19 PROCEDURE — 3331090001 HH PPS REVENUE CREDIT

## 2019-04-19 PROCEDURE — G0299 HHS/HOSPICE OF RN EA 15 MIN: HCPCS

## 2019-04-19 PROCEDURE — 3331090002 HH PPS REVENUE DEBIT

## 2019-04-19 PROCEDURE — G0151 HHCP-SERV OF PT,EA 15 MIN: HCPCS

## 2019-04-19 NOTE — TELEPHONE ENCOUNTER
Spoke with Ibis Kelly patient has a 9 cm x 9 cm area of readness warm to  Touch not open, needs evaluation. Monday, April 22, 2019 11:30 AM appointment with Dr. Sj Gao was scheduled.

## 2019-04-19 NOTE — TELEPHONE ENCOUNTER
Carolann Boss, [facility], would like to speak with the nurse about her concern with a spot on pt's left leg that is warm to the touch and painful.  Callback: [phone]       Message received & copied from [facility]

## 2019-04-20 PROCEDURE — 3331090001 HH PPS REVENUE CREDIT

## 2019-04-20 PROCEDURE — 3331090002 HH PPS REVENUE DEBIT

## 2019-04-21 PROCEDURE — 3331090002 HH PPS REVENUE DEBIT

## 2019-04-21 PROCEDURE — 3331090001 HH PPS REVENUE CREDIT

## 2019-04-22 ENCOUNTER — TELEPHONE (OUTPATIENT)
Dept: INTERNAL MEDICINE CLINIC | Age: 84
End: 2019-04-22

## 2019-04-22 ENCOUNTER — OFFICE VISIT (OUTPATIENT)
Dept: INTERNAL MEDICINE CLINIC | Age: 84
End: 2019-04-22

## 2019-04-22 VITALS
SYSTOLIC BLOOD PRESSURE: 137 MMHG | RESPIRATION RATE: 16 BRPM | HEIGHT: 60 IN | HEART RATE: 73 BPM | TEMPERATURE: 97.4 F | DIASTOLIC BLOOD PRESSURE: 72 MMHG | OXYGEN SATURATION: 97 % | BODY MASS INDEX: 22.46 KG/M2

## 2019-04-22 DIAGNOSIS — L03.116 CELLULITIS OF LEFT LEG: Primary | ICD-10-CM

## 2019-04-22 DIAGNOSIS — K59.00 CONSTIPATION, UNSPECIFIED CONSTIPATION TYPE: ICD-10-CM

## 2019-04-22 DIAGNOSIS — R29.898 LEFT LEG WEAKNESS: ICD-10-CM

## 2019-04-22 DIAGNOSIS — R11.2 NAUSEA AND VOMITING, INTRACTABILITY OF VOMITING NOT SPECIFIED, UNSPECIFIED VOMITING TYPE: ICD-10-CM

## 2019-04-22 PROCEDURE — 3331090002 HH PPS REVENUE DEBIT

## 2019-04-22 PROCEDURE — 3331090001 HH PPS REVENUE CREDIT

## 2019-04-22 RX ORDER — CLINDAMYCIN HYDROCHLORIDE 300 MG/1
300 CAPSULE ORAL 3 TIMES DAILY
Qty: 30 CAP | Refills: 0 | Status: SHIPPED | OUTPATIENT
Start: 2019-04-22 | End: 2019-05-02

## 2019-04-22 RX ORDER — AMOXICILLIN 250 MG
1 CAPSULE ORAL DAILY
Qty: 30 TAB | Refills: 0 | Status: SHIPPED | OUTPATIENT
Start: 2019-04-22 | End: 2019-05-17

## 2019-04-22 RX ORDER — ONDANSETRON 4 MG/1
4 TABLET, FILM COATED ORAL
COMMUNITY
Start: 2019-04-18 | End: 2019-06-25

## 2019-04-22 RX ORDER — AMOXICILLIN 250 MG
1 CAPSULE ORAL DAILY
Qty: 30 TAB | Refills: 0 | Status: SHIPPED | OUTPATIENT
Start: 2019-04-22 | End: 2019-04-22 | Stop reason: SDUPTHER

## 2019-04-22 RX ORDER — CLINDAMYCIN HYDROCHLORIDE 300 MG/1
300 CAPSULE ORAL 3 TIMES DAILY
Qty: 30 CAP | Refills: 0 | Status: SHIPPED | OUTPATIENT
Start: 2019-04-22 | End: 2019-04-22 | Stop reason: SDUPTHER

## 2019-04-22 NOTE — PROGRESS NOTES
SUBJECTIVE Ms. Lima Aguirre presents today acutely for Chief Complaint Patient presents with  
 Skin Infection L leg has been swelling. \"All they did was elevated it. \"  The nurse came in on Friday and was worried it was cellulitis. She can't walk on it. Constipated, nauseated and throwing up, since getting out of rehab Wednesday. Emesis is mainly stomach contents; no blood or bile. Her last BM was 5 days ago. OBJECTIVE Visit Vitals /72 (BP 1 Location: Left arm, BP Patient Position: Sitting) Pulse 73 Temp 97.4 °F (36.3 °C) (Oral) Resp 16 Ht 5' (1.524 m) SpO2 97% BMI 22.46 kg/m² Gen: Pleasant 80 y.o.  female in NAD. She is tired appearing. Ambulates with walker.  HEENT: PERRLA. EOMI. OP moist and pink.  Neck: Supple.  No LAD.  HEART: RRR, No M/G/R.   LUNGS: CTAB No W/R.   ABDOMEN: S, NT, ND, BS+.   EXTREMITIES: Warm. L LE red, mildly swollen, and tender. ASSESSMENT / PLAN 
  ICD-10-CM ICD-9-CM 1. Cellulitis of left leg L03.116 682.6 clindamycin (CLEOCIN) 300 mg capsule 2. Nausea and vomiting, intractability of vomiting not specified, unspecified vomiting type R11.2 787.01 ondansetron hcl (ZOFRAN) 4 mg tablet 3. Constipation, unspecified constipation type K59.00 564.00 senna-docusate (PERICOLACE) 8.6-50 mg per tablet 4. Left leg weakness R29.898 729.89 Wheel Chair cady I have reviewed with the patient details of the assessment and plan and all questions were answered. Relevant patient education was performed. Follow-up and Dispositions · Return in about 1 week (around 4/29/2019) for Dr. Megan Colindres; RASHARD WADE management.

## 2019-04-22 NOTE — TELEPHONE ENCOUNTER
Message was left that medications were sent to [facility]. And wheelchair order faxed to [facility].

## 2019-04-22 NOTE — TELEPHONE ENCOUNTER
#[phone] Marisol states the medication, Clindamycin 300 mg never got sent to [facility]. This was sent to [facility]  in which it doesn't need to go. Please call this in yet today as she specified Donnie. This is a medication that is needed now. The companies that you gave her for wheelchairs do not deal with wheelchairs. Please call with other options.

## 2019-04-23 ENCOUNTER — HOME CARE VISIT (OUTPATIENT)
Dept: SCHEDULING | Facility: HOME HEALTH | Age: 84
End: 2019-04-23
Payer: MEDICARE

## 2019-04-23 VITALS
OXYGEN SATURATION: 98 % | HEART RATE: 75 BPM | RESPIRATION RATE: 18 BRPM | TEMPERATURE: 98.9 F | DIASTOLIC BLOOD PRESSURE: 72 MMHG | SYSTOLIC BLOOD PRESSURE: 139 MMHG

## 2019-04-23 VITALS
SYSTOLIC BLOOD PRESSURE: 139 MMHG | HEART RATE: 75 BPM | TEMPERATURE: 98.9 F | DIASTOLIC BLOOD PRESSURE: 72 MMHG | RESPIRATION RATE: 18 BRPM | OXYGEN SATURATION: 98 %

## 2019-04-23 PROCEDURE — 3331090001 HH PPS REVENUE CREDIT

## 2019-04-23 PROCEDURE — G0299 HHS/HOSPICE OF RN EA 15 MIN: HCPCS

## 2019-04-23 PROCEDURE — 3331090002 HH PPS REVENUE DEBIT

## 2019-04-23 PROCEDURE — G0152 HHCP-SERV OF OT,EA 15 MIN: HCPCS

## 2019-04-24 ENCOUNTER — HOME CARE VISIT (OUTPATIENT)
Dept: SCHEDULING | Facility: HOME HEALTH | Age: 84
End: 2019-04-24
Payer: MEDICARE

## 2019-04-24 VITALS
OXYGEN SATURATION: 95 % | HEART RATE: 69 BPM | DIASTOLIC BLOOD PRESSURE: 70 MMHG | TEMPERATURE: 97.7 F | RESPIRATION RATE: 18 BRPM | SYSTOLIC BLOOD PRESSURE: 130 MMHG

## 2019-04-24 PROCEDURE — G0151 HHCP-SERV OF PT,EA 15 MIN: HCPCS

## 2019-04-24 PROCEDURE — 3331090002 HH PPS REVENUE DEBIT

## 2019-04-24 PROCEDURE — 3331090001 HH PPS REVENUE CREDIT

## 2019-04-25 ENCOUNTER — PATIENT OUTREACH (OUTPATIENT)
Dept: INTERNAL MEDICINE CLINIC | Age: 84
End: 2019-04-25

## 2019-04-25 ENCOUNTER — HOME CARE VISIT (OUTPATIENT)
Dept: SCHEDULING | Facility: HOME HEALTH | Age: 84
End: 2019-04-25
Payer: MEDICARE

## 2019-04-25 VITALS
RESPIRATION RATE: 18 BRPM | TEMPERATURE: 97.8 F | DIASTOLIC BLOOD PRESSURE: 78 MMHG | SYSTOLIC BLOOD PRESSURE: 114 MMHG | OXYGEN SATURATION: 97 % | HEART RATE: 78 BPM

## 2019-04-25 PROCEDURE — G0299 HHS/HOSPICE OF RN EA 15 MIN: HCPCS

## 2019-04-25 PROCEDURE — 3331090002 HH PPS REVENUE DEBIT

## 2019-04-25 PROCEDURE — 3331090001 HH PPS REVENUE CREDIT

## 2019-04-25 RX ORDER — OXYCODONE HYDROCHLORIDE 5 MG/1
5 TABLET ORAL
COMMUNITY
End: 2019-05-17

## 2019-04-25 NOTE — PROGRESS NOTES
Most recent CCT documentation reviewed by this NN; Patient was discharged from SNF on  at family request to home home with daughter and son-in-law and [facility]. PCP follow up scheduled for  at 10:30 AM. 
  
Attended office visit with Dr. Romana Doyne on 19 for diagnosis of cellulitis of left leg; prescribed clindamycin for treatment of cellulitis, pericolace for constipation, advised to take zofran for nausea, f/u with PCP in one week for [address] management. Home Health SN start of care 19 Home Health PT initial evaluation 19 Home Health OT initial evaluation 19 Hospital Discharge Follow-Up Date/Time:  2019 2:34 PM 
 
 
Method of communication with provider :chart routing Nurse Navigator (NN) contacted the daughter, Carroll Marin (most recent HIPAA), by telephone to perform post hospital discharge assessment. Verified name and  with daughter as identifiers. Provided introduction to self, and explanation of the Nurse Navigator role. Reviewed discharge instructions and red flags with daughter who verbalized understanding. daughter given an opportunity to ask questions and does not have any further questions or concerns at this time. The daughter agrees to contact the PCP office for questions related to their healthcare. NN provided contact information for future reference. Barriers to care? none reported/identified at this time Medication(s):  
Medication reconciliation was performed with daughter, Carroll Marin,, who verbalizes understanding of administration of home medications. There were no barriers to obtaining medications identified at this time. Referral to Pharm D needed: no  
 
Med Rec during this Call Current Outpatient Medications Medication Sig  
 oxyCODONE IR (ROXICODONE) 5 mg immediate release tablet Take 5 mg by mouth every four (4) hours as needed for Pain.  ondansetron hcl (ZOFRAN) 4 mg tablet Take 4 mg by mouth daily as needed.  senna-docusate (PERICOLACE) 8.6-50 mg per tablet Take 1 Tab by mouth daily.  clindamycin (CLEOCIN) 300 mg capsule Take 1 Cap by mouth three (3) times daily for 10 days.  clonazePAM (KLONOPIN) 0.5 mg tablet Take 0.5 mg by mouth daily.  levothyroxine (SYNTHROID) 75 mcg tablet TAKE 1 TABLET DAILY BEFORE BREAKFAST  memantine (NAMENDA) 5 mg tablet Take 1 Tab by mouth two (2) times a day. (Patient taking differently: Take 10 mg by mouth daily.)  rivastigmine tartrate (EXELON) 1.5 mg capsule Take 3 mg by mouth every evening.  PARoxetine (PAXIL) 40 mg tablet Take 40 mg by mouth daily.  multivitamin (ONE A DAY) tablet Take 1 Tab by mouth daily.  cholecalciferol, vitamin D3, (VITAMIN D3) 2,000 unit tab Take 1 Tab by mouth daily.  lithium CR (ESKALITH CR) 450 mg CR tablet Take 450 mg by mouth nightly.  Wheel Chair cady Weakness No current facility-administered medications for this visit. There are no discontinued medications. Goals Addressed This Visit's Progress Post Hospitalization  Prevent complications post hospitalization. 4/25/2019  
- attended office visit with Ortho on 4/18/19 for post-op staple removal 
- taking clindamycin for 10 days as ordered by Dr. Saurabh Abrams at office visit on 4/22/19 for treatment of left leg cellulitis 
- Re: Left Leg Cellulitis - daughter states, \"looks better, it's not swollen and it's still red, but not as red. \" Denies fever. - constipation resolved; last bowel movement 4/24/19. Encouraged to continue pericolace to prevent constipation while taking oxycodone. - \"occasionally\" experiences nausea; denies vomiting. Has not taken zofran since 4/19/19; daughter reports zofran ordered at [facility] discharge. - taking oxycodone 5 mg as needed for pain management; daughter reports prescription ordered at discharge from [facility]. Daughter reports patient only taking one tablet daily if needed. - will attend office visit with Dr. Royal Small on 5/1/19 
- NN confirmed with [address] today that patient attended office visit with Dr. Michaela Freeman on 4/17/19 and next scheduled office visit 6/18/19 at Dr. Michaela Freeman  Supportive resources in place to maintain patient in the community (ie. Home Health, DME equipment, refer to, medication assistant plan, etc.)     
  4/25/2019 
- discharged from SNF on 4/17/19 to home with [facility] for SN, PT, OT . Home health SN start of care 4/19/19, PT initial eval 4/19/19, OT initial eval 4/23/19 
- new order for wheelchair by  at office visit on 4/22/19. Daughter reports contacting [facility] on 4/23/19 regarding order and being advised that [facility] would contact PCP office to complete order and obtain necessary documentation for Medicare. Per telephone enc documentation 4/22/19, LPN  faxed wheelchair order to [facility]. Future Appointments: 
Future Appointments Date Time Provider Ehsan Shi 4/26/2019 10:00 AM Francine Fitzpatrick [facility]  
4/27/2019 To Be Determined Dimple Corley [facility]  
4/29/2019 To Be Determined Rosa Santiago [address]  
4/29/2019 11:30 AM Betty Stewart, OT [facility]  
4/30/2019 11:00 AM Betty Stewart OT [facility]  
4/30/2019 To Be Determined Michael Quirozlilli [address]  
5/1/2019 10:30 AM Sandra Clarke MD [address]   
5/3/2019 To Be Determined Sagar Keene RN [address]  
5/3/2019 To Be Determined Jerod Villagomez PT [facility]  
5/6/2019 To Be Determined Letha Fernandes [facility] [address]  
5/6/2019 To Be Determined Raúl Dove [address]  
5/8/2019 To Be Determined Letha Fernandes [facility]  
5/10/2019 To Be Determined Raúl Derrell [facility]  
5/13/2019 To Be Determined Betty Stewart OT [facility]  
 5/13/2019 To Be Determined Vasquez Artis  
5/15/2019 To Be Determined Raulito Jennings [facility] [place] [address]  
5/16/2019 To Be Determined Ramila Aid [place] [facility]  
5/20/2019 To Be Determined Raymundo Artistina  
5/21/2019  1:40 PM Oniel Hearn MD [address] [place] [address]  
5/27/2019 To Be Determined [facility]  
6/3/2019 To Be Determined Ramila Aid [facility]  
6/10/2019 To Be Determined Ramila Aid [place] [address] Last Appointment With Me: 
Visit date not found Last Appointment My Department: 
4/22/2019

## 2019-04-26 ENCOUNTER — HOME CARE VISIT (OUTPATIENT)
Dept: SCHEDULING | Facility: HOME HEALTH | Age: 84
End: 2019-04-26
Payer: MEDICARE

## 2019-04-26 PROCEDURE — 3331090002 HH PPS REVENUE DEBIT

## 2019-04-26 PROCEDURE — G0151 HHCP-SERV OF PT,EA 15 MIN: HCPCS

## 2019-04-26 PROCEDURE — 3331090001 HH PPS REVENUE CREDIT

## 2019-04-27 ENCOUNTER — HOME CARE VISIT (OUTPATIENT)
Dept: HOME HEALTH SERVICES | Facility: HOME HEALTH | Age: 84
End: 2019-04-27
Payer: MEDICARE

## 2019-04-27 PROCEDURE — 3331090001 HH PPS REVENUE CREDIT

## 2019-04-27 PROCEDURE — 3331090002 HH PPS REVENUE DEBIT

## 2019-04-28 VITALS
OXYGEN SATURATION: 94 % | TEMPERATURE: 97.6 F | DIASTOLIC BLOOD PRESSURE: 80 MMHG | HEART RATE: 72 BPM | SYSTOLIC BLOOD PRESSURE: 130 MMHG | RESPIRATION RATE: 18 BRPM

## 2019-04-28 PROCEDURE — 3331090001 HH PPS REVENUE CREDIT

## 2019-04-28 PROCEDURE — 3331090002 HH PPS REVENUE DEBIT

## 2019-04-29 ENCOUNTER — HOME CARE VISIT (OUTPATIENT)
Dept: SCHEDULING | Facility: HOME HEALTH | Age: 84
End: 2019-04-29
Payer: MEDICARE

## 2019-04-29 VITALS
OXYGEN SATURATION: 97 % | RESPIRATION RATE: 18 BRPM | TEMPERATURE: 98.7 F | SYSTOLIC BLOOD PRESSURE: 130 MMHG | DIASTOLIC BLOOD PRESSURE: 70 MMHG | HEART RATE: 70 BPM

## 2019-04-29 VITALS
DIASTOLIC BLOOD PRESSURE: 60 MMHG | TEMPERATURE: 98.7 F | SYSTOLIC BLOOD PRESSURE: 122 MMHG | OXYGEN SATURATION: 98 % | RESPIRATION RATE: 18 BRPM | HEART RATE: 80 BPM

## 2019-04-29 PROCEDURE — 3331090002 HH PPS REVENUE DEBIT

## 2019-04-29 PROCEDURE — G0152 HHCP-SERV OF OT,EA 15 MIN: HCPCS

## 2019-04-29 PROCEDURE — G0299 HHS/HOSPICE OF RN EA 15 MIN: HCPCS

## 2019-04-29 PROCEDURE — 3331090001 HH PPS REVENUE CREDIT

## 2019-04-30 ENCOUNTER — HOME CARE VISIT (OUTPATIENT)
Dept: HOME HEALTH SERVICES | Facility: HOME HEALTH | Age: 84
End: 2019-04-30
Payer: MEDICARE

## 2019-04-30 ENCOUNTER — HOME CARE VISIT (OUTPATIENT)
Dept: SCHEDULING | Facility: HOME HEALTH | Age: 84
End: 2019-04-30
Payer: MEDICARE

## 2019-04-30 VITALS
RESPIRATION RATE: 19 BRPM | OXYGEN SATURATION: 98 % | TEMPERATURE: 98.7 F | SYSTOLIC BLOOD PRESSURE: 122 MMHG | HEART RATE: 70 BPM | DIASTOLIC BLOOD PRESSURE: 60 MMHG

## 2019-04-30 PROCEDURE — 3331090001 HH PPS REVENUE CREDIT

## 2019-04-30 PROCEDURE — G0152 HHCP-SERV OF OT,EA 15 MIN: HCPCS

## 2019-04-30 PROCEDURE — 3331090002 HH PPS REVENUE DEBIT

## 2019-05-01 ENCOUNTER — HOSPITAL ENCOUNTER (OUTPATIENT)
Dept: LAB | Age: 84
Discharge: HOME | End: 2019-05-01
Payer: MEDICARE

## 2019-05-01 ENCOUNTER — HOSPITAL ENCOUNTER (OUTPATIENT)
Dept: VASCULAR SURGERY | Age: 84
Discharge: HOME | End: 2019-05-01
Attending: FAMILY MEDICINE
Payer: MEDICARE

## 2019-05-01 ENCOUNTER — OFFICE VISIT (OUTPATIENT)
Dept: INTERNAL MEDICINE CLINIC | Age: 84
End: 2019-05-01

## 2019-05-01 VITALS
WEIGHT: 119.2 LBS | HEIGHT: 60 IN | SYSTOLIC BLOOD PRESSURE: 114 MMHG | DIASTOLIC BLOOD PRESSURE: 70 MMHG | HEART RATE: 70 BPM | BODY MASS INDEX: 23.4 KG/M2 | TEMPERATURE: 97.8 F | OXYGEN SATURATION: 96 % | RESPIRATION RATE: 14 BRPM

## 2019-05-01 DIAGNOSIS — M79.605 PAIN OF LEFT LOWER EXTREMITY: ICD-10-CM

## 2019-05-01 DIAGNOSIS — K59.03 DRUG-INDUCED CONSTIPATION: ICD-10-CM

## 2019-05-01 DIAGNOSIS — M25.572 LEFT ANKLE PAIN, UNSPECIFIED CHRONICITY: Primary | ICD-10-CM

## 2019-05-01 DIAGNOSIS — M25.572 ACUTE LEFT ANKLE PAIN: Primary | ICD-10-CM

## 2019-05-01 PROCEDURE — 80053 COMPREHEN METABOLIC PANEL: CPT

## 2019-05-01 PROCEDURE — 85025 COMPLETE CBC W/AUTO DIFF WBC: CPT

## 2019-05-01 PROCEDURE — 93971 EXTREMITY STUDY: CPT

## 2019-05-01 PROCEDURE — 3331090001 HH PPS REVENUE CREDIT

## 2019-05-01 PROCEDURE — 36415 COLL VENOUS BLD VENIPUNCTURE: CPT

## 2019-05-01 PROCEDURE — 3331090002 HH PPS REVENUE DEBIT

## 2019-05-01 NOTE — PROGRESS NOTES
Gloria Ross is a 80 y.o. female Chief Complaint Patient presents with  Transitions Of Care ED [facility] 3/17/19-3/21/19 for hip fracture SNF 3/21/19-4/17/19. c/o of left leg pain. Visit Vitals /70 (BP 1 Location: Left arm, BP Patient Position: Sitting) Pulse 70 Temp 97.8 °F (36.6 °C) (Oral) Resp 14 Ht 5' (1.524 m) Wt 119 lb 3.2 oz (54.1 kg) SpO2 96% BMI 23.28 kg/m² Health Maintenance Due Topic Date Due  Shingrix Vaccine Age 50> (1 of 2) 07/22/1976  GLAUCOMA SCREENING Q2Y  07/22/1991  Bone Densitometry (Dexa) Screening  07/22/1991  MEDICARE YEARLY EXAM  05/22/2019 1. Have you been to the ER, urgent care clinic since your last visit? Hospitalized since your last visit? Yes ED [facility] 3/17/19-3/21/19 [facility] 3/21/19-4/17/19 
 
2. Have you seen or consulted any other health care providers outside of the [address] [place] [address] since your last visit? Include any pap smears or colon screening.  No

## 2019-05-01 NOTE — PROGRESS NOTES
SUBJECTIVE:  
Ms. Eliezer Otero is a 80 y.o. female who is here with her daughter following 3/17-3/21/19 hospital admission for a L hip fracture secondary to a ground level fall. Pt was then in rehab until 4/17/19. Home health is currently visiting the patient for PT and OT. Pt is not very receptive to the [address] visits. Pt is currently taking 1-2 oxycodone/day due to pain. Pt is having approximately one bowel movement/week. Pt is not staying well hydrated. Pt is unable to ambulate due s/p left hip fracture. Pt c/o left ankle pain. Pt's daughter reports while pt was in rehab she fell out of bed. Pt reports her left ankle was swollen following the fall and she was told to keep it elevated. There was no imaging of the ankle. Pt is unable to ambulate due to the ankle, as well as her hip. Pt's daughter reports on 4/22 pt was seen by Dr. Lynda Bautista for evaluation of left lower extremity cellulitis. Pt was prescribed clindamycin (one day remaining). Pt's daughter states the redness is beginning to resolve and the swelling has dissipated. A supply order for a wheelchair was placed at the encounter with Dr. Lynda Bautista, but it was not received due to insufficient information, per [facility]. Pt's daughter reports [address] advised her of a bruise on the posterior left leg extending from the knee down the calf. Pt's daughter states the bruise has begun to fade. Pt's daughter states one of the home health nurses advised pt to take deep breaths more frequently. She reports the nurse stated her breathing was shallow. Pt requests the fan to be on, stating its helps her breath better. Pt denies difficulty breathing. At this time, she is otherwise doing well and has brought no other complaints to my attention today. For a list of the medical issues addressed today, see the assessment and plan below. PMH:  
Past Medical History:  
Diagnosis Date  Dementia  Depression psychiatrist: Dr Serge Sunshine (on Lithium)  Fracture of wrist   
 slipped and fx left wrist, surgery scheduled 12/21/16  Full dentures   
 upper and lower  Hypothyroid PSH:  has a past surgical history that includes hx kyphoplasty (2013); hx orthopaedic (12/2016); and hx wrist fracture tx (Left, 12/2016). All: is allergic to sulfa (sulfonamide antibiotics). MEDS:  
Current Outpatient Medications Medication Sig  
 clindamycin (CLEOCIN) 300 mg capsule Take 300 mg by mouth three (3) times daily. x10 days  oxyCODONE IR (ROXICODONE) 5 mg immediate release tablet Take 5 mg by mouth every six (6) hours as needed for Pain.  oxyCODONE IR (ROXICODONE) 5 mg immediate release tablet Take 5 mg by mouth every four (4) hours as needed for Pain.  ondansetron hcl (ZOFRAN) 4 mg tablet Take 4 mg by mouth daily as needed.  senna-docusate (PERICOLACE) 8.6-50 mg per tablet Take 1 Tab by mouth daily.  clindamycin (CLEOCIN) 300 mg capsule Take 1 Cap by mouth three (3) times daily for 10 days.  clonazePAM (KLONOPIN) 0.5 mg tablet Take 0.5 mg by mouth daily.  levothyroxine (SYNTHROID) 75 mcg tablet TAKE 1 TABLET DAILY BEFORE BREAKFAST  memantine (NAMENDA) 5 mg tablet Take 1 Tab by mouth two (2) times a day. (Patient taking differently: Take 10 mg by mouth daily.)  rivastigmine tartrate (EXELON) 1.5 mg capsule Take 3 mg by mouth every evening.  PARoxetine (PAXIL) 40 mg tablet Take 40 mg by mouth daily.  multivitamin (ONE A DAY) tablet Take 1 Tab by mouth daily.  cholecalciferol, vitamin D3, (VITAMIN D3) 2,000 unit tab Take 1 Tab by mouth daily.  lithium CR (ESKALITH CR) 450 mg CR tablet Take 450 mg by mouth nightly.  Wheel Chair cady Weakness No current facility-administered medications for this visit. FH: family history includes Alzheimer in her sister; Cancer in her sister; Depression in her son and son; Diabetes in her brother and son;  Heart Disease in her son; Hypertension in her daughter; Liver Disease in her son; Other in her mother and son; Thyroid Disease in her daughter. SH:  reports that she has quit smoking. She has never used smokeless tobacco. She reports that she does not drink alcohol or use drugs. Review of Systems - History obtained from the patient General ROS: no fever, chills, fatigue, body aches Psychological ROS: no change in anxiety, depression, SI/HI Ophthalmic ROS: no blurred vision, myopia, double vision ENT ROS: no dysphagia, otalgia, otorrhea, rhinorrhea, post nasal drip Respiratory ROS: no cough, shortness of breath, or wheezing Cardiovascular ROS: no chest pain or dyspnea on exertion Gastrointestinal ROS: no abdominal pain, change in bowel habits, or black or bloody stools Genito-Urinary ROS: no frequency, urgency, incontinence, dysuria, hematouria Musculoskeletal ROS: left ankle pain Neurological ROS: no headaches, dizziness, lightheadedness, tremors, seizures Dermatological ROS: no rash or lesions OBJECTIVE:  
Vitals:  
Visit Vitals /70 (BP 1 Location: Left arm, BP Patient Position: Sitting) Pulse 70 Temp 97.8 °F (36.6 °C) (Oral) Resp 14 Ht 5' (1.524 m) Wt 119 lb 3.2 oz (54.1 kg) SpO2 96% BMI 23.28 kg/m² Gen: Pleasant 80 y.o.  female . + stress reaction HEENT: PERRLA. EOMI. OP moist and pink. Neck: Supple. No LAD. HEART: RRR, No M/G/R.     
LUNGS: CTAB No W/R. EXTREMITIES: Warm. No C/C/E.   
MUSCULOSKELETAL: + lateral left ankle tender to palpation inferior to the malleolus + tenderness with increased flexion of the foot in the leg and ankle NEURO: Alert and oriented x 3. Cranial nerves grossly intact. No focal sensory or motor deficits noted. SKIN: + L anterior lower leg: mildly erythematous on medial side, slightly warm + Fading ecchymoses on lateral left leg and knee ASSESSMENT/ PLAN: Diagnoses and all orders for this visit: 
 
1. Acute left ankle pain -     XR ANKLE LT MIN 3 V; Future 2. Pain of left lower extremity 
-     DUPLEX LOWER EXT VENOUS LEFT; Future 3. Drug-induced constipation -     METABOLIC PANEL, COMPREHENSIVE 
-     CBC WITH AUTOMATED DIFF 
 
 
  ICD-10-CM ICD-9-CM 1. Acute left ankle pain M25.572 719.47 XR ANKLE LT MIN 3 V  
2. Pain of left lower extremity M79.605 729.5 DUPLEX LOWER EXT VENOUS LEFT 3. Drug-induced constipation W35.40 022.46 METABOLIC PANEL, COMPREHENSIVE  
  E980.5 CBC WITH AUTOMATED DIFF 1. Acute left ankle pain I ordered a L ankle XR to assess for fracture. 2. Pain of left lower extremity I ordered a left lower extremity venous duplex for further evaluation. She is showing improvement after the course of clindamycin. 3. Drug induced constipation I encouraged pt to stay well hydrated. I ordered labs for CMP and CBC for further evaluation. Due to recent hip surgery plus trauma to the left ankle in a fall post surgery, Ms. Yolanda Kussmaul is not ambulating and will require a wheel chair. Follow-up and Dispositions · Return if symptoms worsen or fail to improve. I have reviewed the patient's medications and risks/side effects/benefits were discussed. Diagnosis(-es) explained to patient and questions answered. Literature provided where appropriate.   
 
Written by Katie Loza, as dictated by Keyshawn Plaza MD.

## 2019-05-02 ENCOUNTER — PATIENT OUTREACH (OUTPATIENT)
Dept: INTERNAL MEDICINE CLINIC | Age: 84
End: 2019-05-02

## 2019-05-02 ENCOUNTER — HOME CARE VISIT (OUTPATIENT)
Dept: SCHEDULING | Facility: HOME HEALTH | Age: 84
End: 2019-05-02
Payer: MEDICARE

## 2019-05-02 LAB
ALBUMIN SERPL-MCNC: 4.2 G/DL (ref 3.2–4.6)
ALBUMIN/GLOB SERPL: 1.6 {RATIO} (ref 1.2–2.2)
ALP SERPL-CCNC: 146 IU/L (ref 39–117)
ALT SERPL-CCNC: 8 IU/L (ref 0–32)
AST SERPL-CCNC: 23 IU/L (ref 0–40)
BASOPHILS # BLD AUTO: 0.1 X10E3/UL (ref 0–0.2)
BASOPHILS NFR BLD AUTO: 1 %
BILIRUB SERPL-MCNC: 0.3 MG/DL (ref 0–1.2)
BUN SERPL-MCNC: 10 MG/DL (ref 10–36)
BUN/CREAT SERPL: 11 (ref 12–28)
CALCIUM SERPL-MCNC: 9.7 MG/DL (ref 8.7–10.3)
CHLORIDE SERPL-SCNC: 105 MMOL/L (ref 96–106)
CO2 SERPL-SCNC: 22 MMOL/L (ref 20–29)
CREAT SERPL-MCNC: 0.87 MG/DL (ref 0.57–1)
EOSINOPHIL # BLD AUTO: 0.3 X10E3/UL (ref 0–0.4)
EOSINOPHIL NFR BLD AUTO: 5 %
ERYTHROCYTE [DISTWIDTH] IN BLOOD BY AUTOMATED COUNT: 14.2 % (ref 12.3–15.4)
GLOBULIN SER CALC-MCNC: 2.6 G/DL (ref 1.5–4.5)
GLUCOSE SERPL-MCNC: 79 MG/DL (ref 65–99)
HCT VFR BLD AUTO: 40.5 % (ref 34–46.6)
HGB BLD-MCNC: 13 G/DL (ref 11.1–15.9)
IMM GRANULOCYTES # BLD AUTO: 0 X10E3/UL (ref 0–0.1)
IMM GRANULOCYTES NFR BLD AUTO: 0 %
LYMPHOCYTES # BLD AUTO: 2.1 X10E3/UL (ref 0.7–3.1)
LYMPHOCYTES NFR BLD AUTO: 33 %
MCH RBC QN AUTO: 29.5 PG (ref 26.6–33)
MCHC RBC AUTO-ENTMCNC: 32.1 G/DL (ref 31.5–35.7)
MCV RBC AUTO: 92 FL (ref 79–97)
MONOCYTES # BLD AUTO: 0.5 X10E3/UL (ref 0.1–0.9)
MONOCYTES NFR BLD AUTO: 8 %
NEUTROPHILS # BLD AUTO: 3.4 X10E3/UL (ref 1.4–7)
NEUTROPHILS NFR BLD AUTO: 53 %
PLATELET # BLD AUTO: 253 X10E3/UL (ref 150–379)
POTASSIUM SERPL-SCNC: 4.6 MMOL/L (ref 3.5–5.2)
PROT SERPL-MCNC: 6.8 G/DL (ref 6–8.5)
RBC # BLD AUTO: 4.4 X10E6/UL (ref 3.77–5.28)
SODIUM SERPL-SCNC: 140 MMOL/L (ref 134–144)
WBC # BLD AUTO: 6.5 X10E3/UL (ref 3.4–10.8)

## 2019-05-02 PROCEDURE — 3331090002 HH PPS REVENUE DEBIT

## 2019-05-02 PROCEDURE — 3331090001 HH PPS REVENUE CREDIT

## 2019-05-02 NOTE — PROGRESS NOTES
Goals Addressed This Visit's Progress Post Hospitalization  Prevent complications post hospitalization. On track 4/25/2019  
- attended office visit with Ortho on 4/18/19 for post-op staple removal 
- taking clindamycin for 10 days as ordered by Dr. Светлана Martines at office visit on 4/22/19 for treatment of left leg cellulitis 
- Re: Left Leg Cellulitis - daughter states, \"looks better, it's not swollen and it's still red, but not as red. \" Denies fever. - constipation resolved; last bowel movement 4/24/19. Encouraged to continue pericolace to prevent constipation while taking oxycodone. - \"occasionally\" experiences nausea; denies vomiting. Has not taken zofran since 4/19/19; daughter reports zofran ordered at [facility] discharge. - taking oxycodone 5 mg as needed for pain management; daughter reports prescription ordered at discharge from SNF. Daughter reports patient only taking one tablet daily if needed. - will attend office visit with Dr. Mary Anne Mena on 5/1/19 
- NN confirmed with [address] today that patient attended office visit with Dr. Alonzo Lakhani on 4/17/19 and next scheduled office visit 6/18/19 at Dr. Alonzo Lakhani 5/2/2019 
- attended [facility] appt with Dr. Mary Anne Mena on 5/1/19; left ankle x-ray ordered for c/o acute left ankle pain, left lower extremity doppler ordered for pain of left lower extremity, cbc and cmp ordered, wheelchair 
- left lower extremity doppler and left ankle xray completed yesterday; x-ray negative for fracture and doppler negative for DVT  Supportive resources in place to maintain patient in the community (ie. Home Health, DME equipment, refer to, medication assistant plan, etc.)   On track 4/25/2019 
- discharged from SNF on 4/17/19 to home with [facility] for SN, PT, OT . Home health SN start of care 4/19/19, PT initial eval 4/19/19, OT initial eval 4/23/19 
- new order for wheelchair by  at office visit on 4/22/19. Daughter reports contacting [facility] on 4/23/19 regarding order and being advised that [facility] would contact PCP office to complete order and obtain necessary documentation for Medicare. Per telephone enc documentation 4/22/19, LPN  faxed wheelchair order to [facility]. 5/2/2019 
- per PCP office visit note 5/1/19, wheelchair ordered Future Appointments: 
Future Appointments Date Time Provider Ehsan Shi 5/3/2019 To Be Determined Rome Dent, PT [address]  
5/6/2019 To Be Determined Felton Hudson [facility]  
5/6/2019 To Be Determined [facility]  
5/8/2019 To Be Determined JOSELINE Jo  
5/10/2019 To Be Determined [facility]  
5[facility]13[facility]2019 [facility]  
5/15/2019 To Be Determined Florin Luis OT [facility]  
5/16/2019 To Be Determined [facility]  
5/20/2019 To Be Determined [facility]  
5/21/2019  1:40 PM Alma Soto  [address]  
5/27/2019 To Be Determined [facility]  
6/3/2019 To Be Determined [facility]  
6/10/2019 To Be Determined Angie Santo [place] [address] Last Appointment With Me: 
Visit date not found Last Appointment My Department: 
5/1/2019

## 2019-05-03 ENCOUNTER — HOME CARE VISIT (OUTPATIENT)
Dept: SCHEDULING | Facility: HOME HEALTH | Age: 84
End: 2019-05-03
Payer: MEDICARE

## 2019-05-03 ENCOUNTER — HOME CARE VISIT (OUTPATIENT)
Dept: HOME HEALTH SERVICES | Facility: HOME HEALTH | Age: 84
End: 2019-05-03
Payer: MEDICARE

## 2019-05-03 VITALS
DIASTOLIC BLOOD PRESSURE: 60 MMHG | HEART RATE: 70 BPM | RESPIRATION RATE: 18 BRPM | SYSTOLIC BLOOD PRESSURE: 144 MMHG | OXYGEN SATURATION: 96 % | TEMPERATURE: 97.6 F

## 2019-05-03 PROCEDURE — 3331090002 HH PPS REVENUE DEBIT

## 2019-05-03 PROCEDURE — 3331090001 HH PPS REVENUE CREDIT

## 2019-05-03 PROCEDURE — G0151 HHCP-SERV OF PT,EA 15 MIN: HCPCS

## 2019-05-04 PROCEDURE — 3331090002 HH PPS REVENUE DEBIT

## 2019-05-04 PROCEDURE — 3331090001 HH PPS REVENUE CREDIT

## 2019-05-05 PROCEDURE — 3331090002 HH PPS REVENUE DEBIT

## 2019-05-05 PROCEDURE — 3331090001 HH PPS REVENUE CREDIT

## 2019-05-06 ENCOUNTER — HOME CARE VISIT (OUTPATIENT)
Dept: SCHEDULING | Facility: HOME HEALTH | Age: 84
End: 2019-05-06
Payer: MEDICARE

## 2019-05-06 PROCEDURE — 3331090001 HH PPS REVENUE CREDIT

## 2019-05-06 PROCEDURE — 3331090002 HH PPS REVENUE DEBIT

## 2019-05-06 PROCEDURE — G0300 HHS/HOSPICE OF LPN EA 15 MIN: HCPCS

## 2019-05-07 ENCOUNTER — HOME CARE VISIT (OUTPATIENT)
Dept: SCHEDULING | Facility: HOME HEALTH | Age: 84
End: 2019-05-07
Payer: MEDICARE

## 2019-05-07 ENCOUNTER — PATIENT OUTREACH (OUTPATIENT)
Dept: INTERNAL MEDICINE CLINIC | Age: 84
End: 2019-05-07

## 2019-05-07 VITALS
TEMPERATURE: 98.4 F | OXYGEN SATURATION: 99 % | HEART RATE: 70 BPM | DIASTOLIC BLOOD PRESSURE: 70 MMHG | SYSTOLIC BLOOD PRESSURE: 118 MMHG

## 2019-05-07 PROCEDURE — G0152 HHCP-SERV OF OT,EA 15 MIN: HCPCS

## 2019-05-07 PROCEDURE — 3331090002 HH PPS REVENUE DEBIT

## 2019-05-07 PROCEDURE — 3331090001 HH PPS REVENUE CREDIT

## 2019-05-07 NOTE — PROGRESS NOTES
Goals Addressed This Visit's Progress Post Hospitalization  Prevent complications post hospitalization. 4/25/2019  
- attended office visit with Ortho on 4/18/19 for post-op staple removal 
- taking clindamycin for 10 days as ordered by Dr. Sharona Call at office visit on 4/22/19 for treatment of left leg cellulitis 
- Re: Left Leg Cellulitis - daughter states, \"looks better, it's not swollen and it's still red, but not as red. \" Denies fever. - constipation resolved; last bowel movement 4/24/19. Encouraged to continue pericolace to prevent constipation while taking oxycodone. - \"occasionally\" experiences nausea; denies vomiting. Has not taken zofran since 4/19/19; daughter reports zofran ordered at [facility] discharge. - taking oxycodone 5 mg as needed for pain management; daughter reports prescription ordered at discharge from [facility]. Daughter reports patient only taking one tablet daily if needed. - will attend office visit with Dr. Emily Grande on 5/1/19 
- NN confirmed with [address] today that patient attended office visit with Dr. Abram Ordonez on 4/17/19 and next scheduled office visit 6/18/19 at Dr. Abram Ordonez 5/2/2019 
- attended [facility] appt with Dr. Emily Grande on 5/1/19; left ankle x-ray ordered for c/o acute left ankle pain, left lower extremity doppler ordered for pain of left lower extremity, cbc and cmp ordered, wheelchair 
- left lower extremity doppler and left ankle xray completed yesterday; x-ray negative for fracture and doppler negative for DVT 
 
5/7/2019  
- completed clindamycin for treatment of left lower extremity cellulitis. Inquired about appearance of left lower extremity; Daughter states, \"It looks a little better. It's still red, but it's not swollen anymore. \" 
- reports patient c/o stomach is \"upset\"; states, \"She says she feels a little nauseous sometimes\", \"She doesn't say it hurts or anything, she just says it's upset\". Denies vomiting.  Denies constipation; reports last bowel movement 5/6/19. Denies fever, chills. Reports that this is not new and has been present since SNF discharge. Daughter felt that symptoms may be related to Oxycodone, so she did not give patient an oxycodone today and instead gave her Tylenol, however patient continues to report symptoms. - daughter reports home health OT visit today and that therapist reported BP was \"low\"; daughter is unable to recall patient's BP reading during visit today and home health visit documentation is not yet available, no telephone encounters from home health to PCP office regarding BP noted in chart. Documented BP during SN home health visit on 5/6/19 118/70, Pulse 70 
- discussed [facility] services and daughter is agreeable to [facility] acute visit today for evaluation of left lower extremity/ resolution of cellulitis and GI symptoms - [facility] referral completed by NN; visit scheduled for today between 4:30p-5:30p 
  
  Supportive resources in place to maintain patient in the community (ie. Home Health, DME equipment, refer to, medication assistant plan, etc.)     
  4/25/2019 
- discharged from SNF on 4/17/19 to home with [facility] for SN, PT, OT . Home health SN start of care 4/19/19, PT initial eval 4/19/19, OT initial eval 4/23/19 
- new order for wheelchair by  at office visit on 4/22/19. Daughter reports contacting [facility] on 4/23/19 regarding order and being advised that [place] would contact PCP office to complete order and obtain necessary documentation for Medicare. Per telephone enc documentation 4/22/19, LPN  faxed wheelchair order to [facility]. 5/2/2019 
- per PCP office visit note 5/1/19, wheelchair ordered 5/7/2019 
- discharged from home health PT on 5/3/19 
- remains open to home health SN, OT 
- home health MSW visit scheduled for MSW on Thursday, 5/9, between 9 and 10 
- daughter reports that wheelchair was delivered today; ordered from [employer]

## 2019-05-08 ENCOUNTER — PATIENT OUTREACH (OUTPATIENT)
Dept: INTERNAL MEDICINE CLINIC | Age: 84
End: 2019-05-08

## 2019-05-08 PROCEDURE — 3331090001 HH PPS REVENUE CREDIT

## 2019-05-08 PROCEDURE — 3331090002 HH PPS REVENUE DEBIT

## 2019-05-08 NOTE — PROGRESS NOTES
Goals Addressed This Visit's Progress Post Hospitalization  Prevent complications post hospitalization. 4/25/2019  
- attended office visit with Ortho on 4/18/19 for post-op staple removal 
- taking clindamycin for 10 days as ordered by Dr. Anu Stanley at office visit on 4/22/19 for treatment of left leg cellulitis 
- Re: Left Leg Cellulitis - daughter states, \"looks better, it's not swollen and it's still red, but not as red. \" Denies fever. - constipation resolved; last bowel movement 4/24/19. Encouraged to continue pericolace to prevent constipation while taking oxycodone. - \"occasionally\" experiences nausea; denies vomiting. Has not taken zofran since 4/19/19; daughter reports zofran ordered at [facility] discharge. - taking oxycodone 5 mg as needed for pain management; daughter reports prescription ordered at discharge from [facility]. Daughter reports patient only taking one tablet daily if needed. - will attend office visit with Dr. Royal Small on 5/1/19 
- NN confirmed with [address] today that patient attended office visit with Dr. Michaela Freeman on 4/17/19 and next scheduled office visit 6/18/19 with Dr. Michaela Freeman 5/2/2019 
- attended [facility] appt with Dr. Royal Small on 5/1/19; left ankle x-ray ordered for c/o acute left ankle pain, left lower extremity doppler ordered for pain of left lower extremity, cbc and cmp ordered, wheelchair 
- left lower extremity doppler and left ankle xray completed yesterday; x-ray negative for fracture and doppler negative for DVT 
 
5/7/2019  
- completed clindamycin for treatment of left lower extremity cellulitis. Inquired about appearance of left lower extremity; Daughter states, \"It looks a little better. It's still red, but it's not swollen anymore. \" 
- reports patient c/o stomach is \"upset\"; states, \"She says she feels a little nauseous sometimes\", \"She doesn't say it hurts or anything, she just says it's upset\". Denies vomiting.  Denies constipation; reports last bowel movement 5/6/19. Denies fever, chills. Reports that this is not new and has been present since SNF discharge. Daughter felt that symptoms may be related to Oxycodone, so she did not give patient an oxycodone today and instead gave her Tylenol, however patient continues to report symptoms. - daughter reports home health OT visit today and that therapist reported BP was \"low\"; daughter is unable to recall patient's BP reading during visit today and home health visit documentation is not yet available, no telephone encounters from home health to PCP office regarding BP noted in chart. Documented BP during  home health visit on 5/6/19 118/70, Pulse 70 
- discussed [facility] services and daughter is agreeable to [facility] acute visit today for evaluation of left lower extremity/ resolution of cellulitis and GI symptoms - [facility] referral completed by NN; visit scheduled for today between 4:30p-5:30p 
 
5/8/2019  
- NN unable to find documented blood pressure in 5/7/19 home health OT documentation 
- NN confirmed with [facility] that visit was completed on 5/7/19; BP during visit 164/64, try to wean off of narcotics as this could be contributing to abdominal symptoms,daughter declined xray of right hip and side, advised tylenol and topical treatment such as capsaicin, left lower extremity cellulitis appears resolved, recommend f/u with PCP and Ortho. Visit note to be faxed to PCP office for PCP review. - next scheduled office visit 6/18/19 with Dr. Everton Linda. Daughter will contact Ortho to inquire about scheduling an earlier f/u to discuss patient c/o continued left hip pain, pain management - 6 month routine f/u scheduled with Dr. Beckford Record for 7/19/19; daughter declines to schedule acute appt and/or earlier f/u with PCP at this time. She will contact PCP office if abdominal symptoms worsen or fail to improve.  Supportive resources in place to maintain patient in the community (ie. Home Health, DME equipment, refer to, medication assistant plan, etc.)     
  4/25/2019 
- discharged from SNF on 4/17/19 to home with [facility] for SN, PT, OT . Home health SN start of care 4/19/19, PT initial eval 4/19/19, OT initial eval 4/23/19 
- new order for wheelchair by  at office visit on 4/22/19. Daughter reports contacting [facility] [employer] on 4/23/19 regarding order and being advised that [facility] would contact PCP office to complete order and obtain necessary documentation for Medicare. Per telephone enc documentation 4/22/19, LPN  faxed wheelchair order to [facility]. 5/2/2019 
- per PCP office visit note 5/1/19, wheelchair ordered 5/7/2019 
- discharged from home health PT on 5/3/19 
- remains open to home health SN, OT 
- home health MSW visit scheduled for MSW on Thursday, 5/9, between 9 and 10 
- daughter reports that wheelchair was delivered today; ordered from [address] 5/8/2019 
- daughter denies patient falls post-discharge 
- has [facility] contact information as a resource Future Appointments: 
Future Appointments Date Time Provider Ehsan Shi 5/9/2019 To Be Determined Melissa Mcneal LMSW [facility]  
5/9/2019 To Be Determined Reyes Rodríguez LPN [facility]  
5/13/2019 To Be Determined Raymundo Artis  
5/15/2019 To Be Determined JOSELINE Donohue  
5/16/2019 To Be Determined Raymundo Artis  
5/20/2019 To Be Determined Raymundo Artis  
5/21/2019  1:40 PM MD KARL Painter/ Marisel Brown  
5/27/2019 To Be Determined Raymundo Artis  
6/3/2019 To Be Determined Estephanie Joe [facility]  
6/10/2019 To Be Determined Estephanie Joe [facility]  
7/19/2019 11:15 AM Fatemeh Maurice [facility] Last Appointment With Me: 
Visit date not found Last Appointment My Department: 5/1/2019

## 2019-05-09 ENCOUNTER — HOME CARE VISIT (OUTPATIENT)
Dept: SCHEDULING | Facility: HOME HEALTH | Age: 84
End: 2019-05-09
Payer: MEDICARE

## 2019-05-09 PROCEDURE — G0155 HHCP-SVS OF CSW,EA 15 MIN: HCPCS

## 2019-05-09 PROCEDURE — 3331090002 HH PPS REVENUE DEBIT

## 2019-05-09 PROCEDURE — 3331090001 HH PPS REVENUE CREDIT

## 2019-05-10 PROCEDURE — 3331090001 HH PPS REVENUE CREDIT

## 2019-05-10 PROCEDURE — 3331090002 HH PPS REVENUE DEBIT

## 2019-05-11 PROCEDURE — 3331090002 HH PPS REVENUE DEBIT

## 2019-05-11 PROCEDURE — 3331090001 HH PPS REVENUE CREDIT

## 2019-05-12 PROCEDURE — 3331090002 HH PPS REVENUE DEBIT

## 2019-05-12 PROCEDURE — 3331090001 HH PPS REVENUE CREDIT

## 2019-05-13 ENCOUNTER — HOME CARE VISIT (OUTPATIENT)
Dept: SCHEDULING | Facility: HOME HEALTH | Age: 84
End: 2019-05-13
Payer: MEDICARE

## 2019-05-13 ENCOUNTER — APPOINTMENT (OUTPATIENT)
Dept: GENERAL RADIOLOGY | Age: 84
End: 2019-05-13
Attending: EMERGENCY MEDICINE
Payer: MEDICARE

## 2019-05-13 ENCOUNTER — HOSPITAL ENCOUNTER (OUTPATIENT)
Age: 84
Setting detail: OBSERVATION
Discharge: HOME | End: 2019-05-17
Attending: EMERGENCY MEDICINE | Admitting: HOSPITALIST
Payer: MEDICARE

## 2019-05-13 ENCOUNTER — TELEPHONE (OUTPATIENT)
Dept: INTERNAL MEDICINE CLINIC | Age: 84
End: 2019-05-13

## 2019-05-13 ENCOUNTER — APPOINTMENT (OUTPATIENT)
Dept: ULTRASOUND IMAGING | Age: 84
End: 2019-05-13
Attending: EMERGENCY MEDICINE
Payer: MEDICARE

## 2019-05-13 ENCOUNTER — APPOINTMENT (OUTPATIENT)
Dept: CT IMAGING | Age: 84
End: 2019-05-13
Attending: EMERGENCY MEDICINE
Payer: MEDICARE

## 2019-05-13 VITALS
DIASTOLIC BLOOD PRESSURE: 80 MMHG | HEART RATE: 86 BPM | TEMPERATURE: 99.2 F | OXYGEN SATURATION: 97 % | SYSTOLIC BLOOD PRESSURE: 140 MMHG

## 2019-05-13 VITALS
SYSTOLIC BLOOD PRESSURE: 122 MMHG | DIASTOLIC BLOOD PRESSURE: 60 MMHG | OXYGEN SATURATION: 98 % | HEART RATE: 70 BPM | RESPIRATION RATE: 18 BRPM | TEMPERATURE: 98.7 F

## 2019-05-13 DIAGNOSIS — F32.A ANXIETY AND DEPRESSION: ICD-10-CM

## 2019-05-13 DIAGNOSIS — R26.2 UNABLE TO WALK: ICD-10-CM

## 2019-05-13 DIAGNOSIS — M79.605 LEFT LEG PAIN: ICD-10-CM

## 2019-05-13 DIAGNOSIS — F41.9 ANXIETY AND DEPRESSION: ICD-10-CM

## 2019-05-13 DIAGNOSIS — Z63.6 CAREGIVER STRESS: ICD-10-CM

## 2019-05-13 DIAGNOSIS — Z71.89 COUNSELING REGARDING ADVANCE CARE PLANNING AND GOALS OF CARE: ICD-10-CM

## 2019-05-13 DIAGNOSIS — M54.17 LEFT LUMBOSACRAL RADICULOPATHY: Primary | ICD-10-CM

## 2019-05-13 LAB
ALBUMIN SERPL-MCNC: 3.6 G/DL (ref 3.5–5)
ALBUMIN/GLOB SERPL: 1 {RATIO} (ref 1.1–2.2)
ALP SERPL-CCNC: 143 U/L (ref 45–117)
ALT SERPL-CCNC: 12 U/L (ref 12–78)
ANION GAP SERPL CALC-SCNC: 5 MMOL/L (ref 5–15)
AST SERPL-CCNC: 19 U/L (ref 15–37)
BASOPHILS # BLD: 0.1 K/UL (ref 0–0.1)
BASOPHILS NFR BLD: 1 % (ref 0–1)
BILIRUB SERPL-MCNC: 0.4 MG/DL (ref 0.2–1)
BUN SERPL-MCNC: 9 MG/DL (ref 6–20)
BUN/CREAT SERPL: 15 (ref 12–20)
CALCIUM SERPL-MCNC: 9 MG/DL (ref 8.5–10.1)
CHLORIDE SERPL-SCNC: 107 MMOL/L (ref 97–108)
CO2 SERPL-SCNC: 25 MMOL/L (ref 21–32)
CREAT SERPL-MCNC: 0.61 MG/DL (ref 0.55–1.02)
DIFFERENTIAL METHOD BLD: NORMAL
EOSINOPHIL # BLD: 0.3 K/UL (ref 0–0.4)
EOSINOPHIL NFR BLD: 4 % (ref 0–7)
ERYTHROCYTE [DISTWIDTH] IN BLOOD BY AUTOMATED COUNT: 12.9 % (ref 11.5–14.5)
GLOBULIN SER CALC-MCNC: 3.6 G/DL (ref 2–4)
GLUCOSE SERPL-MCNC: 101 MG/DL (ref 65–100)
HCT VFR BLD AUTO: 39.7 % (ref 35–47)
HGB BLD-MCNC: 13.2 G/DL (ref 11.5–16)
IMM GRANULOCYTES # BLD AUTO: 0 K/UL (ref 0–0.04)
IMM GRANULOCYTES NFR BLD AUTO: 0 % (ref 0–0.5)
LYMPHOCYTES # BLD: 2.5 K/UL (ref 0.8–3.5)
LYMPHOCYTES NFR BLD: 38 % (ref 12–49)
MCH RBC QN AUTO: 29.7 PG (ref 26–34)
MCHC RBC AUTO-ENTMCNC: 33.2 G/DL (ref 30–36.5)
MCV RBC AUTO: 89.4 FL (ref 80–99)
MONOCYTES # BLD: 0.7 K/UL (ref 0–1)
MONOCYTES NFR BLD: 10 % (ref 5–13)
NEUTS SEG # BLD: 3.2 K/UL (ref 1.8–8)
NEUTS SEG NFR BLD: 47 % (ref 32–75)
NRBC # BLD: 0 K/UL (ref 0–0.01)
NRBC BLD-RTO: 0 PER 100 WBC
PLATELET # BLD AUTO: 186 K/UL (ref 150–400)
PMV BLD AUTO: 10.2 FL (ref 8.9–12.9)
POTASSIUM SERPL-SCNC: 4 MMOL/L (ref 3.5–5.1)
PROT SERPL-MCNC: 7.2 G/DL (ref 6.4–8.2)
RBC # BLD AUTO: 4.44 M/UL (ref 3.8–5.2)
SODIUM SERPL-SCNC: 137 MMOL/L (ref 136–145)
WBC # BLD AUTO: 6.6 K/UL (ref 3.6–11)

## 2019-05-13 PROCEDURE — 80178 ASSAY OF LITHIUM: CPT

## 2019-05-13 PROCEDURE — 73502 X-RAY EXAM HIP UNI 2-3 VIEWS: CPT

## 2019-05-13 PROCEDURE — 3331090002 HH PPS REVENUE DEBIT

## 2019-05-13 PROCEDURE — 96372 THER/PROPH/DIAG INJ SC/IM: CPT

## 2019-05-13 PROCEDURE — 72131 CT LUMBAR SPINE W/O DYE: CPT

## 2019-05-13 PROCEDURE — 3331090001 HH PPS REVENUE CREDIT

## 2019-05-13 PROCEDURE — 80053 COMPREHEN METABOLIC PANEL: CPT

## 2019-05-13 PROCEDURE — G0299 HHS/HOSPICE OF RN EA 15 MIN: HCPCS

## 2019-05-13 PROCEDURE — 74011250636 HC RX REV CODE- 250/636: Performed by: HOSPITALIST

## 2019-05-13 PROCEDURE — 93971 EXTREMITY STUDY: CPT

## 2019-05-13 PROCEDURE — 36415 COLL VENOUS BLD VENIPUNCTURE: CPT

## 2019-05-13 PROCEDURE — G0152 HHCP-SERV OF OT,EA 15 MIN: HCPCS

## 2019-05-13 PROCEDURE — 85025 COMPLETE CBC W/AUTO DIFF WBC: CPT

## 2019-05-13 PROCEDURE — 99218 HC RM OBSERVATION: CPT

## 2019-05-13 PROCEDURE — 74011250637 HC RX REV CODE- 250/637: Performed by: HOSPITALIST

## 2019-05-13 PROCEDURE — 99283 EMERGENCY DEPT VISIT LOW MDM: CPT

## 2019-05-13 PROCEDURE — 72192 CT PELVIS W/O DYE: CPT

## 2019-05-13 RX ORDER — ACETAMINOPHEN 325 MG/1
650 TABLET ORAL
Status: DISCONTINUED | OUTPATIENT
Start: 2019-05-13 | End: 2019-05-17 | Stop reason: HOSPADM

## 2019-05-13 RX ORDER — RIVASTIGMINE TARTRATE 1.5 MG/1
3 CAPSULE ORAL EVERY EVENING
Status: DISCONTINUED | OUTPATIENT
Start: 2019-05-14 | End: 2019-05-17 | Stop reason: HOSPADM

## 2019-05-13 RX ORDER — LEVOTHYROXINE SODIUM 75 UG/1
75 TABLET ORAL
Status: DISCONTINUED | OUTPATIENT
Start: 2019-05-14 | End: 2019-05-17 | Stop reason: HOSPADM

## 2019-05-13 RX ORDER — CLONAZEPAM 0.5 MG/1
0.5 TABLET ORAL DAILY
Status: DISCONTINUED | OUTPATIENT
Start: 2019-05-14 | End: 2019-05-17

## 2019-05-13 RX ORDER — GABAPENTIN 100 MG/1
100 CAPSULE ORAL 3 TIMES DAILY
Status: DISCONTINUED | OUTPATIENT
Start: 2019-05-13 | End: 2019-05-14

## 2019-05-13 RX ORDER — MEMANTINE HYDROCHLORIDE 10 MG/1
10 TABLET ORAL DAILY
Status: DISCONTINUED | OUTPATIENT
Start: 2019-05-14 | End: 2019-05-17 | Stop reason: HOSPADM

## 2019-05-13 RX ORDER — AMOXICILLIN 250 MG
1 CAPSULE ORAL DAILY
Status: DISCONTINUED | OUTPATIENT
Start: 2019-05-14 | End: 2019-05-14

## 2019-05-13 RX ORDER — OXYCODONE HYDROCHLORIDE 5 MG/1
5 TABLET ORAL
Status: DISCONTINUED | OUTPATIENT
Start: 2019-05-13 | End: 2019-05-17 | Stop reason: HOSPADM

## 2019-05-13 RX ORDER — PAROXETINE HYDROCHLORIDE 20 MG/1
40 TABLET, FILM COATED ORAL DAILY
Status: DISCONTINUED | OUTPATIENT
Start: 2019-05-14 | End: 2019-05-17 | Stop reason: HOSPADM

## 2019-05-13 RX ORDER — SODIUM CHLORIDE 0.9 % (FLUSH) 0.9 %
5-40 SYRINGE (ML) INJECTION AS NEEDED
Status: DISCONTINUED | OUTPATIENT
Start: 2019-05-13 | End: 2019-05-17 | Stop reason: HOSPADM

## 2019-05-13 RX ORDER — SODIUM CHLORIDE 0.9 % (FLUSH) 0.9 %
5-40 SYRINGE (ML) INJECTION EVERY 8 HOURS
Status: DISCONTINUED | OUTPATIENT
Start: 2019-05-13 | End: 2019-05-17 | Stop reason: HOSPADM

## 2019-05-13 RX ORDER — HEPARIN SODIUM 5000 [USP'U]/ML
5000 INJECTION, SOLUTION INTRAVENOUS; SUBCUTANEOUS EVERY 8 HOURS
Status: DISCONTINUED | OUTPATIENT
Start: 2019-05-13 | End: 2019-05-14

## 2019-05-13 RX ORDER — LITHIUM CARBONATE 450 MG/1
450 TABLET ORAL
Status: DISCONTINUED | OUTPATIENT
Start: 2019-05-13 | End: 2019-05-17 | Stop reason: HOSPADM

## 2019-05-13 RX ORDER — ONDANSETRON 2 MG/ML
4 INJECTION INTRAMUSCULAR; INTRAVENOUS
Status: DISCONTINUED | OUTPATIENT
Start: 2019-05-13 | End: 2019-05-17 | Stop reason: HOSPADM

## 2019-05-13 RX ADMIN — GABAPENTIN 100 MG: 100 CAPSULE ORAL at 23:49

## 2019-05-13 RX ADMIN — Medication 10 ML: at 23:06

## 2019-05-13 RX ADMIN — HEPARIN SODIUM 5000 UNITS: 5000 INJECTION INTRAVENOUS; SUBCUTANEOUS at 22:53

## 2019-05-13 RX ADMIN — OXYCODONE HYDROCHLORIDE 5 MG: 5 TABLET ORAL at 22:53

## 2019-05-13 SDOH — SOCIAL STABILITY - SOCIAL INSECURITY: DEPENDENT RELATIVE NEEDING CARE AT HOME: Z63.6

## 2019-05-13 NOTE — TELEPHONE ENCOUNTER
HIPAA verified with patient's daughter Nasreen Matt  Patient complaining of 10/10 pain in middle of abdomen and lower left leg pain. Last BM today, no problems with urinating, no nausea or vomiting. Labs reviewed with [facility] also. Marisol would like to know what to do about pain?

## 2019-05-13 NOTE — ED PROVIDER NOTES
EMERGENCY DEPARTMENT HISTORY AND PHYSICAL EXAM 
 
 
Date: 5/13/2019 Patient Name: Brennon Bolaños History of Presenting Illness Chief Complaint Patient presents with  
 Hip Pain Left hip pain radiating down left leg to left calf since surgery in March on hip. Pt unable to bear weight. Sent by PCP and home health nurse History Provided By: Patient and Patient's Daughter HPI: Brennon Boalños, 80 y.o. female  presents to the ED with cc of left hip pain radiating down her left leg and into her left calf. Patient had a fall and a left hip fracture in March. She had surgery and then went to rehabilitation. Since leaving rehabilitation she has had this pain that is been getting more severe. She comes in today because she is just not unable to even walk anymore. She was using a walker for ambulation but even with that she cannot bear weight or walk. They deny fevers or chills. There are no other complaints, changes, or physical findings at this time. PCP: Aracely Caruso MD 
 
No current facility-administered medications on file prior to encounter. Current Outpatient Medications on File Prior to Encounter Medication Sig Dispense Refill  oxyCODONE IR (ROXICODONE) 5 mg immediate release tablet Take 5 mg by mouth every six (6) hours as needed for Pain.  oxyCODONE IR (ROXICODONE) 5 mg immediate release tablet Take 5 mg by mouth every four (4) hours as needed for Pain.  ondansetron hcl (ZOFRAN) 4 mg tablet Take 4 mg by mouth daily as needed.  Wheel Chair cady Weakness 1 Each 0  
 senna-docusate (PERICOLACE) 8.6-50 mg per tablet Take 1 Tab by mouth daily. 30 Tab 0  clonazePAM (KLONOPIN) 0.5 mg tablet Take 0.5 mg by mouth daily.  levothyroxine (SYNTHROID) 75 mcg tablet TAKE 1 TABLET DAILY BEFORE BREAKFAST 90 Tab 0  
 memantine (NAMENDA) 5 mg tablet Take 1 Tab by mouth two (2) times a day. (Patient taking differently: Take 10 mg by mouth daily. ) 180 Tab 3  
  rivastigmine tartrate (EXELON) 1.5 mg capsule Take 3 mg by mouth every evening.  PARoxetine (PAXIL) 40 mg tablet Take 40 mg by mouth daily.  multivitamin (ONE A DAY) tablet Take 1 Tab by mouth daily.  cholecalciferol, vitamin D3, (VITAMIN D3) 2,000 unit tab Take 1 Tab by mouth daily.  lithium CR (ESKALITH CR) 450 mg CR tablet Take 450 mg by mouth nightly. Past History Past Medical History: 
Past Medical History:  
Diagnosis Date  Dementia  Depression   
 psychiatrist: Dr Caro Buitrago (on Lithium)  Fracture of wrist   
 slipped and fx left wrist, surgery scheduled 12/21/16  Full dentures   
 upper and lower  Hypothyroid Past Surgical History: 
Past Surgical History:  
Procedure Laterality Date  HX KYPHOPLASTY  2013  HX ORTHOPAEDIC  12/2016 Wrist surgery  HX WRIST FRACTURE TX Left 12/2016 Family History: 
Family History Problem Relation Age of Onset 24 [facility] Other Mother Inefficient wound healing  Cancer Sister Stomach CA  
 Diabetes Brother  Alzheimer Sister  Hypertension Daughter  Thyroid Disease Daughter  Other Son Hip replacement  Depression Son  Liver Disease Son  Diabetes Son   
 Heart Disease Son  Depression Son   
 
 
Social History: 
Social History Tobacco Use  Smoking status: Former Smoker  Smokeless tobacco: Never Used Substance Use Topics  Alcohol use: No  
 Drug use: No  
 
 
Allergies: Allergies Allergen Reactions  Sulfa (Sulfonamide Antibiotics) Hives Review of Systems Review of Systems Constitutional: Negative for chills and fever. HENT: Negative for congestion, ear pain, rhinorrhea, sore throat and trouble swallowing. Eyes: Negative for visual disturbance. Respiratory: Negative for cough, chest tightness and shortness of breath. Cardiovascular: Negative for chest pain and palpitations. Gastrointestinal: Negative for abdominal pain, blood in stool, constipation, diarrhea, nausea and vomiting. Genitourinary: Negative for decreased urine volume, difficulty urinating, dysuria and frequency. Musculoskeletal: Negative for back pain and neck pain. Left leg pain Skin: Negative for color change and rash. Neurological: Negative for dizziness, weakness, light-headedness and headaches. Physical Exam  
Physical Exam  
Constitutional: She is oriented to person, place, and time. She appears well-developed and well-nourished. She does not appear ill. No distress. HENT:  
Mouth/Throat: Oropharynx is clear and moist.  
Eyes: Conjunctivae are normal.  
Neck: Neck supple. Cardiovascular: Normal rate and regular rhythm. Pulmonary/Chest: Effort normal and breath sounds normal. No accessory muscle usage. No respiratory distress. Abdominal: Soft. She exhibits no distension. There is no tenderness. Musculoskeletal:  
Pain with range of motion of the left leg. Lymphadenopathy:  
  She has no cervical adenopathy. Neurological: She is alert and oriented to person, place, and time. She has normal strength. Skin: Skin is warm and dry. Nursing note and vitals reviewed. Diagnostic Study Results Labs - No results found for this or any previous visit (from the past 24 hour(s)). Radiologic Studies -  
CT SPINE LUMB WO CONT Final Result IMPRESSION:  
1. No acute finding. 2. Kyphoplasty treated fractures of T12 and L1.  
3. 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 4. Multilevel degenerative disease. CT PELV WO CONT  
  
  
XR HIP LT W OR WO PELV 2-3 VWS Final Result CT Results  (Last 48 hours) 05/13/19 2009  CT SPINE LUMB WO CONT Final result Impression:  IMPRESSION:  
1. No acute finding. 2. Kyphoplasty treated fractures of T12 and L1.  
3. 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 4. Multilevel degenerative disease. Narrative:  HISTORY: Left hip pain radiating down left leg. COMPARISON: None. TECHNIQUE:   Noncontrast axial CT imaging of the lumbar spine was performed. Coronal and sagittal reconstructions were obtained. CT dose reduction was  
achieved through the use of a standardized protocol tailored for this  
examination and automatic exposure control for dose modulation. FINDINGS:  
There is no acute compression fracture. There are kyphoplasty treated fractures  
of T12 and L1. There is chronic appearing nondisplaced left L5 pars fracture. There is L5-S1 disc degeneration. There is multilevel facet and interspinous  
arthritis. There is no HNP or spinal stenosis. T12-L1:  The spinal canal and neural foramina are widely patent. L1-2:  The spinal canal and neural foramina are widely patent. L2-3:  The spinal canal and neural foramina are widely patent. L3-4:  The spinal canal and neural foramina are widely patent. L4-5:  The spinal canal and neural foramina are widely patent. L5-S1:  The spinal canal and neural foramina are widely patent. 05/13/19 2009  CT PELV WO CONT Preliminary result Narrative:  **PRELIMINARY REPORT** Unenhanced CT Pelvis shows ORIF left hip fracture without hardware failure or  
displacement. No new fracture. No dislocation. Preliminary report was provided by Dr. Doug Ley, the on-call radiologist, at 2054  
hours. Final report to follow. **END PRELIMINARY REPORT** CXR Results  (Last 48 hours) None Medical Decision Making I am the first provider for this patient. I reviewed the vital signs, available nursing notes, past medical history, past surgical history, family history and social history. Vital Signs-Reviewed the patient's vital signs. Patient Vitals for the past 24 hrs: 
 Temp Pulse Resp BP SpO2 05/13/19 1900    143/63 99 % 05/13/19 1535 98.6 °F (37 °C) 76 16 123/72 95 % Records Reviewed: Nursing Notes, Old Medical Records, Previous Radiology Studies and Previous Laboratory Studies Provider Notes (Medical Decision Making): This patient has a history of left hip fracture from a fall presents to the emergency department worsening left leg pain. She has had leg pain on the left side since her surgery and being in rehab. She normally uses a walker at home her pain is been getting worse and worse since the injury. She is not able to bear weight and can no longer walk at all. She has not any fevers or chills. No other falls. CT imaging of the lumbar spine and pelvis do not show any fractures that may have been initially missed after the initial injury. She has not had any vertebral compression fractures. She does not have any pelvic fractures. There is no hardware complications or failures. The pain may be a sciatica or lumbar radiculopathy. She has been taking oxycodone at home. She is 80 and increasing her pain medicine is going to be dangerous in her case. I think the best thing to do tonight is admit her for observation with the hospitalist service and consult case management in the morning. She may be able to get back into her rehabilitation. I do not feel safe in sending her home nor does the family feel safe in taking her home. ED Course:  
Initial assessment performed. The patients presenting problems have been discussed, and they are in agreement with the care plan formulated and outlined with them. I have encouraged them to ask questions as they arise throughout their visit. Orders Placed This Encounter  CT SPINE LUMB WO CONT  CT PELV WO CONT  XR HIP LT W OR WO PELV 2-3 VWS  
 CBC WITH AUTOMATED DIFF  
 COMPREHENSIVE METABOLIC PANEL  
 LITHIUM  
 DIET REGULAR  
 NOTIFY PROVIDER: SPECIFY Notify provider on pt's arrival to floor ONE TIME STAT  OUT OF BED IN CHAIR  
 VITAL SIGNS PER UNIT ROUTINE  DO NOT RESUSCITATE  SALINE LOCK IV ONE TIME STAT  
 IP CONSULT TO HOSPITALIST Critical Care Time:  
0 Disposition: 
Admit Diagnosis Clinical Impression: 1. Left lumbosacral radiculopathy 2. Left leg pain 3. Unable to walk This note will not be viewable in [address]

## 2019-05-13 NOTE — ED NOTES
Bedside and Verbal shift change report given to Randi Nguyễn (oncoming nurse) by Oren Munoz (offgoing nurse). Report included the following information SBAR, Kardex and MAR. Pt resting in bed. Daughter at bedside

## 2019-05-13 NOTE — ED NOTES
Report received from [facility], [facility]. Pt lying in bed in comfortable position and is not having any pain at this time. Tom Manges is in place and hooked to suction.

## 2019-05-14 ENCOUNTER — HOME CARE VISIT (OUTPATIENT)
Dept: HOME HEALTH SERVICES | Facility: HOME HEALTH | Age: 84
End: 2019-05-14
Payer: MEDICARE

## 2019-05-14 LAB
DATE LAST DOSE: NORMAL
LITHIUM SERPL-SCNC: 0.81 MMOL/L (ref 0.6–1.2)
REPORTED DOSE,DOSE: NORMAL UNITS
REPORTED DOSE/TIME,TMG: NORMAL

## 2019-05-14 PROCEDURE — 97535 SELF CARE MNGMENT TRAINING: CPT | Performed by: OCCUPATIONAL THERAPIST

## 2019-05-14 PROCEDURE — 96372 THER/PROPH/DIAG INJ SC/IM: CPT

## 2019-05-14 PROCEDURE — 99218 HC RM OBSERVATION: CPT

## 2019-05-14 PROCEDURE — 74011000250 HC RX REV CODE- 250: Performed by: NURSE PRACTITIONER

## 2019-05-14 PROCEDURE — 74011250637 HC RX REV CODE- 250/637: Performed by: INTERNAL MEDICINE

## 2019-05-14 PROCEDURE — 74011250637 HC RX REV CODE- 250/637: Performed by: HOSPITALIST

## 2019-05-14 PROCEDURE — 97161 PT EVAL LOW COMPLEX 20 MIN: CPT | Performed by: PHYSICAL THERAPIST

## 2019-05-14 PROCEDURE — 3331090001 HH PPS REVENUE CREDIT

## 2019-05-14 PROCEDURE — 97116 GAIT TRAINING THERAPY: CPT | Performed by: PHYSICAL THERAPIST

## 2019-05-14 PROCEDURE — 74011250636 HC RX REV CODE- 250/636: Performed by: HOSPITALIST

## 2019-05-14 PROCEDURE — 74011250636 HC RX REV CODE- 250/636: Performed by: INTERNAL MEDICINE

## 2019-05-14 PROCEDURE — 3331090002 HH PPS REVENUE DEBIT

## 2019-05-14 PROCEDURE — 97166 OT EVAL MOD COMPLEX 45 MIN: CPT | Performed by: OCCUPATIONAL THERAPIST

## 2019-05-14 RX ORDER — ACETAMINOPHEN 325 MG/1
650 TABLET ORAL 2 TIMES DAILY
Status: DISCONTINUED | OUTPATIENT
Start: 2019-05-14 | End: 2019-05-17 | Stop reason: HOSPADM

## 2019-05-14 RX ORDER — AMOXICILLIN 250 MG
1 CAPSULE ORAL 2 TIMES DAILY
Status: DISCONTINUED | OUTPATIENT
Start: 2019-05-14 | End: 2019-05-17 | Stop reason: HOSPADM

## 2019-05-14 RX ORDER — POLYETHYLENE GLYCOL 3350 17 G/17G
17 POWDER, FOR SOLUTION ORAL DAILY
Status: DISCONTINUED | OUTPATIENT
Start: 2019-05-14 | End: 2019-05-17 | Stop reason: HOSPADM

## 2019-05-14 RX ORDER — LIDOCAINE 4 G/100G
1 PATCH TOPICAL EVERY 24 HOURS
Status: DISCONTINUED | OUTPATIENT
Start: 2019-05-14 | End: 2019-05-17 | Stop reason: HOSPADM

## 2019-05-14 RX ORDER — ENOXAPARIN SODIUM 100 MG/ML
30 INJECTION SUBCUTANEOUS
Status: DISCONTINUED | OUTPATIENT
Start: 2019-05-14 | End: 2019-05-15

## 2019-05-14 RX ORDER — MEMANTINE HYDROCHLORIDE 10 MG/1
10 TABLET ORAL DAILY
COMMUNITY
End: 2019-05-23 | Stop reason: CLARIF

## 2019-05-14 RX ORDER — MELOXICAM 7.5 MG/1
7.5 TABLET ORAL DAILY
Status: DISCONTINUED | OUTPATIENT
Start: 2019-05-14 | End: 2019-05-17 | Stop reason: HOSPADM

## 2019-05-14 RX ADMIN — ENOXAPARIN SODIUM 30 MG: 30 INJECTION SUBCUTANEOUS at 20:36

## 2019-05-14 RX ADMIN — SENNOSIDES AND DOCUSATE SODIUM 1 TABLET: 8.6; 5 TABLET ORAL at 08:45

## 2019-05-14 RX ADMIN — SENNOSIDES AND DOCUSATE SODIUM 1 TABLET: 8.6; 5 TABLET ORAL at 08:44

## 2019-05-14 RX ADMIN — ACETAMINOPHEN 650 MG: 325 TABLET ORAL at 00:58

## 2019-05-14 RX ADMIN — HEPARIN SODIUM 5000 UNITS: 5000 INJECTION INTRAVENOUS; SUBCUTANEOUS at 06:03

## 2019-05-14 RX ADMIN — LITHIUM CARBONATE 450 MG: 450 TABLET, EXTENDED RELEASE ORAL at 22:00

## 2019-05-14 RX ADMIN — RIVASTIGMINE TARTRATE 3 MG: 1.5 CAPSULE ORAL at 18:49

## 2019-05-14 RX ADMIN — SENNOSIDES AND DOCUSATE SODIUM 1 TABLET: 8.6; 5 TABLET ORAL at 18:49

## 2019-05-14 RX ADMIN — GABAPENTIN 100 MG: 100 CAPSULE ORAL at 08:45

## 2019-05-14 RX ADMIN — MELOXICAM 7.5 MG: 7.5 TABLET ORAL at 08:43

## 2019-05-14 RX ADMIN — ACETAMINOPHEN 650 MG: 325 TABLET ORAL at 08:44

## 2019-05-14 RX ADMIN — OXYCODONE HYDROCHLORIDE 5 MG: 5 TABLET ORAL at 20:34

## 2019-05-14 RX ADMIN — LEVOTHYROXINE SODIUM 75 MCG: 75 TABLET ORAL at 06:08

## 2019-05-14 RX ADMIN — Medication 10 ML: at 22:00

## 2019-05-14 RX ADMIN — MEMANTINE HYDROCHLORIDE 10 MG: 10 TABLET ORAL at 08:45

## 2019-05-14 RX ADMIN — CLONAZEPAM 0.5 MG: 0.5 TABLET ORAL at 08:43

## 2019-05-14 RX ADMIN — Medication 10 ML: at 06:09

## 2019-05-14 RX ADMIN — HEPARIN SODIUM 5000 UNITS: 5000 INJECTION INTRAVENOUS; SUBCUTANEOUS at 08:45

## 2019-05-14 RX ADMIN — PAROXETINE HYDROCHLORIDE 40 MG: 20 TABLET, FILM COATED ORAL at 08:45

## 2019-05-14 RX ADMIN — ACETAMINOPHEN 650 MG: 325 TABLET ORAL at 18:49

## 2019-05-14 RX ADMIN — Medication 10 ML: at 14:36

## 2019-05-14 RX ADMIN — POLYETHYLENE GLYCOL 3350 17 G: 17 POWDER, FOR SOLUTION ORAL at 08:45

## 2019-05-14 NOTE — PROGRESS NOTES
Orders received, chart reviewed and patient evaluated by physical therapy. Recommend patient to discharge to [facility] with assist of daughter vs SNF rehab pending progress with skilled acute care physical therapy. Full evaluation to follow.

## 2019-05-14 NOTE — CONSULTS
ORTHOPAEDIC CONSULT NOTE Subjective:  
 
Date of Consultation:  May 14, 2019 Viviana Andrea is a 80 y.o. female who is being seen for LLE pain s/p IMN for hip fracture on 3/18/19. Daughter reports limited ambulation at home secondary to pain. Pt is able to ambulate to BR and when needed throughout the day. No falls or noted injury since surgery. No edema or wound drainage. Patient Active Problem List  
 Diagnosis Date Noted  Left leg pain 05/13/2019  Hip fracture ([facility] 03/18/2019  Bipolar disorder ([facility] 03/18/2019  Auditory hallucinations 09/04/2018  Altered mental status, unspecified 02/02/2018  Bilateral carotid artery stenosis 02/02/2018  Transient ischemic attack involving left internal carotid artery 02/02/2018  Late onset Alzheimer's disease with behavioral disturbance 02/02/2018  Benign essential tremor syndrome 02/02/2018  Acute metabolic encephalopathy 03/91/3085  Laceration of left arm with complication 02/54/3419  Hypothyroidism 01/02/2014  Depression 01/02/2014 Family History Problem Relation Age of Onset [facility] Other Mother Inefficient wound healing  Cancer Sister Stomach CA  
 Diabetes Brother  Alzheimer Sister  Hypertension Daughter  Thyroid Disease Daughter  Other Son Hip replacement  Depression Son  Liver Disease Son  Diabetes Son   
 Heart Disease Son  Depression Son   
  
Social History Tobacco Use  Smoking status: Former Smoker  Smokeless tobacco: Never Used Substance Use Topics  Alcohol use: No  
 
Past Medical History:  
Diagnosis Date  Dementia  Depression   
 psychiatrist: Dr Antoni Rivas (on Lithium)  Fracture of wrist   
 slipped and fx left wrist, surgery scheduled 12/21/16  Full dentures   
 upper and lower  Hypothyroid Past Surgical History:  
Procedure Laterality Date  HX KYPHOPLASTY  2013  HX ORTHOPAEDIC  12/2016 Wrist surgery  HX WRIST FRACTURE TX Left 12/2016 Prior to Admission medications Medication Sig Start Date End Date Taking? Authorizing Provider  
memantine (NAMENDA) 10 mg tablet Take 10 mg by mouth daily. Yes Provider, Historical  
oxyCODONE IR (ROXICODONE) 5 mg immediate release tablet Take 5 mg by mouth every six (6) hours as needed for Pain. Provider, Historical  
oxyCODONE IR (ROXICODONE) 5 mg immediate release tablet Take 5 mg by mouth every four (4) hours as needed for Pain. Provider, Historical  
ondansetron hcl (ZOFRAN) 4 mg tablet Take 4 mg by mouth daily as needed. 4/18/19   Provider, Historical  
senna-docusate (PERICOLACE) 8.6-50 mg per tablet Take 1 Tab by mouth daily. 4/22/19   Marsha Wilkinson MD  
clonazePAM (KLONOPIN) 0.5 mg tablet Take 0.5 mg by mouth daily. Provider, Historical  
levothyroxine (SYNTHROID) 75 mcg tablet TAKE 1 TABLET DAILY BEFORE BREAKFAST 3/22/19   Марина Vivas MD  
rivastigmine tartrate (EXELON) 1.5 mg capsule Take 3 mg by mouth every evening. 2/24/18   Provider, Historical  
PARoxetine (PAXIL) 40 mg tablet Take 40 mg by mouth daily. 1/24/17   Provider, Historical  
multivitamin (ONE A DAY) tablet Take 1 Tab by mouth daily. Provider, Historical  
cholecalciferol, vitamin D3, (VITAMIN D3) 2,000 unit tab Take 1 Tab by mouth daily. Provider, Historical  
lithium CR (ESKALITH CR) 450 mg CR tablet Take 450 mg by mouth nightly. Provider, Historical  
 
Current Facility-Administered Medications Medication Dose Route Frequency  senna-docusate (PERICOLACE) 8.6-50 mg per tablet 1 Tab  1 Tab Oral BID  polyethylene glycol (MIRALAX) packet 17 g  17 g Oral DAILY  nitroglycerin (NITROBID) 2 % ointment 1 Inch  1 Inch Topical Q6H PRN  
 acetaminophen (TYLENOL) tablet 650 mg  650 mg Oral BID  meloxicam (MOBIC) tablet 7.5 mg  7.5 mg Oral DAILY  enoxaparin (LOVENOX) injection 30 mg  30 mg SubCUTAneous QHS  lidocaine 4 % patch 1 Patch  1 Patch TransDERmal Q24H  
 sodium chloride (NS) flush 5-40 mL  5-40 mL IntraVENous Q8H  
 sodium chloride (NS) flush 5-40 mL  5-40 mL IntraVENous PRN  
 acetaminophen (TYLENOL) tablet 650 mg  650 mg Oral Q4H PRN  
 oxyCODONE IR (ROXICODONE) tablet 5 mg  5 mg Oral Q4H PRN  
 ondansetron (ZOFRAN) injection 4 mg  4 mg IntraVENous Q4H PRN  
 clonazePAM (KlonoPIN) tablet 0.5 mg  0.5 mg Oral DAILY  levothyroxine (SYNTHROID) tablet 75 mcg  75 mcg Oral 6am  
 PARoxetine (PAXIL) tablet 40 mg  40 mg Oral DAILY  rivastigmine tartrate (EXELON) capsule 3 mg  3 mg Oral QPM  
 memantine (NAMENDA) tablet 10 mg  10 mg Oral DAILY  lithium carbonate CR (ESKALITH CR) tablet 450 mg  450 mg Oral QHS Allergies Allergen Reactions  Sulfa (Sulfonamide Antibiotics) Hives Review of Systems:  A comprehensive review of systems was negative except for that written in the HPI. Mental Status: mild dementia Objective:  
 
Patient Vitals for the past 8 hrs: 
 BP Temp Pulse Resp SpO2  
19 1120 109/64 97.8 °F (36.6 °C) 66 16 94 % 19 0840 142/68 98.6 °F (37 °C) 65 16 94 % Temp (24hrs), Av.1 °F (36.7 °C), Min:97.5 °F (36.4 °C), Max:98.6 °F (37 °C) Gen: Well-developed,  in no acute distress Musc: LLE - + ROM without pain, no edema, incision are healed without erythema fluctuance, NVI Skin: No skin breakdown noted. Skin warm, pink, dry Neuro: Cranial nerves are grossly intact, no focal motor weakness, follows commands appropriately Psych: Good insight, oriented to person, place and time, alert Imaging Review: 
Final result Impression:  
 IMPRESSION: 
1. No acute finding. 2. Kyphoplasty treated fractures of T12 and L1. 
3. 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 4. Multilevel degenerative disease. Narrative:  
 HISTORY: Left hip pain radiating down left leg. COMPARISON: None. TECHNIQUE:   Noncontrast axial CT imaging of the lumbar spine was performed. Coronal and sagittal reconstructions were obtained.  CT dose reduction was 
achieved through the use of a standardized protocol tailored for this 
examination and automatic exposure control for dose modulation. FINDINGS: 
There is no acute compression fracture. There are kyphoplasty treated fractures 
of T12 and L1. There is chronic appearing nondisplaced left L5 pars fracture. There is L5-S1 disc degeneration. There is multilevel facet and interspinous 
arthritis. There is no HNP or spinal stenosis. T12-L1:  The spinal canal and neural foramina are widely patent. L1-2:  The spinal canal and neural foramina are widely patent. L2-3:  The spinal canal and neural foramina are widely patent. L3-4:  The spinal canal and neural foramina are widely patent. L4-5:  The spinal canal and neural foramina are widely patent. L5-S1:  The spinal canal and neural foramina are widely patent.  
  
  
  
   
   
CT PELV WO CONT (Final result) Result time 05/14/19 07:52:35 Final result Impression:  
 IMPRESSION: 
1. Partially healed left hip fracture. Osteopenia with no new fracture 
demonstrated Labs:  
Recent Results (from the past 24 hour(s)) CBC WITH AUTOMATED DIFF Collection Time: 05/13/19 10:52 PM  
Result Value Ref Range WBC 6.6 3.6 - 11.0 K/uL  
 RBC 4.44 3.80 - 5.20 M/uL  
 HGB 13.2 11.5 - 16.0 g/dL HCT 39.7 35.0 - 47.0 % MCV 89.4 80.0 - 99.0 FL  
 MCH 29.7 26.0 - 34.0 PG  
 MCHC 33.2 30.0 - 36.5 g/dL  
 RDW 12.9 11.5 - 14.5 % PLATELET 536 248 - 252 K/uL MPV 10.2 8.9 - 12.9 FL  
 NRBC 0.0 0  WBC ABSOLUTE NRBC 0.00 0.00 - 0.01 K/uL NEUTROPHILS 47 32 - 75 % LYMPHOCYTES 38 12 - 49 % MONOCYTES 10 5 - 13 % EOSINOPHILS 4 0 - 7 % BASOPHILS 1 0 - 1 % IMMATURE GRANULOCYTES 0 0.0 - 0.5 % ABS. NEUTROPHILS 3.2 1.8 - 8.0 K/UL ABS. LYMPHOCYTES 2.5 0.8 - 3.5 K/UL  
 ABS. MONOCYTES 0.7 0.0 - 1.0 K/UL  
 ABS. EOSINOPHILS 0.3 0.0 - 0.4 K/UL  
 ABS. BASOPHILS 0.1 0.0 - 0.1 K/UL  
 ABS. IMM. GRANS. 0.0 0.00 - 0.04 K/UL  
 DF AUTOMATED METABOLIC PANEL, COMPREHENSIVE Collection Time: 05/13/19 10:52 PM  
Result Value Ref Range Sodium 137 136 - 145 mmol/L Potassium 4.0 3.5 - 5.1 mmol/L Chloride 107 97 - 108 mmol/L  
 CO2 25 21 - 32 mmol/L Anion gap 5 5 - 15 mmol/L Glucose 101 (H) 65 - 100 mg/dL BUN 9 6 - 20 MG/DL Creatinine 0.61 0.55 - 1.02 MG/DL  
 BUN/Creatinine ratio 15 12 - 20 GFR est AA >60 >60 ml/min/1.73m2 GFR est non-AA >60 >60 ml/min/1.73m2 Calcium 9.0 8.5 - 10.1 MG/DL Bilirubin, total 0.4 0.2 - 1.0 MG/DL  
 ALT (SGPT) 12 12 - 78 U/L  
 AST (SGOT) 19 15 - 37 U/L Alk. phosphatase 143 (H) 45 - 117 U/L Protein, total 7.2 6.4 - 8.2 g/dL Albumin 3.6 3.5 - 5.0 g/dL Globulin 3.6 2.0 - 4.0 g/dL A-G Ratio 1.0 (L) 1.1 - 2.2 LITHIUM Collection Time: 05/13/19 10:52 PM  
Result Value Ref Range Lithium level 0.81 0.60 - 1.20 MMOL/L Reported dose date: NOT PROVIDED Reported dose time: NOT PROVIDED Reported dose: NOT PROVIDED UNITS Impression:  
 
Patient Active Problem List  
 Diagnosis Date Noted  Left leg pain 05/13/2019  [facility] 03/18/2019  Bipolar disorder ([facility] 03/18/2019  Auditory hallucinations 09/04/2018  Altered mental status, unspecified 02/02/2018  Bilateral carotid artery stenosis 02/02/2018  Transient ischemic attack involving left internal carotid artery 02/02/2018  Late onset Alzheimer's disease with behavioral disturbance 02/02/2018  Benign essential tremor syndrome 02/02/2018  Acute metabolic encephalopathy 78/29/4603  Laceration of left arm with complication 53/05/7442  Hypothyroidism 01/02/2014  Depression 01/02/2014 Active Problems: 
  Left leg pain (5/13/2019) Plan: -  Pt is stable orthopaedically, Non-Operative management at this time -  Agree with mobic and tylenol, added lido patch -  WBAT with walker Dr. Luke Jara aware and agrees with plan as above. Anila Wu NP Orthopedic Nurse Practitioner [place]

## 2019-05-14 NOTE — PROGRESS NOTES
Occupational Therapy OT consult received, chart reviewed. Patient was seen this morning for OT evaluation. Recommend home with home health upon discharge. Full evaluation note to follow.

## 2019-05-14 NOTE — PROGRESS NOTES
Problem: Self Care Deficits Care Plan (Adult) Goal: *Acute Goals and Plan of Care (Insert Text) Description Occupational Therapy Goals Initiated 5/14/2019 1. Patient will perform grooming standing at the sink with contact guard assist within 7 day(s). 2.  Patient will perform lower body dressing with minimal assistance within 7 day(s). 3.  Patient will perform toilet transfers with supervision/SBA within 7 day(s). 4.  Patient will perform all aspects of toileting with supervision/SBA within 7 day(s). Outcome: Progressing Towards Goal 
 
OCCUPATIONAL THERAPY EVALUATION Patient: Ramandeep Hunter (1 y.o. female) Date: 5/14/2019 Primary Diagnosis: Left leg pain [M79.605] Precautions:  Fall ASSESSMENT : 
Based on the objective data described below, the patient presents with deficits in self-care, primarily due pain, decreased activity tolerance, decreased balance, decreased safety, and decreased memory. She lives with her daughter and reports that she has assistance with all ADL. Due to history of dementia, unsure of her accuracy, but she may be near her new baseline. Patient will benefit from a trial of OT to attempt to increase safety and independence in ADL. Recommend home with home health. Patient will benefit from skilled intervention to address the above impairments. Patient?s rehabilitation potential is considered to be Fair Factors which may influence rehabilitation potential include: ? None noted ? Mental ability/status ? Medical condition ? Home/family situation and support systems ? Safety awareness ? Pain tolerance/management ? Other: PLAN : 
Recommendations and Planned Interventions: 
?               Self Care Training                  ? Therapeutic Activities ? Functional Mobility Training    ? Cognitive Retraining ?               Therapeutic Exercises           ? Endurance Activities ? Balance Training                   ? Neuromuscular Re-Education ? Visual/Perceptual Training     ? Home Safety Training 
? Patient Education                 ? Family Training/Education ? Other (comment): Frequency/Duration: Patient will be followed by occupational therapy 3 times a week to address goals. Discharge Recommendations: Home Health Further Equipment Recommendations for Discharge: None anticipated SUBJECTIVE:  
Patient stated ?with my daughter. ? OBJECTIVE DATA SUMMARY:  
HISTORY:  
Past Medical History:  
Diagnosis Date Dementia Depression   
 psychiatrist: Dr Beatris Alexander (on Lithium) Fracture of wrist   
 slipped and fx left wrist, surgery scheduled 12/21/16 Full dentures   
 upper and lower Hypothyroid Past Surgical History:  
Procedure Laterality Date HX KYPHOPLASTY  2013 HX ORTHOPAEDIC  12/2016 Wrist surgery HX WRIST FRACTURE TX Left 12/2016 Prior Level of Function/Environment/Context: Patient had surgery (intertrochanteric nail) about 2 months ago after a fall and fracture. Patient went to rehab at [facility]. Unsure exactly how much assistance she was needed, but patient reports that her daughter assists her with dressing and bathing. Expanded or extensive additional review of patient history:  
 
Home Situation Home Environment: Private residence # Steps to Enter: 3 Rails to Enter: Yes One/Two Story Residence: One story Living Alone: No 
Support Systems: Child(tj) Patient Expects to be Discharged to[de-identified] Unknown Current DME Used/Available at Home: Eveleen Frames, straight, Frutoso Goodson, rollator Tub or Shower Type: Tub/Shower combination Hand dominance: Right EXAMINATION OF PERFORMANCE DEFICITS: 
Cognitive/Behavioral Status: 
Neurologic State: Alert Orientation Level: Oriented to person;Oriented to place;Oriented to situation;Oriented to time Cognition: Decreased attention/concentration; Follows commands;Memory loss Perception: Cues to maintain midline in standing(slight posterior lean) Perseveration: No perseveration noted Safety/Judgement: Awareness of environment;Home safety; Fall prevention Range of Motion: 
AROM: Generally decreased, functional 
  
  
  
  
  
  
  
Strength: 
Strength: Generally decreased, functional 
  
  
  
  
Coordination: 
  
Fine Motor Skills-Upper: Left Intact; Right Intact(grossly) Gross Motor Skills-Upper: Left Intact; Right Intact(grossly) Tone & Sensation: 
Tone: Normal 
  
  
  
  
  
  
  
Balance: 
Sitting: Intact Standing: Impaired Standing - Static: Fair Standing - Dynamic : Fair Functional Mobility and Transfers for ADLs: 
Bed Mobility: 
Supine to Sit: Supervision Scooting: Supervision Transfers: 
Sit to Stand: Minimum assistance; Additional time;Assist x1 Stand to Sit: Minimum assistance;Assist x1 Toilet Transfer : Minimum assistance; Additional time;Assist x1 Assistive Device : Walker, rolling ADL Assessment: 
Feeding: Setup Oral Facial Hygiene/Grooming: Supervision(seated) Bathing: Moderate assistance(simulated) Upper Body Dressing: Supervision Lower Body Dressing: Maximum assistance Toileting: Moderate assistance ADL Intervention and task modifications: 
Patient educated on safe and efficient strategies for functional transfers. Cues provided for safety, especially with hand placement on the walker, and to make sure she brings the RW all the way back until she feels the chair/bed on her legs prior to sitting. Patient was tearful due to pain. Discussed with nursing. Emphasized the importance of being up out of the bed at least 3 times a day for meals. Cognitive Retraining Safety/Judgement: Awareness of environment;Home safety; Fall prevention Functional Measure: 
Barthel Index: 
Bathin Bladder: 5 Bowels: 10 
Groomin Dressin Feedin Mobility: 0 Stairs: 0 Toilet Use: 5 Transfer (Bed to Chair and Back): 10 Total: 45/100 Percentage of impairment  
0% 1-19% 20-39% 40-59% 60-79% 80-99% 100% Barthel Score 0-100 100 99-80 79-60 59-40 20-39 1-19 
 0 The Barthel ADL Index: Guidelines 1. The index should be used as a record of what a patient does, not as a record of what a patient could do. 2. The main aim is to establish degree of independence from any help, physical or verbal, however minor and for whatever reason. 3. The need for supervision renders the patient not independent. 4. A patient's performance should be established using the best available evidence. Asking the patient, friends/relatives and nurses are the usual sources, but direct observation and common sense are also important. However direct testing is not needed. 5. Usually the patient's performance over the preceding 24-48 hours is important, but occasionally longer periods will be relevant. 6. Middle categories imply that the patient supplies over 50 per cent of the effort. 7. Use of aids to be independent is allowed. Berto Hartman., Barthel, D.W. ([id number]). Functional evaluation: the Barthel Index. [address] [facility] [employer] (14)2. MEJIA Oliveira, Cheryl Joseph., Veronica Palomares., [place], [address] [place] [address] (). Measuring the change indisability after inpatient rehabilitation; comparison of the responsiveness of the Barthel Index and Functional Cooper Measure. Journal of Neurology, Neurosurgery, and Psychiatry, 66(4), 272-011. Mary Kay Guillen N.HAMIDA.A, RANJEET Hansen, & Vishnu Mcghee MJoeA. (2004.) Assessment of post-stroke quality of life in cost-effectiveness studies: The usefulness of the Barthel Index and the EuroQoL-5D. [facility], 13, [phone] Occupational Therapy Evaluation Charge Determination History Examination Decision-Making MEDIUM Complexity : Expanded review of history including physical, cognitive and psychosocial  history  MEDIUM Complexity : 3-5 performance deficits relating to physical, cognitive , or psychosocial skils that result in activity limitations and / or participation restrictions MEDIUM Complexity : Patient may present with comorbidities that affect occupational performnce. Miniml to moderate modification of tasks or assistance (eg, physical or verbal ) with assesment(s) is necessary to enable patient to complete evaluation Based on the above components, the patient evaluation is determined to be of the following complexity level: MEDIUM Pain: 
Pain Scale 1: Adult Nonverbal Pain Scale Pain Intensity 1: 2 Pain Location 1: Leg 
Pain Orientation 1: Left Pain Description 1: Sore Pain Intervention(s) 1: Repositioned Activity Tolerance:  
Fair After treatment:  
? Patient left in no apparent distress sitting up in chair ? Patient left in no apparent distress in bed 
? Call bell left within reach ? Nursing notified ? Caregiver present ? Bed alarm activated COMMUNICATION/EDUCATION:  
The patient?s plan of care was discussed with: Physical Therapist and Registered Nurse. 
? Home safety education was provided and the patient/caregiver indicated understanding. ? Patient/family have participated as able in goal setting and plan of care. ? Patient/family agree to work toward stated goals and plan of care. ? Patient understands intent and goals of therapy, but is neutral about his/her participation. ? Patient is unable to participate in goal setting and plan of care. This patient?s plan of care is appropriate for delegation to [facility]. Thank you for this referral. 
Sharri Thorne, OTR/L Time Calculation: 23 mins

## 2019-05-14 NOTE — H&P
HISTORY AND PHYSICAL 
 
 
PCP: Julian Reed MD 
History source: the patient CC: left leg pain HPI: 80 y.o lady w/ hypothyroidism, recent L hip arthroplasty, presents with left leg pain. Symptoms began about 2  Months ago after her hip surgery. She reports she developed pain that begins in the mid left thigh area and radiates down to her calf. It is constant and sharp in nature, worsened with activity and weight-bearing. No weakness or paresthesias. She is tearful when she talks about the pain. No fever, n/v/d, or falls. PMH/PSH: 
Past Medical History:  
Diagnosis Date  Dementia  Depression   
 psychiatrist: Dr Bernadra Castellanos (on Lithium)  Fracture of wrist   
 slipped and fx left wrist, surgery scheduled 12/21/16  Full dentures   
 upper and lower  Hypothyroid Past Surgical History:  
Procedure Laterality Date  HX KYPHOPLASTY  2013  HX ORTHOPAEDIC  12/2016 Wrist surgery  HX WRIST FRACTURE TX Left 12/2016 Home meds:  
Prior to Admission medications Medication Sig Start Date End Date Taking? Authorizing Provider  
oxyCODONE IR (ROXICODONE) 5 mg immediate release tablet Take 5 mg by mouth every six (6) hours as needed for Pain. Provider, Historical  
oxyCODONE IR (ROXICODONE) 5 mg immediate release tablet Take 5 mg by mouth every four (4) hours as needed for Pain. Provider, Historical  
ondansetron hcl (ZOFRAN) 4 mg tablet Take 4 mg by mouth daily as needed. 4/18/19   Provider, Historical  
Wheel Chair cady Weakness 4/22/19   Elia Najera MD  
senna-docusate (PERICOLACE) 8.6-50 mg per tablet Take 1 Tab by mouth daily. 4/22/19   Elia Najera MD  
clonazePAM (KLONOPIN) 0.5 mg tablet Take 0.5 mg by mouth daily. Provider, Historical  
levothyroxine (SYNTHROID) 75 mcg tablet TAKE 1 TABLET DAILY BEFORE BREAKFAST 3/22/19   Julian Reed MD  
memantine [facility]) 5 mg tablet Take 1 Tab by mouth two (2) times a day. Patient taking differently: Take 10 mg by mouth daily. 9/17/18   Drew Cagle MD  
rivastigmine tartrate (EXELON) 1.5 mg capsule Take 3 mg by mouth every evening. 2/24/18   Provider, Historical  
PARoxetine (PAXIL) 40 mg tablet Take 40 mg by mouth daily. 1/24/17   Provider, Historical  
multivitamin (ONE A DAY) tablet Take 1 Tab by mouth daily. Provider, Historical  
cholecalciferol, vitamin D3, (VITAMIN D3) 2,000 unit tab Take 1 Tab by mouth daily. Provider, Historical  
lithium CR (ESKALITH CR) 450 mg CR tablet Take 450 mg by mouth nightly. Provider, Historical  
 
 
Allergies: Allergies Allergen Reactions  Sulfa (Sulfonamide Antibiotics) Hives FH: 
Family History Problem Relation Age of Onset [facility] Other Mother Inefficient wound healing  Cancer Sister Stomach CA  
 Diabetes Brother  Alzheimer Sister  Hypertension Daughter  Thyroid Disease Daughter  Other Son Hip replacement  Depression Son  Liver Disease Son  Diabetes Son   
 Heart Disease Son  Depression Son SH: Social History Tobacco Use  Smoking status: Former Smoker  Smokeless tobacco: Never Used Substance Use Topics  Alcohol use: No  
 
 
ROS: A comprehensive review of systems was negative except for that written in the HPI. PHYSICAL EXAM: 
Visit Vitals /63 Pulse 76 Temp 98.6 °F (37 °C) Resp 16 Ht 5' 4\" (1.626 m) Wt 51.7 kg (114 lb) SpO2 99% BMI 19.57 kg/m² Gen: NAD, non-toxic, frail HEENT: anicteric sclerae, normal conjunctiva, oropharynx clear, MM moist 
Neck: supple, trachea midline, no adenopathy Heart: RRR, no MRG, no JVD, no peripheral edema Lungs: CTA b/l, non-labored respirations Abd: soft, NT, ND, BS+, no organomegaly Extr: warm, no deformities over left leg, no tenderness to palpation, able to flex hip and knee actively,  
Skin: dry, no rash Neuro: CN II-XII grossly intact, normal speech, moves all extremities Psych: intermittently tearful Labs/Imaging: No results found for this or any previous visit (from the past 24 hour(s)). No results for input(s): WBC, HGB, HCT, PLT, HGBEXT, HCTEXT, PLTEXT in the last 72 hours. No results for input(s): NA, K, CL, CO2, BUN, CREA, GLU, CA, MG, PHOS, URICA in the last 72 hours. No results for input(s): SGOT, GPT, ALT, AP, TBIL, TBILI, TP, ALB, GLOB, GGT, AML, LPSE in the last 72 hours. No lab exists for component: AMYP, HLPSE No results for input(s): CPK, CKNDX, TROIQ in the last 72 hours. No lab exists for component: CPKMB No results for input(s): INR, PTP, APTT in the last 72 hours. No lab exists for component: INREXT No results for input(s): PH, PCO2, PO2 in the last 72 hours. Xr Hip Lt W Or Wo Pelv 2-3 Vws Result Date: 5/13/2019 IMPRESSION: Healing instrumented left hip fracture. No new finding. Ct Spine Lumb Wo Cont Result Date: 5/13/2019 IMPRESSION: 1. No acute finding. 2. Kyphoplasty treated fractures of T12 and L1. 3. 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 4. Multilevel degenerative disease. Assessment & Plan:  
 
L leg pain: unclear etiology. No evidence of L hip prosthesis compromise. Possibly neuropathic element to pain. -pain control 
-trial of low-dose gabapentin 
-PT and OT eval 
-consult ortho surgery Hypothyroidism: 
-continue synthroid Depression/anxiety: 
-continue home meds 
-f/u lithium level Dementia per problem list 
 
DVT ppx: sq heparin Code status: DNR Disposition: TBD Signed By: Aurora Artis MD   
 May 13, 2019

## 2019-05-14 NOTE — PROGRESS NOTES
Problem: Mobility Impaired (Adult and Pediatric) Goal: *Acute Goals and Plan of Care (Insert Text) Description Physical Therapy Goals Initiated 5/14/2019 1. Patient will move from supine to sit and sit to supine  in bed with modified independence within 7 day(s). 2.  Patient will transfer from bed to chair and chair to bed with modified independent using the least restrictive device within 7 day(s). 3.  Patient will perform sit to stand with modified independent within 7 day(s). 4.  Patient will ambulate with modified independence for 150 feet with the least restrictive device within 7 day(s). 5.  Patient will ascend/descend 3 stairs with 1 handrail(s) with modified independence within 7 day(s). Outcome: Progressing Towards Goal 
PHYSICAL THERAPY EVALUATION Patient: Byron Campbell (82 y.o. female) Date: 5/14/2019 Primary Diagnosis: Left leg pain [M79.605] Precautions:   Fall ASSESSMENT : 
Based on the objective data described below, the patient presents with decreased functional mobility from baseline level of function. Patient lives with daughter in a 1 level home with approx 3 FAVIAN with rails. Patient currently needing supervision for bed mobility with extra time. Sit to stand with Heike x 2 and able to take a few steps to commode with Heike-CGA. Reports L LE pain but is able to bear weight with no evidence of buckling. Tearful once positioned in chair regarding her pain and RN is aware. Anticipate as patient's pain improves she will be able to DC home with family support. Otherwise may need rehab if family unable to provide level of assist she needs. Patient states she does not want to go to rehab if she can avoid it. Patient will benefit from skilled intervention to address the above impairments. Patient?s rehabilitation potential is considered to be Good Factors which may influence rehabilitation potential include:  
? None noted ? Mental ability/status ?         Medical condition ? Home/family situation and support systems ? Safety awareness 
? Pain tolerance/management 
? Other: PLAN : 
Recommendations and Planned Interventions: 
?           Bed Mobility Training             ? Neuromuscular Re-Education ? Transfer Training                   ? Orthotic/Prosthetic Training 
? Gait Training                         ? Modalities ? Therapeutic Exercises           ? Edema Management/Control ? Therapeutic Activities            ? Patient and Family Training/Education ? Other (comment): Frequency/Duration: Patient will be followed by physical therapy  5 times a week to address goals. Discharge Recommendations: Home Health with family assist vs SNF rehab if family cannot provide level of assist 
Further Equipment Recommendations for Discharge: none-owns rollator and SPC SUBJECTIVE:  
Patient stated ? I can't pee with this thing (Tana Greenberg). ? OBJECTIVE DATA SUMMARY:  
HISTORY:   
Past Medical History:  
Diagnosis Date Dementia Depression   
 psychiatrist: Dr Shireen Russell (on Lithium) Fracture of wrist   
 slipped and fx left wrist, surgery scheduled 12/21/16 Full dentures   
 upper and lower Hypothyroid Past Surgical History:  
Procedure Laterality Date HX KYPHOPLASTY  2013 HX ORTHOPAEDIC  12/2016 Wrist surgery HX WRIST FRACTURE TX Left 12/2016 Prior Level of Function/Home Situation: Amb with rollator walker at baseline. Lives with daughter. Patient with some dementia Personal factors and/or comorbidities impacting plan of care:  
 
Home Situation Home Environment: Private residence # Steps to Enter: 3 Rails to Enter: Yes One/Two Story Residence: One story Living Alone: No 
Support Systems: Child(tj) Patient Expects to be Discharged to[de-identified] Unknown Current DME Used/Available at Home: nadiya Da Silva Rayetta Danker, rollator Tub or Shower Type: Tub/Shower combination EXAMINATION/PRESENTATION/DECISION MAKING:  
Critical Behavior: 
Neurologic State: Alert Orientation Level: Oriented to person, Oriented to place, Oriented to situation, Oriented to time Cognition: Decreased attention/concentration, Follows commands, Memory loss Safety/Judgement: Awareness of environment, Home safety, Fall prevention Hearing: Auditory Auditory Impairment: Hard of hearing, bilateral 
 
Range Of Motion: 
AROM: Generally decreased, functional 
  
  
  
  
  
  
  
Strength:   
Strength: Generally decreased, functional 
  
  
  
  
  
  
Tone & Sensation:  
Tone: Normal 
  
  
  
  
  
  
  
  
   
Coordination: 
  
Vision:  
  
Functional Mobility: 
Bed Mobility: 
  
Supine to Sit: Supervision Scooting: Supervision Transfers: 
Sit to Stand: Minimum assistance; Additional time;Assist x1 Stand to Sit: Minimum assistance;Assist x1 Balance:  
Sitting: Intact Standing: Impaired Standing - Static: Fair Standing - Dynamic : Fair Ambulation/Gait Training: 
Distance (ft): 5 Feet (ft)(small steps to commode then to chair) Assistive Device: Gait belt;Walker, rolling Ambulation - Level of Assistance: Contact guard assistance; Additional time;Assist x1 Gait Description (WDL): Exceptions to [facility] Gait Abnormalities: Antalgic;Decreased step clearance; Step to gait Base of Support: Narrowed Stance: Left decreased Speed/Sandy: Pace decreased (<100 feet/min); Slow Step Length: Left shortened;Right shortened Pain: 
Pain Scale 1: Adult Nonverbal Pain Scale Pain Intensity 1: 2 Pain Location 1: Leg 
Pain Orientation 1: Left Pain Description 1: Sore Pain Intervention(s) 1: Repositioned Activity Tolerance: VSS Please refer to the flowsheet for vital signs taken during this treatment. After treatment:  
?         Patient left in no apparent distress sitting up in chair ?         Patient left in no apparent distress in bed 
? Call bell left within reach ? Nursing notified ? Caregiver present ? Bed alarm activated COMMUNICATION/EDUCATION:  
The patient?s plan of care was discussed with: Physical Therapist, Occupational Therapist and Registered Nurse. ?         Fall prevention education was provided and the patient/caregiver indicated understanding. ? Patient/family have participated as able in goal setting and plan of care. ?         Patient/family agree to work toward stated goals and plan of care. ?         Patient understands intent and goals of therapy, but is neutral about his/her participation. ? Patient is unable to participate in goal setting and plan of care. Thank you for this referral. 
Renzo Harkins, PT, DPT Time Calculation: 20 mins

## 2019-05-14 NOTE — PROGRESS NOTES
Hospitalist Progress Note NAME: Sterling Magaña :  1926 MRN:  [id number] Assessment / Plan: 
Subacute, progressive LLE pain with inability to ambulate in setting of recent left hip fx s/p cephalomedullary nailing with Dr. Abram Ordonez 3/18/19: 
- L hip xray with healing instrumented left hip fracture. No new finding. 
- LLE doppler with no evidence of acute deep vein thrombosis in the left common femoral, superficial femoral, popliteal, posterior tibial, and peroneal veins. The veins were imaged in the transverse and longitudinal planes. - ortho consulted 
- add scheduled tylenol and mobic 
- PT/OT evals requested Hypothyroidism: con't synthroid Dementia with bipolar d/o: very emotional this morning, suspect this is affecting mgmt 
- con't namenda and exelon 
- con't klonipin, paxil 
- con't lithium, level within normal limits DVT prophylaxis: lovenox Code status: DNR Surrogate Decision Maker: Daughter Subjective: Chief Complaint / Reason for Physician Visit Crying, c/o diffuse leg pain. Cannot localize. Says she cannot walk. Discussed with RN events overnight. Review of Systems: 
Symptom Y/N Comments  Symptom Y/N Comments Fever/Chills n   Chest Pain n   
Poor Appetite n   Edema n   
Cough n   Abdominal Pain n   
Sputum n   Joint Pain SOB/SHERMAN n   Pruritis/Rash Nausea/vomit    Tolerating PT/OT Diarrhea    Tolerating Diet Constipation    Other Could NOT obtain due to:   
 
Objective: VITALS:  
Last 24hrs VS reviewed since prior progress note. Most recent are: 
Patient Vitals for the past 24 hrs: 
 Temp Pulse Resp BP SpO2  
19 0840 98.6 °F (37 °C) 65 16 142/68 94 % 19 0518 97.5 °F (36.4 °C) 61 14 118/61 92 % 19 2359 98.1 °F (36.7 °C) 71 18 141/73 97 % 19 1900    143/63 99 % 19 1535 98.6 °F (37 °C) 76 16 123/72 95 % Intake/Output Summary (Last 24 hours) at 2019 2898 Last data filed at 2019 0002 Gross per 24 hour Intake 150 ml Output  Net 150 ml PHYSICAL EXAM: 
General: Thin. Alert, cooperative, no acute distress   
EENT:  EOMI. Anicteric sclerae. MMM Resp:  CTA bilaterally, no wheezing or rales. No accessory muscle use CV:  Regular  rhythm,  No edema GI:  Soft, Non distended, Non tender.  +Bowel sounds Neurologic:  Alert and oriented X 2, normal speech, Psych:   Poor insight. Not anxious nor agitated Skin:  No rashes. No jaundice Reviewed most current lab test results and cultures  YES Reviewed most current radiology test results   YES Review and summation of old records today    NO Reviewed patient's current orders and MAR    YES 
PMH/SH reviewed - no change compared to H&P 
________________________________________________________________________ Care Plan discussed with: 
  Comments Patient x Family RN x Care Manager Consultant Multidiciplinary team rounds were held today with , nursing, pharmacist and clinical coordinator. Patient's plan of care was discussed; medications were reviewed and discharge planning was addressed. ________________________________________________________________________ Total NON critical care TIME:  35 Minutes Total CRITICAL CARE TIME Spent:   Minutes non procedure based Comments >50% of visit spent in counseling and coordination of care x   
________________________________________________________________________ Tony Baron MD  
 
Procedures: see electronic medical records for all procedures/Xrays and details which were not copied into this note but were reviewed prior to creation of Plan. LABS: 
I reviewed today's most current labs and imaging studies. Pertinent labs include: 
Recent Labs 05/13/19 
2252 WBC 6.6 HGB 13.2 HCT 39.7  Recent Labs 05/13/19 
2252   
K 4.0  
 CO2 25 * BUN 9  
CREA 0.61  
CA 9.0 ALB 3.6 TBILI 0.4 SGOT 19 ALT 12 Signed: Liz Roberts MD

## 2019-05-14 NOTE — PROGRESS NOTES
Bedside and Verbal shift change report given to [facility] (oncoming nurse) by Marimar Archer (offgoing nurse). Report included the following information SBAR, Kardex, Intake/Output, MAR, Accordion, Recent Results and Med Rec Status.

## 2019-05-15 ENCOUNTER — PATIENT OUTREACH (OUTPATIENT)
Dept: INTERNAL MEDICINE CLINIC | Age: 84
End: 2019-05-15

## 2019-05-15 LAB
ANION GAP SERPL CALC-SCNC: 5 MMOL/L (ref 5–15)
BUN SERPL-MCNC: 16 MG/DL (ref 6–20)
BUN/CREAT SERPL: 21 (ref 12–20)
CALCIUM SERPL-MCNC: 8.8 MG/DL (ref 8.5–10.1)
CHLORIDE SERPL-SCNC: 109 MMOL/L (ref 97–108)
CO2 SERPL-SCNC: 26 MMOL/L (ref 21–32)
CREAT SERPL-MCNC: 0.75 MG/DL (ref 0.55–1.02)
ERYTHROCYTE [DISTWIDTH] IN BLOOD BY AUTOMATED COUNT: 13 % (ref 11.5–14.5)
GLUCOSE SERPL-MCNC: 96 MG/DL (ref 65–100)
HCT VFR BLD AUTO: 41.2 % (ref 35–47)
HGB BLD-MCNC: 12.8 G/DL (ref 11.5–16)
MCH RBC QN AUTO: 28.8 PG (ref 26–34)
MCHC RBC AUTO-ENTMCNC: 31.1 G/DL (ref 30–36.5)
MCV RBC AUTO: 92.8 FL (ref 80–99)
NRBC # BLD: 0 K/UL (ref 0–0.01)
NRBC BLD-RTO: 0 PER 100 WBC
PLATELET # BLD AUTO: 162 K/UL (ref 150–400)
PMV BLD AUTO: 10.1 FL (ref 8.9–12.9)
POTASSIUM SERPL-SCNC: 3.9 MMOL/L (ref 3.5–5.1)
RBC # BLD AUTO: 4.44 M/UL (ref 3.8–5.2)
SODIUM SERPL-SCNC: 140 MMOL/L (ref 136–145)
WBC # BLD AUTO: 5.5 K/UL (ref 3.6–11)

## 2019-05-15 PROCEDURE — 97535 SELF CARE MNGMENT TRAINING: CPT | Performed by: OCCUPATIONAL THERAPIST

## 2019-05-15 PROCEDURE — 74011250637 HC RX REV CODE- 250/637: Performed by: HOSPITALIST

## 2019-05-15 PROCEDURE — 3331090001 HH PPS REVENUE CREDIT

## 2019-05-15 PROCEDURE — 85027 COMPLETE CBC AUTOMATED: CPT

## 2019-05-15 PROCEDURE — 99218 HC RM OBSERVATION: CPT

## 2019-05-15 PROCEDURE — 97530 THERAPEUTIC ACTIVITIES: CPT

## 2019-05-15 PROCEDURE — 80048 BASIC METABOLIC PNL TOTAL CA: CPT

## 2019-05-15 PROCEDURE — 3331090002 HH PPS REVENUE DEBIT

## 2019-05-15 PROCEDURE — 74011250637 HC RX REV CODE- 250/637: Performed by: INTERNAL MEDICINE

## 2019-05-15 PROCEDURE — 74011000250 HC RX REV CODE- 250: Performed by: NURSE PRACTITIONER

## 2019-05-15 PROCEDURE — 97116 GAIT TRAINING THERAPY: CPT

## 2019-05-15 PROCEDURE — 97110 THERAPEUTIC EXERCISES: CPT | Performed by: OCCUPATIONAL THERAPIST

## 2019-05-15 PROCEDURE — 36415 COLL VENOUS BLD VENIPUNCTURE: CPT

## 2019-05-15 RX ORDER — LANOLIN ALCOHOL/MO/W.PET/CERES
3 CREAM (GRAM) TOPICAL
Status: DISCONTINUED | OUTPATIENT
Start: 2019-05-15 | End: 2019-05-17 | Stop reason: HOSPADM

## 2019-05-15 RX ORDER — ENOXAPARIN SODIUM 100 MG/ML
30 INJECTION SUBCUTANEOUS DAILY
Status: DISCONTINUED | OUTPATIENT
Start: 2019-05-16 | End: 2019-05-17 | Stop reason: CLARIF

## 2019-05-15 RX ADMIN — MELOXICAM 7.5 MG: 7.5 TABLET ORAL at 09:35

## 2019-05-15 RX ADMIN — RIVASTIGMINE TARTRATE 3 MG: 1.5 CAPSULE ORAL at 17:33

## 2019-05-15 RX ADMIN — ACETAMINOPHEN 650 MG: 325 TABLET ORAL at 17:33

## 2019-05-15 RX ADMIN — Medication 10 ML: at 21:49

## 2019-05-15 RX ADMIN — SENNOSIDES AND DOCUSATE SODIUM 1 TABLET: 8.6; 5 TABLET ORAL at 17:33

## 2019-05-15 RX ADMIN — CLONAZEPAM 0.5 MG: 0.5 TABLET ORAL at 09:34

## 2019-05-15 RX ADMIN — LEVOTHYROXINE SODIUM 75 MCG: 75 TABLET ORAL at 06:12

## 2019-05-15 RX ADMIN — LITHIUM CARBONATE 450 MG: 450 TABLET, EXTENDED RELEASE ORAL at 21:48

## 2019-05-15 RX ADMIN — MEMANTINE HYDROCHLORIDE 10 MG: 10 TABLET ORAL at 09:34

## 2019-05-15 RX ADMIN — Medication 10 ML: at 06:13

## 2019-05-15 RX ADMIN — PAROXETINE HYDROCHLORIDE 40 MG: 20 TABLET, FILM COATED ORAL at 09:34

## 2019-05-15 RX ADMIN — Medication 10 ML: at 17:39

## 2019-05-15 RX ADMIN — ACETAMINOPHEN 650 MG: 325 TABLET ORAL at 09:33

## 2019-05-15 RX ADMIN — MELATONIN 3 MG: at 21:48

## 2019-05-15 RX ADMIN — SENNOSIDES AND DOCUSATE SODIUM 1 TABLET: 8.6; 5 TABLET ORAL at 09:37

## 2019-05-15 NOTE — PROGRESS NOTES
Problem: Mobility Impaired (Adult and Pediatric) Goal: *Acute Goals and Plan of Care (Insert Text) Description Physical Therapy Goals Initiated 5/14/2019 1. Patient will move from supine to sit and sit to supine  in bed with modified independence within 7 day(s). 2.  Patient will transfer from bed to chair and chair to bed with modified independent using the least restrictive device within 7 day(s). 3.  Patient will perform sit to stand with modified independent within 7 day(s). 4.  Patient will ambulate with modified independence for 150 feet with the least restrictive device within 7 day(s). 5.  Patient will ascend/descend 3 stairs with 1 handrail(s) with modified independence within 7 day(s). Note: PHYSICAL THERAPY TREATMENT Patient: Marjorie Olivares (51 y.o. female) Date: 5/15/2019 Diagnosis: Left leg pain [M79.605] Precautions: Fall Chart, physical therapy assessment, plan of care and goals were reviewed. ASSESSMENT: pt tolerated tx well, no LOB or SOB, does well with bed mob and transfers, vc's for safety and proper RW use. Progression toward goals: 
?    Improving appropriately and progressing toward goals ? Improving slowly and progressing toward goals ? Not making progress toward goals and plan of care will be adjusted PLAN: 
Patient continues to benefit from skilled intervention to address the above impairments. Continue treatment per established plan of care. Discharge Recommendations:  Home Health with family assist vs SNF rehab if family cannot provide level of assist 
Further Equipment Recommendations for Discharge:  rolling walker OBJECTIVE DATA SUMMARY:  
Critical Behavior: 
Neurologic State: Alert Orientation Level: Disoriented to time, Oriented to person, Oriented to place, Oriented to situation Cognition: Decreased command following, Memory loss Safety/Judgement: Awareness of environment, Decreased awareness of need for safety, Fall prevention Functional Mobility Training: 
Bed Mobility: 
Rolling: Supervision Supine to Sit: Supervision Sit to Supine: Supervision Scooting: Supervision Level of Assistance: Supervision Interventions: Verbal cues Transfers: 
Sit to Stand: Contact guard assistance Stand to Sit: Contact guard assistance Bed to Chair: Contact guard assistance Interventions: Tactile cues; Verbal cues Level of Assistance: Contact guard assistance Balance: 
Sitting: Intact; Without support Standing: Intact; With support Standing - Static: Good;Constant support Standing - Dynamic : Good;Constant support Ambulation/Gait Training: 
Distance (ft): 50 Feet (ft) Assistive Device: Walker, rolling;Gait belt Ambulation - Level of Assistance: Contact guard assistance; Additional time Gait Abnormalities: Antalgic;Decreased step clearance Right Side Weight Bearing: Full Left Side Weight Bearing: Full Base of Support: Narrowed Stance: Left decreased Speed/Sandy: Pace decreased (<100 feet/min) Step Length: Left shortened;Right shortened Pain: 
Pain Scale 1: Numeric (0 - 10) Pain Intensity 1: 3 Pain Location 1: Leg 
Pain Orientation 1: Left Pain Description 1: Aching Pain Intervention(s) 1: Medication (see MAR) Activity Tolerance: good After treatment:  
?    Patient left in no apparent distress sitting up in chair ? Patient left in no apparent distress in bed 
? Call bell left within reach ? Nursing notified ? Caregiver present ? Bed alarm activated COMMUNICATION/COLLABORATION:  
The patient?s plan of care was discussed with: Registered Nurse Tasha Rosa PTA Time Calculation: 25 mins

## 2019-05-15 NOTE — PROGRESS NOTES
Hospitalist Progress Note NAME: Ramandeep Hunter :  1926 MRN:  [id number] Assessment / Plan: 
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 She has been having auditory hallucinations with \"Congregation music\" that she hears in her head and finds disturbing. She has been sensitive to all sounds and cannot tolerate the TV being on. 
- con't namenda and exelon  
- con't klonipin, paxil, lithium for now. Lithium level within normal limits. - Dtr requesting psychiatry consult to reassess medications and plan of care - says her mom has had similar brakes and needed adjustment in meds in the past.  CM also assisting with care, Dtr is dealing with severe caregiver stress and says she cannot continue to care for her mom if current situation cannot be stabilized. Subacute, progressive LLE pain with inability to ambulate in setting of recent left hip fx s/p cephalomedullary nailing with Dr. Cristofer Doran 3/18/19: 
- L hip xray with healing instrumented left hip fracture. No new finding. 
- LLE doppler with no evidence of acute deep vein thrombosis in the left common femoral, superficial femoral, popliteal, posterior tibial, and peroneal veins. The veins were imaged in the transverse and longitudinal planes. - ortho consult appreciated 
- con't scheduled tylenol and mobic, lidoderm 
- PT/OT evals appreciated Hypothyroidism: con't synthroid 
  
DVT prophylaxis: lovenox Code status: DNR Surrogate Decision Maker: Daughter Subjective: Chief Complaint / Reason for Physician Visit Crying. Observed transferring with nursing, does not appear severe pain but complains of severe pain. Discussed with RN events overnight. Review of Systems: 
Symptom Y/N Comments  Symptom Y/N Comments Fever/Chills n   Chest Pain n   
Poor Appetite n   Edema n   
Cough n   Abdominal Pain n   
Sputum n   Joint Pain SOB/SHERMAN n   Pruritis/Rash Nausea/vomit    Tolerating PT/OT Diarrhea    Tolerating Diet Constipation    Other Could NOT obtain due to:   
 
Objective: VITALS:  
Last 24hrs VS reviewed since prior progress note. Most recent are: 
Patient Vitals for the past 24 hrs: 
 Temp Pulse Resp BP SpO2  
05/15/19 1739  66 18 128/59 96 % 05/15/19 1128 97.9 °F (36.6 °C) 61 16 120/49 95 % 05/15/19 0843 98 °F (36.7 °C) 69 16 150/58 100 % 05/15/19 0459 97.4 °F (36.3 °C) 65 14 136/68 94 % 05/15/19 0000 97.8 °F (36.6 °C) 67 18 150/60 95 % 05/14/19 2039 97.9 °F (36.6 °C) 69 16 156/62 95 % Intake/Output Summary (Last 24 hours) at 5/15/2019 1835 Last data filed at 5/15/2019 4817 Gross per 24 hour Intake 100 ml Output 475 ml Net -375 ml PHYSICAL EXAM: 
General: WD, WN. Alert, cooperative, no acute distress   
EENT:  EOMI. Anicteric sclerae. MMM Resp:  CTA bilaterally, no wheezing or rales. No accessory muscle use CV:  Regular  rhythm,  No edema GI:  Soft, Non distended, Non tender.  +Bowel sounds Neurologic:  Alert and oriented X 2, normal speech, Psych:   Poor insight. Not anxious nor agitated. Intermittent crying. Skin:  No rashes. No jaundice Reviewed most current lab test results and cultures  YES Reviewed most current radiology test results   YES Review and summation of old records today    NO Reviewed patient's current orders and MAR    YES 
PMH/SH reviewed - no change compared to H&P 
________________________________________________________________________ Care Plan discussed with: 
  Comments Patient x Family  x dtr at bedside RN x Care Manager x Consultant Multidiciplinary team rounds were held today with , nursing, pharmacist and clinical coordinator. Patient's plan of care was discussed; medications were reviewed and discharge planning was addressed. ________________________________________________________________________ Total NON critical care TIME:  25 Minutes Total CRITICAL CARE TIME Spent:   Minutes non procedure based Comments >50% of visit spent in counseling and coordination of care x   
________________________________________________________________________ Rohan Omer MD  
 
Procedures: see electronic medical records for all procedures/Xrays and details which were not copied into this note but were reviewed prior to creation of Plan. LABS: 
I reviewed today's most current labs and imaging studies. Pertinent labs include: 
Recent Labs 05/15/19 
0405 05/13/19 
2252 WBC 5.5 6.6 HGB 12.8 13.2 HCT 41.2 39.7  186 Recent Labs 05/15/19 
0405 05/13/19 
2252  137  
K 3.9 4.0  
* 107 CO2 26 25 GLU 96 101* BUN 16 9 CREA 0.75 0.61  
CA 8.8 9.0 ALB  --  3.6 TBILI  --  0.4 SGOT  --  19 ALT  --  12 Signed: Rohan Omer MD

## 2019-05-15 NOTE — PROGRESS NOTES
Currently admitted to ED [facility] ED as of 5/13/19 with c/o Left hip pain radiating down left leg to left calf since surgery in March, referred by PCP and home health staff.

## 2019-05-15 NOTE — PROGRESS NOTES
Bedside and Verbal shift change report given to Caretha Harada RN (oncoming nurse) by Adria Mccallum RN (offgoing nurse). Report included the following information SBAR, Kardex, ED Summary, OR Summary, Procedure Summary, Intake/Output, MAR, Accordion, Recent Results and Med Rec Status.

## 2019-05-15 NOTE — PROGRESS NOTES
OCCUPATIONAL THERAPY TREATMENT Patient: Lily Bates (45 y.o. female) Date: 5/15/2019 Diagnosis: Left leg pain [M79.655] <principal problem not specified> Precautions: Fall Chart, occupational therapy assessment, plan of care, and goals were reviewed. ASSESSMENT: 
Patient is progressing nicely toward goals. She is more steady today and was able to walk into the bathroom for ADL, rather than using the [facility]. Patient requires overall Min A. She completed UE exercises while seated in the chair. Emphasized how it is important to strengthen her upper body to compensate for her lower body. She was tearful throughout the session, but mostly at the beginning, feeling like she is a burden on her daughter. Patient will continue to benefit from skilled OT to maximize independence and safety. Progression toward goals: 
?       Improving appropriately and progressing toward goals ? Improving slowly and progressing toward goals ? Not making progress toward goals and plan of care will be adjusted PLAN: 
Patient continues to benefit from skilled intervention to address the above impairments. Continue treatment per established plan of care. Discharge Recommendations:  Home with home health. If family is not able to care for her at home, she may need a brief rehab stay. Further Equipment Recommendations for Discharge:  None anticipated SUBJECTIVE:  
Patient stated ? I guess I'm just too much trouble. ? OBJECTIVE DATA SUMMARY:  
Cognitive/Behavioral Status: 
Neurologic State: Alert Orientation Level: Disoriented to time;Oriented to person;Oriented to place;Oriented to situation Cognition: Decreased command following;Memory loss Perception: Appears intact Perseveration: No perseveration noted Safety/Judgement: Awareness of environment;Decreased awareness of need for safety; Fall prevention Functional Mobility and Transfers for ADLs: 
Bed Mobility: 
Supine to Sit: Supervision Scooting: Supervision Transfers: 
Sit to Stand: Contact guard assistance Functional Transfers Bathroom Mobility: Contact guard assistance Toilet Transfer : Contact guard assistance Cues: Verbal cues provided Adaptive Equipment: Grab bars; [address] (comment) Balance: 
Sitting: Intact Standing: Impaired Standing - Static: Good Standing - Dynamic : Fair ADL Intervention: 
  
 
Grooming Washing Face: Contact guard assistance(standing at the sink with RW) Washing Hands: Contact guard assistance Brushing/Combing Hair: Contact guard assistance(standing at the sink) Cues: Verbal cues provided Lower Body Bathing Lower Body : Minimum assistance Position Performed: Long sitting on bed Cues: Verbal cues provided Lower Body Dressing Assistance Socks: Minimum assistance Position Performed: Long sitting on bed Cues: Verbal cues provided;Visual cues provided Toileting Bladder Hygiene: Supervision Bowel Hygiene: Minimum assistance Clothing Management: Minimum assistance Cues: Verbal cues provided;Visual cues provided Adaptive Equipment: Grab bars; [address] Cognitive Retraining Safety/Judgement: Awareness of environment;Decreased awareness of need for safety; Fall prevention Pain: 
Pain Scale 1: Numeric (0 - 10) Pain Intensity 1: 0 Therapeutic Exercise: 
Patient completed B UE exercises to increase strength and activity tolerance for ADL. She started with 1 set of 10 repetitions for shoulder flexion and abduction, but she indicated that may be too much. Encouraged her to start with 5 repetitions and gradually increase to 10. Patient also completed elbow flexion and extension, protraction/retraction, and pronation/supination. Activity Tolerance:  
Fair After treatment:  
? Patient left in no apparent distress sitting up in chair ? Patient left in no apparent distress in bed 
? Call bell left within reach ? Nursing notified ? Caregiver present ? Bed alarm activated COMMUNICATION/COLLABORATION:  
The patient?s plan of care was discussed with: Physical Therapist, Physical Therapy Assistant, Registered Nurse and Care Manager Karen Siegel OTR/L Time Calculation: 41 mins

## 2019-05-15 NOTE — PROGRESS NOTES
Reason for Admission:   Leg pain RRAT Score: 24 HIGH Resources/supports as identified by patient/family: Pt daughter states they lack support and that pt would not qualify for Medicaid for assistance Top Challenges facing patient (as identified by patient/family and CM): Finances/Medication cost?  No problems identified Transportation? No problems identified Support system or lack thereof? Pt dtr is pt main support system and lacks support herself (only has  and [employer] physically, children and other family emotionally as all family lives out of town) Living arrangements? Pt lives with her daughter and son in law who are becoming burnt out specifically from pt agitation from any type of light or noise Self-care/ADLs/Cognition? Pt was alert, but only oriented to self. Current Advanced Directive/Advance Care Plan: On file Plan for utilizing home health:  Per recommendation Likelihood of readmission: High per pt acuity and co morbidities Transition of Care Plan:              Pt is a 80year old,  female, admitted with leg pain. Pt was alert but not oriented when meeting with CM. Pt stated it was 1998 and president Tavares Sharpe was the president. When meeting with CM, pt was unable to state her location (city or state), pt was able to confirm that her daughter was in the room. Pt dtr states this is a new behavior. Pt currently lives with her daughter and son in law in a one level home with 3 steps to enter. Pt has a walker and cane and is independent at baseline. Prior to admission pt was open with [address] and was discharged from a 26 day stay at [facility] 2-3 weeks ago.  Pt's preferred pharmacy is the [facility], pt dtr states no problems affording or accessing medications. Pt has advanced directives on file. If pt were to go home pt dtr will drive her home at time of discharge and as needed. CM consult noted-  
CM met with pt dtr to discuss discharge planning. Pt dtr has concerns that pt dementia medications are not working. Pt dtr states she is not able to get a psych appt until July with Dr. Niesha Weiss in [facility]. Pt dtr concerned that pt is constantly crying about music and noises in her head and for the lights being on. Pt dtr also inquired about pt severe pain in leg. CM inquired with attending about palliative consult, palliative consult placed. Pt dtr states she is willing to take pt home if the noises in the pt head go away. In the meantime, pt dtr is looking at [address] [place] [address] in the area. Plan of Care 1) Psych and Palliative Consults 2) Home with [facility] services 3) Assisted Living (Memory Care Unit) with [facility] services Care Management Interventions PCP Verified by CM: Yes Mode of Transport at Discharge: Self Transition of Care Consult (CM Consult): Discharge Planning MyChart Signup: No 
Discharge Durable Medical Equipment: No 
Health Maintenance Reviewed: Yes Physical Therapy Consult: Yes Occupational Therapy Consult: Yes Speech Therapy Consult: No 
Current Support Network: Lives with Spouse, Own Home Confirm Follow Up Transport: Family Plan discussed with Pt/Family/Caregiver: Yes Discharge Location Discharge Placement: Home with family assistance KIRIT Pérez, CM [address] [phone]

## 2019-05-16 PROBLEM — F32.A ANXIETY AND DEPRESSION: Status: ACTIVE | Noted: 2019-05-16

## 2019-05-16 PROBLEM — Z71.89 COUNSELING REGARDING ADVANCE CARE PLANNING AND GOALS OF CARE: Status: ACTIVE | Noted: 2019-05-16

## 2019-05-16 PROBLEM — F41.9 ANXIETY AND DEPRESSION: Status: ACTIVE | Noted: 2019-05-16

## 2019-05-16 PROBLEM — Z63.6 CAREGIVER STRESS: Status: ACTIVE | Noted: 2019-05-16

## 2019-05-16 PROCEDURE — 76450000000

## 2019-05-16 PROCEDURE — 97110 THERAPEUTIC EXERCISES: CPT

## 2019-05-16 PROCEDURE — 97535 SELF CARE MNGMENT TRAINING: CPT

## 2019-05-16 PROCEDURE — 74011250637 HC RX REV CODE- 250/637: Performed by: HOSPITALIST

## 2019-05-16 PROCEDURE — 99218 HC RM OBSERVATION: CPT

## 2019-05-16 PROCEDURE — 3331090002 HH PPS REVENUE DEBIT

## 2019-05-16 PROCEDURE — 96372 THER/PROPH/DIAG INJ SC/IM: CPT

## 2019-05-16 PROCEDURE — 3331090001 HH PPS REVENUE CREDIT

## 2019-05-16 PROCEDURE — 77030036660

## 2019-05-16 PROCEDURE — 74011000250 HC RX REV CODE- 250: Performed by: NURSE PRACTITIONER

## 2019-05-16 PROCEDURE — 74011250636 HC RX REV CODE- 250/636: Performed by: INTERNAL MEDICINE

## 2019-05-16 PROCEDURE — 97116 GAIT TRAINING THERAPY: CPT

## 2019-05-16 PROCEDURE — 74011250637 HC RX REV CODE- 250/637: Performed by: INTERNAL MEDICINE

## 2019-05-16 RX ADMIN — LITHIUM CARBONATE 450 MG: 450 TABLET, EXTENDED RELEASE ORAL at 21:49

## 2019-05-16 RX ADMIN — Medication 10 ML: at 05:13

## 2019-05-16 RX ADMIN — OXYCODONE HYDROCHLORIDE 5 MG: 5 TABLET ORAL at 09:14

## 2019-05-16 RX ADMIN — MELOXICAM 7.5 MG: 7.5 TABLET ORAL at 09:15

## 2019-05-16 RX ADMIN — Medication 10 ML: at 15:26

## 2019-05-16 RX ADMIN — MELATONIN 3 MG: at 21:27

## 2019-05-16 RX ADMIN — CLONAZEPAM 0.5 MG: 0.5 TABLET ORAL at 09:14

## 2019-05-16 RX ADMIN — RIVASTIGMINE TARTRATE 3 MG: 1.5 CAPSULE ORAL at 18:20

## 2019-05-16 RX ADMIN — LEVOTHYROXINE SODIUM 75 MCG: 75 TABLET ORAL at 05:12

## 2019-05-16 RX ADMIN — ENOXAPARIN SODIUM 30 MG: 30 INJECTION SUBCUTANEOUS at 09:13

## 2019-05-16 RX ADMIN — POLYETHYLENE GLYCOL 3350 17 G: 17 POWDER, FOR SOLUTION ORAL at 09:13

## 2019-05-16 RX ADMIN — ACETAMINOPHEN 650 MG: 325 TABLET ORAL at 09:14

## 2019-05-16 RX ADMIN — MEMANTINE HYDROCHLORIDE 10 MG: 10 TABLET ORAL at 09:18

## 2019-05-16 RX ADMIN — PAROXETINE HYDROCHLORIDE 40 MG: 20 TABLET, FILM COATED ORAL at 09:15

## 2019-05-16 RX ADMIN — Medication 10 ML: at 21:27

## 2019-05-16 RX ADMIN — ACETAMINOPHEN 650 MG: 325 TABLET ORAL at 18:19

## 2019-05-16 NOTE — PROGRESS NOTES
Problem: Mobility Impaired (Adult and Pediatric) Goal: *Acute Goals and Plan of Care (Insert Text) Description Physical Therapy Goals Initiated 5/14/2019 1. Patient will move from supine to sit and sit to supine  in bed with modified independence within 7 day(s). 2.  Patient will transfer from bed to chair and chair to bed with modified independent using the least restrictive device within 7 day(s). 3.  Patient will perform sit to stand with modified independent within 7 day(s). 4.  Patient will ambulate with modified independence for 150 feet with the least restrictive device within 7 day(s). 5.  Patient will ascend/descend 3 stairs with 1 handrail(s) with modified independence within 7 day(s). Outcome: Progressing Towards Goal 
Note: PHYSICAL THERAPY TREATMENT Patient: Gloria Ross (23 y.o. female) Date: 5/16/2019 Diagnosis: Left leg pain [M79.605] Precautions: Fall Chart, physical therapy assessment, plan of care and goals were reviewed. ASSESSMENT: pt continues to do well with transfers and gait, no LOB or SOB, did well with toilet transfers and ther-ex, vc's for safety and proper RW use. Progression toward goals: 
?    Improving appropriately and progressing toward goals ? Improving slowly and progressing toward goals ? Not making progress toward goals and plan of care will be adjusted PLAN: 
Patient continues to benefit from skilled intervention to address the above impairments. Continue treatment per established plan of care. Discharge Recommendations:  Home Health Further Equipment Recommendations for Discharge:  rolling walker OBJECTIVE DATA SUMMARY:  
Critical Behavior: 
Neurologic State: Alert Orientation Level: Oriented to situation, Oriented to place, Oriented to person Cognition: Impaired decision making, Memory loss Safety/Judgement: Fall prevention Functional Mobility Training: 
Bed Mobility: pt sitting EOB on arrival 
 
Transfers: Sit to Stand: Contact guard assistance Stand to Sit: Contact guard assistance Bed to Chair: Contact guard assistance Interventions: Tactile cues; Verbal cues Level of Assistance: Contact guard assistance Balance: 
Sitting: Intact; Without support Standing: Intact; With support Standing - Static: Good;Constant support Standing - Dynamic : Good;Constant support Ambulation/Gait Training: 
Distance (ft): 50 Feet (ft) Assistive Device: Walker, rolling;Gait belt Ambulation - Level of Assistance: Contact guard assistance; Additional time Gait Abnormalities: Antalgic;Decreased step clearance Right Side Weight Bearing: Full Left Side Weight Bearing: Full Base of Support: Narrowed Stance: Left decreased Speed/Sandy: Pace decreased (<100 feet/min) Step Length: Left shortened;Right shortened Therapeutic Exercises:  
sitting EXERCISE Sets Reps Active Active Assist  
Passive Comments Ankle pumps 1 10 ? ? ? bilat Heel raises 1 10 ? ? ? \" Toe tap 1 10 ? ? ? \" Knee ext 1 10 ? ? ? \" Hip flex 1 10 ? ? ? \"  
 
Pain: 
Pain Scale 1: Numeric (0 - 10) Pain Intensity 1: 5 Pain Location 1: Hip Pain Orientation 1: Left Pain Description 1: Aching Pain Intervention(s) 1: Medication (see MAR) Activity Tolerance: good After treatment:  
?    Patient left in no apparent distress sitting up in chair ? Patient left in no apparent distress in bed 
? Call bell left within reach ? Nursing notified ? Caregiver present ? Bed alarm activated COMMUNICATION/COLLABORATION:  
The patient?s plan of care was discussed with: Registered Nurse Ariel Rinaldi PTA Time Calculation: 25 mins

## 2019-05-16 NOTE — PROGRESS NOTES
[facility]: [phone] ([phone]) [facility]: [phone] ([phone]) Patient is a 79 yo very tearful woman, who shared that she lives with her daughter Lennie Dias and she tearfully voiced that \"I want to go home\". When asked if someone had told her that she can't go home, she stated, \"I don't want to be here, my daughter said I can't come home now\". Patient voicing fears of being in the hospital, she noted she's afraid of the dark and likes to have someone with her all the time. Palliative medicine asked to be involved for reported leg pain, care options, emotional support to family, patient with reported auditory hallucinations and low tolerance for stimulation such as noise, light etc. Patient appears to have a significant psychiatric history and is on several medications. It would be helpful for psychiatry to review these to see if any changes need to be made. Patient shared that she has lived with her daughter Lennie Dias for the last 8 years. She has another son and daughter bu they live in another state per patient. Patient is originally from [place] and moved to the [place] when she was 15 yo. Chart reviewed and telephone call made to daughter to meet with her tomorrow when she comes in. Left message for her. According to team treating patient, daughter has been very stressed caring for patient who appears very needy. Palliative medicine will follow up tomorrow when daughter able to come in.   
 
Daughter coming in at 1 pm 5/17/19 to meet with palliative team.

## 2019-05-16 NOTE — CONSULTS
Palliative Medicine Consult Chet: [phone]) Patient Name: Lily Bates YOB: 1926 Date of Initial Consult: 5/16/19 Reason for Consult: Family requesting palliative involvement, recent hip fracture with ongoing pain, confusion and auditory hallucinations (?dementia).  Family is struggling with next plan of care, neda if she cannot safely return home. Requesting Provider: Duke Carrillo MD 
Primary Care Physician: Anitha Mack MD 
 
 SUMMARY:  
Lily Bates is a 80 y.o. with a past history of left hip fracture March 2019, hypothyroidism, depression/anxiety, DVT, who was admitted on 5/13/2019 from home with a diagnosis of left leg pain, left lumbosacral radiculopathy. Current medical issues leading to Palliative Medicine involvement include: caregiver burnout, uncontrolled pain, disposition. Psychosocial: lives with daughter, requires assistance with all ADLs. Pt is experiencing auditory hallucinations, cries most of time, daughter is only caregiver and is burnt out. Has AMD on file. PALLIATIVE DIAGNOSES:  
1. Left hip pain that radiates down left leg 2. Dementia with bipolar d/o: very labile, cries most of time 3. Depression/anxiety 4. Auditory hallucinations 5. Severe caregiver stress: cannot continue to care for pt if her current condition is not stabilized 6. Care decisions PLAN:  
1. Prior to visit, I completed an extensive review of patient's medical records, including medical documentation, vital signs, MARs, and results of various labs  and other diagnostics. I also spoke with attending . 2. Met with pt and Palliative RODDY Layne, pt was tearful and laser focused on going home. She shared that she grew up in [facility] during [employer], indicated but did not share details that it was a terrible time (I didn't ask for details due to her already depressed condition).   She came to the [address] [facility] with her  at age 16 years, has 3 children and  \"too many to count\" grandchildren. Her focus during our conversation was fixed on going home, when is  coming back. She states \"I'm scared to death\" to be in the hospital, especially at night in the dark. Nolvia Jose Armandodixie tried to help pt find distractions but pt was resistant, ie, doesn't want music, TV, light on at night, etc. We were unsuccessful in providing reassurance. Pt states she is crying because she wants to go home, but admits she's been crying a lot lately. 3. Leg pain: no TTP spots, able to actively and passively move her leg without eliciting pain. I suspect most of her pain is emotional.  
4. I doubt any facility/[facility] would accept pt in her current emotional condition. 5. Will meet with dtr tomorrow 1pm. 
6. Initial consult note routed to primary continuity provider and/or primary health care team members 7. Communicated plan of care with: Palliative IDTRavi 192 Team 
 
 GOALS OF CARE / TREATMENT PREFERENCES:  
 
GOALS OF CARE:  Unclear at this point Patient/Health Care Proxy Stated Goals: Prolong life TREATMENT PREFERENCES:  
Code Status: DNR Advance Care Planning: 
[x] [facility] Interdisciplinary Team has updated the ACP Navigator with [place] and Patient Capacity Primary Decision Maker (Active): DeannacyndyMarisol burns - Child - [phone] Advance Care Planning 5/16/2019 Patient's Healthcare Decision Maker is: Named in scanned ACP document Primary Decision Maker Name -  
Primary Decision Maker Phone Number -  
Primary Decision Maker Relationship to Patient -  
Confirm Advance Directive Yes, on file Patient Would Like to Complete Advance Directive - Does the patient have other document types - Other Instructions: Other: As far as possible, the palliative care team has discussed with patient / health care proxy about goals of care / treatment preferences for patient. HISTORY:  
 
History obtained from: chart CHIEF COMPLAINT: left leg pain HPI/SUBJECTIVE: The patient is:  
[x] Verbal and participatory [] Non-participatory due to:  
 
5/13: presented to ED with c/o left hip pain that radiates down her left leg and into her left calf. Pt fell and fractured her left hip in March, had left hip cephalo-medullary nailing on 3/18/19, then subsequently went to rehab. Since leaving rehab she has had this pain that has been getting worse over time, now unable to walk due to pain. She was using a walker but even with that she cannot bear weight or walk. She takes oxycodone at home. Daughter reports pt has been emotionally labile, crying most of time, having auditory hallucinations with \"Restoration music\" that she hears in her head and finds disturbing. She is sensitive to all sounds and cannot even tolerate a TV being on. CT L-SPINE: no acute findings, kyphoplasty treated fractures of T12 and L1, chronic appearing nondisplaced left L5 pars fracture, multilevel disease. CT PELVIS: ORIF left hip fracture without hardware failure or displacement, no new fracture, no dislocation. 5/14: tylenol, mobic and Lido patch added to pain regimen. 5/16: crying during whole visit. Clinical Pain Assessment (nonverbal scale for severity on nonverbal patients):  
Clinical Pain Assessment Severity: 5 Location: lateral-posterior aspect of left leg from hip to just below the knee Character: sharp Duration: months Effect: cannot ambulate Frequency: constant Activity (Movement): Restless, excessive activity and/or withdrawal reflexes Duration: for how long has pt been experiencing pain (e.g., 2 days, 1 month, years) Frequency: how often pain is an issue (e.g., several times per day, once every few days, constant) FUNCTIONAL ASSESSMENT:  
 
Palliative Performance Scale (PPS): PPS: 30 
 
 
 PSYCHOSOCIAL/SPIRITUAL SCREENING:  
 
Palliative IDT has assessed this patient for cultural preferences / practices and a referral made as appropriate to needs (Cultural Services, Patient Advocacy, Ethics, etc.) Any spiritual / Evangelical concerns: 
[] Yes /  [x] No 
 
Caregiver Burnout: 
[] Yes /  [x] No /  [] No Caregiver Present Anticipatory grief assessment:  
[x] Normal  / [] Maladaptive ESAS Anxiety: Anxiety: 8 ESAS Depression: Depression: 8 REVIEW OF SYSTEMS:  
 
Positive and pertinent negative findings in ROS are noted above in HPI. The following systems were [x] reviewed / [] unable to be reviewed as noted in HPI Other findings are noted below. Systems: constitutional, ears/nose/mouth/throat, respiratory, gastrointestinal, genitourinary, musculoskeletal, integumentary, neurologic, psychiatric, endocrine. Positive findings noted below. Modified ESAS Completed by: provider Fatigue: 8 Drowsiness: 0 Depression: 8 Pain: 5 Anxiety: 8 Nausea: 0 Anorexia: 8 Dyspnea: 0 Constipation: Yes Stool Occurrence(s): 1 PHYSICAL EXAM:  
 
From RN flowsheet: 
Wt Readings from Last 3 Encounters:  
05/13/19 114 lb (51.7 kg) 05/01/19 119 lb 3.2 oz (54.1 kg) 03/17/19 115 lb (52.2 kg) Blood pressure 138/57, pulse 67, temperature 97.7 °F (36.5 °C), resp. rate 16, height 5' 4\" (1.626 m), weight 114 lb (51.7 kg), SpO2 98 %. Pain Scale 1: Numeric (0 - 10) Pain Intensity 1: 5 Pain Onset 1: recent Pain Location 1: Hip Pain Orientation 1: Left Pain Description 1: Aching Pain Intervention(s) 1: Medication (see MAR) Last bowel movement, if known:  
 
Constitutional: elderly, frail, awake, alert, oriented to self, her past 
Eyes: pupils equal, anicteric ENMT: no nasal discharge, moist mucous membranes Cardiovascular: regular rhythm, distal pulses intact Respiratory: breathing not labored, symmetric Gastrointestinal: soft non-tender, +bowel sounds Musculoskeletal: no deformity, no tenderness to palpation Skin: warm, dry Neurologic: following commands, moving all extremities Psychiatric: depressed, crying HISTORY:  
 
Active Problems: 
  Left leg pain (5/13/2019) Past Medical History:  
Diagnosis Date  Dementia  Depression   
 psychiatrist: Dr Araceli Rodriguez (on Lithium)  Fracture of wrist   
 slipped and fx left wrist, surgery scheduled 12/21/16  Full dentures   
 upper and lower  Hypothyroid Past Surgical History:  
Procedure Laterality Date  HX KYPHOPLASTY  2013  HX ORTHOPAEDIC  12/2016 Wrist surgery  HX WRIST FRACTURE TX Left 12/2016 Family History Problem Relation Age of Onset Marciano Other Mother Inefficient wound healing  Cancer Sister Stomach CA  
 Diabetes Brother  Alzheimer Sister  Hypertension Daughter  Thyroid Disease Daughter  Other Son Hip replacement  Depression Son  Liver Disease Son  Diabetes Son   
 Heart Disease Son  Depression Son History reviewed, no pertinent family history. Social History Tobacco Use  Smoking status: Former Smoker  Smokeless tobacco: Never Used Substance Use Topics  Alcohol use: No  
 
Allergies Allergen Reactions  Sulfa (Sulfonamide Antibiotics) Hives Current Facility-Administered Medications Medication Dose Route Frequency  enoxaparin (LOVENOX) injection 30 mg  30 mg SubCUTAneous DAILY  melatonin tablet 3 mg  3 mg Oral QHS  senna-docusate (PERICOLACE) 8.6-50 mg per tablet 1 Tab  1 Tab Oral BID  polyethylene glycol (MIRALAX) packet 17 g  17 g Oral DAILY  nitroglycerin (NITROBID) 2 % ointment 1 Inch  1 Inch Topical Q6H PRN  
 acetaminophen (TYLENOL) tablet 650 mg  650 mg Oral BID  meloxicam (MOBIC) tablet 7.5 mg  7.5 mg Oral DAILY  lidocaine 4 % patch 1 Patch  1 Patch TransDERmal Q24H  
 sodium chloride (NS) flush 5-40 mL  5-40 mL IntraVENous Q8H  
 sodium chloride (NS) flush 5-40 mL  5-40 mL IntraVENous PRN  
  acetaminophen (TYLENOL) tablet 650 mg  650 mg Oral Q4H PRN  
 oxyCODONE IR (ROXICODONE) tablet 5 mg  5 mg Oral Q4H PRN  
 ondansetron (ZOFRAN) injection 4 mg  4 mg IntraVENous Q4H PRN  
 clonazePAM (KlonoPIN) tablet 0.5 mg  0.5 mg Oral DAILY  levothyroxine (SYNTHROID) tablet 75 mcg  75 mcg Oral 6am  
 PARoxetine (PAXIL) tablet 40 mg  40 mg Oral DAILY  rivastigmine tartrate (EXELON) capsule 3 mg  3 mg Oral QPM  
 memantine (NAMENDA) tablet 10 mg  10 mg Oral DAILY  lithium carbonate CR (ESKALITH CR) tablet 450 mg  450 mg Oral QHS  
 
 
 
 LAB AND IMAGING FINDINGS:  
 
Lab Results Component Value Date/Time WBC 5.5 05/15/2019 04:05 AM  
 HGB 12.8 05/15/2019 04:05 AM  
 PLATELET 306 28/71/9043 04:05 AM  
 
Lab Results Component Value Date/Time Sodium 140 05/15/2019 04:05 AM  
 Potassium 3.9 05/15/2019 04:05 AM  
 Chloride 109 (H) 05/15/2019 04:05 AM  
 CO2 26 05/15/2019 04:05 AM  
 BUN 16 05/15/2019 04:05 AM  
 Creatinine 0.75 05/15/2019 04:05 AM  
 Calcium 8.8 05/15/2019 04:05 AM  
 Magnesium 2.2 03/21/2019 06:10 AM  
 Phosphorus 2.1 (L) 03/21/2019 06:10 AM  
  
Lab Results Component Value Date/Time AST (SGOT) 19 05/13/2019 10:52 PM  
 Alk. phosphatase 143 (H) 05/13/2019 10:52 PM  
 Protein, total 7.2 05/13/2019 10:52 PM  
 Albumin 3.6 05/13/2019 10:52 PM  
 Globulin 3.6 05/13/2019 10:52 PM  
 
Lab Results Component Value Date/Time INR 1.1 03/18/2019 01:03 AM  
 Prothrombin time 10.8 03/18/2019 01:03 AM  
 aPTT 25.8 10/11/2013 05:05 PM  
  
No results found for: IRON, FE, TIBC, IBCT, PSAT, FERR No results found for: PH, PCO2, PO2 No components found for: [place] Lab Results Component Value Date/Time  CK 49 10/11/2013 05:05 PM  
 CK - MB 1.5 10/11/2013 05:05 PM  
  
 
 
   
 
Total time: 90 
Counseling / coordination time, spent as noted above: 75 
> 50% counseling / coordination?: y 
 
Prolonged service was provided for  []30 min   []75 min in face to face time in the presence of the patient, spent as noted above. Time Start:  
Time End:  
Note: this can only be billed with 39680 (initial) or 56890 (follow up). If multiple start / stop times, list each separately.

## 2019-05-16 NOTE — CONSULTS
PSYCHIATRY CONSULT NOTE: 
 
REASON FOR CONSULT: 
depression and psychosis 
auditory hallucinations and Bipolar disorder history HISTORY OF PRESENTING COMPLAINT: 
Odette Severin is a 80 y.o. female as per H&P w/ hypothyroidism, recent L hip arthroplasty, presents with left leg pain. Symptoms began about 2  Months ago after her hip surgery. She reports she developed pain that begins in the mid left thigh area and radiates down to her calf. It is constant and sharp in nature, worsened with activity and weight-bearing. No weakness or paresthesias. She is tearful when she talks about the pain. No fever, n/v/d, or falls. She has been in the hospital for the past 4 days under observation with no acute symptoms to be admitted to orthopaedics. Patient has been reportedly crying constantly with poor sleep, poor appetite, difficulty with redirection and confusion where she apparently she sees and hears things that are not otherwise noticed by others. She has a history of a mood disorder, but she does not present as manic. She does present as depressed and notes being lonely and scared since the surgery. For others, she presents as confused and tearful with difficulty collecting herself. She lives with her daughter who is loving and involved per Dr. Sean Watson, but is also burnt out with inability to care for her mother under her current condition. She appears to be decompensating in a way similar to in the past when she was given ECT per daughter report to Dr. Sean Watson. This option is no readily available due to [facility] being booked out 2-3 months and [facility] not having inpt ECT any longer. It is thought by the team that the patient is delusional as none of her complaints bear the weight of proof. She did not present as psychotic or delirious to me today. She also knew that I was a psychiatrist.  It is possible that she \"sundowns\" making evening hours with her difficult. Denies SI/HI/AH/VH. No aggression or violence. PAST PSYCHIATRIC HISTORY: 
Therapy, Out Patient Bipolar Disorder, Dementia? on Rivastigmine SUBSTANCE ABUSE HISTORY: 
Social History Substance and Sexual Activity Drug Use No  
 
Social History Substance and Sexual Activity Alcohol Use No  
 
Social History Tobacco Use Smoking Status Former Smoker Smokeless Tobacco Never Used PAST MEDICAL HISTORY: 
Past Medical History:  
Diagnosis Date  Dementia  Depression   
 psychiatrist: Dr Janeen Harrison (on Lithium)  Fracture of wrist   
 slipped and fx left wrist, surgery scheduled 12/21/16  Full dentures   
 upper and lower  Hypothyroid SOCIAL HISTORY: 
Lives with daughter. No drugs, ETOH, or cigs VITALS: 
Visit Vitals /62 (BP 1 Location: Right arm, BP Patient Position: At rest) Pulse 72 Temp 98.2 °F (36.8 °C) Resp 18 Ht 5' 4\" (1.626 m) Wt 51.7 kg (114 lb) SpO2 94% BMI 19.57 kg/m² MEDICATIONS: 
 
Current Facility-Administered Medications:  
  enoxaparin (LOVENOX) injection 30 mg, 30 mg, SubCUTAneous, DAILY, Melquiades Hull MD, 30 mg at 05/16/19 0913 
  melatonin tablet 3 mg, 3 mg, Oral, QHS, Francisco Phan MD, 3 mg at 05/15/19 2148   senna-docusate (PERICOLACE) 8.6-50 mg per tablet 1 Tab, 1 Tab, Oral, BID, Francisco Phan MD, Stopped at 05/16/19 0900   polyethylene glycol (MIRALAX) packet 17 g, 17 g, Oral, DAILY, Francisco Phan MD, 17 g at 05/16/19 0913 
  nitroglycerin (NITROBID) 2 % ointment 1 Inch, 1 Inch, Topical, Q6H PRN, Francisco Phan MD 
  acetaminophen (TYLENOL) tablet 650 mg, 650 mg, Oral, BID, Francisco Phan MD, 650 mg at 05/16/19 0914 
  meloxicam (MOBIC) tablet 7.5 mg, 7.5 mg, Oral, DAILY, Francisco Phan MD, 7.5 mg at 05/16/19 3932   lidocaine 4 % patch 1 Patch, 1 Patch, TransDERmal, Q24H, Trish Leal NP, 1 Patch at 05/16/19 1551   sodium chloride (NS) flush 5-40 mL, 5-40 mL, IntraVENous, Q8H, Remedios Winn MD, 10 mL at 05/16/19 1524   sodium chloride (NS) flush 5-40 mL, 5-40 mL, IntraVENous, PRN, Prashant Winn MD 
  acetaminophen (TYLENOL) tablet 650 mg, 650 mg, Oral, Q4H PRN, Lisha Cash MD, 650 mg at 05/14/19 4985   oxyCODONE IR (ROXICODONE) tablet 5 mg, 5 mg, Oral, Q4H PRN, Lisha Cash MD, 5 mg at 05/16/19 8817   ondansetron (ZOFRAN) injection 4 mg, 4 mg, IntraVENous, Q4H PRN, Prashant Winn MD 
  clonazePAM (KlonoPIN) tablet 0.5 mg, 0.5 mg, Oral, DAILY, Prashant Winn MD, 0.5 mg at 05/16/19 6852   levothyroxine (SYNTHROID) tablet 75 mcg, 75 mcg, Oral, 6am, Prashant Winn MD, 75 mcg at 05/16/19 6813   PARoxetine (PAXIL) tablet 40 mg, 40 mg, Oral, DAILY, Prashant Winn MD, 40 mg at 05/16/19 0360   rivastigmine tartrate (EXELON) capsule 3 mg, 3 mg, Oral, QPM, Prashant Winn MD, 3 mg at 05/15/19 1733   memantine (NAMENDA) tablet 10 mg, 10 mg, Oral, DAILY, Prashant Winn MD, 10 mg at 05/16/19 1464   lithium carbonate CR (ESKALITH CR) tablet 450 mg, 450 mg, Oral, QHS, Prashant Winn MD, 450 mg at 05/15/19 2148 MENTAL STATUS EXAM: 
Depressed Oriented in all spheres Calm and cooperative especially after reading that I was the psychiatrist 
Goal directed No aggression or violence Appropriately interactive and aware (tearful at times) Denies SI/HI/AH/VH 
 
ASSESSMENT AND PLAN: 
Bipolar, Depressed and Demential with bereavement, Dependent Personality traits , Other psychosocial or environmental problems  and 51-60 moderate symptoms                                                               
 
-Recommend Remeron 7.5mg PO Qhs or Seroquel 12.5mg PO BID for sleep and mood symptoms. 
-Discontinue klonopin and monitor symptoms/ behaviors as it can cause the symptoms described 
-Continue paxil and lithium carbonate 
-Consult Case management for placement in a Skilled nursing facility for rehabilitation and management possibly a memory care unit. -Not a candidate for acute in psychiatric care at this time 
-Thank you for this consultation Ana Mensah MD 
5/16/2019

## 2019-05-16 NOTE — PROGRESS NOTES
Was called to reconsult psych to Dr. Izzy Estes this morning. Followed up on consult and called back to ensure that patient has been seen.

## 2019-05-16 NOTE — PROGRESS NOTES
Problem: Self Care Deficits Care Plan (Adult) Goal: *Acute Goals and Plan of Care (Insert Text) Description Occupational Therapy Goals Initiated 5/14/2019 1. Patient will perform grooming standing at the sink with contact guard assist within 7 day(s). 2.  Patient will perform lower body dressing with minimal assistance within 7 day(s). 3.  Patient will perform toilet transfers with supervision/SBA within 7 day(s). 4.  Patient will perform all aspects of toileting with supervision/SBA within 7 day(s). Outcome: Progressing Towards Goal 
 OCCUPATIONAL THERAPY TREATMENT Patient: Ramandeep Hunter (34 y.o. female) Date: 5/16/2019 Diagnosis: Left leg pain [M79.605] <principal problem not specified> Precautions: Fall Chart, occupational therapy assessment, plan of care, and goals were reviewed. ASSESSMENT: 
Pt was cleared to be seen for therapy and her call bell was ringing and pt was trying to stand to use the bathroom. Pt was SBA for standing, and transfer to toilet . Pt was able to wash beka area and buttocks in standing with setup. Pt did very well and is making progress. Recommend that pt return home with 24/7 assist and home care PT Progression toward goals: 
?       Improving appropriately and progressing toward goals ? Improving slowly and progressing toward goals ? Not making progress toward goals and plan of care will be adjusted PLAN: 
Patient continues to benefit from skilled intervention to address the above impairments. Continue treatment per established plan of care. Discharge Recommendations:  Home Health Further Equipment Recommendations for Discharge:  tbd SUBJECTIVE:  
Patient stated ? I need to go now.? OBJECTIVE DATA SUMMARY:  
Cognitive/Behavioral Status: 
Neurologic State: Alert Orientation Level: Oriented to situation;Oriented to place;Oriented to person Cognition: Impaired decision making;Memory loss Perception: Appears intact Perseveration: No perseveration noted Safety/Judgement: Fall prevention Functional Mobility and Transfers for ADLs: 
 
  
 
Transfers: 
Sit to Stand: Contact guard assistance Bed to Chair: Contact guard assistance Balance: 
Sitting: Intact; Without support Standing: Intact; With support Standing - Static: Good;Constant support Standing - Dynamic : Good;Constant support ADL Intervention: 
Feeding Feeding Assistance: Set-up Lower Body Bathing Bathing Assistance: Set-up Perineal  : Set-up Position Performed: Standing Setup for UB ADLs at the sink Toileting Toileting Assistance: Supervision Bladder Hygiene: Supervision Bowel Hygiene: Supervision Cognitive Retraining Safety/Judgement: Fall prevention Pain: 
Pain Scale 1: Numeric (0 - 10) Pain Intensity 1: 5 Pain Location 1: Hip Pain Orientation 1: Left Pain Description 1: Aching Pain Intervention(s) 1: Medication (see MAR) Activity Tolerance:  
fair Please refer to the flowsheet for vital signs taken during this treatment. After treatment:  
? Patient left in no apparent distress sitting up in chair ? Patient left in no apparent distress in bed 
? Call bell left within reach ? Nursing notified ? Caregiver present ? Bed alarm activated COMMUNICATION/COLLABORATION:  
The patient?s plan of care was discussed with: Physical Therapist and Registered Nurse Mckenzie Bush OT Time Calculation: 20 mins

## 2019-05-16 NOTE — PROGRESS NOTES
Hospitalist Progress Note NAME: Kadeem Márquez :  1926 MRN:  [id number] Assessment / Plan: 
Depression with auditory hallucinations in setting of dementia, bipolar d/o: dtr reports that Pt has been emotionally labile, crying most of the time. She has been having auditory hallucinations with \"Mandaen music\" that she hears in her head and finds disturbing. She has been sensitive to all sounds and cannot tolerate the TV being on. 
- con't namenda and exelon  
- con't klonipin, paxil, lithium for now - psych eval pending. Lithium level within normal limits. - psychiatry consulted, Dtr says that Pt has needed inpatient admission to [facility] and ECT in the past for stabilization of her psychiatry sx Subacute, progressive LLE pain with inability to ambulate in setting of recent left hip fx s/p cephalomedullary nailing with Dr. Federica Louise 3/18/19: ambulating well, do not feel this is a limiting issue at this point - L hip xray with healing instrumented left hip fracture. No new finding. 
- LLE doppler with no evidence of acute deep vein thrombosis in the left common femoral, superficial femoral, popliteal, posterior tibial, and peroneal veins. The veins were imaged in the transverse and longitudinal planes. - ortho consult appreciated 
- con't scheduled tylenol and mobic, lidoderm patch 
- PT/OT evals appreciated Hypothyroidism: con't synthroid 
  
DVT prophylaxis: lovenox Code status: DNR Surrogate Decision Maker: Daughter Subjective: Chief Complaint / Reason for Physician Visit Crying, says she wants to go home, but Dtr says that she cannot take Pt home with her psychiatric issues unstable. Discussed with RN events overnight. Review of Systems: 
Symptom Y/N Comments  Symptom Y/N Comments Fever/Chills n   Chest Pain n   
Poor Appetite n   Edema n   
Cough n   Abdominal Pain n   
Sputum n   Joint Pain SOB/SHERMAN n   Pruritis/Rash Nausea/vomit    Tolerating PT/OT Diarrhea    Tolerating Diet Constipation    Other Could NOT obtain due to:   
 
Objective: VITALS:  
Last 24hrs VS reviewed since prior progress note. Most recent are: 
Patient Vitals for the past 24 hrs: 
 Temp Pulse Resp BP SpO2  
05/16/19 1147 97.7 °F (36.5 °C) 67 16 138/57 98 % 05/16/19 0724 97.9 °F (36.6 °C) 65 18 155/54 99 % 05/16/19 0010 98.1 °F (36.7 °C) 66 16 130/62 96 % 05/15/19 2004 97.6 °F (36.4 °C) 62 18 129/56 95 % 05/15/19 1739 98 °F (36.7 °C) 66 18 128/59 96 % Intake/Output Summary (Last 24 hours) at 5/16/2019 1221 Last data filed at 5/16/2019 4434 Gross per 24 hour Intake 100 ml Output 350 ml Net -250 ml PHYSICAL EXAM: 
General: WD, WN. Alert, cooperative with simple commands as able, no acute distress   
EENT:  EOMI. Anicteric sclerae. MMM Resp:  CTA bilaterally, no wheezing or rales. No accessory muscle use CV:  Regular rhythm,  No edema GI:  Soft, Non distended, Non tender.  +Bowel sounds Neurologic:  Alert and oriented X 2, normal speech, Psych:   Poor insight. Not anxious nor agitated. Tearful. Skin:  No rashes. No jaundice Reviewed most current lab test results and cultures  YES Reviewed most current radiology test results   YES Review and summation of old records today    NO Reviewed patient's current orders and MAR    YES 
PMH/ reviewed - no change compared to H&P 
________________________________________________________________________ Care Plan discussed with: 
  Comments Patient x Family  x dtr at bedside RN x Care Manager Consultant  x psychiatry Multidiciplinary team rounds were held today with , nursing, pharmacist and clinical coordinator. Patient's plan of care was discussed; medications were reviewed and discharge planning was addressed. ________________________________________________________________________ Total NON critical care TIME:  40 Minutes Total CRITICAL CARE TIME Spent:   Minutes non procedure based Comments >50% of visit spent in counseling and coordination of care x   
________________________________________________________________________ Melanie Maxwell MD  
 
Procedures: see electronic medical records for all procedures/Xrays and details which were not copied into this note but were reviewed prior to creation of Plan. LABS: 
I reviewed today's most current labs and imaging studies. Pertinent labs include: 
Recent Labs 05/15/19 
0405 05/13/19 
2252 WBC 5.5 6.6 HGB 12.8 13.2 HCT 41.2 39.7  186 Recent Labs 05/15/19 
0405 05/13/19 
2252  137  
K 3.9 4.0  
* 107 CO2 26 25 GLU 96 101* BUN 16 9 CREA 0.75 0.61  
CA 8.8 9.0 ALB  --  3.6 TBILI  --  0.4 SGOT  --  19 ALT  --  12 Signed: Melanie Maxwell MD

## 2019-05-17 VITALS
SYSTOLIC BLOOD PRESSURE: 146 MMHG | HEART RATE: 71 BPM | BODY MASS INDEX: 19.46 KG/M2 | DIASTOLIC BLOOD PRESSURE: 55 MMHG | TEMPERATURE: 98.3 F | RESPIRATION RATE: 16 BRPM | OXYGEN SATURATION: 95 % | HEIGHT: 64 IN | WEIGHT: 114 LBS

## 2019-05-17 PROCEDURE — 99218 HC RM OBSERVATION: CPT

## 2019-05-17 PROCEDURE — 74011250636 HC RX REV CODE- 250/636: Performed by: INTERNAL MEDICINE

## 2019-05-17 PROCEDURE — 3331090001 HH PPS REVENUE CREDIT

## 2019-05-17 PROCEDURE — 74011250637 HC RX REV CODE- 250/637: Performed by: HOSPITALIST

## 2019-05-17 PROCEDURE — 74011250637 HC RX REV CODE- 250/637: Performed by: INTERNAL MEDICINE

## 2019-05-17 PROCEDURE — 97116 GAIT TRAINING THERAPY: CPT

## 2019-05-17 PROCEDURE — 96372 THER/PROPH/DIAG INJ SC/IM: CPT

## 2019-05-17 PROCEDURE — 3331090002 HH PPS REVENUE DEBIT

## 2019-05-17 RX ORDER — MIRTAZAPINE 7.5 MG/1
7.5 TABLET, FILM COATED ORAL
Qty: 30 TAB | Refills: 0 | Status: SHIPPED | OUTPATIENT
Start: 2019-05-17 | End: 2019-05-21 | Stop reason: SDUPTHER

## 2019-05-17 RX ORDER — HEPARIN SODIUM 5000 [USP'U]/ML
5000 INJECTION, SOLUTION INTRAVENOUS; SUBCUTANEOUS EVERY 12 HOURS
Status: DISCONTINUED | OUTPATIENT
Start: 2019-05-18 | End: 2019-05-17 | Stop reason: HOSPADM

## 2019-05-17 RX ORDER — ACETAMINOPHEN 325 MG/1
650 TABLET ORAL 2 TIMES DAILY
Qty: 60 TAB | Refills: 0 | Status: SHIPPED | OUTPATIENT
Start: 2019-05-17 | End: 2020-01-16 | Stop reason: ALTCHOICE

## 2019-05-17 RX ORDER — LANOLIN ALCOHOL/MO/W.PET/CERES
3 CREAM (GRAM) TOPICAL
Qty: 30 TAB | Refills: 0 | Status: SHIPPED | OUTPATIENT
Start: 2019-05-17 | End: 2019-06-25 | Stop reason: ALTCHOICE

## 2019-05-17 RX ORDER — MIRTAZAPINE 15 MG/1
7.5 TABLET, FILM COATED ORAL
Status: DISCONTINUED | OUTPATIENT
Start: 2019-05-17 | End: 2019-05-17 | Stop reason: HOSPADM

## 2019-05-17 RX ORDER — MELOXICAM 7.5 MG/1
7.5 TABLET ORAL DAILY
Qty: 30 TAB | Refills: 0 | Status: SHIPPED | OUTPATIENT
Start: 2019-05-18 | End: 2019-05-21 | Stop reason: SDUPTHER

## 2019-05-17 RX ADMIN — SENNOSIDES AND DOCUSATE SODIUM 1 TABLET: 8.6; 5 TABLET ORAL at 09:28

## 2019-05-17 RX ADMIN — ACETAMINOPHEN 650 MG: 325 TABLET ORAL at 09:28

## 2019-05-17 RX ADMIN — POLYETHYLENE GLYCOL 3350 17 G: 17 POWDER, FOR SOLUTION ORAL at 09:28

## 2019-05-17 RX ADMIN — Medication 10 ML: at 05:05

## 2019-05-17 RX ADMIN — ENOXAPARIN SODIUM 30 MG: 30 INJECTION SUBCUTANEOUS at 09:27

## 2019-05-17 RX ADMIN — PAROXETINE HYDROCHLORIDE 40 MG: 20 TABLET, FILM COATED ORAL at 09:28

## 2019-05-17 RX ADMIN — MELOXICAM 7.5 MG: 7.5 TABLET ORAL at 09:28

## 2019-05-17 RX ADMIN — MEMANTINE HYDROCHLORIDE 10 MG: 10 TABLET ORAL at 09:28

## 2019-05-17 RX ADMIN — LEVOTHYROXINE SODIUM 75 MCG: 75 TABLET ORAL at 05:04

## 2019-05-17 NOTE — PROGRESS NOTES
Palliative Medicine Consult Chet: [phone] ([phone]) Patient Name: Eliezer Otero YOB: 1926 Date of Initial Consult: 5/16/19 Reason for Consult: Family requesting palliative involvement, recent hip fracture with ongoing pain, confusion and auditory hallucinations (?dementia).  Family is struggling with next plan of care, neda if she cannot safely return home. Requesting Provider: Anthony Álvarez MD 
Primary Care Physician: Jett Grier MD 
 
 SUMMARY:  
Eliezer Otero is a 80 y.o. with a past history of left hip fracture March 2019, hypothyroidism, depression/anxiety, DVT, who was admitted on 5/13/2019 from home with a diagnosis of left leg pain, left lumbosacral radiculopathy. Current medical issues leading to Palliative Medicine involvement include: caregiver burnout, uncontrolled pain, disposition. Psychosocial: lives with daughter, requires assistance with all ADLs. Pt is experiencing auditory hallucinations, cries most of time, daughter is only caregiver and is burnt out. Has AMD on file. PALLIATIVE DIAGNOSES:  
1. Left hip pain that radiates down left leg 2. Dementia with bipolar d/o: very labile, cries most of time 3. Depression/anxiety 4. Auditory hallucinations 5. Severe caregiver stress: cannot continue to care for pt if her current condition is not stabilized 6. Care decisions PLAN:  
1. 1:00-1:45pm: met with pt, daughter, Palliative RODDY Garcia, and towards the end of the meeting  joined us:  introduced Palliative Medicine as a support for pt and families dealing with life limiting disease, to help with symptom management, education and understanding disease process and treatment options, and to discuss goals of care, what pt wants in terms of treatment as well as code status.   Obtained history: pt was independent until the fall, was doing ok until she went to rehab, during which she decompensated emotionally as well as c/o severe leg pain, would not work with PT/OT. We discussed changes in medications (d/c Klonopin, started Remeron 7.5mg). Daughter plans to take pt home for a week, during which reps from [facility] will visit; pt told dtr she was willing to go there. Back up plan if pt's emotional status decompensates is dtr will take [facility]. 2. DDNR completed. 3. Pt less tearful today. 4. Initial consult note routed to primary continuity provider and/or primary health care team members 5. Communicated plan of care with: Palliative IDTRavi 192 Team 
 
 GOALS OF CARE / TREATMENT PREFERENCES:  
 
GOALS OF CARE:  Home with daughter, in a week will look into [facility] Patient/Health Care Proxy Stated Goals: Prolong life TREATMENT PREFERENCES:  
Code Status: DNR Advance Care Planning: 
[x] [facility] Interdisciplinary Team has updated the ACP Navigator with [place] and Patient Capacity Primary Decision Maker (Active): Marisol Orozco - Child - [phone] Advance Care Planning 5/16/2019 Patient's Healthcare Decision Maker is: Named in scanned ACP document Primary Decision Maker Name -  
Primary Decision Maker Phone Number -  
Primary Decision Maker Relationship to Patient -  
Confirm Advance Directive Yes, on file Patient Would Like to Complete Advance Directive - Does the patient have other document types - Other Instructions: Other: As far as possible, the palliative care team has discussed with patient / health care proxy about goals of care / treatment preferences for patient. HISTORY:  
 
History obtained from: chart CHIEF COMPLAINT: left leg pain HPI/SUBJECTIVE: The patient is:  
[x] Verbal and participatory [] Non-participatory due to:  
 
5/13: presented to ED with c/o left hip pain that radiates down her left leg and into her left calf.   Pt fell and fractured her left hip in March, had left hip cephalo-medullary nailing on 3/18/19, then subsequently went to rehab. Since leaving rehab she has had this pain that has been getting worse over time, now unable to walk due to pain. She was using a walker but even with that she cannot bear weight or walk. She takes oxycodone at home. Daughter reports pt has been emotionally labile, crying most of time, having auditory hallucinations with \"Christian music\" that she hears in her head and finds disturbing. She is sensitive to all sounds and cannot even tolerate a TV being on. CT L-SPINE: no acute findings, kyphoplasty treated fractures of T12 and L1, chronic appearing nondisplaced left L5 pars fracture, multilevel disease. CT PELVIS: ORIF left hip fracture without hardware failure or displacement, no new fracture, no dislocation. 5/14: tylenol, mobic and Lido patch added to pain regimen. 5/16: crying during whole visit. 5/17: sat in bed listening, occasionally making complaints; \"It's 1pm, where is my lunch? \"  \"I don't like the food. \" Clinical Pain Assessment (nonverbal scale for severity on nonverbal patients):  
Clinical Pain Assessment Severity: 5 Location: lateral-posterior aspect of left leg from hip to just below the knee Character: sharp Duration: months Effect: cannot ambulate Frequency: constant Activity (Movement): Restless, excessive activity and/or withdrawal reflexes Duration: for how long has pt been experiencing pain (e.g., 2 days, 1 month, years) Frequency: how often pain is an issue (e.g., several times per day, once every few days, constant) FUNCTIONAL ASSESSMENT:  
 
Palliative Performance Scale (PPS): PPS: 30 
 
 
 PSYCHOSOCIAL/SPIRITUAL SCREENING:  
 
Palliative IDT has assessed this patient for cultural preferences / practices and a referral made as appropriate to needs (Cultural Services, Patient Advocacy, Ethics, etc.) Any spiritual / Taoism concerns: 
[] Yes /  [x] No 
 
Caregiver Burnout: 
 [] Yes /  [x] No /  [] No Caregiver Present Anticipatory grief assessment:  
[x] Normal  / [] Maladaptive ESAS Anxiety: Anxiety: 8 ESAS Depression: Depression: 8 REVIEW OF SYSTEMS:  
 
Positive and pertinent negative findings in ROS are noted above in HPI. The following systems were [x] reviewed / [] unable to be reviewed as noted in HPI Other findings are noted below. Systems: constitutional, ears/nose/mouth/throat, respiratory, gastrointestinal, genitourinary, musculoskeletal, integumentary, neurologic, psychiatric, endocrine. Positive findings noted below. Modified ESAS Completed by: provider Fatigue: 8 Drowsiness: 0 Depression: 8 Pain: 5 Anxiety: 8 Nausea: 0 Anorexia: 8 Dyspnea: 0 Constipation: Yes Stool Occurrence(s): 1 PHYSICAL EXAM:  
 
From RN flowsheet: 
Wt Readings from Last 3 Encounters:  
05/13/19 114 lb (51.7 kg) 05/01/19 119 lb 3.2 oz (54.1 kg) 03/17/19 115 lb (52.2 kg) Blood pressure 146/55, pulse 71, temperature 98.3 °F (36.8 °C), resp. rate 16, height 5' 4\" (1.626 m), weight 114 lb (51.7 kg), SpO2 95 %. Pain Scale 1: Numeric (0 - 10) Pain Intensity 1: 2 Pain Onset 1: recent Pain Location 1: Hip Pain Orientation 1: Left Pain Description 1: Aching Pain Intervention(s) 1: Medication (see MAR) Last bowel movement, if known:  
 
Constitutional: elderly, frail, awake, alert, oriented to self, her past 
Eyes: pupils equal, anicteric ENMT: no nasal discharge, moist mucous membranes Cardiovascular: regular rhythm, distal pulses intact Respiratory: breathing not labored, symmetric Gastrointestinal: soft non-tender, +bowel sounds Musculoskeletal: no deformity, no tenderness to palpation Skin: warm, dry Neurologic: following commands, moving all extremities Psychiatric: depressed, occasional moments of crying but nothing like yesterday HISTORY:  
 
Active Problems: 
  Left leg pain (5/13/2019) Caregiver stress (5/16/2019) Anxiety and depression (5/16/2019) Counseling regarding advance care planning and goals of care (5/16/2019) Past Medical History:  
Diagnosis Date  Dementia  Depression   
 psychiatrist: Dr Tho Valencia (on Lithium)  Fracture of wrist   
 slipped and fx left wrist, surgery scheduled 12/21/16  Full dentures   
 upper and lower  Hypothyroid Past Surgical History:  
Procedure Laterality Date  HX KYPHOPLASTY  2013  HX ORTHOPAEDIC  12/2016 Wrist surgery  HX WRIST FRACTURE TX Left 12/2016 Family History Problem Relation Age of Onset 24 [facility] Other Mother Inefficient wound healing  Cancer Sister Stomach CA  
 Diabetes Brother  Alzheimer Sister  Hypertension Daughter  Thyroid Disease Daughter  Other Son Hip replacement  Depression Son  Liver Disease Son  Diabetes Son   
 Heart Disease Son  Depression Son History reviewed, no pertinent family history. Social History Tobacco Use  Smoking status: Former Smoker  Smokeless tobacco: Never Used Substance Use Topics  Alcohol use: No  
 
Allergies Allergen Reactions  Sulfa (Sulfonamide Antibiotics) Hives Current Facility-Administered Medications Medication Dose Route Frequency  mirtazapine (REMERON) tablet 7.5 mg  7.5 mg Oral QHS  enoxaparin (LOVENOX) injection 30 mg  30 mg SubCUTAneous DAILY  melatonin tablet 3 mg  3 mg Oral QHS  senna-docusate (PERICOLACE) 8.6-50 mg per tablet 1 Tab  1 Tab Oral BID  polyethylene glycol (MIRALAX) packet 17 g  17 g Oral DAILY  nitroglycerin (NITROBID) 2 % ointment 1 Inch  1 Inch Topical Q6H PRN  
 acetaminophen (TYLENOL) tablet 650 mg  650 mg Oral BID  meloxicam (MOBIC) tablet 7.5 mg  7.5 mg Oral DAILY  lidocaine 4 % patch 1 Patch  1 Patch TransDERmal Q24H  
 sodium chloride (NS) flush 5-40 mL  5-40 mL IntraVENous Q8H  
 sodium chloride (NS) flush 5-40 mL  5-40 mL IntraVENous PRN  
  acetaminophen (TYLENOL) tablet 650 mg  650 mg Oral Q4H PRN  
 oxyCODONE IR (ROXICODONE) tablet 5 mg  5 mg Oral Q4H PRN  
 ondansetron (ZOFRAN) injection 4 mg  4 mg IntraVENous Q4H PRN  
 levothyroxine (SYNTHROID) tablet 75 mcg  75 mcg Oral 6am  
 PARoxetine (PAXIL) tablet 40 mg  40 mg Oral DAILY  rivastigmine tartrate (EXELON) capsule 3 mg  3 mg Oral QPM  
 memantine (NAMENDA) tablet 10 mg  10 mg Oral DAILY  lithium carbonate CR (ESKALITH CR) tablet 450 mg  450 mg Oral QHS  
 
 
 
 LAB AND IMAGING FINDINGS:  
 
Lab Results Component Value Date/Time WBC 5.5 05/15/2019 04:05 AM  
 HGB 12.8 05/15/2019 04:05 AM  
 PLATELET 023 18/67/1619 04:05 AM  
 
Lab Results Component Value Date/Time Sodium 140 05/15/2019 04:05 AM  
 Potassium 3.9 05/15/2019 04:05 AM  
 Chloride 109 (H) 05/15/2019 04:05 AM  
 CO2 26 05/15/2019 04:05 AM  
 BUN 16 05/15/2019 04:05 AM  
 Creatinine 0.75 05/15/2019 04:05 AM  
 Calcium 8.8 05/15/2019 04:05 AM  
 Magnesium 2.2 03/21/2019 06:10 AM  
 Phosphorus 2.1 (L) 03/21/2019 06:10 AM  
  
Lab Results Component Value Date/Time AST (SGOT) 19 05/13/2019 10:52 PM  
 Alk. phosphatase 143 (H) 05/13/2019 10:52 PM  
 Protein, total 7.2 05/13/2019 10:52 PM  
 Albumin 3.6 05/13/2019 10:52 PM  
 Globulin 3.6 05/13/2019 10:52 PM  
 
Lab Results Component Value Date/Time INR 1.1 03/18/2019 01:03 AM  
 Prothrombin time 10.8 03/18/2019 01:03 AM  
 aPTT 25.8 10/11/2013 05:05 PM  
  
No results found for: IRON, FE, TIBC, IBCT, PSAT, FERR No results found for: PH, PCO2, PO2 No components found for: [place] Lab Results Component Value Date/Time CK 49 10/11/2013 05:05 PM  
 CK - MB 1.5 10/11/2013 05:05 PM  
  
 
 
   
 
Total time: 54 
Counseling / coordination time, spent as noted above: 45 
> 50% counseling / coordination?: y 
 
Prolonged service was provided for  []30 min   []75 min in face to face time in the presence of the patient, spent as noted above. Time Start: Time End:  
Note: this can only be billed with 43702 (initial) or 59669 (follow up). If multiple start / stop times, list each separately.

## 2019-05-17 NOTE — DISCHARGE INSTRUCTIONS
HOSPITALIST DISCHARGE INSTRUCTIONS    NAME: My Tate   :  1926   MRN:  [id number]     Date/Time:  2019 1:45 PM    ADMIT DATE: 2019   DISCHARGE DATE: 2019     Attending Physician: Matthew Shah MD    DISCHARGE DIAGNOSIS:  Situational depression with auditory hallucinations in setting of dementia, bipolar disorder  Left leg pain in setting of recent left hip fracture s/p cephalomedullary nailing with Dr. Angeles [facility] 3/18/19  Hypothyroidism    MEDICATIONS:  See above    · It is important that you take the medication exactly as they are prescribed. · Keep your medication in the bottles provided by the pharmacist and keep a list of the medication names, dosages, and times to be taken in your wallet. · Do not take other medications without consulting your doctor. Pain Management: per above medications    What to do at [address]:  Resume previous diet    Recommended activity: Activity as tolerated    If you have questions regarding the hospital related prescriptions or hospital related issues please call [facility] at . You can always direct your questions to your primary care doctor if you are unable to reach your hospital physician; your PCP works as an extension of your hospital doctor just like your hospital doctor is an extension of your PCP for your time at [facility]. If you experience any of the following symptoms then please call your primary care physician or return to the emergency room if you cannot get hold of your doctor:  Fever, chills, nausea, vomiting, diarrhea, change in mentation, falling, bleeding, shortness of breath    Additional Instructions:      Bring these papers with you to your follow up appointments.  The papers will help your doctors be sure to continue the care plan from the hospital.              Information obtained by :  I understand that if any problems occur once I am at home I am to contact my physician. I understand and acknowledge receipt of the instructions indicated above.                                                                                                                                            Physician's or R.N.'s Signature                                                                  Date/Time                                                                                                                                              Patient or Representative Signature                                                          Date/Time

## 2019-05-17 NOTE — HOME CARE
Home Health Care Discharge Planning: [facility] Face to Face Encounter NAME: Kary Walker :  1926 MRN:  [id number] Primary Diagnosis:  
Depression with auditory hallucinations in setting of dementia, bipolar d/o Subacute, progressive LLE pain with inability to ambulate in setting of recent left hip fx s/p cephalomedullary nailing with Dr. Adell Holstein 3/18/19 Hypothyroidism Date of Face to Face:  2019 1:47 PM        
                        
Face to Face Encounter findings are related to primary reason for home care:   YES 
 
1. I certify that the patient needs intermittent skilled nursing care, physical therapy and/or speech therapy. I will not be following this patient in the Community and Dr. Fatemeh Maurice MD will be responsible for signing the [address] of Care. 2. Initial Orders for Care: Skilled Nursing and Physical Therapy 3. I certify that this patient is homebound because of illness or injury, need the aid of supportive devices such as crutches, canes, wheelchairs, and walkers; the use of special transportation; or the assistance of another person in order to leave their place of residence. There exists a normal inability to leave home and leaving home requires a considerable and taxing effort. 4. I certify that this patient is under my care and that I had a Face-to-Face Encounter that meets the physician Face-to-Face Encounter requirements. Document the physical findings from the Face-to-Face Encounter that support the need for skilled services: Has new diagnosis that requires skilled nursing teaching and intervention , Has new medications that requires skilled nursing teaching and monitoring for understanding and compliance  and Has new finding of weakness and altered mobility that requires skilled physical/occupational and/or speech therapy services for evaluation and interventions. Lilliana Guaman MD 
Discharging Physician Office: [phone] Fax:   [phone]

## 2019-05-17 NOTE — DISCHARGE SUMMARY
Hospitalist Discharge Summary Patient ID: 
Natalie Conner [id number] 
33 y.o. 
7/22/1926 5/13/2019 PCP on record: Meg Aragon MD 
 
Admit date: 5/13/2019 Discharge date and time: 5/17/2019 DISCHARGE DIAGNOSIS: 
Depression with auditory hallucinations in setting of dementia, bipolar d/o Subacute, progressive LLE pain with inability to ambulate in setting of recent left hip fx s/p cephalomedullary nailing with Dr. Ruth Madrigal 3/18/19 Hypothyroidism CONSULTATIONS: 
IP CONSULT TO ORTHOPEDIC SURGERY 
IP CONSULT TO PALLIATIVE CARE - PROVIDER 
IP CONSULT TO PSYCHIATRY IP CONSULT TO PSYCHIATRY Excerpted HPI from H&P of Connie Giang MD: 
80 y.o lady w/ hypothyroidism, recent L hip arthroplasty, presents with left leg pain. Symptoms began about 2  Months ago after her hip surgery. She reports she developed pain that begins in the mid left thigh area and radiates down to her calf. It is constant and sharp in nature, worsened with activity and weight-bearing. No weakness or paresthesias. She is tearful when she talks about the pain. No fever, n/v/d, or falls. ______________________________________________________________________ DISCHARGE SUMMARY/HOSPITAL COURSE:  for full details see H&P, daily progress notes, labs, consult notes. Hospital course: 
Depression with auditory hallucinations in setting of dementia, bipolar d/o: dtr reports that Pt has been emotionally labile, crying most of the time. She has been having auditory hallucinations with \"Yarsanism music\" that she hears in her head and finds disturbing. She has been sensitive to all sounds and cannot tolerate the TV being on. Pt seen by psychiatry while here. She is recommended to continue namenda and exelon. She was recommended to continue her paxil, lithium as well. Lithium level within normal limits.   Per psychiatry, her klonopin was stopped and she was started on remeron at night. Pt's daughter is taking Pt home today with HH/PT. She is currently working on assisted living options. We have discussed that if in the interim Pt needs additional psychiatric stabilization, Pt would benefit from admission to [facility]. Subacute, progressive LLE pain with inability to ambulate in setting of recent left hip fx s/p cephalomedullary nailing with Dr. Jolanta Camacho 3/18/19: ambulating well, do not feel this is a limiting issue at this point. Pt had L hip xray with healing instrumented left hip fracture. No new finding. Pt had LLE doppler with no evidence of acute deep vein thrombosis in the left common femoral, superficial femoral, popliteal, posterior tibial, and peroneal veins. The veins were imaged in the transverse and longitudinal planes. Pt was seen in consultation by orthopedics. Pt should con't scheduled tylenol and mobic, lidoderm patch. Hypothyroidism: con't synthroid 
 
_______________________________________________________________________ Patient seen and examined by me on discharge day. Pertinent Findings: 
Gen: thin, awake, appropriate, NAD HEENT: cl jason, no lesions Chest: CTA bilaterally, no crackles or wheezes Cv: RRR, no murmur, no edema Abd: soft, NT, ND, BS+, no mass Neuro: CN intact 
 
_______________________________________________________________________ DISCHARGE MEDICATIONS:  
Current Discharge Medication List  
  
START taking these medications Details  
acetaminophen (TYLENOL) 325 mg tablet Take 2 Tabs by mouth two (2) times a day. Qty: 60 Tab, Refills: 0  
  
melatonin 3 mg tablet Take 1 Tab by mouth nightly. Qty: 30 Tab, Refills: 0  
  
meloxicam (MOBIC) 7.5 mg tablet Take 1 Tab by mouth daily. Qty: 30 Tab, Refills: 0  
  
mirtazapine (REMERON) 7.5 mg tablet Take 1 Tab by mouth nightly. Qty: 30 Tab, Refills: 0 CONTINUE these medications which have NOT CHANGED Details memantine (NAMENDA) 10 mg tablet Take 10 mg by mouth daily. ondansetron hcl (ZOFRAN) 4 mg tablet Take 4 mg by mouth daily as needed. Associated Diagnoses: Nausea and vomiting, intractability of vomiting not specified, unspecified vomiting type  
  
levothyroxine (SYNTHROID) 75 mcg tablet TAKE 1 TABLET DAILY BEFORE BREAKFAST Qty: 90 Tab, Refills: 0  
  
rivastigmine tartrate (EXELON) 1.5 mg capsule Take 3 mg by mouth every evening. PARoxetine (PAXIL) 40 mg tablet Take 40 mg by mouth daily. multivitamin (ONE A DAY) tablet Take 1 Tab by mouth daily. cholecalciferol, vitamin D3, (VITAMIN D3) 2,000 unit tab Take 1 Tab by mouth daily. lithium CR (ESKALITH CR) 450 mg CR tablet Take 450 mg by mouth nightly. STOP taking these medications  
  
 oxyCODONE IR (ROXICODONE) 5 mg immediate release tablet Comments:  
Reason for Stopping:   
   
 oxyCODONE IR (ROXICODONE) 5 mg immediate release tablet Comments:  
Reason for Stopping:   
   
 senna-docusate (PERICOLACE) 8.6-50 mg per tablet Comments:  
Reason for Stopping:   
   
 clonazePAM (KLONOPIN) 0.5 mg tablet Comments:  
Reason for Stopping:   
   
  
 
 
 
Patient Follow Up Instructions: Follow-up Information Follow up With Specialties Details Why Contact Info Papito Larson MD Family Practice In 1 week hospital follow up 2 [address] [address] [phone] 
  
  
 
________________________________________________________________ Risk of deterioration: Moderate Condition at Discharge:  Stable 
__________________________________________________________________ Disposition Home with family and home health services 
 
____________________________________________________________________ Code Status: DNR/DNI 
___________________________________________________________________ Total time in minutes spent coordinating this discharge (includes going over instructions, follow-up, prescriptions, and preparing report for sign off to her PCP) :  40 minutes Signed: Genevieve Lance MD

## 2019-05-17 NOTE — PROGRESS NOTES
Plan of Care 1) Home with family assistance and [facility] (resumption of care- PT, RN and SW) 2) Memory Care Unit 11:49 AM- CM spoke with pt dtr in regards to discharge planning updating pt dtr on psych consult per pt dtr request. CM inquired again about Memory Care Unit placement. Pt dtr is looking at finances and [facility]. Pt dtr also is meeting with Palliative today at 1:00 PM. CM will continue to follow pt for discharge planning. CM received phone call from attending stating pt and pt family will be going home. Referral sent to [facility] with SN, PT, OT and SW resumption of care orders per dtr. Pt accepted, AVS updated. CM provided pt dtr with caregivers in the community. Pt and pt family have no questions or concerns, no further CM needs identified. Care Management Interventions PCP Verified by CM: Yes Mode of Transport at Discharge: Self Transition of Care Consult (CM Consult): Discharge Planning MyChart Signup: No 
Discharge Durable Medical Equipment: No 
Health Maintenance Reviewed: Yes Physical Therapy Consult: Yes Occupational Therapy Consult: Yes Speech Therapy Consult: No 
Current Support Network: Lives with Spouse, Own Home Confirm Follow Up Transport: Family Plan discussed with Pt/Family/Caregiver: Yes Discharge Location Discharge Placement: Home with family assistance KIRIT Jackson, CM [facility] [phone]

## 2019-05-17 NOTE — PROGRESS NOTES
Problem: Mobility Impaired (Adult and Pediatric) Goal: *Acute Goals and Plan of Care (Insert Text) Description Physical Therapy Goals Initiated 5/14/2019 1. Patient will move from supine to sit and sit to supine  in bed with modified independence within 7 day(s). 2.  Patient will transfer from bed to chair and chair to bed with modified independent using the least restrictive device within 7 day(s). 3.  Patient will perform sit to stand with modified independent within 7 day(s). 4.  Patient will ambulate with modified independence for 150 feet with the least restrictive device within 7 day(s). 5.  Patient will ascend/descend 3 stairs with 1 handrail(s) with modified independence within 7 day(s). Outcome: Progressing Towards Goal 
  
PHYSICAL THERAPY TREATMENT Patient: Federico Jara (17 y.o. female) Date: 5/17/2019 Diagnosis: Left leg pain [M79.605] <principal problem not specified> Precautions: Fall Chart, physical therapy assessment, plan of care and goals were reviewed. ASSESSMENT: 
Patient agreeable but concerned regarding voices coming from her [facility] call bell (TV off). Improved gait tolerance today given cues to avoid flexed posture and increase step length. Noted tendency towards left hip internal rotation during gait and patient reports that she cannot stop. Intermittently tearful and endorsed fear of being alone. Progressing overall with mobility. Progression toward goals: 
?    Improving appropriately and progressing toward goals ? Improving slowly and progressing toward goals ? Not making progress toward goals and plan of care will be adjusted PLAN: 
Patient continues to benefit from skilled intervention to address the above impairments. Continue treatment per established plan of care. Discharge Recommendations:  Home Health Further Equipment Recommendations for Discharge:  None SUBJECTIVE:  
Patient stated ? I am afraid of being alone. ? OBJECTIVE DATA SUMMARY:  
Critical Behavior: 
Neurologic State: Alert Orientation Level: Disoriented to time Cognition: Impaired decision making, Memory loss Safety/Judgement: Fall prevention Functional Mobility Training: 
Bed Mobility: 
Rolling: Supervision Supine to Sit: Supervision Sit to Supine: Supervision Scooting: Supervision Level of Assistance: Supervision Transfers: 
Sit to Stand: Contact guard assistance Stand to Sit: Contact guard assistance Bed to Chair: Contact guard assistance Level of Assistance: Contact guard assistance Balance: 
Sitting: Intact; Without support Standing: Intact; With support Standing - Static: Good;Constant support Standing - Dynamic : Good;Constant support Ambulation/Gait Training: 
Distance (ft): 75 Feet (ft) Assistive Device: Gait belt;Walker, rolling Ambulation - Level of Assistance: Contact guard assistance Gait Abnormalities: Antalgic;Decreased step clearance Base of Support: Narrowed Stance: Left decreased Speed/Sandy: Pace decreased (<100 feet/min) Stairs: 
  
  
   
 
Neuro Re-Education: 
Therapeutic Exercises:  
 
Pain: 
  
  
  
  
  
  
Activity Tolerance:  
Please refer to the flowsheet for vital signs taken during this treatment. After treatment:  
?    Patient left in no apparent distress sitting up in chair ? Patient left in no apparent distress in bed 
? Call bell left within reach ? Nursing notified ? Caregiver present ? Bed alarm activated COMMUNICATION/COLLABORATION:  
The patient?s plan of care was discussed with: Registered Nurse Adilene Mcneal, PT, DPT Time Calculation: 12 mins

## 2019-05-18 PROCEDURE — 3331090001 HH PPS REVENUE CREDIT

## 2019-05-18 PROCEDURE — 3331090002 HH PPS REVENUE DEBIT

## 2019-05-19 PROCEDURE — 3331090002 HH PPS REVENUE DEBIT

## 2019-05-19 PROCEDURE — 3331090001 HH PPS REVENUE CREDIT

## 2019-05-20 ENCOUNTER — TELEPHONE (OUTPATIENT)
Dept: NEUROLOGY | Age: 84
End: 2019-05-20

## 2019-05-20 PROCEDURE — 3331090002 HH PPS REVENUE DEBIT

## 2019-05-20 PROCEDURE — 3331090001 HH PPS REVENUE CREDIT

## 2019-05-20 NOTE — TELEPHONE ENCOUNTER
----- Message from Kim Briggs sent at 5/17/2019  3:57 PM EDT -----  Regarding: Dr. Myron Orozco(pt's daughter) is requesting a call by from Dr. Jessie Hayes or nurse in reference to hosp f/up appt. Pt is having trouble walking.      Best contact [phone];

## 2019-05-20 NOTE — TELEPHONE ENCOUNTER
----- Message from Cher Babb sent at 5/20/2019 12:13 PM EDT -----  Regarding: Dr Otero/ telephone  Priti Kruger, daughter, returned the nurses call in reference to scheduling an appt following a hospital stay.        Best contact number is [phone]

## 2019-05-21 PROCEDURE — 3331090001 HH PPS REVENUE CREDIT

## 2019-05-21 PROCEDURE — 3331090002 HH PPS REVENUE DEBIT

## 2019-05-21 NOTE — TELEPHONE ENCOUNTER
----- Message from Won Pagan sent at 5/21/2019 10:34 AM EDT -----  Regarding: Dr. Ian Reyna  Patient's daughter, Andrews Cleveland, called to request a refill for Meloxicam 7.5 mg and Mirtazapine 7.5 mg. Please send all future refill requests through [employer] [facility]. Patient was admitted to [facility] on 5/13/19 and discharged on 5/17/19 for nerve damage pain. Hospital Follow up appointment was scheduled for 6/7/19 at 10:30 am with Dr Pat Correa. Patient needs to be seen prior to that date. Best contact number for patient is [phone].      Copy/paste Envera

## 2019-05-22 ENCOUNTER — PATIENT OUTREACH (OUTPATIENT)
Dept: INTERNAL MEDICINE CLINIC | Age: 84
End: 2019-05-22

## 2019-05-22 PROCEDURE — 3331090002 HH PPS REVENUE DEBIT

## 2019-05-22 PROCEDURE — 3331090001 HH PPS REVENUE CREDIT

## 2019-05-22 RX ORDER — MIRTAZAPINE 7.5 MG/1
7.5 TABLET, FILM COATED ORAL
Qty: 90 TAB | Refills: 1 | Status: SHIPPED | OUTPATIENT
Start: 2019-05-22 | End: 2019-06-25 | Stop reason: DRUGHIGH

## 2019-05-22 RX ORDER — MELOXICAM 7.5 MG/1
7.5 TABLET ORAL DAILY
Qty: 90 TAB | Refills: 1 | Status: SHIPPED | OUTPATIENT
Start: 2019-05-22 | End: 2019-06-25

## 2019-05-22 NOTE — TELEPHONE ENCOUNTER
I called the patient's daughter. She was taken off Namenda by Dr. Anton Wang while in the hospital. Taken to the hospital because of mood problems and pain.    Put on the schedule for tomorrow

## 2019-05-22 NOTE — PROGRESS NOTES
Hospital Discharge Follow-Up Date/Time:  2019 2:49 PM 
 
Patient was admitted to [facility] on 19 and discharged on 19(Observation) for c/o Left hip pain. Top Challenges reviewed with the provider  
-Palliative Medicine was consulted this admission for caregiver burnout, uncontrolled pain, disposition 
 
- based on this NN's conversation with daughter today, NN recommended that patient be transported to [facility] for inpatient geriatric psychiatry; advised to contact EMS to assist with mobilizing patient/transport to ED if needed. Method of communication with provider :chart routing Nurse Navigator (NN) contacted the daughter, Dashawn Nazario (HIPAA), by telephone to perform post hospital discharge assessment. Verified name and  with daughter as identifiers. Provided introduction to self, and explanation of the Nurse Navigator role. Summary of patient's top problems: 1. 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 Psychiatry consulted. Recommended to continue namenda, exelon, paxil, lithium .Lithium level wnl (0.81). Followed by Dr Sonia Bauer for Psychiatry as outpatient. Daughter reports difficulty getting patient to eat and get out of bed; daughter was able to mobilize patient about 10:00 am this morning and persuaded patient to eat an egg. Notes that patient did shower today, however prior to today she had not showered in a couple of weeks with the exception of sponge-bathing. Daughter reports that patient just wants to stay in bed almost 24 hours a day; daughter is able to eventually convince patient to mobilize, however if daughter goes to another room inside of the house to complete a task then the patient goes back to bed. States, \"She told me today that she doesn't want to go to anymore doctors and she doesn't want to take any more pills and she just wanted to die\"; daughter reports that on a daily basis patient states that she wants to die and she's in pain and she's miserable. Daughter denies patient verbalization of intent/ plan to harm herself; notes that this is not new following most recent hospital discharge and was present prior to most recent admission. 2. Subacute, progressive LLE pain with inability to ambulate in setting of recent left hip fx s/p cephalomedullary nailing with Dr. Shekhar Lindsay 3/18/19: per IP team, ambulating well. L hip xray with healing instrumented left hip fracture, No new finding LLE doppler with no evidence of acute deep vein thrombosis . Orthopedic surgery consulted. Recommendation to continue scheduled tylenol and mobic, lidoderm patch. 3. LTC: per IP documentation, daughter looking into Assisted Living options; IP team discussed with daughter that if in the interim Pt needs additional psychiatric stabilization, she would benefit from admission to [facility] for geriatric psychiatry. Per IP CM, daughter reviewing finances and was planning to tour the [id number] on 5/17/19 Home Health orders at discharge: PT, OT, SN, SW, resumption of care [address]: [facility] Date of initial visit: 5/24/19? Durable Medical Equipment ordered/company: None Durable Medical Equipment received: n/a Barriers to care? depression Medication(s):  
New Medications at Discharge: tylenol, melatonin, meloxicam, remeron Changed Medications at Discharge: none Discontinued Medications at Discharge: roxicodone, pericolace, klonopin [facility] follow up appointment(s):  
Future Appointments Date Time Provider Ehsan Shi 5/23/2019 12:20 PM Darrius Miranda  [facility]  
5/23/2019 To Be Determined Vasquez Artis  
5/30/2019 To Be Determined Vasquez Artis  
6/6/2019 To Be Determined Jaylyn LICONA [facility]  
6/7/2019 10:30 AM MD DIXON [address]  
6/13/2019 To Be Determined Jaylyn LICONA [facility]  
7/19/2019 11:15 AM MD DIXON [address]

## 2019-05-23 ENCOUNTER — PATIENT OUTREACH (OUTPATIENT)
Dept: INTERNAL MEDICINE CLINIC | Age: 84
End: 2019-05-23

## 2019-05-23 ENCOUNTER — OFFICE VISIT (OUTPATIENT)
Dept: NEUROLOGY | Age: 84
End: 2019-05-23

## 2019-05-23 VITALS
OXYGEN SATURATION: 94 % | HEART RATE: 72 BPM | SYSTOLIC BLOOD PRESSURE: 146 MMHG | HEIGHT: 64 IN | BODY MASS INDEX: 19.12 KG/M2 | DIASTOLIC BLOOD PRESSURE: 68 MMHG | WEIGHT: 112 LBS | RESPIRATION RATE: 16 BRPM

## 2019-05-23 DIAGNOSIS — M79.605 LEFT LEG PAIN: ICD-10-CM

## 2019-05-23 DIAGNOSIS — G30.1 LATE ONSET ALZHEIMER'S DISEASE WITH BEHAVIORAL DISTURBANCE (HCC): ICD-10-CM

## 2019-05-23 DIAGNOSIS — R41.82 ALTERED MENTAL STATUS, UNSPECIFIED ALTERED MENTAL STATUS TYPE: ICD-10-CM

## 2019-05-23 DIAGNOSIS — I65.23 BILATERAL CAROTID ARTERY STENOSIS: ICD-10-CM

## 2019-05-23 DIAGNOSIS — F02.818 LATE ONSET ALZHEIMER'S DISEASE WITH BEHAVIORAL DISTURBANCE (HCC): ICD-10-CM

## 2019-05-23 DIAGNOSIS — G45.1 TRANSIENT ISCHEMIC ATTACK INVOLVING LEFT INTERNAL CAROTID ARTERY: Primary | ICD-10-CM

## 2019-05-23 DIAGNOSIS — F41.9 ANXIETY AND DEPRESSION: ICD-10-CM

## 2019-05-23 DIAGNOSIS — G93.41 ACUTE METABOLIC ENCEPHALOPATHY: ICD-10-CM

## 2019-05-23 DIAGNOSIS — G20 PARKINSON'S DISEASE (TREMOR, STIFFNESS, SLOW MOTION, UNSTABLE POSTURE) (HCC): ICD-10-CM

## 2019-05-23 DIAGNOSIS — F32.A ANXIETY AND DEPRESSION: ICD-10-CM

## 2019-05-23 DIAGNOSIS — R44.0 AUDITORY HALLUCINATIONS: ICD-10-CM

## 2019-05-23 DIAGNOSIS — G25.0 BENIGN ESSENTIAL TREMOR SYNDROME: ICD-10-CM

## 2019-05-23 PROCEDURE — 3331090001 HH PPS REVENUE CREDIT

## 2019-05-23 PROCEDURE — 3331090002 HH PPS REVENUE DEBIT

## 2019-05-23 RX ORDER — CARBIDOPA AND LEVODOPA 25; 100 MG/1; MG/1
0.5 TABLET ORAL 3 TIMES DAILY
Qty: 100 TAB | Refills: 11 | Status: SHIPPED | OUTPATIENT
Start: 2019-05-23 | End: 2019-06-25 | Stop reason: ALTCHOICE

## 2019-05-23 RX ORDER — MEMANTINE HYDROCHLORIDE 10 MG/1
10 TABLET ORAL DAILY
Qty: 30 TAB | Refills: 11 | Status: SHIPPED | OUTPATIENT
Start: 2019-05-23

## 2019-05-23 NOTE — PATIENT INSTRUCTIONS
A Healthy Lifestyle: Care Instructions Your Care Instructions A healthy lifestyle can help you feel good, stay at a healthy weight, and have plenty of energy for both work and play. A healthy lifestyle is something you can share with your whole family. A healthy lifestyle also can lower your risk for serious health problems, such as high blood pressure, heart disease, and diabetes. You can follow a few steps listed below to improve your health and the health of your family. Follow-up care is a key part of your treatment and safety. Be sure to make and go to all appointments, and call your doctor if you are having problems. It's also a good idea to know your test results and keep a list of the medicines you take. How can you care for yourself at home? · Do not eat too much sugar, fat, or fast foods. You can still have dessert and treats now and then. The goal is moderation. · Start small to improve your eating habits. Pay attention to portion sizes, drink less juice and soda pop, and eat more fruits and vegetables. ? Eat a healthy amount of food. A 3-ounce serving of meat, for example, is about the size of a deck of cards. Fill the rest of your plate with vegetables and whole grains. ? Limit the amount of soda and sports drinks you have every day. Drink more water when you are thirsty. ? Eat at least 5 servings of fruits and vegetables every day. It may seem like a lot, but it is not hard to reach this goal. A serving or helping is 1 piece of fruit, 1 cup of vegetables, or 2 cups of leafy, raw vegetables. Have an apple or some carrot sticks as an afternoon snack instead of a candy bar. Try to have fruits and/or vegetables at every meal. 
· Make exercise part of your daily routine. You may want to start with simple activities, such as walking, bicycling, or slow swimming. Try to be active 30 to 60 minutes every day.  You do not need to do all 30 to 60 minutes all at once. For example, you can exercise 3 times a day for 10 or 20 minutes. Moderate exercise is safe for most people, but it is always a good idea to talk to your doctor before starting an exercise program. 
· Keep moving. Axel Leblanc the lawn, work in the garden, or [employer]. Take the stairs instead of the elevator at work. · If you smoke, quit. People who smoke have an increased risk for heart attack, stroke, cancer, and other lung illnesses. Quitting is hard, but there are ways to boost your chance of quitting tobacco for good. ? Use nicotine gum, patches, or lozenges. ? Ask your doctor about stop-smoking programs and medicines. ? Keep trying. In addition to reducing your risk of diseases in the future, you will notice some benefits soon after you stop using tobacco. If you have shortness of breath or asthma symptoms, they will likely get better within a few weeks after you quit. · Limit how much alcohol you drink. Moderate amounts of alcohol (up to 2 drinks a day for men, 1 drink a day for women) are okay. But drinking too much can lead to liver problems, high blood pressure, and other health problems. Family health If you have a family, there are many things you can do together to improve your health. · Eat meals together as a family as often as possible. · Eat healthy foods. This includes fruits, vegetables, lean meats and dairy, and whole grains. · Include your family in your fitness plan. Most people think of activities such as jogging or tennis as the way to fitness, but there are many ways you and your family can be more active. Anything that makes you breathe hard and gets your heart pumping is exercise. Here are some tips: 
? Walk to do errands or to take your child to school or the bus. 
? Go for a family bike ride after dinner instead of watching TV. Where can you learn more? Go to http://ro-lizz.info/. Enter T263 in the search box to learn more about \"A Healthy Lifestyle: Care Instructions. \" Current as of: September 11, 2018 Content Version: 11.9 © 6205-2301 [employer], [employer]. Care instructions adapted under license by [employer] (which disclaims liability or warranty for this information). If you have questions about a medical condition or this instruction, always ask your healthcare professional. [address] any warranty or liability for your use of this information. Office Policieso Phone calls/patient messages: Please allow up to 24 hours for someone in the office to contact you about your call or message. Be mindful your provider may be out of the office or your message may require further review. We encourage you to use [employer] for your messages as this is a faster, more efficient way to communicate with our office 
o Medication Refills: 
Prescription medications require up to 48 business hours to process. We encourage you to use [employer] for your refills. For controlled medications: Please allow up to 72 business hours to process. Certain medications may require you to  a written prescription at our office. NO narcotic/controlled medications will be prescribed after 4pm Monday through Friday or on weekends 
o Form/Paperwork Completion: We ask that you allow 7-14 business days. You may also download your forms to [employer] to have your doctor print off.

## 2019-05-23 NOTE — PROGRESS NOTES
NN attempted outreach to daughter today in order to f/u; lvm requesting a return phone call to this NN.

## 2019-05-23 NOTE — LETTER
5/23/2019 8:23 PM 
 
Patient:  Anamaria Grande YOB: 1926 Date of Visit: 5/23/2019 Dear No Recipients: Thank you for referring Ms. Howard Dotson to me for evaluation/treatment. Below are the relevant portions of my assessment and plan of care. Consult REFERRED BY: 
Shantanu Hagen MD 
 
CHIEF COMPLAINT: Auditory and visual hallucinations and dementia and tremors Subjective:  
 
Anamaria Grande is a 80 y.o.  female we are seeing at the request of Dr. Jus Moreira for evaluation for new problem of increasing hand tremors, and some stiffness and soreness in moving, and the patient daughter having some concern about her possibly having Parkinson's disease, and she is also being seen for persistent auditory and visual hallucinations, and dementia. Patient has seen a psychiatrist who recommended she take Namenda 10 mg each day that we gave her, but for some reason the medication was discontinued, and notes that discontinue the medication after he said there stopping the Aricept not the Namenda but I think it was a mistake. We refilled the medication for the patient today and start her on Sinemet one half a tablet 25 100 just to see if it helps the tremors, but I am not sure this is just gegenhalten rigidity and some spasticity from her degenerative brain condition of Alzheimer's disease. She is already taking melatonin for sleep taking only 3 mg we recommended she go to 6 mg. She is on Remeron 7.5 mg each night and this should be continued and possibly even increase in the future, but we will see how she does on Namenda first.  She has a follow-up with her psychiatrist who gave her the Remeron to help her sleep. She also on lithium and her levels were checked recently and they are all therapeutic.   Takes vitamins and vitamin D every day in addition, but really cannot exercise anymore and had a fall on fracture and does not really ambulate without a walker and assistance and cries all the time and is very fearful, because of her previous fall and injury. Her hallucinations are slowly seemingly getting worse, and the auditory hallucinations are mainly music and singing in the morning that aggravate the patient, and other every day and actually associated with some visual hallucinations more in the late afternoon and evening of seeing people around her also. She is also demented and had a complete evaluation in the hospital in February 2018 for these problems, the CT scan that just showed atrophic changes, patient uncooperative for MRI scan, and carotid Doppler showed no significant stenosis, and EEG was ordered but she would not take that either. She does take Exelon 1.5 mg twice a day for dementia, but has frequent diarrhea and upset stomach, we told to stop the Exelon, she is also intolerant of Aricept in the past.  She already has a bipolar disorder and is on lithium for that. Patient is also on Paxil and thyroid and vitamins and vitamin D supposedly. Weeks had a long talk with the patient, and after we told her the Namenda might help her hallucinations, she is agreeable to try that once twice a day at least for now. Hopefully that will help with her behavior, because she has tried quetiapine and another neuroleptic both of which made her worse. She has not had any new focal neurologic deficits. There have been no other clear new neurologic findings, no focal weakness, no headache, no sensory loss, no fever, no meningismus, and no family history of similar problems. She becoming very hard to handle at home. The family is looking for any help taking it. Past Medical History:  
Diagnosis Date  Dementia  Depression   
 psychiatrist: Dr Leland Middleton (on Lithium)  Fracture of wrist   
 slipped and fx left wrist, surgery scheduled 12/21/16  Full dentures   
 upper and lower  Hypothyroid Past Surgical History:  
Procedure Laterality Date  HX KYPHOPLASTY  2013  HX ORTHOPAEDIC  12/2016 Wrist surgery  HX WRIST FRACTURE TX Left 12/2016 Family History Problem Relation Age of Onset 24 [facility] Other Mother Inefficient wound healing  Cancer Sister Stomach CA  
 Diabetes Brother  Alzheimer Sister  Hypertension Daughter  Thyroid Disease Daughter  Other Son Hip replacement  Depression Son  Liver Disease Son  Diabetes Son   
 Heart Disease Son  Depression Son   
  
Social History Tobacco Use  Smoking status: Former Smoker  Smokeless tobacco: Never Used Substance Use Topics  Alcohol use: No  
   
 
Current Outpatient Medications:  
  memantine (NAMENDA) 10 mg tablet, Take 1 Tab by mouth daily. , Disp: 30 Tab, Rfl: 11 
  carbidopa-levodopa (SINEMET)  mg per tablet, Take 0.5 Tabs by mouth three (3) times daily. Indications: parkinson symptoms, Extreme Discomfort in Calves when Sitting or Lying Down, Disp: 100 Tab, Rfl: 11 
  meloxicam (MOBIC) 7.5 mg tablet, Take 1 Tab by mouth daily. , Disp: 90 Tab, Rfl: 1   mirtazapine (REMERON) 7.5 mg tablet, Take 1 Tab by mouth nightly., Disp: 90 Tab, Rfl: 1 
  acetaminophen (TYLENOL) 325 mg tablet, Take 2 Tabs by mouth two (2) times a day., Disp: 60 Tab, Rfl: 0 
  melatonin 3 mg tablet, Take 1 Tab by mouth nightly. (Patient taking differently: Take 3-6 mg by mouth nightly.), Disp: 30 Tab, Rfl: 0 
  ondansetron hcl (ZOFRAN) 4 mg tablet, Take 4 mg by mouth daily as needed. , Disp: , Rfl:  
  levothyroxine (SYNTHROID) 75 mcg tablet, TAKE 1 TABLET DAILY BEFORE BREAKFAST, Disp: 90 Tab, Rfl: 0 
  PARoxetine (PAXIL) 40 mg tablet, Take 40 mg by mouth daily. , Disp: , Rfl:  
  multivitamin (ONE A DAY) tablet, Take 1 Tab by mouth daily. , Disp: , Rfl:  
  cholecalciferol, vitamin D3, (VITAMIN D3) 2,000 unit tab, Take 1 Tab by mouth daily. , Disp: , Rfl:  
  lithium CR (ESKALITH CR) 450 mg CR tablet, Take 450 mg by mouth nightly., Disp: , Rfl:  
 
 
 
Allergies Allergen Reactions  Sulfa (Sulfonamide Antibiotics) Hives Review of Systems: 
Review of systems not obtained due to patient factors. Vitals:  
 05/23/19 1215 BP: 146/68 Pulse: 72 Resp: 16 SpO2: 94% Weight: 112 lb (50.8 kg) Height: 5' 4\" (1.626 m) Objective: I 
 
 
NEUROLOGICAL EXAM: 
  
Appearance: The patient is well developed, well nourished, provides an incoherent history and is in no acute distress. Mental Status: Oriented to place and person, and not the date or the president, cognitive function is abnormal and speech is fluent and no aphasia or dysarthria. Mood and affect crying and depressed and asked \"help me\". Cranial Nerves:   Intact visual fields. Fundi are benign poorly seen. ALFONSO, EOM's full, no nystagmus, no ptosis. Facial sensation is normal. Corneal reflexes are not tested. Facial movement is symmetric. Hearing is abnormal bilaterally. Palate is midline with normal sternocleidomastoid and trapezius muscles are normal. Tongue is midline. Neck without meningismus or bruits Temporal arteries are not tender or enlarged Motor:  4/5 strength in upper and lower proximal and distal muscles. Normal bulk and generalized increased tone with the left side being worse than the right, with some cogwheel rigidity and bradykinesia left side greater than right. No fasciculations. Rapid altering movement is slow in all extremities Reflexes:   Deep tendon reflexes 1+/4 and symmetrical. 
No babinski or clonus present Sensory:   Normal to touch, pinprick and vibration and temperature decreased in both feet. DSS is intact Gait:  Not tested gait. Tremor:    Mild bilateral intention upper extremity tremor noted, and the patient may have a slight component of resting tremor on the left upper extremity. Cerebellar:   Patient has abnormal Romberg and tandem and finger-nose-finger examination on cerebellar signs present. Neurovascular:  Normal heart sounds and regular rhythm, peripheral pulses decreased, and no carotid bruits.  
  
 
 
   
   
   
   
 
 
 
Assessment: ICD-10-CM ICD-9-CM 1. Transient ischemic attack involving left internal carotid artery G45.1 435.8 memantine (NAMENDA) 10 mg tablet  
   carbidopa-levodopa (SINEMET)  mg per tablet 2. Bilateral carotid artery stenosis I65.23 433.10 memantine (NAMENDA) 10 mg tablet 433.30 carbidopa-levodopa (SINEMET)  mg per tablet 3. Benign essential tremor syndrome G25.0 333.1 memantine (NAMENDA) 10 mg tablet  
   carbidopa-levodopa (SINEMET)  mg per tablet 4. Late onset Alzheimer's disease with behavioral disturbance G30.1 331.0 memantine (NAMENDA) 10 mg tablet F02.81 294.11 carbidopa-levodopa (SINEMET)  mg per tablet 5. Acute metabolic encephalopathy Z31.90 348.31 memantine (NAMENDA) 10 mg tablet  
   carbidopa-levodopa (SINEMET)  mg per tablet 6. Anxiety and depression F41.9 300.00 memantine (NAMENDA) 10 mg tablet F32.9 311 carbidopa-levodopa (SINEMET)  mg per tablet 7. Auditory hallucinations R44.0 780.1 memantine (NAMENDA) 10 mg tablet  
   carbidopa-levodopa (SINEMET)  mg per tablet 8. Altered mental status, unspecified altered mental status type R41.82 780.97 memantine (NAMENDA) 10 mg tablet  
   carbidopa-levodopa (SINEMET)  mg per tablet 9. Left leg pain M79.605 729.5 memantine (NAMENDA) 10 mg tablet  
   carbidopa-levodopa (SINEMET)  mg per tablet 10. Parkinson's disease (tremor, stiffness, slow motion, unstable posture) ([facility]) G20 332.0 carbidopa-levodopa (SINEMET)  mg per tablet Active Problems: * No active hospital problems.  * 
 
 
Plan:  
 
Patient with dementia associated with visual and auditory hallucinations that are very bothersome to the patient and the family, and we advised the patient and the family to restart her Namenda 10 mg once a day as recommended by the psychiatrist and recommended by us in the past. 
Patient intolerant of Aricept and not having GI side effects on Exelon but she has stopped that in addition. Patient also with new problem of increasing tremor and rigidity, looks a little bit like gegenhalten rigidity and essential tremor seems to be a component of resting tremor and cogwheeling suggesting Parkinson's disease at least on the left side, so we will try low-dose Sinemet just to see if that helps. Patient with bipolar disorder on Paxil and lithium and her levels were just checked and are therapeutic followed by psychiatry Gabapentin or Depakote or Lamictal may be other options in the future. Very difficult case, 35 minutes spent with the patient and the daughter counseling her, reviewing all of her records from the hospital and workup there, and discussing her diagnosis prognosis and further treatment evaluation Follow-up in 6 months time or earlier as needed. Signed By: Jerman Garcia MD   
 May 23, 2019 CC: Meg Aragon MD 
FAX: [phone] This note will not be viewable in [address] If you have questions, please do not hesitate to call me. I look forward to following Ms. Mccarthy along with you. Sincerely, Jerman Garcia MD

## 2019-05-23 NOTE — PROGRESS NOTES
Received incoming return phone call from Daughter. Patient did not go to [facility] yesterday for admission/inpatient treatment. Daughter states, Sarita Reed was pretty calm yesterday after we got off of the phone; we talked about going. \" Reports collaborative decision with patient yesterday to attend office visit with Dr. Nancy Giraldo (Neuro) today and discuss patient's condition and his recommendations for plan of care. Future Appointments: 
Future Appointments Date Time Provider Ehsan Yuridia 5/23/2019 12:20 PM Fani Anand  [facility]  
5/24/2019 To Be Determined Vasquez Artis  
5/30/2019 To Be Determined Vasquez Artis  
6/6/2019 To Be Determined Eldon Gautam [facility]  
6/7/2019 10:30 AM Regan Galan MD [address]  
6/13/2019 To Be Determined Eldon Gautam [facility]  
7/19/2019 11:15 AM Regan Galan MD [address] Last Appointment With Me: 
Visit date not found Last Appointment My Department: 
5/1/2019

## 2019-05-24 ENCOUNTER — HOME CARE VISIT (OUTPATIENT)
Dept: SCHEDULING | Facility: HOME HEALTH | Age: 84
End: 2019-05-24
Payer: MEDICARE

## 2019-05-24 VITALS
HEART RATE: 85 BPM | TEMPERATURE: 98.2 F | BODY MASS INDEX: 19.33 KG/M2 | OXYGEN SATURATION: 96 % | WEIGHT: 112.6 LBS | SYSTOLIC BLOOD PRESSURE: 136 MMHG | RESPIRATION RATE: 22 BRPM | DIASTOLIC BLOOD PRESSURE: 70 MMHG

## 2019-05-24 PROCEDURE — 3331090002 HH PPS REVENUE DEBIT

## 2019-05-24 PROCEDURE — G0299 HHS/HOSPICE OF RN EA 15 MIN: HCPCS

## 2019-05-24 PROCEDURE — 3331090001 HH PPS REVENUE CREDIT

## 2019-05-24 NOTE — PROGRESS NOTES
Consult REFERRED BY: 
Papito Larson MD 
 
CHIEF COMPLAINT: Auditory and visual hallucinations and dementia and tremors Subjective:  
 
Odette Severin is a 80 y.o.  female we are seeing at the request of Dr. Lex Walton for evaluation for new problem of increasing hand tremors, and some stiffness and soreness in moving, and the patient daughter having some concern about her possibly having Parkinson's disease, and she is also being seen for persistent auditory and visual hallucinations, and dementia. Patient has seen a psychiatrist who recommended she take Namenda 10 mg each day that we gave her, but for some reason the medication was discontinued, and notes that discontinue the medication after he said there stopping the Aricept not the Namenda but I think it was a mistake. We refilled the medication for the patient today and start her on Sinemet one half a tablet 25 100 just to see if it helps the tremors, but I am not sure this is just gegenhalten rigidity and some spasticity from her degenerative brain condition of Alzheimer's disease. She is already taking melatonin for sleep taking only 3 mg we recommended she go to 6 mg. She is on Remeron 7.5 mg each night and this should be continued and possibly even increase in the future, but we will see how she does on Namenda first.  She has a follow-up with her psychiatrist who gave her the Remeron to help her sleep. She also on lithium and her levels were checked recently and they are all therapeutic. Takes vitamins and vitamin D every day in addition, but really cannot exercise anymore and had a fall on fracture and does not really ambulate without a walker and assistance and cries all the time and is very fearful, because of her previous fall and injury.   Her hallucinations are slowly seemingly getting worse, and the auditory hallucinations are mainly music and singing in the morning that aggravate the patient, and other every day and actually associated with some visual hallucinations more in the late afternoon and evening of seeing people around her also. She is also demented and had a complete evaluation in the hospital in February 2018 for these problems, the CT scan that just showed atrophic changes, patient uncooperative for MRI scan, and carotid Doppler showed no significant stenosis, and EEG was ordered but she would not take that either. She does take Exelon 1.5 mg twice a day for dementia, but has frequent diarrhea and upset stomach, we told to stop the Exelon, she is also intolerant of Aricept in the past.  She already has a bipolar disorder and is on lithium for that. Patient is also on Paxil and thyroid and vitamins and vitamin D supposedly. Weeks had a long talk with the patient, and after we told her the Namenda might help her hallucinations, she is agreeable to try that once twice a day at least for now. Hopefully that will help with her behavior, because she has tried quetiapine and another neuroleptic both of which made her worse. She has not had any new focal neurologic deficits. There have been no other clear new neurologic findings, no focal weakness, no headache, no sensory loss, no fever, no meningismus, and no family history of similar problems. She becoming very hard to handle at home. The family is looking for any help taking it. Past Medical History:  
Diagnosis Date  Dementia  Depression   
 psychiatrist: Dr Leland Middleton (on Lithium)  Fracture of wrist   
 slipped and fx left wrist, surgery scheduled 12/21/16  Full dentures   
 upper and lower  Hypothyroid Past Surgical History:  
Procedure Laterality Date  HX KYPHOPLASTY  2013  HX ORTHOPAEDIC  12/2016 Wrist surgery  HX WRIST FRACTURE TX Left 12/2016 Family History Problem Relation Age of Onset [facility] Other Mother Inefficient wound healing  Cancer Sister Stomach CA  
 Diabetes Brother  Alzheimer Sister  Hypertension Daughter  Thyroid Disease Daughter  Other Son Hip replacement  Depression Son  Liver Disease Son  Diabetes Son   
 Heart Disease Son  Depression Son   
  
Social History Tobacco Use  Smoking status: Former Smoker  Smokeless tobacco: Never Used Substance Use Topics  Alcohol use: No  
   
 
Current Outpatient Medications:  
  memantine (NAMENDA) 10 mg tablet, Take 1 Tab by mouth daily. , Disp: 30 Tab, Rfl: 11 
  carbidopa-levodopa (SINEMET)  mg per tablet, Take 0.5 Tabs by mouth three (3) times daily. Indications: parkinson symptoms, Extreme Discomfort in [place] when Sitting or Lying Down, Disp: 100 Tab, Rfl: 11 
  meloxicam (MOBIC) 7.5 mg tablet, Take 1 Tab by mouth daily. , Disp: 90 Tab, Rfl: 1   mirtazapine (REMERON) 7.5 mg tablet, Take 1 Tab by mouth nightly., Disp: 90 Tab, Rfl: 1 
  acetaminophen (TYLENOL) 325 mg tablet, Take 2 Tabs by mouth two (2) times a day., Disp: 60 Tab, Rfl: 0 
  melatonin 3 mg tablet, Take 1 Tab by mouth nightly. (Patient taking differently: Take 3-6 mg by mouth nightly.), Disp: 30 Tab, Rfl: 0 
  ondansetron hcl (ZOFRAN) 4 mg tablet, Take 4 mg by mouth daily as needed. , Disp: , Rfl:  
  levothyroxine (SYNTHROID) 75 mcg tablet, TAKE 1 TABLET DAILY BEFORE BREAKFAST, Disp: 90 Tab, Rfl: 0 
  PARoxetine (PAXIL) 40 mg tablet, Take 40 mg by mouth daily. , Disp: , Rfl:  
  multivitamin (ONE A DAY) tablet, Take 1 Tab by mouth daily. , Disp: , Rfl:  
  cholecalciferol, vitamin D3, (VITAMIN D3) 2,000 unit tab, Take 1 Tab by mouth daily. , Disp: , Rfl:  
  lithium CR (ESKALITH CR) 450 mg CR tablet, Take 450 mg by mouth nightly., Disp: , Rfl:  
 
 
 
Allergies Allergen Reactions  Sulfa (Sulfonamide Antibiotics) Hives Review of Systems: 
Review of systems not obtained due to patient factors. Vitals:  
 05/23/19 1215 BP: 146/68 Pulse: 72 Resp: 16 SpO2: 94% Weight: 112 lb (50.8 kg) Height: 5' 4\" (1.626 m) Objective: I 
 
 
NEUROLOGICAL EXAM: 
  
Appearance: The patient is well developed, well nourished, provides an incoherent history and is in no acute distress. Mental Status: Oriented to place and person, and not the date or the president, cognitive function is abnormal and speech is fluent and no aphasia or dysarthria. Mood and affect crying and depressed and asked \"help me\". Cranial Nerves:   Intact visual fields. Fundi are benign poorly seen. ALFONSO, EOM's full, no nystagmus, no ptosis. Facial sensation is normal. Corneal reflexes are not tested. Facial movement is symmetric. Hearing is abnormal bilaterally. Palate is midline with normal sternocleidomastoid and trapezius muscles are normal. Tongue is midline. Neck without meningismus or bruits Temporal arteries are not tender or enlarged Motor:  4/5 strength in upper and lower proximal and distal muscles. Normal bulk and generalized increased tone with the left side being worse than the right, with some cogwheel rigidity and bradykinesia left side greater than right. No fasciculations. Rapid altering movement is slow in all extremities Reflexes:   Deep tendon reflexes 1+/4 and symmetrical. 
No babinski or clonus present Sensory:   Normal to touch, pinprick and vibration and temperature decreased in both feet. DSS is intact Gait:  Not tested gait. Tremor:    Mild bilateral intention upper extremity tremor noted, and the patient may have a slight component of resting tremor on the left upper extremity. Cerebellar:   Patient has abnormal Romberg and tandem and finger-nose-finger examination on cerebellar signs present. Neurovascular:  Normal heart sounds and regular rhythm, peripheral pulses decreased, and no carotid bruits.  
  
 
 
   
   
   
   
 
 
 
Assessment: ICD-10-CM ICD-9-CM 1. Transient ischemic attack involving left internal carotid artery G45.1 435.8 memantine (NAMENDA) 10 mg tablet carbidopa-levodopa (SINEMET)  mg per tablet 2. Bilateral carotid artery stenosis I65.23 433.10 memantine (NAMENDA) 10 mg tablet 433.30 carbidopa-levodopa (SINEMET)  mg per tablet 3. Benign essential tremor syndrome G25.0 333.1 memantine (NAMENDA) 10 mg tablet  
   carbidopa-levodopa (SINEMET)  mg per tablet 4. Late onset Alzheimer's disease with behavioral disturbance G30.1 331.0 memantine (NAMENDA) 10 mg tablet F02.81 294.11 carbidopa-levodopa (SINEMET)  mg per tablet 5. Acute metabolic encephalopathy B79.53 348.31 memantine (NAMENDA) 10 mg tablet  
   carbidopa-levodopa (SINEMET)  mg per tablet 6. Anxiety and depression F41.9 300.00 memantine (NAMENDA) 10 mg tablet F32.9 311 carbidopa-levodopa (SINEMET)  mg per tablet 7. Auditory hallucinations R44.0 780.1 memantine (NAMENDA) 10 mg tablet  
   carbidopa-levodopa (SINEMET)  mg per tablet 8. Altered mental status, unspecified altered mental status type R41.82 780.97 memantine (NAMENDA) 10 mg tablet  
   carbidopa-levodopa (SINEMET)  mg per tablet 9. Left leg pain M79.605 729.5 memantine (NAMENDA) 10 mg tablet  
   carbidopa-levodopa (SINEMET)  mg per tablet 10. Parkinson's disease (tremor, stiffness, slow motion, unstable posture) ([facility]) G20 332.0 carbidopa-levodopa (SINEMET)  mg per tablet Active Problems: * No active hospital problems. * 
 
 
Plan:  
 
Patient with dementia associated with visual and auditory hallucinations that are very bothersome to the patient and the family, and we advised the patient and the family to restart her Namenda 10 mg once a day as recommended by the psychiatrist and recommended by us in the past. 
Patient intolerant of Aricept and not having GI side effects on Exelon but she has stopped that in addition.  
Patient also with new problem of increasing tremor and rigidity, looks a little bit like gegenhalten rigidity and essential tremor seems to be a component of resting tremor and cogwheeling suggesting Parkinson's disease at least on the left side, so we will try low-dose Sinemet just to see if that helps. Patient with bipolar disorder on Paxil and lithium and her levels were just checked and are therapeutic followed by psychiatry Gabapentin or Depakote or Lamictal may be other options in the future. Very difficult case, 35 minutes spent with the patient and the daughter counseling her, reviewing all of her records from the hospital and workup there, and discussing her diagnosis prognosis and further treatment evaluation Follow-up in 6 months time or earlier as needed. Signed By: Adilene Liriano MD   
 May 23, 2019 CC: Fatemeh La MD 
FAX: [phone] This note will not be viewable in [address]

## 2019-05-25 PROCEDURE — 3331090002 HH PPS REVENUE DEBIT

## 2019-05-25 PROCEDURE — 3331090001 HH PPS REVENUE CREDIT

## 2019-05-26 PROCEDURE — 3331090001 HH PPS REVENUE CREDIT

## 2019-05-26 PROCEDURE — 3331090002 HH PPS REVENUE DEBIT

## 2019-05-27 PROCEDURE — 3331090001 HH PPS REVENUE CREDIT

## 2019-05-27 PROCEDURE — 3331090002 HH PPS REVENUE DEBIT

## 2019-05-28 PROCEDURE — 3331090001 HH PPS REVENUE CREDIT

## 2019-05-28 PROCEDURE — 3331090002 HH PPS REVENUE DEBIT

## 2019-05-29 PROCEDURE — 3331090002 HH PPS REVENUE DEBIT

## 2019-05-29 PROCEDURE — 3331090001 HH PPS REVENUE CREDIT

## 2019-05-30 ENCOUNTER — HOME CARE VISIT (OUTPATIENT)
Dept: SCHEDULING | Facility: HOME HEALTH | Age: 84
End: 2019-05-30
Payer: MEDICARE

## 2019-05-30 VITALS
TEMPERATURE: 98.4 F | RESPIRATION RATE: 16 BRPM | OXYGEN SATURATION: 94 % | HEART RATE: 86 BPM | DIASTOLIC BLOOD PRESSURE: 62 MMHG | SYSTOLIC BLOOD PRESSURE: 110 MMHG

## 2019-05-30 PROCEDURE — G0299 HHS/HOSPICE OF RN EA 15 MIN: HCPCS

## 2019-05-30 PROCEDURE — 3331090001 HH PPS REVENUE CREDIT

## 2019-05-30 PROCEDURE — 3331090002 HH PPS REVENUE DEBIT

## 2019-05-31 PROCEDURE — 3331090002 HH PPS REVENUE DEBIT

## 2019-05-31 PROCEDURE — 3331090001 HH PPS REVENUE CREDIT

## 2019-06-01 PROCEDURE — 3331090001 HH PPS REVENUE CREDIT

## 2019-06-01 PROCEDURE — 3331090002 HH PPS REVENUE DEBIT

## 2019-06-02 PROCEDURE — 3331090001 HH PPS REVENUE CREDIT

## 2019-06-02 PROCEDURE — 3331090002 HH PPS REVENUE DEBIT

## 2019-06-03 ENCOUNTER — TELEPHONE (OUTPATIENT)
Dept: NEUROLOGY | Age: 84
End: 2019-06-03

## 2019-06-03 ENCOUNTER — PATIENT OUTREACH (OUTPATIENT)
Dept: INTERNAL MEDICINE CLINIC | Age: 84
End: 2019-06-03

## 2019-06-03 PROCEDURE — 3331090001 HH PPS REVENUE CREDIT

## 2019-06-03 PROCEDURE — 3331090002 HH PPS REVENUE DEBIT

## 2019-06-03 NOTE — PROGRESS NOTES
Goals Addressed                 This Visit's Progress       Chronic Disease     Develop action plan for Self-Management Chronic Disease. 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 Followed by psychiatry  - attended office visit with Neurology 19; restarted on Namenda 10 mg once a day, f/u with Neuro in 6 months  - started on low-dose of Sinemet by Neurology on 19 for new problem of increasing tremor and rigidity suggestive of Parkinson's Disease  - daughter states, \"one minute she's (patient) sad, one minute lonely; never happy though. \"  - \"bad reaction\" to Sinemet and experienced visual hallucinations (\"things were flying off of the walls\"), increased agitation, felt as though she was \"spinning\". Reports advised by Pharmacist to discontinue Sinemet, however caregiver has not notified Neurology of this; caregiver is advised by this NN today to contact Neurology to provide update.  - visual hallucinations failed to resolve after discontinuing Sinemet as patient continues with \"occasional\" visual hallucinations (seeing individuals that are -such as old neighbors/friends); daughter states, \"I'm not sure if that (sinemet) triggered it, but it hasn't gone away yet. \". Of note, presence of visual hallucinations is noted in Neurology office visit documentation 19. Continues to experience auditory hallucinations (hearing music); reports that this is not new and has been present for over a year. - taking lithium and paxil  - taking Namenda 10 mg daily  - reports most recent office visit with Dr. Danni Muniz (psych) was 2018 and next scheduled routine f/u is 2019  - advised daughter to contact psychiatry (dr. Danni Muniz) to request an earlier appointment.  Daughter is also advised to contact Neurology to provide update regarding Sinemet and to notify of onset of visual hallucinations Post Hospitalization     Prevent complications post hospitalization. 4/25/2019   - attended office visit with Ortho on 4/18/19 for post-op staple removal  - taking clindamycin for 10 days as ordered by Dr. Sophia Umana at office visit on 4/22/19 for treatment of left leg cellulitis  - Re: Left Leg Cellulitis - daughter states, \"looks better, it's not swollen and it's still red, but not as red. \" Denies fever. - constipation resolved; last bowel movement 4/24/19. Encouraged to continue pericolace to prevent constipation while taking oxycodone. - \"occasionally\" experiences nausea; denies vomiting. Has not taken zofran since 4/19/19; daughter reports zofran ordered at [facility] discharge. - taking oxycodone 5 mg as needed for pain management; daughter reports prescription ordered at discharge from [facility]. Daughter reports patient only taking one tablet daily if needed. - will attend office visit with Dr. Raj Colvin on 5/1/19  - NN confirmed with [address] today that patient attended office visit with Dr. Shekhar Lindsay on 4/17/19 and next scheduled office visit 6/18/19 with Dr. Shekhar Lindsay    5/2/2019  - attended [facility] appt with Dr. Raj Colvin on 5/1/19; left ankle x-ray ordered for c/o acute left ankle pain, left lower extremity doppler ordered for pain of left lower extremity, cbc and cmp ordered, wheelchair  - left lower extremity doppler and left ankle xray completed yesterday; x-ray negative for fracture and doppler negative for DVT    5/7/2019   - completed clindamycin for treatment of left lower extremity cellulitis. Inquired about appearance of left lower extremity; Daughter states, \"It looks a little better. It's still red, but it's not swollen anymore. \"  - reports patient c/o stomach is \"upset\"; states, \"She says she feels a little nauseous sometimes\", \"She doesn't say it hurts or anything, she just says it's upset\". Denies vomiting. Denies constipation; reports last bowel movement 5/6/19. Denies fever, chills.  Reports that this is not new and has been present since SNF discharge. Daughter felt that symptoms may be related to Oxycodone, so she did not give patient an oxycodone today and instead gave her Tylenol, however patient continues to report symptoms. - daughter reports home health OT visit today and that therapist reported BP was \"low\"; daughter is unable to recall patient's BP reading during visit today and home health visit documentation is not yet available, no telephone encounters from home health to PCP office regarding BP noted in chart. Documented BP during  home health visit on 5/6/19 118/70, Pulse 70  - discussed [facility] services and daughter is agreeable to [facility] acute visit today for evaluation of left lower extremity/ resolution of cellulitis and GI symptoms  - [facility] referral completed by NN; visit scheduled for today between 4:30p-5:30p    5/8/2019   - NN unable to find documented blood pressure in 5/7/19 home health OT documentation  - NN confirmed with [facility] that visit was completed on 5/7/19; BP during visit 164/64, try to wean off of narcotics as this could be contributing to abdominal symptoms,daughter declined xray of right hip and side, advised tylenol and topical treatment such as capsaicin, left lower extremity cellulitis appears resolved, recommend f/u with PCP and Ortho. Visit note to be faxed to PCP office for PCP review. - next scheduled office visit 6/18/19 with Dr. Jolanta Camacho. Daughter will contact Ortho to inquire about scheduling an earlier f/u to discuss patient c/o continued left hip pain, pain management  - 6 month routine f/u scheduled with Dr. Isac Macias for 7/19/19; daughter declines to schedule acute appt and/or earlier f/u with PCP at this time. She will contact PCP office if abdominal symptoms worsen or fail to improve.     5/15/2019  - currently admitted to [facility] as of 5/13/19 for left hip pain since surgery in March    6/3/2019  - admitted to [facility] 5/13-5/17; diagnosis of Depression with auditory hallucinations in setting of dementia, bipolar disorder,Subacute/progressive LLE pain with inability to ambulate in setting of recent left hip fracture s/p cephalomedullary nailing with Dr. Timothy Ruff 3/18/19  - to f/u with Ortho in July (Dr. Timothy Ruff); daughter reports appt already scheduled, however she is unable to recall the exact date at this time       Supportive resources in place to maintain patient in the community (ie. Home Health, DME equipment, refer to, medication assistant plan, etc.)        4/25/2019  - discharged from SNF on 4/17/19 to home with [facility] for SN, PT, OT . Home health SN start of care 4/19/19, PT initial eval 4/19/19, OT initial eval 4/23/19  - new order for wheelchair by  at office visit on 4/22/19. Daughter reports contacting [facility] on 4/23/19 regarding order and being advised that [place] would contact PCP office to complete order and obtain necessary documentation for Medicare. Per telephone enc documentation 4/22/19, LPN  faxed wheelchair order to [employer].     5/2/2019  - per PCP office visit note 5/1/19, wheelchair ordered    5/7/2019  - discharged from home health PT on 5/3/19  - remains open to home health SN, OT  - home health MSW visit scheduled for MSW on Thursday, 5/9, between 9 and 10  - daughter reports that wheelchair was delivered today; ordered from [place]    5/8/2019  - daughter denies patient falls post-discharge  - has [facility] contact information as a resource    6/3/2019  - Palliative Medicine was consulted during most recent admission [facility] 5/13-5/17) for caregiver burnout, uncontrolled pain, disposition  - daughter feels as though increased agitation in patient following hospital discharge was related to too many home visits (PT, OT, SN and different nurses for SN visits); patient now has the same home health nurse for each visit as requested by Daughter   - daughter not interested in pursuing higher level of care options such as [facility], SNF at this time; notes that she has discussed this with the patient and both patient and daughter are agreeable that pt may require facility placement in the future  - daughter denies the need for additional resources/support at this time to maintain patient in home setting          Future Appointments:  Future Appointments   Date Time Provider Ehsan Shi   6/6/2019 To Be Determined [facility] [address]   6/7/2019 10:30 AM Althea Webster MD [address]   6/13/2019 To Be Determined Blaiseelvie Juliane RI [address]   7/19/2019 11:15 AM Althea Webster MD [address]   1/7/2020 11:20 AM Lori Miranda MD [address] [place] [address]        Last Appointment With Me:  Visit date not found     Last Appointment My Department:  5/1/2019      Chart routed to PCP for notification/review.

## 2019-06-04 ENCOUNTER — APPOINTMENT (OUTPATIENT)
Dept: GENERAL RADIOLOGY | Age: 84
End: 2019-06-04
Attending: EMERGENCY MEDICINE
Payer: MEDICARE

## 2019-06-04 ENCOUNTER — HOSPITAL ENCOUNTER (EMERGENCY)
Age: 84
Discharge: HOME | End: 2019-06-04
Attending: EMERGENCY MEDICINE | Admitting: EMERGENCY MEDICINE
Payer: MEDICARE

## 2019-06-04 ENCOUNTER — APPOINTMENT (OUTPATIENT)
Dept: CT IMAGING | Age: 84
End: 2019-06-04
Attending: EMERGENCY MEDICINE
Payer: MEDICARE

## 2019-06-04 VITALS
OXYGEN SATURATION: 93 % | WEIGHT: 110 LBS | HEART RATE: 62 BPM | TEMPERATURE: 98.2 F | DIASTOLIC BLOOD PRESSURE: 54 MMHG | RESPIRATION RATE: 18 BRPM | SYSTOLIC BLOOD PRESSURE: 154 MMHG | BODY MASS INDEX: 18.88 KG/M2

## 2019-06-04 DIAGNOSIS — I10 ACCELERATED HYPERTENSION: ICD-10-CM

## 2019-06-04 DIAGNOSIS — Z63.6 CAREGIVER STRESS: ICD-10-CM

## 2019-06-04 DIAGNOSIS — G20 PARKINSON'S DISEASE (TREMOR, STIFFNESS, SLOW MOTION, UNSTABLE POSTURE) (HCC): ICD-10-CM

## 2019-06-04 DIAGNOSIS — Z71.89 COUNSELING REGARDING ADVANCE CARE PLANNING AND GOALS OF CARE: ICD-10-CM

## 2019-06-04 DIAGNOSIS — F32.A DEPRESSION, UNSPECIFIED DEPRESSION TYPE: Primary | ICD-10-CM

## 2019-06-04 LAB
ALBUMIN SERPL-MCNC: 3.7 G/DL (ref 3.5–5)
ALBUMIN/GLOB SERPL: 1.1 {RATIO} (ref 1.1–2.2)
ALP SERPL-CCNC: 134 U/L (ref 45–117)
ALT SERPL-CCNC: 17 U/L (ref 12–78)
ANION GAP SERPL CALC-SCNC: 4 MMOL/L (ref 5–15)
APPEARANCE UR: ABNORMAL
AST SERPL-CCNC: 19 U/L (ref 15–37)
BACTERIA URNS QL MICRO: NEGATIVE /HPF
BASOPHILS # BLD: 0 K/UL (ref 0–0.1)
BASOPHILS NFR BLD: 1 % (ref 0–1)
BILIRUB SERPL-MCNC: 0.2 MG/DL (ref 0.2–1)
BILIRUB UR QL CFM: NEGATIVE
BUN SERPL-MCNC: 16 MG/DL (ref 6–20)
BUN/CREAT SERPL: 16 (ref 12–20)
CALCIUM SERPL-MCNC: 8.6 MG/DL (ref 8.5–10.1)
CAOX CRY URNS QL MICRO: ABNORMAL
CHLORIDE SERPL-SCNC: 108 MMOL/L (ref 97–108)
CO2 SERPL-SCNC: 28 MMOL/L (ref 21–32)
COLOR UR: ABNORMAL
COMMENT, HOLDF: NORMAL
CREAT SERPL-MCNC: 0.98 MG/DL (ref 0.55–1.02)
DATE LAST DOSE: NORMAL
DIFFERENTIAL METHOD BLD: NORMAL
EOSINOPHIL # BLD: 0.4 K/UL (ref 0–0.4)
EOSINOPHIL NFR BLD: 6 % (ref 0–7)
EPITH CASTS URNS QL MICRO: ABNORMAL /LPF
ERYTHROCYTE [DISTWIDTH] IN BLOOD BY AUTOMATED COUNT: 12.6 % (ref 11.5–14.5)
GLOBULIN SER CALC-MCNC: 3.3 G/DL (ref 2–4)
GLUCOSE SERPL-MCNC: 93 MG/DL (ref 65–100)
GLUCOSE UR STRIP.AUTO-MCNC: NEGATIVE MG/DL
HCT VFR BLD AUTO: 42.8 % (ref 35–47)
HGB BLD-MCNC: 13.8 G/DL (ref 11.5–16)
HGB UR QL STRIP: NEGATIVE
HYALINE CASTS URNS QL MICRO: ABNORMAL /LPF (ref 0–5)
IMM GRANULOCYTES # BLD AUTO: 0 K/UL (ref 0–0.04)
IMM GRANULOCYTES NFR BLD AUTO: 0 % (ref 0–0.5)
KETONES UR QL STRIP.AUTO: ABNORMAL MG/DL
LEUKOCYTE ESTERASE UR QL STRIP.AUTO: ABNORMAL
LITHIUM SERPL-SCNC: 1.19 MMOL/L (ref 0.6–1.2)
LYMPHOCYTES # BLD: 2.2 K/UL (ref 0.8–3.5)
LYMPHOCYTES NFR BLD: 33 % (ref 12–49)
MCH RBC QN AUTO: 28.8 PG (ref 26–34)
MCHC RBC AUTO-ENTMCNC: 32.2 G/DL (ref 30–36.5)
MCV RBC AUTO: 89.4 FL (ref 80–99)
MONOCYTES # BLD: 0.6 K/UL (ref 0–1)
MONOCYTES NFR BLD: 9 % (ref 5–13)
MUCOUS THREADS URNS QL MICRO: ABNORMAL /LPF
NEUTS SEG # BLD: 3.3 K/UL (ref 1.8–8)
NEUTS SEG NFR BLD: 51 % (ref 32–75)
NITRITE UR QL STRIP.AUTO: NEGATIVE
NRBC # BLD: 0 K/UL (ref 0–0.01)
NRBC BLD-RTO: 0 PER 100 WBC
PH UR STRIP: 5 [PH] (ref 5–8)
PLATELET # BLD AUTO: 195 K/UL (ref 150–400)
PMV BLD AUTO: 10.5 FL (ref 8.9–12.9)
POTASSIUM SERPL-SCNC: 4 MMOL/L (ref 3.5–5.1)
PROT SERPL-MCNC: 7 G/DL (ref 6.4–8.2)
PROT UR STRIP-MCNC: ABNORMAL MG/DL
RBC # BLD AUTO: 4.79 M/UL (ref 3.8–5.2)
RBC #/AREA URNS HPF: ABNORMAL /HPF (ref 0–5)
REPORTED DOSE,DOSE: NORMAL UNITS
REPORTED DOSE/TIME,TMG: NORMAL
SAMPLES BEING HELD,HOLD: NORMAL
SODIUM SERPL-SCNC: 140 MMOL/L (ref 136–145)
SP GR UR REFRACTOMETRY: 1.03 (ref 1–1.03)
TROPONIN I SERPL-MCNC: <0.05 NG/ML
UA: UC IF INDICATED,UAUC: ABNORMAL
UROBILINOGEN UR QL STRIP.AUTO: 1 EU/DL (ref 0.2–1)
WBC # BLD AUTO: 6.6 K/UL (ref 3.6–11)
WBC URNS QL MICRO: ABNORMAL /HPF (ref 0–4)

## 2019-06-04 PROCEDURE — 36415 COLL VENOUS BLD VENIPUNCTURE: CPT

## 2019-06-04 PROCEDURE — 84484 ASSAY OF TROPONIN QUANT: CPT

## 2019-06-04 PROCEDURE — 77030005563 HC CATH URETH INT MMGH -A

## 2019-06-04 PROCEDURE — 70450 CT HEAD/BRAIN W/O DYE: CPT

## 2019-06-04 PROCEDURE — 80053 COMPREHEN METABOLIC PANEL: CPT

## 2019-06-04 PROCEDURE — 90791 PSYCH DIAGNOSTIC EVALUATION: CPT

## 2019-06-04 PROCEDURE — 3331090002 HH PPS REVENUE DEBIT

## 2019-06-04 PROCEDURE — 99285 EMERGENCY DEPT VISIT HI MDM: CPT

## 2019-06-04 PROCEDURE — 80178 ASSAY OF LITHIUM: CPT

## 2019-06-04 PROCEDURE — 3331090001 HH PPS REVENUE CREDIT

## 2019-06-04 PROCEDURE — 85025 COMPLETE CBC W/AUTO DIFF WBC: CPT

## 2019-06-04 PROCEDURE — 87086 URINE CULTURE/COLONY COUNT: CPT

## 2019-06-04 PROCEDURE — 93005 ELECTROCARDIOGRAM TRACING: CPT

## 2019-06-04 PROCEDURE — 71045 X-RAY EXAM CHEST 1 VIEW: CPT

## 2019-06-04 PROCEDURE — 51701 INSERT BLADDER CATHETER: CPT

## 2019-06-04 PROCEDURE — 81001 URINALYSIS AUTO W/SCOPE: CPT

## 2019-06-04 SDOH — SOCIAL STABILITY - SOCIAL INSECURITY: DEPENDENT RELATIVE NEEDING CARE AT HOME: Z63.6

## 2019-06-04 NOTE — ED NOTES
Ana Maria Dhillon with [employer] called and notified of consult with pt at this time.   ETA of 30 mins

## 2019-06-04 NOTE — ED NOTES
Daughter is at bedside with patient at this time. Patient states to RN that she does not have thoughts of intentionally harming herself or others. Pt states that she is simply \"fed up with doctors and not feeling any better in her head. I feel so foggy in my head. I don't want to take my medicines any more because nothing is helping me. \" Pt with history of bipolar and dementia. Current patient of Dr. Robert David.

## 2019-06-04 NOTE — DISCHARGE INSTRUCTIONS
Ehsan Dudley scheduled using triage protocol. Patients will be evaluated and referred to appropriate treatment. A  is usually assigned, but there is usually a waiting list. All [place] residents without financial resources may be referred to [facility]. Patients must bring proof that they are residents of the [address] [place] ([employer], rent receipt, picture ID, etc.).   [phone]  Crisis: [facility] and [facility]  [address] [facility]     [phone]  Detox unit: [address]  [address] [facility]       Intakes are Monday, Wednesday, Thursday from 8 AM - 12 noon. Patients may walk in any day and speak with a counselor. Patient must bring a picture ID.   [phone]   [facility]       No detox available. Patient must be medically stable and able to work. Patient needs social security card and an ID. Patient does not need to be homeless. [address] [place] [address]       Detoxification available. Patients must be medically-cleared to go to detox and must be free of benzodiazepines and barbituates. THP prefers if they also have Clonidine available to help them with their detox. [address] [employer] program available for men. Patients need to be able to work and follow rules. 90 days inpatient followed by 90 days in a care home house.    Vladimir Mitchell  that hosts a number of [address] and NA groups each week   [phone]   [employer]  [address] [facility] Patients must first go through registration and financial eligibility screening, 8 - 11:30 AM and 1 - 4 PM Mondays through Friday. [facility] also provides homeless services, vision, dental and medical services. [address] outpatient and some inpatient (sober living houses) for men and women. Fees are determined at time of admission. Patient must be able to work and follow rules. [address] for [address] [facility]       Outpatient program for men, women and adolescents. Assessments can usually be scheduled within 24 hours. Intensive outpatient programs also available. Methadone and Suboxone detoxification also available. They do not accept Medicaid or Medicare. [address] [facility] Service [employer] End: [address] End: [address]   Centralized Intake: [phone]  Crisis: Jonh Jo. [phone]  Crisis: [facility]  [address] [facility]   [phone]  Crisis: [phone]   [address] Providers    Accepts Insured Patients Only:  [facility]  [address]       [phone]   Near the corner of [facility] and [address] [facility] [address] in the near [facility]. Accepts most insurance including Medicaid/Medicare. No psychiatry. On the [facility] bus line. [address] [phone]  [address] [facility] ([address]  [address] [facility]. [address] [facility] [place])     [phone]   Accepts most major insurances. Psychiatry available. Some DBT groups. [facility])    [phone]   Mixture of psychologists and psychiatrists. They do not accept Medicaid or Medicare.     The [address]       [phone]   Mixture of clinical social workers and psychologists. Sliding Scale/Financial Aid/Differing Payment Options  [facility]  [address] [facility])      [phone]   Our own Parish Martinez and Emigdio Kumari. Variety of treatment options, including DBT. [facility]  [address] [place] [address] Road       [phone]   Provides a variety of group and individual counseling options. Insurance, Medicaid, Medicare and sliding scale      Medicaid/Medicare providers in the [address] area  [address] [place] [address] [address]. [address]       [phone]    [facility]         [address]       [phone]    [place]  [address], [address]    [phone] ex.  [address] [facility]     [phone]    [address] [facility]      [phone]      Services for patients without Medicaid, [place] or Insurance  The [address] [place] [address] Drive       [phone]   See handout in separate folder    Bethany Camargo

## 2019-06-04 NOTE — ED PROVIDER NOTES
EMERGENCY DEPARTMENT HISTORY AND PHYSICAL EXAM      Date: 6/4/2019  Patient Name: Zuleyma Duffy  Patient Age and Sex: 80 y.o. female    History of Presenting Illness     Chief Complaint   Patient presents with    Altered mental status     pts family reports pt has voiced that she \"wants to die, wants to give up\", reports she has stopped eating, refused to take her meds, is more confused, did not recognize her daughter today       History Provided By: Patient and Patient's Daughter    HPI: Zuleyma Duffy, 80 y.o. female with PMHx significant for dementia, depression presents to the ED with c/o of increasing thoughts about harming herself as well as suicide thoughts. Per family, patient has voiced that she wants to die and wants to give out. She has stopped eating. She refused to take her medications. She appears more confused according to her daughter and she did not recognize her daughter today. Her daughter states that she had some visual hallucinations and was seeing her . She is refusing to eat any of her medications as well. She also appears more confused according to family members. Pt seen Dr. Yseenia Ching, Neurology, in the past and has been diagnosed with dementia with visual and auditory hallucinations. Patient has restarted her Namenda 10 mg daily as recommended by the psychiatrist.  She did not take Aricept due to GI side effects. She was also recently started on low-dose Sinemet due to concerns for Parkinson's. She does take Paxil and lithium as well. Patient also reports some urinary frequency and dysuria so family thinks she may have a UTI. Pt denies any other alleviating or exacerbating factors. Additionally, pt specifically denies any recent fever, chills, headache, nausea, vomiting, abdominal pain, CP, SOB, lightheadedness, dizziness, numbness, weakness, BLE swelling, heart palpitations,  diarrhea, constipation, melena, hematochezia, cough, or congestion.      PCP: Michelle Hernandez MD KARL    History is limited due to dementia    Past History   Past Medical History:  Past Medical History:   Diagnosis Date    Dementia     Depression     psychiatrist: Dr Dionicio Blackmon (on Lithium)    Fracture of wrist     slipped and fx left wrist, surgery scheduled 12/21/16    Full dentures     upper and lower    Hypothyroid        Past Surgical History:  Past Surgical History:   Procedure Laterality Date    HX KYPHOPLASTY  2013   Deberah January  12/2016    Wrist surgery    HX WRIST FRACTURE TX Left 12/2016       Family History:  Family History   Problem Relation Age of Onset    Other Mother         Inefficient wound healing    Cancer Sister         Stomach CA    Diabetes Brother     Alzheimer Sister     Hypertension Daughter     Thyroid Disease Daughter     Other Son         Hip replacement    Depression Son     Liver Disease Son     Diabetes Son     Heart Disease Son     Depression Son        Social History:  Social History     Tobacco Use    Smoking status: Former Smoker    Smokeless tobacco: Never Used   Substance Use Topics    Alcohol use: No    Drug use: No       Allergies: Allergies   Allergen Reactions    Sulfa (Sulfonamide Antibiotics) Hives       Current Medications:  No current facility-administered medications on file prior to encounter. Current Outpatient Medications on File Prior to Encounter   Medication Sig Dispense Refill    carbidopa-levodopa (SINEMET)  mg per tablet Take 0.5 Tabs by mouth three (3) times daily. pt is NOT taking this      memantine (NAMENDA) 10 mg tablet Take 1 Tab by mouth daily. 30 Tab 11    carbidopa-levodopa (SINEMET)  mg per tablet Take 0.5 Tabs by mouth three (3) times daily. Indications: parkinson symptoms, Extreme Discomfort in Calves when Sitting or Lying Down 100 Tab 11    meloxicam (MOBIC) 7.5 mg tablet Take 1 Tab by mouth daily. 90 Tab 1    mirtazapine (REMERON) 7.5 mg tablet Take 1 Tab by mouth nightly.  90 Tab 1    acetaminophen (TYLENOL) 325 mg tablet Take 2 Tabs by mouth two (2) times a day. 60 Tab 0    melatonin 3 mg tablet Take 1 Tab by mouth nightly. (Patient taking differently: Take 3-6 mg by mouth nightly.) 30 Tab 0    ondansetron hcl (ZOFRAN) 4 mg tablet Take 4 mg by mouth daily as needed.  levothyroxine (SYNTHROID) 75 mcg tablet TAKE 1 TABLET DAILY BEFORE BREAKFAST 90 Tab 0    PARoxetine (PAXIL) 40 mg tablet Take 40 mg by mouth daily.  multivitamin (ONE A DAY) tablet Take 1 Tab by mouth daily.  cholecalciferol, vitamin D3, (VITAMIN D3) 2,000 unit tab Take 1 Tab by mouth daily.  lithium CR (ESKALITH CR) 450 mg CR tablet Take 450 mg by mouth nightly. Review of Systems   Review of Systems   Unable to perform ROS: Mental status change   Constitutional: Negative. Negative for chills and fever. HENT: Negative. Negative for congestion, facial swelling, rhinorrhea, sore throat, trouble swallowing and voice change. Eyes: Negative. Respiratory: Negative. Negative for apnea, cough, chest tightness, shortness of breath and wheezing. Cardiovascular: Negative. Negative for chest pain, palpitations and leg swelling. Gastrointestinal: Negative. Negative for abdominal distention, abdominal pain, blood in stool, constipation, diarrhea, nausea and vomiting. Endocrine: Negative. Negative for cold intolerance, heat intolerance and polyuria. Genitourinary: Negative. Negative for difficulty urinating, dysuria, flank pain, frequency, hematuria and urgency. Musculoskeletal: Negative. Negative for arthralgias, back pain, myalgias, neck pain and neck stiffness. Skin: Negative. Negative for color change and rash. Neurological: Negative. Negative for dizziness, syncope, facial asymmetry, speech difficulty, weakness, light-headedness, numbness and headaches. Hematological: Negative. Does not bruise/bleed easily. Psychiatric/Behavioral: Negative.   Negative for confusion and self-injury. The patient is not nervous/anxious. Physical Exam   Physical Exam   Constitutional: She appears well-developed and well-nourished. No distress. HENT:   Head: Normocephalic and atraumatic. Mouth/Throat: Oropharynx is clear and moist. No oropharyngeal exudate. Eyes: Pupils are equal, round, and reactive to light. Conjunctivae and EOM are normal.   Neck: Normal range of motion. Cardiovascular: Normal rate, regular rhythm and normal heart sounds. Exam reveals no gallop and no friction rub. No murmur heard. Pulmonary/Chest: Effort normal and breath sounds normal. No respiratory distress. She has no wheezes. She has no rales. She exhibits no tenderness. Abdominal: Soft. Bowel sounds are normal. She exhibits no distension and no mass. There is no tenderness. There is no rebound and no guarding. Musculoskeletal: Normal range of motion. She exhibits no edema, tenderness or deformity. Neurological: She is alert. She displays normal reflexes. No cranial nerve deficit. She exhibits normal muscle tone. Coordination normal.   Skin: Skin is warm. No rash noted. She is not diaphoretic. Psychiatric: She is withdrawn. She exhibits a depressed mood. Nursing note and vitals reviewed.       Diagnostic Study Results     Labs -  Recent Results (from the past 24 hour(s))   EKG, 12 LEAD, INITIAL    Collection Time: 06/04/19  3:34 PM   Result Value Ref Range    Ventricular Rate 63 BPM    Atrial Rate 63 BPM    P-R Interval 200 ms    QRS Duration 86 ms    Q-T Interval 422 ms    QTC Calculation (Bezet) 431 ms    Calculated R Axis -19 degrees    Calculated T Axis 0 degrees    Diagnosis       Normal sinus rhythm  Normal ECG  When compared with ECG of 18-MAR-2019 03:34,  No significant change was found     CBC WITH AUTOMATED DIFF    Collection Time: 06/04/19  3:40 PM   Result Value Ref Range    WBC 6.6 3.6 - 11.0 K/uL    RBC 4.79 3.80 - 5.20 M/uL    HGB 13.8 11.5 - 16.0 g/dL    HCT 42.8 35.0 - 47.0 %    MCV 89.4 80.0 - 99.0 FL    MCH 28.8 26.0 - 34.0 PG    MCHC 32.2 30.0 - 36.5 g/dL    RDW 12.6 11.5 - 14.5 %    PLATELET 591 511 - 177 K/uL    MPV 10.5 8.9 - 12.9 FL    NRBC 0.0 0  WBC    ABSOLUTE NRBC 0.00 0.00 - 0.01 K/uL    NEUTROPHILS 51 32 - 75 %    LYMPHOCYTES 33 12 - 49 %    MONOCYTES 9 5 - 13 %    EOSINOPHILS 6 0 - 7 %    BASOPHILS 1 0 - 1 %    IMMATURE GRANULOCYTES 0 0.0 - 0.5 %    ABS. NEUTROPHILS 3.3 1.8 - 8.0 K/UL    ABS. LYMPHOCYTES 2.2 0.8 - 3.5 K/UL    ABS. MONOCYTES 0.6 0.0 - 1.0 K/UL    ABS. EOSINOPHILS 0.4 0.0 - 0.4 K/UL    ABS. BASOPHILS 0.0 0.0 - 0.1 K/UL    ABS. IMM. GRANS. 0.0 0.00 - 0.04 K/UL    DF AUTOMATED     METABOLIC PANEL, COMPREHENSIVE    Collection Time: 06/04/19  3:40 PM   Result Value Ref Range    Sodium 140 136 - 145 mmol/L    Potassium 4.0 3.5 - 5.1 mmol/L    Chloride 108 97 - 108 mmol/L    CO2 28 21 - 32 mmol/L    Anion gap 4 (L) 5 - 15 mmol/L    Glucose 93 65 - 100 mg/dL    BUN 16 6 - 20 MG/DL    Creatinine 0.98 0.55 - 1.02 MG/DL    BUN/Creatinine ratio 16 12 - 20      GFR est AA >60 >60 ml/min/1.73m2    GFR est non-AA 53 (L) >60 ml/min/1.73m2    Calcium 8.6 8.5 - 10.1 MG/DL    Bilirubin, total 0.2 0.2 - 1.0 MG/DL    ALT (SGPT) 17 12 - 78 U/L    AST (SGOT) 19 15 - 37 U/L    Alk.  phosphatase 134 (H) 45 - 117 U/L    Protein, total 7.0 6.4 - 8.2 g/dL    Albumin 3.7 3.5 - 5.0 g/dL    Globulin 3.3 2.0 - 4.0 g/dL    A-G Ratio 1.1 1.1 - 2.2     URINALYSIS W/ REFLEX CULTURE    Collection Time: 06/04/19  3:40 PM   Result Value Ref Range    Color DARK YELLOW      Appearance CLOUDY (A) CLEAR      Specific gravity 1.030 1.003 - 1.030      pH (UA) 5.0 5.0 - 8.0      Protein TRACE (A) NEG mg/dL    Glucose NEGATIVE  NEG mg/dL    Ketone TRACE (A) NEG mg/dL    Blood NEGATIVE  NEG      Urobilinogen 1.0 0.2 - 1.0 EU/dL    Nitrites NEGATIVE  NEG      Leukocyte Esterase MODERATE (A) NEG      WBC 5-10 0 - 4 /hpf    RBC 0-5 0 - 5 /hpf    Epithelial cells FEW FEW /lpf    Bacteria NEGATIVE  NEG /hpf UA: UC IF INDICATED URINE CULTURE ORDERED (A) CNI      Mucus TRACE (A) NEG /lpf    CA Oxalate crystals 1+ (A) NEG    Hyaline cast 10-20 0 - 5 /lpf   LITHIUM    Collection Time: 06/04/19  3:40 PM   Result Value Ref Range    Lithium level 1.19 0.60 - 1.20 MMOL/L    Reported dose date: NOT PROVIDED      Reported dose time: NOT PROVIDED      Reported dose: NOT PROVIDED UNITS   TROPONIN I    Collection Time: 06/04/19  3:40 PM   Result Value Ref Range    Troponin-I, Qt. <0.05 <0.05 ng/mL   SAMPLES BEING HELD    Collection Time: 06/04/19  3:40 PM   Result Value Ref Range    SAMPLES BEING HELD 1RED     COMMENT        Add-on orders for these samples will be processed based on acceptable specimen integrity and analyte stability, which may vary by analyte. BILIRUBIN, CONFIRM    Collection Time: 06/04/19  3:40 PM   Result Value Ref Range    Bilirubin UA, confirm NEGATIVE  NEG         Radiologic Studies -   CT HEAD WO CONT   Final Result   IMPRESSION: No acute intracranial abnormality. XR CHEST PORT   Final Result   IMPRESSION: No acute findings. Hiatal hernia and chronic findings as above. CT Results  (Last 48 hours)               06/04/19 1628  CT HEAD WO CONT Final result    Impression:  IMPRESSION: No acute intracranial abnormality. Narrative:  EXAM: CT HEAD WO CONT       INDICATION: Altered mental status       COMPARISON: February 2, 2018. CONTRAST: None. TECHNIQUE: Unenhanced CT of the head was performed using 5 mm images. Brain and   bone windows were generated. CT dose reduction was achieved through use of a   standardized protocol tailored for this examination and automatic exposure   control for dose modulation. FINDINGS:   Age-related prominence of the ventricles and sulci due to atrophy. There is no   significant white matter disease. There is no intracranial hemorrhage,   extra-axial collection, mass, mass effect or midline shift.   The basilar   cisterns are open. No acute infarct is identified. The bone windows demonstrate   no abnormalities. The visualized portions of the paranasal sinuses and mastoid   air cells are clear. CXR Results  (Last 48 hours)               06/04/19 1550  XR CHEST PORT Final result    Impression:  IMPRESSION: No acute findings. Hiatal hernia and chronic findings as above. Narrative:  EXAM: XR CHEST PORT       INDICATION: AMS       COMPARISON: Chest x-ray 3/17/2019. FINDINGS: A portable AP radiograph of the chest was obtained at 15:35 hours. There is redemonstration of a moderate-sized hiatal hernia. The lungs are clear. Atherosclerotic calcifications affect the aortic arch and the thoracic aorta is   tortuous. The cardiac and mediastinal contours and pulmonary vascularity are   normal.  The bones and soft tissues are diffusely osteopenic but otherwise   grossly within normal limits. Medical Decision Making   I am the first provider for this patient. I reviewed the vital signs, available nursing notes, past medical history, past surgical history, family history and social history. Vital Signs-Reviewed the patient's vital signs. Patient Vitals for the past 24 hrs:   Temp Pulse Resp BP SpO2   06/04/19 1815  62  154/54 93 %   06/04/19 1812  66   96 %   06/04/19 1721  66   96 %   06/04/19 1715  66  157/52 94 %   06/04/19 1652  63   95 %   06/04/19 1645  63  161/53 95 %   06/04/19 1641  83   97 %   06/04/19 1604    147/55    06/04/19 1427 98.2 °F (36.8 °C) 88 18 152/74 96 %       Pulse Oximetry Analysis - 96% on RA    Cardiac Monitor:   Rate: 88 bpm  Rhythm: Normal Sinus Rhythm      ED EKG interpretation:  Rhythm: normal sinus rhythm; and regular . Rate (approx.): 63; Axis: normal; P wave: normal; QRS interval: normal ; ST/T wave: normal; Other findings: normal. This EKG was interpreted by Jason Hudson M.D.     Records Reviewed: Nursing Notes, Old Medical Records, Previous electrocardiograms, Previous Radiology Studies and Previous Laboratory Studies    Provider Notes (Medical Decision Making):   Patient presents with depressive symptoms. DDx:  2/2 MDD, schizoaffective d/o, bipolar, drug induced, organic cause such as electrolyte anomoly or infection. Stable vitals and benign exam. No obvious organic causes to explain behavior but will obtain psych labs, UA, UDS and speak with mental health professional. Pt is currently voluntary. Sitter at bedside. Will continue to monitor while in ED. ED Course:   Initial assessment performed. The patients presenting problems have been discussed, and they are in agreement with the care plan formulated and outlined with them. I have encouraged them to ask questions as they arise throughout their visit. HYPERTENSION COUNSELING   Education was provided to the patient today regarding their hypertension. Patient is made aware of their elevated blood pressure and is instructed to follow up this week with their Primary Care for a recheck. Patient is counseled regarding consequences of chronic, uncontrolled hypertension including kidney disease, heart disease, stroke or even death. Patient states their understanding and agrees to follow up this week. Additionally, during their visit, I discussed sodium restriction, maintaining ideal body weight and regular exercise program as physiologic means to achieve blood pressure control. The patient will strive towards this. I reviewed our electronic medical record system for any past medical records that were available that may contribute to the patient's current condition, the nursing notes and vital signs from today's visit. Adrian Landa MD    Medications Administered During ED Course:  Medications - No data to display  ED Course as of Jun 05 0017 Tue Jun 04, 2019   [address] Seen by be EDWIN. Patient is both medically and cleared from a psychiatric standpoint.   Patient has a follow-up with the neurologist and psychiatrist tomorrow. Patient ready for discharge home at this time. [HW]      ED Course User Index  [HW] Chang Scherer MD     Progress Note  Tom Remy MD spoke with Haley Blevins,   Specialty: [facility]  Discussed pt's hx, disposition, and available diagnostic and imaging results. Reviewed care plans. Agree with management and plan thus far. Consultant has evaluated patient. Daughter feels safe taking patient home. The plan is monitor patient and call senior connections to look into addition services/help. Follow up with Dr. Loree Wagner office tomorrow. Pt denying SI or HI. States she also wants to go home.     Progress Note:  Patient has been reassessed and reports feeling better and symptoms have improved after ED treatment. Odette Severin is able to tolerate PO and ambulate per baseline. Louisa Mccarthy's final labs and imaging have been reviewed with her. She has been counseled regarding her diagnosis. She verbally conveys understanding and agreement of the signs, symptoms, diagnosis, treatment and prognosis and additionally agrees to follow up as recommended with Dr. Papito Larson MD in 24 - 48 hours. She also agrees with the care-plan and conveys that all of her questions have been answered. I have also put together some discharge instructions for her that include: 1) educational information regarding their diagnosis, 2) how to care for their diagnosis at home, as well a 3) list of reasons why they would want to return to the ED prior to their follow-up appointment, should their condition change. I have answered all questions to the patient's satisfaction. Strict return precautions given. She both understood and agreed with plan as discussed above. Vital signs stable for discharge. Disposition: DISCHARGE     The pt is ready for discharge. The pt's signs, symptoms, diagnosis, and discharge instructions have been discussed and pt has conveyed their understanding.  The pt is to follow up as recommended or return to ER should their symptoms worsen. Plan has been discussed and pt is in agreement. PLAN:  1. Discharge Medication List as of 6/4/2019  6:37 PM      No current facility-administered medications for this encounter. Current Outpatient Medications:     carbidopa-levodopa (SINEMET)  mg per tablet, Take 0.5 Tabs by mouth three (3) times daily. pt is NOT taking this, Disp: , Rfl:     memantine (NAMENDA) 10 mg tablet, Take 1 Tab by mouth daily. , Disp: 30 Tab, Rfl: 11    carbidopa-levodopa (SINEMET)  mg per tablet, Take 0.5 Tabs by mouth three (3) times daily. Indications: parkinson symptoms, Extreme Discomfort in [place] when Sitting or Lying Down, Disp: 100 Tab, Rfl: 11    meloxicam (MOBIC) 7.5 mg tablet, Take 1 Tab by mouth daily. , Disp: 90 Tab, Rfl: 1    mirtazapine (REMERON) 7.5 mg tablet, Take 1 Tab by mouth nightly., Disp: 90 Tab, Rfl: 1    acetaminophen (TYLENOL) 325 mg tablet, Take 2 Tabs by mouth two (2) times a day., Disp: 60 Tab, Rfl: 0    melatonin 3 mg tablet, Take 1 Tab by mouth nightly. (Patient taking differently: Take 3-6 mg by mouth nightly.), Disp: 30 Tab, Rfl: 0    ondansetron hcl (ZOFRAN) 4 mg tablet, Take 4 mg by mouth daily as needed. , Disp: , Rfl:     levothyroxine (SYNTHROID) 75 mcg tablet, TAKE 1 TABLET DAILY BEFORE BREAKFAST, Disp: 90 Tab, Rfl: 0    PARoxetine (PAXIL) 40 mg tablet, Take 40 mg by mouth daily. , Disp: , Rfl:     multivitamin (ONE A DAY) tablet, Take 1 Tab by mouth daily. , Disp: , Rfl:     cholecalciferol, vitamin D3, (VITAMIN D3) 2,000 unit tab, Take 1 Tab by mouth daily. , Disp: , Rfl:     lithium CR (ESKALITH CR) 450 mg CR tablet, Take 450 mg by mouth nightly., Disp: , Rfl:     2.    Follow-up Information     Follow up With Specialties Details Why Contact Info    Alma Ly MD [facility]   Vladimir Gil [address] [place] [address] [address]  [facility]  [phone]      [facility] EMERGENCY DEPT Emergency Medicine  As needed, If symptoms worsen [address] [place] [address]  [phone]          Return to ED if worse  Diagnosis     Clinical Impression:   1. Depression, unspecified depression type    2. Parkinson's disease (tremor, stiffness, slow motion, unstable posture) ([facility])    3. Caregiver stress    4. Counseling regarding advance care planning and goals of care    5. Accelerated hypertension        Attestation:    I personally performed the services described in this documentation on this date 6/4/2019 for patient Kadeem Márquez. I am the sole author of this note. Adiel Elizabeth MD    Please note that this dictation was completed with [email or web address], the [employer] voice recognition software. Quite often unanticipated grammatical, syntax, homophones, and other interpretive errors are inadvertently transcribed by the computer software. Please disregard these errors. Please excuse any errors that have escaped final proofreading. Thank you. This note will not be viewable in [address]

## 2019-06-05 ENCOUNTER — PATIENT OUTREACH (OUTPATIENT)
Dept: INTERNAL MEDICINE CLINIC | Age: 84
End: 2019-06-05

## 2019-06-05 LAB
ATRIAL RATE: 63 BPM
CALCULATED R AXIS, ECG10: -19 DEGREES
CALCULATED T AXIS, ECG11: 0 DEGREES
DIAGNOSIS, 93000: NORMAL
P-R INTERVAL, ECG05: 200 MS
Q-T INTERVAL, ECG07: 422 MS
QRS DURATION, ECG06: 86 MS
QTC CALCULATION (BEZET), ECG08: 431 MS
VENTRICULAR RATE, ECG03: 63 BPM

## 2019-06-05 PROCEDURE — 3331090001 HH PPS REVENUE CREDIT

## 2019-06-05 PROCEDURE — 3331090002 HH PPS REVENUE DEBIT

## 2019-06-06 ENCOUNTER — HOME CARE VISIT (OUTPATIENT)
Dept: SCHEDULING | Facility: HOME HEALTH | Age: 84
End: 2019-06-06
Payer: MEDICARE

## 2019-06-06 VITALS
HEART RATE: 69 BPM | DIASTOLIC BLOOD PRESSURE: 70 MMHG | RESPIRATION RATE: 16 BRPM | OXYGEN SATURATION: 98 % | WEIGHT: 110 LBS | SYSTOLIC BLOOD PRESSURE: 110 MMHG | BODY MASS INDEX: 18.88 KG/M2 | TEMPERATURE: 98.9 F

## 2019-06-06 LAB
BACTERIA SPEC CULT: NORMAL
CC UR VC: NORMAL
SERVICE CMNT-IMP: NORMAL

## 2019-06-06 PROCEDURE — 3331090001 HH PPS REVENUE CREDIT

## 2019-06-06 PROCEDURE — 3331090003 HH PPS REVENUE ADJ

## 2019-06-06 PROCEDURE — G0299 HHS/HOSPICE OF RN EA 15 MIN: HCPCS

## 2019-06-06 PROCEDURE — 3331090002 HH PPS REVENUE DEBIT

## 2019-06-07 PROCEDURE — 3331090002 HH PPS REVENUE DEBIT

## 2019-06-07 PROCEDURE — 3331090001 HH PPS REVENUE CREDIT

## 2019-06-08 PROCEDURE — 3331090001 HH PPS REVENUE CREDIT

## 2019-06-08 PROCEDURE — 3331090002 HH PPS REVENUE DEBIT

## 2019-06-09 PROCEDURE — 3331090001 HH PPS REVENUE CREDIT

## 2019-06-09 PROCEDURE — 3331090002 HH PPS REVENUE DEBIT

## 2019-06-10 PROCEDURE — 3331090002 HH PPS REVENUE DEBIT

## 2019-06-10 PROCEDURE — 3331090001 HH PPS REVENUE CREDIT

## 2019-06-11 PROCEDURE — 3331090002 HH PPS REVENUE DEBIT

## 2019-06-11 PROCEDURE — 3331090001 HH PPS REVENUE CREDIT

## 2019-06-12 ENCOUNTER — HOME CARE VISIT (OUTPATIENT)
Dept: HOME HEALTH SERVICES | Facility: HOME HEALTH | Age: 84
End: 2019-06-12
Payer: MEDICARE

## 2019-06-12 PROCEDURE — 3331090002 HH PPS REVENUE DEBIT

## 2019-06-12 PROCEDURE — 3331090001 HH PPS REVENUE CREDIT

## 2019-06-13 PROCEDURE — 3331090002 HH PPS REVENUE DEBIT

## 2019-06-13 PROCEDURE — 3331090001 HH PPS REVENUE CREDIT

## 2019-06-14 ENCOUNTER — PATIENT OUTREACH (OUTPATIENT)
Dept: INTERNAL MEDICINE CLINIC | Age: 84
End: 2019-06-14

## 2019-06-14 ENCOUNTER — TELEPHONE (OUTPATIENT)
Dept: INTERNAL MEDICINE CLINIC | Age: 84
End: 2019-06-14

## 2019-06-14 PROCEDURE — 3331090002 HH PPS REVENUE DEBIT

## 2019-06-14 PROCEDURE — 3331090001 HH PPS REVENUE CREDIT

## 2019-06-14 NOTE — PROGRESS NOTES
Goals Addressed This Visit's Progress Chronic Disease  Develop action plan for Self-Management Chronic Disease. 6/3/2019  
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  
- also with diagnosis of bipolar disorder- taking paxil and lithium. Followed by psychiatry 
- attended office visit with Neurology 19; restarted on Namenda 10 mg once a day, f/u with Neuro in 6 months 
- started on low-dose of Sinemet by Neurology on 19 for new problem of increasing tremor and rigidity suggestive of Parkinson's Disease 
- daughter states, \"one minute she's (patient) sad, one minute lonely; never happy though. \" 
- \"bad reaction\" to Sinemet and experienced visual hallucinations (\"things were flying off of the walls\"), increased agitation, felt as though she was \"spinning\". Reports advised by Pharmacist to discontinue Sinemet, however caregiver has not notified Neurology of this; caregiver is advised by this NN today to contact Neurology to provide update. 
- visual hallucinations failed to resolve after discontinuing Sinemet as patient continues with \"occasional\" visual hallucinations (seeing individuals that are -such as old neighbors/friends); daughter states, \"I'm not sure if that (sinemet) triggered it, but it hasn't gone away yet. \". Of note, presence of visual hallucinations is noted in Neurology office visit documentation 19. Continues to experience auditory hallucinations (hearing music); reports that this is not new and has been present for over a year. - taking lithium and paxil 
- taking Namenda 10 mg daily 
- reports most recent office visit with Dr. Milka Bah (psych) was 2018 and next scheduled routine f/u is 2019 
- advised daughter to contact psychiatry (dr. Milka Bah) to request an earlier appointment. Daughter is also advised to contact Neurology to provide update regarding Sinemet and to notify of onset of visual hallucinations 6/5/2019 
[facility] with c/o Altered Mental Status. ED diagnosis of depression, B-SMART consulted. Discharged to home with no new orders and/or medication changes, advised to f/u with PCP.

## 2019-06-14 NOTE — PROGRESS NOTES
NN attempted outreach to daughter today in order to perform CCM follow-up; lvm requesting a return phone call to this NN. Goals Addressed This Visit's Progress Post Hospitalization  COMPLETED: Prevent complications post hospitalization. 4/25/2019  
- attended office visit with Ortho on 4/18/19 for post-op staple removal 
- taking clindamycin for 10 days as ordered by Dr. Derek Claudio at office visit on 4/22/19 for treatment of left leg cellulitis 
- Re: Left Leg Cellulitis - daughter states, \"looks better, it's not swollen and it's still red, but not as red. \" Denies fever. - constipation resolved; last bowel movement 4/24/19. Encouraged to continue pericolace to prevent constipation while taking oxycodone. - \"occasionally\" experiences nausea; denies vomiting. Has not taken zofran since 4/19/19; daughter reports zofran ordered at [facility] discharge. - taking oxycodone 5 mg as needed for pain management; daughter reports prescription ordered at discharge from [facility]. Daughter reports patient only taking one tablet daily if needed. - will attend office visit with Dr. Radha Mcgill on 5/1/19 
- NN confirmed with [address] today that patient attended office visit with Dr. Ruth Madrigal on 4/17/19 and next scheduled office visit 6/18/19 with Dr. Ruth Madrigal 5/2/2019 
- attended [facility]THA appt with Dr. Radha Mcgill on 5/1/19; left ankle x-ray ordered for c/o acute left ankle pain, left lower extremity doppler ordered for pain of left lower extremity, cbc and cmp ordered, wheelchair 
- left lower extremity doppler and left ankle xray completed yesterday; x-ray negative for fracture and doppler negative for DVT 
 
5/7/2019  
- completed clindamycin for treatment of left lower extremity cellulitis. Inquired about appearance of left lower extremity; Daughter states, \"It looks a little better. It's still red, but it's not swollen anymore. \" 
- reports patient c/o stomach is \"upset\"; states, \"She says she feels a little nauseous sometimes\", \"She doesn't say it hurts or anything, she just says it's upset\". Denies vomiting. Denies constipation; reports last bowel movement 5/6/19. Denies fever, chills. Reports that this is not new and has been present since SNF discharge. Daughter felt that symptoms may be related to Oxycodone, so she did not give patient an oxycodone today and instead gave her Tylenol, however patient continues to report symptoms. - daughter reports home health OT visit today and that therapist reported BP was \"low\"; daughter is unable to recall patient's BP reading during visit today and home health visit documentation is not yet available, no telephone encounters from home health to PCP office regarding BP noted in chart. Documented BP during  home health visit on 5/6/19 118/70, Pulse 70 
- discussed [facility] services and daughter is agreeable to [facility] acute visit today for evaluation of left lower extremity/ resolution of cellulitis and GI symptoms - [facility] referral completed by ; visit scheduled for today between 4:30p-5:30p 
 
5/8/2019  
- NN unable to find documented blood pressure in 5/7/19 home health OT documentation 
- NN confirmed with [facility] that visit was completed on 5/7/19; BP during visit 164/64, try to wean off of narcotics as this could be contributing to abdominal symptoms,daughter declined xray of right hip and side, advised tylenol and topical treatment such as capsaicin, left lower extremity cellulitis appears resolved, recommend f/u with PCP and Ortho. Visit note to be faxed to PCP office for PCP review. - next scheduled office visit 6/18/19 with Dr. Leilani Perry. Daughter will contact Ortho to inquire about scheduling an earlier f/u to discuss patient c/o continued left hip pain, pain management - 6 month routine f/u scheduled with Dr. Del Rosario Mention for 7/19/19; daughter declines to schedule acute appt and/or earlier f/u with PCP at this time. She will contact PCP office if abdominal symptoms worsen or fail to improve. 5/15/2019 
- currently admitted to [facility] as of 5/13/19 for left hip pain since surgery in March 6/3/2019 
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 Mayank Denis 3/18/19 
- to f/u with Ortho in July (Dr. Mayank Denis); daughter reports appt already scheduled, however she is unable to recall the exact date at this time 6/14/2019 - To the best of this NN's knowledge, this patient had no additional Inpatient Hospital Admissions during 30 day MARTHA period following admission to [facility] 3/17/19-3/21/19.  
- MARTHA period has ended. Goal Completed. Future Appointments: 
Future Appointments Date Time Provider Ehsan Shi 7/19/2019 11:15 AM Edilson Larkin MD [address]  
1/7/2020 11:20 AM Winter Corcoran  [facility] Last Appointment With Me: 
Visit date not found Last Appointment My Department: 
5/1/2019

## 2019-06-14 NOTE — TELEPHONE ENCOUNTER
----- Message from Da Fournier sent at 6/14/2019  3:23 PM EDT -----  Regarding: Dr. Renee Lynch telephone   Pt's daughter Terrence Grey is requesting a call back in regards to missed call from practice.  Best contact [phone]      Copy/paste fionaera

## 2019-06-15 PROCEDURE — 3331090002 HH PPS REVENUE DEBIT

## 2019-06-15 PROCEDURE — 3331090001 HH PPS REVENUE CREDIT

## 2019-06-16 PROCEDURE — 3331090001 HH PPS REVENUE CREDIT

## 2019-06-16 PROCEDURE — 3331090002 HH PPS REVENUE DEBIT

## 2019-06-17 PROCEDURE — 3331090001 HH PPS REVENUE CREDIT

## 2019-06-17 PROCEDURE — 3331090002 HH PPS REVENUE DEBIT

## 2019-06-18 ENCOUNTER — PATIENT OUTREACH (OUTPATIENT)
Dept: INTERNAL MEDICINE CLINIC | Age: 84
End: 2019-06-18

## 2019-06-18 NOTE — PROGRESS NOTES
NN outreach to daughter, Mireille Hurley, today in order to perform CCM follow-up; two patient identifiers verified. Daughter reports patient currently admitted to [facility] as of 6/6/19, possible discharge to home tomorrow, 6/19/19; daughter expresses concern regarding possible discharge 6/19 as she does not feel that patient is ready for discharge, states, \"the only improvement Lulu seen was that mom's appetite has gotten better\", reports that patient has also expressed that she does not feel ready for discharge from facility to home at this time. Encouraged daughter to contact facility/attending provider to discuss concerns regarding discharge and discharge plan.

## 2019-06-20 ENCOUNTER — PATIENT OUTREACH (OUTPATIENT)
Dept: INTERNAL MEDICINE CLINIC | Age: 84
End: 2019-06-20

## 2019-06-20 RX ORDER — LEVOTHYROXINE SODIUM 75 UG/1
TABLET ORAL
Qty: 90 TAB | Refills: 0 | Status: SHIPPED | OUTPATIENT
Start: 2019-06-20 | End: 2019-09-18 | Stop reason: SDUPTHER

## 2019-06-20 NOTE — PROGRESS NOTES
NN outreach to Daughter, Ajrun Curran, today in order to follow-up on patient; two patient identifiers verified. Daughter reports patient discharged from [facility] today and she (daughter) just arrived at home from picking patient up; daughter is going to pickup new prescriptions from local pharmacy today. Due to recent discharge, NN initial post-discharge outreach assessment will be deferred until 6/21/19 to allow patient/daughter time to get settled at residence, pick-up new prescriptions; daughter is agreeable to this plan.

## 2019-06-24 ENCOUNTER — PATIENT OUTREACH (OUTPATIENT)
Dept: INTERNAL MEDICINE CLINIC | Age: 84
End: 2019-06-24

## 2019-06-24 NOTE — PROGRESS NOTES
NN attempted outreach to Daughter today in order to perform post-hospital discharge assessment, medication reconciliation following patient discharge from [facility] on 6/20/19; daughter's spouse answered the telephone and advised this NN that daughter is currently unavailable (running errands).

## 2019-06-25 ENCOUNTER — PATIENT OUTREACH (OUTPATIENT)
Dept: INTERNAL MEDICINE CLINIC | Age: 84
End: 2019-06-25

## 2019-06-25 RX ORDER — MIRTAZAPINE 45 MG/1
22.5 TABLET, FILM COATED ORAL
COMMUNITY
End: 2020-01-16

## 2019-06-25 RX ORDER — PAROXETINE HYDROCHLORIDE 20 MG/1
20 TABLET, FILM COATED ORAL DAILY
COMMUNITY

## 2019-06-25 RX ORDER — ARIPIPRAZOLE 5 MG/1
5 TABLET ORAL
COMMUNITY
End: 2021-01-01

## 2019-06-25 NOTE — PROGRESS NOTES
Hospital Discharge Follow-Up Date/Time:  2019 1:50 PM 
 
Patient was admitted to [facility] 19-19. Top Challenges reviewed with the provider  
- discharged from [facility] on 19. See medication changes as noted below. - has outpatient follow-up with Psychiatry on 19 Method of communication with provider :chart routing Was this a readmission? no  
Patient stated reason for the readmission: n/a Nurse Navigator (NN) contacted the daughter by telephone to perform post hospital discharge assessment. Verified name and  with daughter as identifiers. Provided introduction to self, and explanation of the Nurse Navigator role. Reviewed discharge instructions and red flags with daughter, Bernardo Hdez,  who verbalized understanding. daughter given an opportunity to ask questions and does not have any further questions or concerns at this time. The daughter agrees to contact the PCP office for questions related to their healthcare. NN provided contact information for future reference. Medication(s):  
Per daughter, New Medications at Discharge: abilify Per daughter, Changed Medications at Discharge: Paxil (20 mg daily), mirtazapine 45 mg tablet (take 0.5 tablets (22.5mg) at bedtime Per daughter, Discontinued Medications at Discharge: melatonin Medication reconciliation was performed with daughter, who verbalizes understanding of administration of home medications. There were no barriers to obtaining medications identified at this time. Referral to Pharm D needed: no  
 
 
 
Med Rec during this Call Current Outpatient Medications Medication Sig  ARIPiprazole (ABILIFY) 5 mg tablet Take 2.5 mg by mouth nightly.  PARoxetine (PAXIL) 20 mg tablet Take 20 mg by mouth daily.  mirtazapine (REMERON) 45 mg tablet Take 22.5 mg by mouth nightly.  levothyroxine (SYNTHROID) 75 mcg tablet TAKE 1 TABLET DAILY BEFORE BREAKFAST  memantine (NAMENDA) 10 mg tablet Take 1 Tab by mouth daily. (Patient taking differently: Take 10 mg by mouth nightly.)  acetaminophen (TYLENOL) 325 mg tablet Take 2 Tabs by mouth two (2) times a day. (Patient taking differently: Take 650 mg by mouth two (2) times daily as needed.)  multivitamin (ONE A DAY) tablet Take 1 Tab by mouth daily.  lithium CR (ESKALITH CR) 450 mg CR tablet Take 450 mg by mouth nightly. No current facility-administered medications for this visit. Medications Discontinued During This Encounter Medication Reason  PARoxetine (PAXIL) 40 mg tablet Dose Adjustment  mirtazapine (REMERON) 7.5 mg tablet Dose Adjustment  melatonin 3 mg tablet Discontinued by Another Clinician  carbidopa-levodopa (SINEMET)  mg per tablet Discontinued by Another Clinician  meloxicam (MOBIC) 7.5 mg tablet Not A Current Medication  carbidopa-levodopa (SINEMET)  mg per tablet Discontinued by Another Clinician  cholecalciferol, vitamin D3, (VITAMIN D3) 2,000 unit tab Not A Current Medication  ondansetron hcl (ZOFRAN) 4 mg tablet Not A Current Medication Paxil 40 mg tablet discontinued because daughter reports that patient is currently taking Paxil 20 mg tablet daily as ordered at patient discharge from [facility] on 6/20/19. Med Rec updated. Remeron 7 mg tablet discontinued because daughter reports that patient is currently taking Remeron 45 mg tablet, Take 0.5 tablet (22.5 mg ) every evening as ordered at patient discharge from [facility]. Melatonin discontinued because daughter reports that this was discontinued at patient discharge from [facility] on 6/20/19. Med Rec updated. Sinemet discontinued because daughter reports that this was discontinued by Dr. Zara Cole (Neurology) due to adverse effects. Discontinued by another clinician. Med Rec updated.  
Vitamin D3 discontinued because daughter reports that patient is no longer taking this medication. Not a current medication. Med rec updated. Zofran discontinued because daughter reports that patient is no longer taking this medication. Not a current medication. Med Rec updated. [facility] follow up appointment(s):  
Future Appointments: 
Future Appointments Date Time Provider Ehsan Shi 7/3/2019 10:30 AM Fatemeh La MD [address]  
2019 11:15 AM Fatemeh La MD [address]  
2020 11:20 AM Tristan Rosas  [facility] follow up appointment(s): Dr. Finn Ackerman (psychiatry) - 19 Goals Addressed This Visit's Progress Chronic Disease  Develop action plan for Self-Management Chronic Disease. On track 6/3/2019  
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  
- also with diagnosis of bipolar disorder- taking paxil and lithium. Followed by psychiatry 
- attended office visit with Neurology 19; restarted on Namenda 10 mg once a day, f/u with Neuro in 6 months 
- started on low-dose of Sinemet by Neurology on 19 for new problem of increasing tremor and rigidity suggestive of Parkinson's Disease 
- daughter states, \"one minute she's (patient) sad, one minute lonely; never happy though. \" 
- \"bad reaction\" to Sinemet and experienced visual hallucinations (\"things were flying off of the walls\"), increased agitation, felt as though she was \"spinning\".  Reports advised by Pharmacist to discontinue Sinemet, however caregiver has not notified Neurology of this; caregiver is advised by this NN today to contact Neurology to provide update. 
- visual hallucinations failed to resolve after discontinuing Sinemet as patient continues with \"occasional\" visual hallucinations (seeing individuals that are -such as old neighbors/friends); daughter states, \"I'm not sure if that (sinemet) triggered it, but it hasn't gone away yet. \". Of note, presence of visual hallucinations is noted in Neurology office visit documentation 5/23/19. Continues to experience auditory hallucinations (hearing music); reports that this is not new and has been present for over a year. - taking lithium and paxil 
- taking Namenda 10 mg daily 
- reports most recent office visit with Dr. Murtaza Rangel (psych) was Fall 2018 and next scheduled routine f/u is July 2019 
- advised daughter to contact psychiatry (dr. Murtaza Rangel) to request an earlier appointment. Daughter is also advised to contact Neurology to provide update regarding Sinemet and to notify of onset of visual hallucinations 6/5/2019 
- [id number] ED visit on 6/4/19 with c/o Altered Mental Status. ED diagnosis of depression, [facility] consulted. Discharged to home with no new orders and/or medication changes, advised to f/u with PCP.  
 
6/25/2019 
- admitted to [facility] 6/6/19-6/20/19 
- med rec reviewed with daughter and updated 
- daughter states, \"She's doing well. I guess they finally got the right medication for her. \" 
- reports improvement in patient's appetite and that patient is now performing ADLs with minimal (\"very little\") assistance 
- has office visit scheduled with Dr. Murtaza Rangel 6/29/19 
- scheduled for [facility] appt with Dr. Radha Mcgill 7/3/19 Post Hospitalization  Supportive resources in place to maintain patient in the community (ie. Home Health, DME equipment, refer to, medication assistant plan, etc.)   On track 4/25/2019 
- discharged from SNF on 4/17/19 to home with [facility] for SN, PT, OT . Home health SN start of care 4/19/19, PT initial eval 4/19/19, OT initial eval 4/23/19 
- new order for wheelchair by  at office visit on 4/22/19. Daughter reports contacting [place] [facility] on 4/23/19 regarding order and being advised that [place] would contact PCP office to complete order and obtain necessary documentation for Medicare.  Per telephone enc documentation 4/22/19, LPN  faxed wheelchair order to [employer]. 5/2/2019 
- per PCP office visit note 5/1/19, wheelchair ordered 5/7/2019 
- discharged from home health PT on 5/3/19 
- remains open to home health SN, OT 
- home health MSW visit scheduled for MSW on Thursday, 5/9, between 9 and 10 
- daughter reports that wheelchair was delivered today; ordered from [employer] 5/8/2019 
- daughter denies patient falls post-discharge 
- has [facility] contact information as a resource 6/3/2019 
- Palliative Medicine was consulted during most recent admission [facility] 5/13-5/17) for caregiver burnout, uncontrolled pain, disposition 
- daughter feels as though increased agitation in patient following hospital discharge was related to too many home visits (PT, OT, SN and different nurses for SN visits); patient now has the same home health nurse for each visit as requested by Daughter  
- daughter not interested in pursuing higher level of care options such as prison, SNF at this time; notes that she has discussed this with the patient and both patient and daughter are agreeable that pt may require facility placement in the future 
- daughter denies the need for additional resources/support at this time to maintain patient in home setting 6/25/2019 
- discharged from [facility] on 6/20/19 
- daughter denies the need for additional resources/support at this time; states, \"we're doing okay right now. \" 
- encouraged caregiver outreach to this NN as needed if circumstances change

## 2019-07-02 ENCOUNTER — PATIENT OUTREACH (OUTPATIENT)
Dept: INTERNAL MEDICINE CLINIC | Age: 84
End: 2019-07-02

## 2019-07-03 ENCOUNTER — OFFICE VISIT (OUTPATIENT)
Dept: INTERNAL MEDICINE CLINIC | Age: 84
End: 2019-07-03

## 2019-07-03 VITALS
OXYGEN SATURATION: 97 % | HEART RATE: 73 BPM | SYSTOLIC BLOOD PRESSURE: 140 MMHG | DIASTOLIC BLOOD PRESSURE: 80 MMHG | RESPIRATION RATE: 18 BRPM | TEMPERATURE: 97.9 F | HEIGHT: 64 IN | BODY MASS INDEX: 20.14 KG/M2 | WEIGHT: 118 LBS

## 2019-07-03 DIAGNOSIS — G20 PARKINSON'S DISEASE (TREMOR, STIFFNESS, SLOW MOTION, UNSTABLE POSTURE) (HCC): Primary | ICD-10-CM

## 2019-07-03 DIAGNOSIS — F03.90 DEMENTIA WITHOUT BEHAVIORAL DISTURBANCE, UNSPECIFIED DEMENTIA TYPE: ICD-10-CM

## 2019-07-03 DIAGNOSIS — F31.9 BIPOLAR AFFECTIVE DISORDER, REMISSION STATUS UNSPECIFIED (HCC): ICD-10-CM

## 2019-07-03 NOTE — PROGRESS NOTES
Identified pt with two pt identifiers(name and ). Reviewed record in preparation for visit and have obtained necessary documentation. Chief Complaint Patient presents with  
[facility] Follow Up Health Maintenance Due Topic  Shingrix Vaccine Age 50> (1 of 2)  GLAUCOMA SCREENING Q2Y  Bone Densitometry (Dexa) Screening  MEDICARE YEARLY EXAM   
 
 
Coordination of Care Questionnaire:  
1) Have you been to an emergency room, urgent care, or hospitalized since your last visit? If yes, where when, and reason for visit? no  
 
 
2. Have seen or consulted any other health care provider since your last visit? If yes, where when, and reason for visit? Dr. Dimple Zamora 3) Do you have an Advanced Directive/ Living Will in place? yes If yes, do we have a copy on file YES If no, would you like information NO Patient is accompanied by daughter I have received verbal consent from Flash Mariee to discuss any/all medical information while they are present in the room. Visit Vitals /74 (BP 1 Location: Left arm, BP Patient Position: Sitting) Pulse 73 Temp 97.9 °F (36.6 °C) (Oral) Resp 18 Ht 5' 4\" (1.626 m) Wt 118 lb (53.5 kg) SpO2 97% BMI 20.25 kg/m²

## 2019-07-03 NOTE — PROGRESS NOTES
SUBJECTIVE:  
Ms. Jerry Whitlock is a 80 y.o. female who is here for follow up of recent psychiatric admission. She presents today with her daughter, who assists in her care. She had a recent admission to [facility] 06/06/2019-06/20/2019 for SI, depression, and hallucinations. Pt's daughter reports that pt is doing much better since this. Her appetite and mood are significantly improved and her hallucinations are also improved. Pt reports that she feels better as well, stating that she is grateful for her daughter and is enjoying life. She continues to f/u with Dr. Saumya Macias (psychiatry) as an outpatient. She is currently taking Paxil 20mg, Abilify 5mg, Namenda 10mg, lithium CR 450mg, and Remeron 45mg. Her daughter notes that she has had some swelling in her ankles, but was told by Dr. Saumya Macias (psychiatry) that it may be d/t her new meds. Her BP in the office today is 150/74, 140/80 on manual repeat. Her daughter reports that she has been checking her BP at home and that it was elevated several times. She reports max BP of 170/100. Daughter had her PCP check the accuracy of her home BP cuff, and reports that it was fine. Pt denies palpitations or other associated sx. At this time, she is otherwise doing well and has brought no other complaints to my attention today. For a list of the medical issues addressed today, see the assessment and plan below. PMH:  
Past Medical History:  
Diagnosis Date  Dementia  Depression   
 psychiatrist: Dr Saumya Macias (on Lithium)  Fracture of wrist   
 slipped and fx left wrist, surgery scheduled 12/21/16  Full dentures   
 upper and lower  Hypothyroid PSH:  has a past surgical history that includes hx kyphoplasty (2013); hx orthopaedic (12/2016); and hx wrist fracture tx (Left, 12/2016). All: is allergic to sulfa (sulfonamide antibiotics). MEDS:  
Current Outpatient Medications Medication Sig  
  ARIPiprazole (ABILIFY) 5 mg tablet Take 2.5 mg by mouth nightly.  PARoxetine (PAXIL) 20 mg tablet Take 20 mg by mouth daily.  mirtazapine (REMERON) 45 mg tablet Take 22.5 mg by mouth nightly.  levothyroxine (SYNTHROID) 75 mcg tablet TAKE 1 TABLET DAILY BEFORE BREAKFAST  memantine (NAMENDA) 10 mg tablet Take 1 Tab by mouth daily. (Patient taking differently: Take 10 mg by mouth nightly.)  acetaminophen (TYLENOL) 325 mg tablet Take 2 Tabs by mouth two (2) times a day. (Patient taking differently: Take 650 mg by mouth two (2) times daily as needed.)  multivitamin (ONE A DAY) tablet Take 1 Tab by mouth daily.  lithium CR (ESKALITH CR) 450 mg CR tablet Take 450 mg by mouth nightly. No current facility-administered medications for this visit. FH: family history includes Alzheimer in her sister; Cancer in her sister; Depression in her son and son; Diabetes in her brother and son; Heart Disease in her son; Hypertension in her daughter; Liver Disease in her son; Other in her mother and son; Thyroid Disease in her daughter. SH:  reports that she has quit smoking. She has never used smokeless tobacco. She reports that she does not drink alcohol or use drugs. Review of Systems - History obtained from the patient General ROS: no fever, chills, fatigue, body aches Psychological ROS: no change in anxiety, depression, SI/HI Ophthalmic ROS: no blurred vision, myopia, double vision ENT ROS: no dysphagia, otalgia, otorrhea, rhinorrhea, post nasal drip Respiratory ROS: no cough, shortness of breath, or wheezing Cardiovascular ROS: no chest pain or dyspnea on exertion Gastrointestinal ROS: no abdominal pain, change in bowel habits, or black or bloody stools Genito-Urinary ROS: no frequency, urgency, incontinence, dysuria, hematuria Musculoskeletal ROS: no arthralgia, myalgia Neurological ROS: no headaches, dizziness, lightheadedness, tremors, seizures Dermatological ROS: no rash or lesions OBJECTIVE:  
Vitals:  
Visit Vitals /80 Pulse 73 Temp 97.9 °F (36.6 °C) (Oral) Resp 18 Ht 5' 4\" (1.626 m) Wt 118 lb (53.5 kg) SpO2 97% BMI 20.25 kg/m² Gen: Pleasant 80 y.o.  female in NAD. HEENT: PERRLA. EOMI. OP moist and pink. Neck: Supple. No LAD. HEART: RRR, No M/G/R.     
LUNGS: CTAB No W/R. ABDOMEN: Non-distended EXTREMITIES: Warm. No C/C/E.   
MUSCULOSKELETAL: Normal ROM, muscle strength 5/5 all groups. NEURO: Alert and oriented x 3. Cranial nerves grossly intact. No focal sensory or motor deficits noted. SKIN: Warm. Dry. No rashes or other lesions noted. PSYCH: Normal mood and affect. ASSESSMENT/ PLAN: Diagnoses and all orders for this visit: 1. Parkinson's disease (tremor, stiffness, slow motion, unstable posture) ([address] 2. Bipolar affective disorder, remission status unspecified ([address] 3. Dementia without behavioral disturbance, unspecified dementia type ICD-10-CM ICD-9-CM 1. Parkinson's disease (tremor, stiffness, slow motion, unstable posture) ([address] G20 332.0 2. Bipolar affective disorder, remission status unspecified ([address] F31.9 296.80 3. Dementia without behavioral disturbance, unspecified dementia type F03.90 294.20 Pt is doing well with her current medication regimen. She is eating and sleeping well, and her mood and affect are good. We will continue to monitor and she will continue to follow up with Dr. Leif Robert. Advised pt's daughter to continue periodically checking her BP at home and follow up as needed. Follow-up and Dispositions · Return in about 6 months (around 1/3/2020) for follow up. I have reviewed the patient's medications and risks/side effects/benefits were discussed. Diagnosis(-es) explained to patient and questions answered. Literature provided where appropriate.   
 
Written by Laura Kelley, as dictated by Dr. Mayank Marroquin.

## 2019-07-22 ENCOUNTER — PATIENT OUTREACH (OUTPATIENT)
Dept: INTERNAL MEDICINE CLINIC | Age: 84
End: 2019-07-22

## 2019-07-22 NOTE — PROGRESS NOTES
Goals Addressed                 This Visit's Progress       Chronic Disease     Develop action plan for Self-Management Chronic Disease. 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 Followed by psychiatry  - attended office visit with Neurology 19; restarted on Namenda 10 mg once a day, f/u with Neuro in 6 months  - started on low-dose of Sinemet by Neurology on 19 for new problem of increasing tremor and rigidity suggestive of Parkinson's Disease  - daughter states, \"one minute she's (patient) sad, one minute lonely; never happy though. \"  - \"bad reaction\" to Sinemet and experienced visual hallucinations (\"things were flying off of the walls\"), increased agitation, felt as though she was \"spinning\". Reports advised by Pharmacist to discontinue Sinemet, however caregiver has not notified Neurology of this; caregiver is advised by this NN today to contact Neurology to provide update.  - visual hallucinations failed to resolve after discontinuing Sinemet as patient continues with \"occasional\" visual hallucinations (seeing individuals that are -such as old neighbors/friends); daughter states, \"I'm not sure if that (sinemet) triggered it, but it hasn't gone away yet. \". Of note, presence of visual hallucinations is noted in Neurology office visit documentation 19. Continues to experience auditory hallucinations (hearing music); reports that this is not new and has been present for over a year. - taking lithium and paxil  - taking Namenda 10 mg daily  - reports most recent office visit with Dr. Brittaney Hayes (psych) was 2018 and next scheduled routine f/u is 2019  - advised daughter to contact psychiatry (dr. Brittaney Hayes) to request an earlier appointment.  Daughter is also advised to contact Neurology to provide update regarding Sinemet and to notify of onset of visual hallucinations    6/5/2019  - ED [facility] ED visit on 6/4/19 with c/o Altered Mental Status. ED diagnosis of depression, B-SMART consulted. Discharged to home with no new orders and/or medication changes, advised to f/u with PCP.     6/25/2019  - admitted to [facility] 6/6/19-6/20/19  - med rec reviewed with daughter and updated  - daughter states, \"She's doing well. I guess they finally got the right medication for her. \"  - reports improvement in patient's appetite and that patient is now performing ADLs with minimal (\"very little\") assistance  - has office visit scheduled with Dr. Ladonna Yusuf 6/29/19  - scheduled for [facility] appt with Dr. Bina Nino 7/3/19    7/2/2019  - daughter states, \"So far, so good. \"  - attended appointment with Dr. Ladonna Yusuf 6/29/19; reports no new medication orders-daughter states, \"Everything is working well\". Daughter waiting for outreach from practice to schedule July appointment  - will attend scheduled appointment with Dr. Bina Nino on 7/3/19    7/22/2019  - attended office visit with Dr. Bina Nino on 7/3/19; no new orders and/or medication changes noted,daughter was advised to continue periodically check patient's BP at home and follow up as needed. - daughter states, \"no complaints right now, she's doing well. \"  - most recently checked patient's BP approximately one week ago; reports most recent reading approximately one week ago was 144/70 and reading prior was 132/80, however she is unsure of the dates of reported readings. - daughter will monitor patient's blood pressure at home and keep a BP log  - daughter will contact PCP office as needed for questions/concerns  - NN will f/u in two weeks         Post Hospitalization     Supportive resources in place to maintain patient in the community (ie. Home Health, DME equipment, refer to, medication assistant plan, etc.)        4/25/2019  - discharged from SNF on 4/17/19 to home with [facility] for SN, PT, OT .  Home health SN start of care 4/19/19, PT initial eval 4/19/19, OT initial eval 4/23/19  - new order for wheelchair by  at office visit on 4/22/19. Daughter reports contacting [facility] on 4/23/19 regarding order and being advised that [place] would contact PCP office to complete order and obtain necessary documentation for Medicare. Per telephone enc documentation 4/22/19, LPN  faxed wheelchair order to [employer]. 5/2/2019  - per PCP office visit note 5/1/19, wheelchair ordered    5/7/2019  - discharged from home health PT on 5/3/19  - remains open to home health SN, OT  - home health MSW visit scheduled for MSW on Thursday, 5/9, between 9 and 10  - daughter reports that wheelchair was delivered today; ordered from [address]    5/8/2019  - daughter denies patient falls post-discharge  - has [facility] contact information as a resource    6/3/2019  - Palliative Medicine was consulted during most recent admission [facility] 5/13-5/17) for caregiver burnout, uncontrolled pain, disposition  - daughter feels as though increased agitation in patient following hospital discharge was related to too many home visits (PT, OT, SN and different nurses for SN visits); patient now has the same home health nurse for each visit as requested by Daughter   - daughter not interested in pursuing higher level of care options such as [facility], SNF at this time; notes that she has discussed this with the patient and both patient and daughter are agreeable that pt may require facility placement in the future  - daughter denies the need for additional resources/support at this time to maintain patient in home setting    6/25/2019  - discharged from [facility] on 6/20/19  - daughter denies the need for additional resources/support at this time; states, \"we're doing okay right now. \"  - encouraged caregiver outreach to this NN as needed if circumstances change    7/2/2019  - continues to deny the need for additional resources/support to maintain patient in home setting at this time; states, \"things are going really good, surprisingly. \"  - patient is bathing and dressing independently; note that this is an improvement compared to caregiver's report prior to patient admission to [facility] on 6/6, daughter states, \"She wants to do it on her own and she didn't want to before. \"    7/22/2019  - support system continues to include patient's daughter, daughter's spouse  - daughter denies the need for additional resources/support at this time ; states, \"for right now it's fine. \"   - does not have Medicaid; daughter unsure if patient will meet income eligibility requirements as she was previously advised by a  that patient exceeded income limitations. Encouraged daughter to proceed with completing online screening/ Medicaid application; if meets eligibility requirements for Medicaid, then LTC Medicaid benefits may be beneficial resource in the future if patient requires LTC placement or if caregiver requires additional assistance maintaining patient in home setting (personal care services).  NN provided daughter with [employer] website link as well as longterm[place]          Future Appointments:  Future Appointments   Date Time Provider Ehsan Shi   1/7/2020 11:20 Jess Koch MD [address] Appointment With Me:  Visit date not found     Last Appointment My Department:  7/3/2019

## 2019-09-18 RX ORDER — LEVOTHYROXINE SODIUM 75 UG/1
TABLET ORAL
Qty: 90 TAB | Refills: 4 | Status: SHIPPED | OUTPATIENT
Start: 2019-09-18 | End: 2020-12-11

## 2019-10-03 ENCOUNTER — PATIENT OUTREACH (OUTPATIENT)
Dept: INTERNAL MEDICINE CLINIC | Age: 84
End: 2019-10-03

## 2019-10-17 NOTE — PROGRESS NOTES
Goals Addressed This Visit's Progress Chronic Disease  COMPLETED: Develop action plan for Self-Management Chronic Disease. 6/3/2019  
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  
- also with diagnosis of bipolar disorder- taking paxil and lithium. Followed by psychiatry 
- attended office visit with Neurology 19; restarted on Namenda 10 mg once a day, f/u with Neuro in 6 months 
- started on low-dose of Sinemet by Neurology on 19 for new problem of increasing tremor and rigidity suggestive of Parkinson's Disease 
- daughter states, \"one minute she's (patient) sad, one minute lonely; never happy though. \" 
- \"bad reaction\" to Sinemet and experienced visual hallucinations (\"things were flying off of the walls\"), increased agitation, felt as though she was \"spinning\". Reports advised by Pharmacist to discontinue Sinemet, however caregiver has not notified Neurology of this; caregiver is advised by this NN today to contact Neurology to provide update. 
- visual hallucinations failed to resolve after discontinuing Sinemet as patient continues with \"occasional\" visual hallucinations (seeing individuals that are -such as old neighbors/friends); daughter states, \"I'm not sure if that (sinemet) triggered it, but it hasn't gone away yet. \". Of note, presence of visual hallucinations is noted in Neurology office visit documentation 19. Continues to experience auditory hallucinations (hearing music); reports that this is not new and has been present for over a year. - taking lithium and paxil 
- taking Namenda 10 mg daily 
- reports most recent office visit with Dr. Aruna Edwards (psych) was 2018 and next scheduled routine f/u is 2019 
- advised daughter to contact psychiatry (dr. Aruna Edwards) to request an earlier appointment.  Daughter is also advised to contact Neurology to provide update regarding Sinemet and to notify of onset of visual hallucinations 6/5/2019 
- ED [facility] ED visit on 6/4/19 with c/o Altered Mental Status. ED diagnosis of depression, B-SMART consulted. Discharged to home with no new orders and/or medication changes, advised to f/u with PCP.  
 
6/25/2019 
- admitted to [facility] 6/6/19-6/20/19 
- med rec reviewed with daughter and updated 
- daughter states, \"She's doing well. I guess they finally got the right medication for her. \" 
- reports improvement in patient's appetite and that patient is now performing ADLs with minimal (\"very little\") assistance 
- has office visit scheduled with Dr. Janice Ruffin 6/29/19 
- scheduled for [facility] appt with Dr. Shandra Galindo 7/3/19 
 
7/2/2019 
- daughter states, \"So far, so good. \" 
- attended appointment with Dr. Janice Ruffin 6/29/19; reports no new medication orders-daughter states, \"Everything is working well\". Daughter waiting for outreach from practice to schedule July appointment 
- will attend scheduled appointment with Dr. Shandra Galindo on 7/3/19 
 
7/22/2019 
- attended office visit with Dr. Shandra Galindo on 7/3/19; no new orders and/or medication changes noted,daughter was advised to continue periodically check patient's BP at home and follow up as needed. - daughter states, \"no complaints right now, she's doing well. \" 
- most recently checked patient's BP approximately one week ago; reports most recent reading approximately one week ago was 144/70 and reading prior was 132/80, however she is unsure of the dates of reported readings. - daughter will monitor patient's blood pressure at home and keep a BP log 
- daughter will contact PCP office as needed for questions/concerns 
- NN will f/u in two weeks 10/17/2019 
- unable to reach daughter; episode resolved. Post Hospitalization  COMPLETED: Supportive resources in place to maintain patient in the community (ie. Home Health, DME equipment, refer to, medication assistant plan, etc.)     
  4/25/2019 - discharged from SNF on 4/17/19 to home with [facility] for SN, PT, OT . Home health SN start of care 4/19/19, PT initial eval 4/19/19, OT initial eval 4/23/19 
- new order for wheelchair by  at office visit on 4/22/19. Daughter reports contacting [facility] on 4/23/19 regarding order and being advised that [place] would contact PCP office to complete order and obtain necessary documentation for Medicare. Per telephone enc documentation 4/22/19, LPN  faxed wheelchair order to [employer]. 5/2/2019 
- per PCP office visit note 5/1/19, wheelchair ordered 5/7/2019 
- discharged from home health PT on 5/3/19 
- remains open to home health SN, OT 
- home health MSW visit scheduled for MSW on Thursday, 5/9, between 9 and 10 
- daughter reports that wheelchair was delivered today; ordered from [employer] 5/8/2019 
- daughter denies patient falls post-discharge 
- has [facility] contact information as a resource 6/3/2019 
- Palliative Medicine was consulted during most recent admission [facility] 5/13-5/17) for caregiver burnout, uncontrolled pain, disposition 
- daughter feels as though increased agitation in patient following hospital discharge was related to too many home visits (PT, OT, SN and different nurses for SN visits); patient now has the same home health nurse for each visit as requested by Daughter  
- daughter not interested in pursuing higher level of care options such as [facility], SNF at this time; notes that she has discussed this with the patient and both patient and daughter are agreeable that pt may require facility placement in the future 
- daughter denies the need for additional resources/support at this time to maintain patient in home setting 6/25/2019 
- discharged from [facility] on 6/20/19 
- daughter denies the need for additional resources/support at this time; states, \"we're doing okay right now. \" 
 - encouraged caregiver outreach to this NN as needed if circumstances change 7/2/2019 
- continues to deny the need for additional resources/support to maintain patient in home setting at this time; states, \"things are going really good, surprisingly. \" 
- patient is bathing and dressing independently; note that this is an improvement compared to caregiver's report prior to patient admission to [facility] on 6/6, daughter states, \"She wants to do it on her own and she didn't want to before. \" 
 
7/22/2019 
- support system continues to include patient's daughter, daughter's spouse 
- daughter denies the need for additional resources/support at this time ; states, \"for right now it's fine. \"  
- does not have Medicaid; daughter unsure if patient will meet income eligibility requirements as she was previously advised by a  that patient exceeded income limitations. Encouraged daughter to proceed with completing online screening/ Medicaid application; if meets eligibility requirements for Medicaid, then LTC Medicaid benefits may be beneficial resource in the future if patient requires LTC placement or if caregiver requires additional assistance maintaining patient in home setting (personal care services). NN provided daughter with [employer] website link as well as [employer] 
 
10/17/2019 
- unable to reach daughter; episode resolved. Future Appointments: 
Future Appointments Date Time Provider Ehsan Shi 1/7/2020 11:20 AM Joby Linares  [address] Last Appointment With Me: 
Visit date not found Last Appointment My Department: 
7/3/2019

## 2020-01-15 ENCOUNTER — OFFICE VISIT (OUTPATIENT)
Dept: NEUROLOGY | Age: 85
End: 2020-01-15

## 2020-01-15 VITALS
HEIGHT: 64 IN | DIASTOLIC BLOOD PRESSURE: 60 MMHG | BODY MASS INDEX: 22.88 KG/M2 | OXYGEN SATURATION: 97 % | SYSTOLIC BLOOD PRESSURE: 112 MMHG | HEART RATE: 66 BPM | WEIGHT: 134 LBS

## 2020-01-15 DIAGNOSIS — R41.82 ALTERED MENTAL STATUS, UNSPECIFIED ALTERED MENTAL STATUS TYPE: Primary | ICD-10-CM

## 2020-01-15 DIAGNOSIS — G25.0 BENIGN ESSENTIAL TREMOR SYNDROME: ICD-10-CM

## 2020-01-15 DIAGNOSIS — G30.1 LATE ONSET ALZHEIMER'S DISEASE WITH BEHAVIORAL DISTURBANCE (HCC): ICD-10-CM

## 2020-01-15 DIAGNOSIS — F02.818 LATE ONSET ALZHEIMER'S DISEASE WITH BEHAVIORAL DISTURBANCE (HCC): ICD-10-CM

## 2020-01-15 DIAGNOSIS — G93.41 ACUTE METABOLIC ENCEPHALOPATHY: ICD-10-CM

## 2020-01-15 DIAGNOSIS — G20 PARKINSON'S DISEASE (TREMOR, STIFFNESS, SLOW MOTION, UNSTABLE POSTURE) (HCC): ICD-10-CM

## 2020-01-15 RX ORDER — PERPHENAZINE 2 MG/1
TABLET, FILM COATED ORAL
COMMUNITY
End: 2020-01-16

## 2020-01-15 RX ORDER — MIRTAZAPINE 15 MG/1
45 TABLET, FILM COATED ORAL
COMMUNITY

## 2020-01-15 RX ORDER — CLONAZEPAM 0.5 MG/1
TABLET ORAL
COMMUNITY
End: 2020-01-16

## 2020-01-15 RX ORDER — ONDANSETRON 4 MG/1
TABLET, FILM COATED ORAL
COMMUNITY
End: 2020-01-16

## 2020-01-15 RX ORDER — FLUPHENAZINE HYDROCHLORIDE 1 MG/1
TABLET ORAL
COMMUNITY
End: 2020-01-16

## 2020-01-15 RX ORDER — PAROXETINE HYDROCHLORIDE 40 MG/1
TABLET, FILM COATED ORAL
COMMUNITY
End: 2020-01-16

## 2020-01-15 RX ORDER — RIVASTIGMINE TARTRATE 3 MG/1
CAPSULE ORAL
COMMUNITY
End: 2020-01-16

## 2020-01-15 RX ORDER — RIVASTIGMINE TARTRATE 1.5 MG/1
CAPSULE ORAL
COMMUNITY
End: 2020-01-16

## 2020-01-15 NOTE — PROGRESS NOTES
Luis Miguel Hart is a 80 y.o. female presents today for   Chief Complaint   Patient presents with    Follow-up    TIA         Historical Data    Patient has rather complicated history but essentially for the better part of 2019 she was having problems with cognitive impairment and received a diagnosis of dementia with visual and auditory hallucinations. She was having problems with tremor and other symptoms that suggested the possibility of Parkinson's disease. She was placed on Sinemet and became dramatically worse that was discontinued. It is reported that visual hallucinations stopped after the Sinemet was discontinued but still persisted with just occasional visual hallucinations. She was eventually admitted to [facility] for the better part of June 2019  Her meds were significantly adjusted  Her neurologic symptoms dramatically improved and remains on Namenda.   And that is prescribed by her psychiatrist.      From a mental health perspective she remains stable her mood is good  And per the daughters report the patient is no longer stating she wants to end her life and her outlook is positive      [facility] languare is [facility]  Interim Data:     Went to mental health inpt: steve gonzales at [facility] [employer] for depression bipolar    Scarred to walking fear of falling    Memory: ADls  Likes to dust and uses swifer cleans bathroom  functinal level about the same  Watches Tv and chrocette     Did not tolerate sinemet      Flowsheets    3 most recent PHQ Screens 1/15/2020   PHQ Not Done -   Little interest or pleasure in doing things Not at all   Feeling down, depressed, irritable, or hopeless Not at all   Total Score PHQ 2 0       Fall Risk Assessment, last 12 mths 1/15/2020   Able to walk? Yes   Fall in past 12 months? No   Fall with injury? -   Number of falls in past 12 months -   Fall Risk Score -       No flowsheet data found. Past Medical History:   Diagnosis Date    Depression     psychiatrist: Dr Ar Shukla (on Lithium)    Fracture of wrist     slipped and fx left wrist, surgery scheduled 12/21/16    Full dentures     upper and lower    Hypothyroid     Parkinson's disease (tremor, stiffness, slow motion, unstable posture) ([facility] [address] 5/23/2019       Social History     Socioeconomic History    Marital status:      Spouse name: Not on file    Number of children: Not on file    Years of education: Not on file    Highest education level: Not on file   Tobacco Use    Smoking status: Former Smoker    Smokeless tobacco: Never Used   Substance and Sexual Activity    Alcohol use: No    Drug use: No    Sexual activity: Not Currently       Vaping:   [] Currently uses  [x] Denies use   [] History of use     Patient does not qualify to have social determinant information on file (likely too young).        Family History   Problem Relation Age of Onset    Other Mother         Inefficient wound healing    Cancer Sister         Stomach CA    Diabetes Brother     Alzheimer Sister     Hypertension Daughter     Thyroid Disease Daughter     Other Son         Hip replacement    Depression Son     Liver Disease Son     Diabetes Son     Heart Disease Son     Depression Son        Past Surgical History:   Procedure Laterality Date   Alesha Sanjuanitaazael KYPHOPLASTY  2013    HX ORTHOPAEDIC  12/2016    Wrist surgery    HX WRIST FRACTURE TX Left 12/2016       Current Outpatient Medications   Medication Sig    mirtazapine (REMERON) 15 mg tablet Take  by mouth nightly. Take one and one half tablets daily    levothyroxine (SYNTHROID) 75 mcg tablet TAKE 1 TABLET DAILY BEFORE BREAKFAST    ARIPiprazole (ABILIFY) 5 mg tablet Take 5 mg by mouth nightly.  PARoxetine (PAXIL) 20 mg tablet Take 20 mg by mouth daily.  memantine (NAMENDA) 10 mg tablet Take 1 Tab by mouth daily. (Patient taking differently: Take 10 mg by mouth nightly.)    lithium CR (ESKALITH CR) 450 mg CR tablet Take 450 mg by mouth nightly. No current facility-administered medications for this visit. Other Pertinent Testing  Not applicable    No visits with results within 3 Month(s) from this visit. Latest known visit with results is:   Admission on 06/04/2019, Discharged on 06/04/2019   Component Date Value    Ventricular Rate 06/04/2019 63     Atrial Rate 06/04/2019 63     P-R Interval 06/04/2019 200     QRS Duration 06/04/2019 86     Q-T Interval 06/04/2019 422     QTC Calculation (Bezet) 06/04/2019 431     Calculated R Axis 06/04/2019 -19     Calculated T Axis 06/04/2019 0     Diagnosis 06/04/2019                      Value:Normal sinus rhythm  Normal ECG  When compared with ECG of 18-MAR-2019 03:34,  No significant change was found  Confirmed by Crispin Ying ([id number]) on 6/5/2019 8:52:42 AM      WBC 06/04/2019 6.6     RBC 06/04/2019 4.79     HGB 06/04/2019 13.8     HCT 06/04/2019 42.8     MCV 06/04/2019 89.4     MCH 06/04/2019 28.8     MCHC 06/04/2019 32.2     RDW 06/04/2019 12.6     PLATELET 16/04/6587 100     MPV 06/04/2019 10.5     NRBC 06/04/2019 0.0     ABSOLUTE NRBC 06/04/2019 0.00     NEUTROPHILS 06/04/2019 51     LYMPHOCYTES 06/04/2019 33     MONOCYTES 06/04/2019 9     EOSINOPHILS 06/04/2019 6     BASOPHILS 06/04/2019 1     IMMATURE GRANULOCYTES 06/04/2019 0     ABS. NEUTROPHILS 06/04/2019 3.3     ABS. LYMPHOCYTES 06/04/2019 2.2     ABS. MONOCYTES 06/04/2019 0.6     ABS.  EOSINOPHILS 06/04/2019 0. 4     ABS. BASOPHILS 06/04/2019 0.0     ABS. IMM. GRANS. 06/04/2019 0.0     DF 06/04/2019 AUTOMATED     Sodium 06/04/2019 140     Potassium 06/04/2019 4.0     Chloride 06/04/2019 108     CO2 06/04/2019 28     Anion gap 06/04/2019 4*    Glucose 06/04/2019 93     BUN 06/04/2019 16     Creatinine 06/04/2019 0.98     BUN/Creatinine ratio 06/04/2019 16     GFR est AA 06/04/2019 >60     GFR est non-AA 06/04/2019 53*    Calcium 06/04/2019 8.6     Bilirubin, total 06/04/2019 0.2     ALT (SGPT) 06/04/2019 17     AST (SGOT) 06/04/2019 19     Alk. phosphatase 06/04/2019 134*    Protein, total 06/04/2019 7.0     Albumin 06/04/2019 3.7     Globulin 06/04/2019 3.3     A-G Ratio 06/04/2019 1.1     Color 06/04/2019 DARK YELLOW     Appearance 06/04/2019 CLOUDY*    Specific gravity 06/04/2019 1.030     pH (UA) 06/04/2019 5.0     Protein 06/04/2019 TRACE*    Glucose 06/04/2019 NEGATIVE      Ketone 06/04/2019 TRACE*    Blood 06/04/2019 NEGATIVE      Urobilinogen 06/04/2019 1.0     Nitrites 06/04/2019 NEGATIVE      Leukocyte Esterase 06/04/2019 MODERATE*    WBC 06/04/2019 5-10     RBC 06/04/2019 0-5     Epithelial cells 06/04/2019 FEW     Bacteria 06/04/2019 NEGATIVE      UA:UC IF INDICATED 06/04/2019 URINE CULTURE ORDERED*    Mucus 06/04/2019 TRACE*    CA Oxalate crystals 06/04/2019 1+*    Hyaline cast 06/04/2019 10-20     Lithium level 06/04/2019 1.19     Reported dose date: 06/04/2019 NOT PROVIDED     Reported dose time: 06/04/2019 NOT PROVIDED     Reported dose: 06/04/2019 NOT PROVIDED     Troponin-I, Qt. 06/04/2019 <0.05     SAMPLES BEING HELD 06/04/2019 1RED     COMMENT 06/04/2019 Add-on orders for these samples will be processed based on acceptable specimen integrity and analyte stability, which may vary by analyte.      Bilirubin UA, confirm 06/04/2019 NEGATIVE      Special Requests: 06/04/2019                      Value:NO SPECIAL REQUESTS  Reflexed from [facility] Count 06/04/2019                      Value:[id number]  COLONIES/mL      Culture result: 06/04/2019 MIXED UROGENITAL VARGHESE ISOLATED        XR Results (maximum last 3): Results from [place] encounter on 06/04/19   XR CHEST PORT    Impression IMPRESSION: No acute findings. Hiatal hernia and chronic findings as above. Results from [place] encounter on 05/13/19   XR HIP LT W OR WO PELV 2-3 VWS    Impression IMPRESSION: Healing instrumented left hip fracture. No new finding. Results from Appointment encounter on 05/01/19   XR ANKLE LT MIN 3 V    Impression IMPRESSION: No evidence of fracture or dislocation. CT Results (maximum last 3): Results from [place] encounter on 06/04/19   CT HEAD WO CONT    Impression IMPRESSION: No acute intracranial abnormality. Results from [place] encounter on 05/13/19   CT PELV WO CONT    Impression IMPRESSION:  1. Partially healed left hip fracture. Osteopenia with no new fracture  demonstrated             CT SPINE LUMB WO CONT    Impression IMPRESSION:  1. No acute finding. 2. Kyphoplasty treated fractures of T12 and L1.  3. 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 4. Multilevel degenerative disease. MRI Results (maximum last 3): No results found for this or any previous visit. Nuclear Medicine Results (maximum last 3): No results found for this or any previous visit. US Results (maximum last 3): Results from [place] encounter on 07/28/10   US ABDOMEN COMPLETE       IR Results (maximum last 3): No results found for this or any previous visit. VAS/US Results (maximum last 3): Results from [place] encounter on 02/01/18   DUPLEX CAROTID BILATERAL         Review of Systems   Constitutional: Negative. HENT: Negative. Eyes: Negative. Respiratory: Negative. Cardiovascular: Negative. Gastrointestinal: Negative. Genitourinary: Negative.     Musculoskeletal: Negative. Skin: Negative. Neurological: Negative. Psychiatric/Behavioral: Positive for depression. Stable since mental health admission           Physical Exam  Constitutional:       General: She is awake. She is not in acute distress. Appearance: Normal appearance. She is normal weight. She is not ill-appearing. HENT:      Head: Normocephalic and atraumatic. Eyes:      General: Lids are normal. Gaze aligned appropriately. Extraocular Movements: Extraocular movements intact. Conjunctiva/sclera: Conjunctivae normal.   Neck:      Musculoskeletal: Normal range of motion and neck supple. Pulmonary:      Effort: Pulmonary effort is normal.      Breath sounds: Normal breath sounds. Skin:     General: Skin is warm and dry. Coloration: Skin is cyanotic. Skin is not ashen, jaundiced, mottled or pale. Findings: No abrasion, bruising or ecchymosis. Neurological:      Mental Status: She is alert. Cranial Nerves: Cranial nerves are intact. Coordination: Coordination is intact. Gait: Gait normal.      Comments: Using a three-point walker for stability   Psychiatric:         Attention and Perception: Attention and perception normal.         Mood and Affect: Mood and affect normal.         Speech: Speech normal.         Behavior: Behavior normal. Behavior is cooperative. Thought Content: Thought content normal.              Assessment and Plan  Patient is dramatically improved from a neurologic point perspective once her mental health condition stabilized. Medications were dramatically adjusted. She no longer demonstrates issues related to possible movement disorder. There are no other significant associated symptoms. Hallucinations have dramatically improved  Mental health is providing prescriptions for the Namenda and even her cognition has improved once her mental health issues stabilized.     Both patient and daughter would like to come back as needed since they were under the care of mental health who is prescribing all of their medications including the Namenda. I think this is reasonable. But we have been happy to see them back if needed. Problem List Items Addressed This Visit        Nervous    Acute metabolic encephalopathy    Relevant Medications    mirtazapine (REMERON) 15 mg tablet    RESOLVED: Late onset Alzheimer's disease with behavioral disturbance (HCC)    Relevant Medications    mirtazapine (REMERON) 15 mg tablet    RESOLVED: Benign essential tremor syndrome    Relevant Medications    mirtazapine (REMERON) 15 mg tablet    RESOLVED: Parkinson's disease (tremor, stiffness, slow motion, unstable posture) (HCC)    Relevant Medications    mirtazapine (REMERON) 15 mg tablet       Other    Altered mental status, unspecified - Primary                      Follow-up and Dispositions    · Return if symptoms worsen or fail to improve.            Olga Zayas, MS, ANP-BC, MSCN, CNRN

## 2020-01-15 NOTE — LETTER
1/16/20 Patient: Yoan Collado YOB: 1926 Date of Visit: 1/15/2020 Susi Cárdenas MD 
[facility] [address] [id number] VIA In Basket Dear Susi Cárdenas MD, Thank you for referring Ms. Jadyn Garcia to [address] [place] [address] for evaluation. My notes for this consultation are attached. If you have questions, please do not hesitate to call me. I look forward to following your patient along with you. Sincerely, Estephanie Rasmussen NP

## 2020-01-15 NOTE — PATIENT INSTRUCTIONS
Preventing Falls: Care Instructions  Your Care Instructions    Getting around your home safely can be a challenge if you have injuries or health problems that make it easy for you to fall. Loose rugs and furniture in walkways are among the dangers for many older people who have problems walking or who have poor eyesight. People who have conditions such as arthritis, osteoporosis, or dementia also have to be careful not to fall. You can make your home safer with a few simple measures. Follow-up care is a key part of your treatment and safety. Be sure to make and go to all appointments, and call your doctor if you are having problems. It's also a good idea to know your test results and keep a list of the medicines you take. How can you care for yourself at home? Taking care of yourself  · You may get dizzy if you do not drink enough water. To prevent dehydration, drink plenty of fluids, enough so that your urine is light yellow or clear like water. Choose water and other caffeine-free clear liquids. If you have kidney, heart, or liver disease and have to limit fluids, talk with your doctor before you increase the amount of fluids you drink. · Exercise regularly to improve your strength, muscle tone, and balance. Walk if you can. Swimming may be a good choice if you cannot walk easily. · Have your vision and hearing checked each year or any time you notice a change. If you have trouble seeing and hearing, you might not be able to avoid objects and could lose your balance. · Know the side effects of the medicines you take. Ask your doctor or pharmacist whether the medicines you take can affect your balance. Sleeping pills or sedatives can affect your balance. · Limit the amount of alcohol you drink. Alcohol can impair your balance and other senses. · Ask your doctor whether calluses or corns on your feet need to be removed.  If you wear loose-fitting shoes because of calluses or corns, you can lose your balance and fall. · Talk to your doctor if you have numbness in your feet. Preventing falls at home  · Remove raised doorway thresholds, throw rugs, and clutter. Repair loose carpet or raised areas in the floor. · Move furniture and electrical cords to keep them out of walking paths. · Use nonskid floor wax, and wipe up spills right away, especially on ceramic tile floors. · If you use a walker or cane, put rubber tips on it. If you use crutches, clean the bottoms of them regularly with an abrasive pad, such as steel wool. · Keep your house well lit, especially [place], and outside walkways. Use night-lights in areas such as hallways and bathrooms. Add extra light switches or use remote switches (such as switches that go on or off when you clap your hands) to make it easier to turn lights on if you have to get up during the night. · Install sturdy handrails on stairways. · Move items in your cabinets so that the things you use a lot are on the lower shelves (about waist level). · Keep a cordless phone and a flashlight with new batteries by your bed. If possible, put a phone in each of the main rooms of your house, or carry a cell phone in case you fall and cannot reach a phone. Or, you can wear a device around your neck or wrist. You push a button that sends a signal for help. · Wear low-heeled shoes that fit well and give your feet good support. Use footwear with nonskid soles. Check the heels and soles of your shoes for wear. Repair or replace worn heels or soles. · Do not wear socks without shoes on wood floors. · Walk on the grass when the sidewalks are slippery. If you live in an area that gets snow and ice in the winter, sprinkle salt on slippery steps and sidewalks. Preventing falls in the bath  · Install grab bars and nonskid mats inside and outside your shower or tub and near the toilet and sinks. · Use shower chairs and bath benches.   · Use a hand-held shower head that will allow you to sit while showering. · Get into a tub or shower by putting the weaker leg in first. Get out of a tub or shower with your strong side first.  · Repair loose toilet seats and consider installing a raised toilet seat to make getting on and off the toilet easier. · Keep your bathroom door unlocked while you are in the shower. Where can you learn more? Go to http://ro-lizz.info/. Enter 0476 [id number] in the search box to learn more about \"Preventing Falls: Care Instructions. \"  Current as of: November 7, 2018  Content Version: 12.2  © 6131-6762 [employer]. Care instructions adapted under license by [employer] (which disclaims liability or warranty for this information). If you have questions about a medical condition or this instruction, always ask your healthcare professional. [place] [address] any warranty or liability for your use of this information. Preventing Outdoor Falls: Care Instructions  Your Care Instructions    Worries about falls don't need to keep you indoors. Outdoor activities like walking have big benefits for your health. You will need to watch your step and learn a few safety measures. If you are worried about having a fall outdoors, ask your doctor about exercises, classes, or physical therapy that may help. You can learn ways to gain strength, flexibility, and balance. Ask if it might help to use a cane or walker. You can make your time outdoors safer with a few simple measures. Follow-up care is a key part of your treatment and safety. Be sure to make and go to all appointments, and call your doctor if you are having problems. It's also a good idea to know your test results and keep a list of the medicines you take. How can you prevent falls outdoors? · Wear shoes with firm soles and low heels. If you have to walk on an icy surface, use grippers that can be worn over your shoes in bad weather.   · Be extra careful if weather is bad. Walk on the grass when the sidewalks are slick. If you live in a place that gets snow and ice in the winter, sprinkle salt on slippery stairs and sidewalks. · Be careful getting on or off buses and trains or getting in and out of cars. If handrails are available, use them. · Be careful when you cross the street. Look for crosswalks or places where curb cuts or ramps are present. · Try not to hurry, especially if you are carrying something. · Be cautious in parking lots or garages. There may be curbs or changes in pavement, or the height of the pavement may vary. · Make sure to wear the correct eyeglasses, if you need them. Reading glasses or bifocals can make it harder to see hazards that might be in your way. · If you are walking outdoors for exercise, try to:  ? Walk in well-lighted, well-maintained areas. These include high school or college tracks, shopping malls, and public spaces. ? Walk with a partner. ? Watch out for cracked sidewalks, curbs, changes in the height of the pavement, exposed tree roots, and debris such as fallen leaves or branches. Where can you learn more? Go to http://ro-lizz.info/. Enter D260 in the search box to learn more about \"Preventing Outdoor Falls: Care Instructions. \"  Current as of: November 7, 2018  Content Version: 12.2  © 4278-6174 [employer]. Care instructions adapted under license by [employer] (which disclaims liability or warranty for this information). If you have questions about a medical condition or this instruction, always ask your healthcare professional. [place] [address] any warranty or liability for your use of this information.          How to Get Up Safely After a Fall: Care Instructions  Your Care Instructions    If you have injuries, health problems, or other reasons that may make it easy for you to fall at home, it is a good idea to learn how to get up safely after a fall. Learning how to get up correctly can help you avoid making an injury worse. Also, knowing what to do if you cannot get up can help you stay safe until help arrives. Follow-up care is a key part of your treatment and safety. Be sure to make and go to all appointments, and call your doctor if you are having problems. It's also a good idea to know your test results and keep a list of the medicines you take. How can you care for yourself after a fall? If you think you can get up  First lie still for a few minutes and think about how you feel. If your body feels okay and you think you can get up safely, follow the rest of the steps below:  1. Look for a chair or other piece of furniture that is close to you. 2. Roll onto your side and rest. Roll by turning your head in the direction you want to roll, move your shoulder and arm, then hip and leg in the same direction. 3. Lie still for a moment to let your blood pressure adjust.  4. Slowly push your upper body up, lift your head, and take a moment to rest.  5. Slowly get up on your hands and knees, and crawl to the chair or other stable piece of furniture. 6. Put your hands on the chair. 7. Move one foot forward, and place it flat on the floor. Your other leg should be bent with the knee on the floor. 8. Rise slowly, turn your body, and sit in the chair. Stay seated for a bit and think about how you feel. Call for help. Even if you feel okay, let someone know what happened to you. You might not know that you have a serious injury. If you cannot get up  1. If you think you are injured after a fall or you cannot get up, try not to panic. 2. Call out for help. 3. If you have a phone within reach or you have an emergency call device, use it to call for help. 4. If you do not have a phone within reach, try to slide yourself toward it. If you cannot get to the phone, try to slide toward a door or window or a place where you think you can be heard.   5. Pender or use an object to make noise so someone might hear you. 6. If you can reach something that you can use for a pillow, place it under your head. Try to stay warm by covering yourself with a blanket or clothing while you wait for help. When should you call for help? Call 911 anytime you think you may need emergency care. For example, call if:    · You passed out (lost consciousness).     · You cannot get up after a fall.     · You have severe pain.    Call your doctor now or seek immediate medical care if:    · You have new or worse pain.     · You are dizzy or lightheaded.     · You hit your head.    Watch closely for changes in your health, and be sure to contact your doctor if:    · You do not get better as expected. Where can you learn more? Go to http://ro-lizz.info/. Enter A909 in the search box to learn more about \"How to Get Up Safely After a Fall: Care Instructions. \"  Current as of: November 7, 2018  Content Version: 12.2  © 2213-5978 [employer]. Care instructions adapted under license by [employer] (which disclaims liability or warranty for this information). If you have questions about a medical condition or this instruction, always ask your healthcare professional. [address] any warranty or liability for your use of this information.

## 2020-01-16 PROBLEM — G25.0 BENIGN ESSENTIAL TREMOR SYNDROME: Status: RESOLVED | Noted: 2018-02-02 | Resolved: 2020-01-16

## 2020-01-16 PROBLEM — F02.818 LATE ONSET ALZHEIMER'S DISEASE WITH BEHAVIORAL DISTURBANCE (HCC): Status: RESOLVED | Noted: 2018-02-02 | Resolved: 2020-01-16

## 2020-01-16 PROBLEM — G20 PARKINSON'S DISEASE (TREMOR, STIFFNESS, SLOW MOTION, UNSTABLE POSTURE) (HCC): Status: RESOLVED | Noted: 2019-05-23 | Resolved: 2020-01-16

## 2020-01-16 PROBLEM — G30.1 LATE ONSET ALZHEIMER'S DISEASE WITH BEHAVIORAL DISTURBANCE (HCC): Status: RESOLVED | Noted: 2018-02-02 | Resolved: 2020-01-16

## 2020-12-10 ENCOUNTER — TELEPHONE (OUTPATIENT)
Dept: INTERNAL MEDICINE CLINIC | Age: 85
End: 2020-12-10

## 2020-12-10 NOTE — TELEPHONE ENCOUNTER
Dian//[facility] states she needs a call back to discuss patient complaints of Nausea, feels like a Knot in her Stomach & very anxious & worrying all the time. Patient & Daughter would like to discuss Hospice Care. Please call Catarino Marvin @ [phone].  Thank you

## 2020-12-10 NOTE — TELEPHONE ENCOUNTER
Spoke with Yves made aware that patient has not been seen by Dr. Flora Humphrey since 5/1/19. According to [facility] patient was recently discharged form [facility] and has Home Health . [facility] will find out what provider patient has been seeing.

## 2020-12-11 RX ORDER — LEVOTHYROXINE SODIUM 75 UG/1
TABLET ORAL
Qty: 90 TAB | Refills: 3 | Status: SHIPPED | OUTPATIENT
Start: 2020-12-11

## 2021-01-01 ENCOUNTER — TELEPHONE (OUTPATIENT)
Dept: INTERNAL MEDICINE CLINIC | Age: 86
End: 2021-01-01

## 2021-01-01 ENCOUNTER — APPOINTMENT (OUTPATIENT)
Dept: CT IMAGING | Age: 86
DRG: 291 | End: 2021-01-01
Attending: STUDENT IN AN ORGANIZED HEALTH CARE EDUCATION/TRAINING PROGRAM
Payer: MEDICARE

## 2021-01-01 ENCOUNTER — VIRTUAL VISIT (OUTPATIENT)
Dept: INTERNAL MEDICINE CLINIC | Age: 86
End: 2021-01-01
Payer: MEDICARE

## 2021-01-01 ENCOUNTER — PATIENT MESSAGE (OUTPATIENT)
Dept: INTERNAL MEDICINE CLINIC | Age: 86
End: 2021-01-01

## 2021-01-01 ENCOUNTER — APPOINTMENT (OUTPATIENT)
Dept: NUCLEAR MEDICINE | Age: 86
DRG: 291 | End: 2021-01-01
Attending: INTERNAL MEDICINE
Payer: MEDICARE

## 2021-01-01 ENCOUNTER — APPOINTMENT (OUTPATIENT)
Dept: CT IMAGING | Age: 86
End: 2021-01-01
Attending: EMERGENCY MEDICINE
Payer: MEDICARE

## 2021-01-01 ENCOUNTER — APPOINTMENT (OUTPATIENT)
Dept: CT IMAGING | Age: 86
DRG: 291 | End: 2021-01-01
Attending: INTERNAL MEDICINE
Payer: MEDICARE

## 2021-01-01 ENCOUNTER — OFFICE VISIT (OUTPATIENT)
Dept: CARDIOLOGY CLINIC | Age: 86
End: 2021-01-01
Payer: MEDICARE

## 2021-01-01 ENCOUNTER — IMMUNIZATION (OUTPATIENT)
Dept: FAMILY MEDICINE CLINIC | Age: 86
End: 2021-01-01
Payer: MEDICARE

## 2021-01-01 ENCOUNTER — PATIENT OUTREACH (OUTPATIENT)
Dept: CASE MANAGEMENT | Age: 86
End: 2021-01-01

## 2021-01-01 ENCOUNTER — APPOINTMENT (OUTPATIENT)
Dept: NON INVASIVE DIAGNOSTICS | Age: 86
DRG: 291 | End: 2021-01-01
Attending: INTERNAL MEDICINE
Payer: MEDICARE

## 2021-01-01 ENCOUNTER — CLINICAL SUPPORT (OUTPATIENT)
Dept: INTERNAL MEDICINE CLINIC | Age: 86
End: 2021-01-01

## 2021-01-01 ENCOUNTER — APPOINTMENT (OUTPATIENT)
Dept: GENERAL RADIOLOGY | Age: 86
DRG: 291 | End: 2021-01-01
Attending: EMERGENCY MEDICINE
Payer: MEDICARE

## 2021-01-01 ENCOUNTER — HOSPITAL ENCOUNTER (INPATIENT)
Age: 86
LOS: 4 days | Discharge: HOME HEALTH | DRG: 291 | End: 2021-04-06
Attending: EMERGENCY MEDICINE | Admitting: STUDENT IN AN ORGANIZED HEALTH CARE EDUCATION/TRAINING PROGRAM
Payer: MEDICARE

## 2021-01-01 ENCOUNTER — HOSPITAL ENCOUNTER (EMERGENCY)
Age: 86
Discharge: HOME | End: 2021-11-23
Attending: EMERGENCY MEDICINE
Payer: MEDICARE

## 2021-01-01 VITALS
HEIGHT: 64 IN | WEIGHT: 129 LBS | OXYGEN SATURATION: 97 % | BODY MASS INDEX: 22.02 KG/M2 | TEMPERATURE: 97 F | DIASTOLIC BLOOD PRESSURE: 77 MMHG | SYSTOLIC BLOOD PRESSURE: 119 MMHG | RESPIRATION RATE: 16 BRPM | HEART RATE: 69 BPM

## 2021-01-01 VITALS
DIASTOLIC BLOOD PRESSURE: 72 MMHG | BODY MASS INDEX: 21.72 KG/M2 | WEIGHT: 127.2 LBS | RESPIRATION RATE: 16 BRPM | OXYGEN SATURATION: 94 % | SYSTOLIC BLOOD PRESSURE: 130 MMHG | HEART RATE: 65 BPM | HEIGHT: 64 IN

## 2021-01-01 VITALS
HEIGHT: 64 IN | DIASTOLIC BLOOD PRESSURE: 49 MMHG | BODY MASS INDEX: 22.09 KG/M2 | HEART RATE: 58 BPM | OXYGEN SATURATION: 98 % | RESPIRATION RATE: 20 BRPM | SYSTOLIC BLOOD PRESSURE: 136 MMHG | WEIGHT: 129.41 LBS

## 2021-01-01 VITALS
WEIGHT: 130.62 LBS | DIASTOLIC BLOOD PRESSURE: 77 MMHG | TEMPERATURE: 99.1 F | RESPIRATION RATE: 18 BRPM | HEART RATE: 68 BPM | BODY MASS INDEX: 22.3 KG/M2 | OXYGEN SATURATION: 93 % | HEIGHT: 64 IN | SYSTOLIC BLOOD PRESSURE: 142 MMHG

## 2021-01-01 DIAGNOSIS — R29.898 WEAKNESS OF BOTH LOWER EXTREMITIES: ICD-10-CM

## 2021-01-01 DIAGNOSIS — J81.0 ACUTE PULMONARY EDEMA (HCC): ICD-10-CM

## 2021-01-01 DIAGNOSIS — R62.51 FAILURE TO THRIVE (CHILD): ICD-10-CM

## 2021-01-01 DIAGNOSIS — L03.115 CELLULITIS OF RIGHT LOWER EXTREMITY: ICD-10-CM

## 2021-01-01 DIAGNOSIS — S06.2X0D: Primary | ICD-10-CM

## 2021-01-01 DIAGNOSIS — I21.4 NSTEMI (NON-ST ELEVATED MYOCARDIAL INFARCTION) (HCC): ICD-10-CM

## 2021-01-01 DIAGNOSIS — F03.91 DEMENTIA WITH BEHAVIORAL DISTURBANCE, UNSPECIFIED DEMENTIA TYPE: Primary | ICD-10-CM

## 2021-01-01 DIAGNOSIS — J96.01 ACUTE RESPIRATORY FAILURE WITH HYPOXIA (HCC): Primary | ICD-10-CM

## 2021-01-01 DIAGNOSIS — R29.6 FREQUENT FALLS: Primary | ICD-10-CM

## 2021-01-01 DIAGNOSIS — E03.1 CONGENITAL HYPOTHYROIDISM WITHOUT GOITER: Primary | ICD-10-CM

## 2021-01-01 DIAGNOSIS — Z23 ENCOUNTER FOR IMMUNIZATION: Primary | ICD-10-CM

## 2021-01-01 DIAGNOSIS — I50.32 CHRONIC DIASTOLIC CONGESTIVE HEART FAILURE (HCC): ICD-10-CM

## 2021-01-01 DIAGNOSIS — I65.23 BILATERAL CAROTID ARTERY STENOSIS: Primary | ICD-10-CM

## 2021-01-01 DIAGNOSIS — Z09 HOSPITAL DISCHARGE FOLLOW-UP: ICD-10-CM

## 2021-01-01 DIAGNOSIS — I50.33 ACUTE ON CHRONIC DIASTOLIC CONGESTIVE HEART FAILURE (HCC): ICD-10-CM

## 2021-01-01 DIAGNOSIS — M15.9 OSTEOARTHRITIS OF MULTIPLE JOINTS, UNSPECIFIED OSTEOARTHRITIS TYPE: ICD-10-CM
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
## 2021-01-01 DIAGNOSIS — N30.00 ACUTE CYSTITIS WITHOUT HEMATURIA: ICD-10-CM

## 2021-01-01 DIAGNOSIS — F03.90 DEMENTIA WITHOUT BEHAVIORAL DISTURBANCE, UNSPECIFIED DEMENTIA TYPE: ICD-10-CM

## 2021-01-01 DIAGNOSIS — S01.01XA LACERATION OF SCALP, INITIAL ENCOUNTER: ICD-10-CM

## 2021-01-01 DIAGNOSIS — F03.90 DEMENTIA WITHOUT BEHAVIORAL DISTURBANCE, UNSPECIFIED DEMENTIA TYPE: Primary | ICD-10-CM

## 2021-01-01 DIAGNOSIS — N30.00 ACUTE CYSTITIS WITHOUT HEMATURIA: Primary | ICD-10-CM

## 2021-01-01 DIAGNOSIS — E03.8 OTHER SPECIFIED HYPOTHYROIDISM: ICD-10-CM

## 2021-01-01 LAB
ALBUMIN SERPL-MCNC: 3.4 G/DL (ref 3.5–5)
ALBUMIN/GLOB SERPL: 0.8 {RATIO} (ref 1.1–2.2)
ALP SERPL-CCNC: 128 U/L (ref 45–117)
ALT SERPL-CCNC: 22 U/L (ref 12–78)
ANION GAP SERPL CALC-SCNC: 1 MMOL/L (ref 5–15)
ANION GAP SERPL CALC-SCNC: 3 MMOL/L (ref 5–15)
ANION GAP SERPL CALC-SCNC: 4 MMOL/L (ref 5–15)
ANION GAP SERPL CALC-SCNC: 6 MMOL/L (ref 5–15)
AST SERPL-CCNC: 33 U/L (ref 15–37)
ATRIAL RATE: 90 BPM
AV R PG: 22.89 MMHG
B PERT DNA SPEC QL NAA+PROBE: NOT DETECTED
BASOPHILS # BLD: 0.1 K/UL (ref 0–0.1)
BASOPHILS NFR BLD: 1 % (ref 0–1)
BILIRUB SERPL-MCNC: 0.7 MG/DL (ref 0.2–1)
BNP SERPL-MCNC: ABNORMAL PG/ML
BORDETELLA PARAPERTUSSIS PCR, BORPAR: NOT DETECTED
BUN SERPL-MCNC: 21 MG/DL (ref 6–20)
BUN SERPL-MCNC: 21 MG/DL (ref 6–20)
BUN SERPL-MCNC: 24 MG/DL (ref 6–20)
BUN SERPL-MCNC: 25 MG/DL (ref 6–20)
BUN/CREAT SERPL: 17 (ref 12–20)
BUN/CREAT SERPL: 20 (ref 12–20)
BUN/CREAT SERPL: 22 (ref 12–20)
BUN/CREAT SERPL: 22 (ref 12–20)
C PNEUM DNA SPEC QL NAA+PROBE: NOT DETECTED
CALCIUM SERPL-MCNC: 7.9 MG/DL (ref 8.5–10.1)
CALCIUM SERPL-MCNC: 8 MG/DL (ref 8.5–10.1)
CALCIUM SERPL-MCNC: 8.1 MG/DL (ref 8.5–10.1)
CALCIUM SERPL-MCNC: 8.6 MG/DL (ref 8.5–10.1)
CALCULATED P AXIS, ECG09: 13 DEGREES
CALCULATED R AXIS, ECG10: -30 DEGREES
CALCULATED T AXIS, ECG11: -48 DEGREES
CHLORIDE SERPL-SCNC: 109 MMOL/L (ref 97–108)
CHLORIDE SERPL-SCNC: 109 MMOL/L (ref 97–108)
CHLORIDE SERPL-SCNC: 110 MMOL/L (ref 97–108)
CHLORIDE SERPL-SCNC: 111 MMOL/L (ref 97–108)
CK MB CFR SERPL CALC: 2.9 % (ref 0–2.5)
CK MB SERPL-MCNC: 3.8 NG/ML (ref 5–25)
CK SERPL-CCNC: 131 U/L (ref 26–192)
CO2 SERPL-SCNC: 25 MMOL/L (ref 21–32)
CO2 SERPL-SCNC: 26 MMOL/L (ref 21–32)
CO2 SERPL-SCNC: 28 MMOL/L (ref 21–32)
CO2 SERPL-SCNC: 29 MMOL/L (ref 21–32)
COVID-19 RAPID TEST, COVR: NOT DETECTED
CREAT SERPL-MCNC: 1.03 MG/DL (ref 0.55–1.02)
CREAT SERPL-MCNC: 1.07 MG/DL (ref 0.55–1.02)
CREAT SERPL-MCNC: 1.15 MG/DL (ref 0.55–1.02)
CREAT SERPL-MCNC: 1.24 MG/DL (ref 0.55–1.02)
D DIMER PPP FEU-MCNC: 4.32 MG/L FEU (ref 0–0.65)
DATE LAST DOSE: ABNORMAL
DATE LAST DOSE: NORMAL
DIAGNOSIS, 93000: NORMAL
DIFFERENTIAL METHOD BLD: ABNORMAL
ECHO AO ROOT DIAM: 2.95 CM
ECHO AR MAX VEL PISA: 233.77 CM/S
ECHO AV AREA PEAK VELOCITY: 1.62 CM2
ECHO AV AREA PEAK VELOCITY: 1.63 CM2
ECHO AV AREA PEAK VELOCITY: 1.71 CM2
ECHO AV AREA PEAK VELOCITY: 1.73 CM2
ECHO AV AREA VTI: 2.12 CM2
ECHO AV AREA/BSA VTI: 1.3 CM2/M2
ECHO AV MEAN GRADIENT: 2.6 MMHG
ECHO AV PEAK GRADIENT: 4.5 MMHG
ECHO AV PEAK GRADIENT: 5.03 MMHG
ECHO AV PEAK VELOCITY: 105.87 CM/S
ECHO AV PEAK VELOCITY: 111.87 CM/S
ECHO AV REGURGITANT PHT: 1329.98 MS
ECHO AV VTI: 21.05 CM
ECHO EST RA PRESSURE: 10 MMHG
ECHO LA MAJOR AXIS: 2.37 CM
ECHO LA MINOR AXIS: 1.44 CM
ECHO LV E' LATERAL VELOCITY: 7.73 CM/S
ECHO LV E' SEPTAL VELOCITY: 3.29 CM/S
ECHO LV INTERNAL DIMENSION DIASTOLIC: 3.45 CM (ref 3.9–5.3)
ECHO LV INTERNAL DIMENSION SYSTOLIC: 2.55 CM
ECHO LV IVSD: 1.02 CM (ref 0.6–0.9)
ECHO LV MASS 2D: 102.6 G (ref 67–162)
ECHO LV MASS INDEX 2D: 62.2 G/M2 (ref 43–95)
ECHO LV POSTERIOR WALL DIASTOLIC: 1 CM (ref 0.6–0.9)
ECHO LVOT CARDIAC OUTPUT: 2.47 LITER/MINUTE
ECHO LVOT DIAM: 1.63 CM
ECHO LVOT PEAK GRADIENT: 2.97 MMHG
ECHO LVOT PEAK GRADIENT: 3.03 MMHG
ECHO LVOT PEAK VELOCITY: 86.23 CM/S
ECHO LVOT PEAK VELOCITY: 87.07 CM/S
ECHO LVOT SV: 44.7 ML
ECHO LVOT VTI: 21.29 CM
ECHO MV A VELOCITY: 106.23 CM/S
ECHO MV AREA PHT: 3.49 CM2
ECHO MV AREA VTI: 1.7 CM2
ECHO MV E DECELERATION TIME (DT): 215.9 MS
ECHO MV E VELOCITY: 63.35 CM/S
ECHO MV E/A RATIO: 0.6
ECHO MV E/E' LATERAL: 8.2
ECHO MV E/E' RATIO (AVERAGED): 13.73
ECHO MV E/E' SEPTAL: 19.26
ECHO MV MAX VELOCITY: 93.56 CM/S
ECHO MV MEAN GRADIENT: 1.01 MMHG
ECHO MV PEAK GRADIENT: 3.5 MMHG
ECHO MV PRESSURE HALF TIME (PHT): 62.98 MS
ECHO MV VTI: 26.27 CM
ECHO PV MAX VELOCITY: 52.73 CM/S
ECHO PV PEAK INSTANTANEOUS GRADIENT SYSTOLIC: 1.11 MMHG
ECHO RA AREA 4C: 18.31 CM2
ECHO RIGHT VENTRICULAR SYSTOLIC PRESSURE (RVSP): 41.26 MMHG
ECHO TV REGURGITANT MAX VELOCITY: 277.45 CM/S
ECHO TV REGURGITANT MAX VELOCITY: 277.48 CM/S
ECHO TV REGURGITANT PEAK GRADIENT: 31.26 MMHG
EOSINOPHIL # BLD: 0.5 K/UL (ref 0–0.4)
EOSINOPHIL # BLD: 0.6 K/UL (ref 0–0.4)
EOSINOPHIL # BLD: 0.7 K/UL (ref 0–0.4)
EOSINOPHIL NFR BLD: 11 % (ref 0–7)
EOSINOPHIL NFR BLD: 7 % (ref 0–7)
EOSINOPHIL NFR BLD: 8 % (ref 0–7)
ERYTHROCYTE [DISTWIDTH] IN BLOOD BY AUTOMATED COUNT: 13.2 % (ref 11.5–14.5)
ERYTHROCYTE [DISTWIDTH] IN BLOOD BY AUTOMATED COUNT: 13.2 % (ref 11.5–14.5)
ERYTHROCYTE [DISTWIDTH] IN BLOOD BY AUTOMATED COUNT: 13.5 % (ref 11.5–14.5)
ERYTHROCYTE [DISTWIDTH] IN BLOOD BY AUTOMATED COUNT: 13.6 % (ref 11.5–14.5)
FLUAV H1 2009 PAND RNA SPEC QL NAA+PROBE: NOT DETECTED
FLUAV H1 RNA SPEC QL NAA+PROBE: NOT DETECTED
FLUAV H3 RNA SPEC QL NAA+PROBE: NOT DETECTED
FLUAV SUBTYP SPEC NAA+PROBE: NOT DETECTED
FLUBV RNA SPEC QL NAA+PROBE: NOT DETECTED
GLOBULIN SER CALC-MCNC: 4.2 G/DL (ref 2–4)
GLUCOSE SERPL-MCNC: 109 MG/DL (ref 65–100)
GLUCOSE SERPL-MCNC: 111 MG/DL (ref 65–100)
GLUCOSE SERPL-MCNC: 92 MG/DL (ref 65–100)
GLUCOSE SERPL-MCNC: 99 MG/DL (ref 65–100)
HADV DNA SPEC QL NAA+PROBE: NOT DETECTED
HCOV 229E RNA SPEC QL NAA+PROBE: NOT DETECTED
HCOV HKU1 RNA SPEC QL NAA+PROBE: NOT DETECTED
HCOV NL63 RNA SPEC QL NAA+PROBE: NOT DETECTED
HCOV OC43 RNA SPEC QL NAA+PROBE: NOT DETECTED
HCT VFR BLD AUTO: 34.4 % (ref 35–47)
HCT VFR BLD AUTO: 35.5 % (ref 35–47)
HCT VFR BLD AUTO: 35.9 % (ref 35–47)
HCT VFR BLD AUTO: 38.5 % (ref 35–47)
HGB BLD-MCNC: 10.6 G/DL (ref 11.5–16)
HGB BLD-MCNC: 11 G/DL (ref 11.5–16)
HGB BLD-MCNC: 11 G/DL (ref 11.5–16)
HGB BLD-MCNC: 12.1 G/DL (ref 11.5–16)
HMPV RNA SPEC QL NAA+PROBE: NOT DETECTED
HPIV1 RNA SPEC QL NAA+PROBE: NOT DETECTED
HPIV2 RNA SPEC QL NAA+PROBE: NOT DETECTED
HPIV3 RNA SPEC QL NAA+PROBE: NOT DETECTED
HPIV4 RNA SPEC QL NAA+PROBE: NOT DETECTED
IMM GRANULOCYTES # BLD AUTO: 0 K/UL (ref 0–0.04)
IMM GRANULOCYTES NFR BLD AUTO: 0 % (ref 0–0.5)
IMM GRANULOCYTES NFR BLD AUTO: 0 % (ref 0–0.5)
IMM GRANULOCYTES NFR BLD AUTO: 1 % (ref 0–0.5)
LITHIUM SERPL-SCNC: 0.91 MMOL/L (ref 0.6–1.2)
LITHIUM SERPL-SCNC: 1.4 MMOL/L (ref 0.6–1.2)
LVOT MG: 1.7 MMHG
LYMPHOCYTES # BLD: 1.5 K/UL (ref 0.8–3.5)
LYMPHOCYTES # BLD: 1.8 K/UL (ref 0.8–3.5)
LYMPHOCYTES # BLD: 2.3 K/UL (ref 0.8–3.5)
LYMPHOCYTES NFR BLD: 19 % (ref 12–49)
LYMPHOCYTES NFR BLD: 28 % (ref 12–49)
LYMPHOCYTES NFR BLD: 28 % (ref 12–49)
M PNEUMO DNA SPEC QL NAA+PROBE: NOT DETECTED
MAGNESIUM SERPL-MCNC: 2.4 MG/DL (ref 1.6–2.4)
MCH RBC QN AUTO: 27.5 PG (ref 26–34)
MCH RBC QN AUTO: 27.6 PG (ref 26–34)
MCH RBC QN AUTO: 28.2 PG (ref 26–34)
MCH RBC QN AUTO: 28.5 PG (ref 26–34)
MCHC RBC AUTO-ENTMCNC: 30.6 G/DL (ref 30–36.5)
MCHC RBC AUTO-ENTMCNC: 30.8 G/DL (ref 30–36.5)
MCHC RBC AUTO-ENTMCNC: 31 G/DL (ref 30–36.5)
MCHC RBC AUTO-ENTMCNC: 31.4 G/DL (ref 30–36.5)
MCV RBC AUTO: 88.8 FL (ref 80–99)
MCV RBC AUTO: 90.2 FL (ref 80–99)
MCV RBC AUTO: 90.8 FL (ref 80–99)
MCV RBC AUTO: 91.5 FL (ref 80–99)
MONOCYTES # BLD: 0.7 K/UL (ref 0–1)
MONOCYTES # BLD: 0.8 K/UL (ref 0–1)
MONOCYTES # BLD: 0.9 K/UL (ref 0–1)
MONOCYTES NFR BLD: 10 % (ref 5–13)
MONOCYTES NFR BLD: 11 % (ref 5–13)
MONOCYTES NFR BLD: 11 % (ref 5–13)
NEUTS SEG # BLD: 3.1 K/UL (ref 1.8–8)
NEUTS SEG # BLD: 4.2 K/UL (ref 1.8–8)
NEUTS SEG # BLD: 5.2 K/UL (ref 1.8–8)
NEUTS SEG NFR BLD: 48 % (ref 32–75)
NEUTS SEG NFR BLD: 52 % (ref 32–75)
NEUTS SEG NFR BLD: 63 % (ref 32–75)
NRBC # BLD: 0 K/UL (ref 0–0.01)
NRBC BLD-RTO: 0 PER 100 WBC
P-R INTERVAL, ECG05: 190 MS
PLATELET # BLD AUTO: 151 K/UL (ref 150–400)
PLATELET # BLD AUTO: 171 K/UL (ref 150–400)
PLATELET # BLD AUTO: 183 K/UL (ref 150–400)
PLATELET # BLD AUTO: 193 K/UL (ref 150–400)
PMV BLD AUTO: 10.9 FL (ref 8.9–12.9)
PMV BLD AUTO: 10.9 FL (ref 8.9–12.9)
PMV BLD AUTO: 11 FL (ref 8.9–12.9)
PMV BLD AUTO: 11.1 FL (ref 8.9–12.9)
POTASSIUM SERPL-SCNC: 3.8 MMOL/L (ref 3.5–5.1)
POTASSIUM SERPL-SCNC: 3.8 MMOL/L (ref 3.5–5.1)
POTASSIUM SERPL-SCNC: 3.9 MMOL/L (ref 3.5–5.1)
POTASSIUM SERPL-SCNC: 4.3 MMOL/L (ref 3.5–5.1)
PROCALCITONIN SERPL-MCNC: 0.2 NG/ML
PROT SERPL-MCNC: 7.6 G/DL (ref 6.4–8.2)
Q-T INTERVAL, ECG07: 382 MS
QRS DURATION, ECG06: 86 MS
QTC CALCULATION (BEZET), ECG08: 467 MS
RBC # BLD AUTO: 3.76 M/UL (ref 3.8–5.2)
RBC # BLD AUTO: 3.98 M/UL (ref 3.8–5.2)
RBC # BLD AUTO: 4 M/UL (ref 3.8–5.2)
RBC # BLD AUTO: 4.24 M/UL (ref 3.8–5.2)
REPORTED DOSE,DOSE: ABNORMAL UNITS
REPORTED DOSE,DOSE: NORMAL UNITS
REPORTED DOSE/TIME,TMG: ABNORMAL
REPORTED DOSE/TIME,TMG: NORMAL
RSV RNA SPEC QL NAA+PROBE: NOT DETECTED
RV+EV RNA SPEC QL NAA+PROBE: NOT DETECTED
SARS-COV-2 PCR, COVPCR: NOT DETECTED
SARS-COV-2, COV2: NORMAL
SODIUM SERPL-SCNC: 138 MMOL/L (ref 136–145)
SODIUM SERPL-SCNC: 141 MMOL/L (ref 136–145)
SOURCE, COVRS: NORMAL
STRESS BASELINE DIAS BP: 65 MMHG
STRESS BASELINE HR: 58 BPM
STRESS BASELINE SYS BP: 144 MMHG
STRESS ESTIMATED WORKLOAD: 1 METS
STRESS EXERCISE DUR MIN: NORMAL
STRESS O2 SAT PEAK: 95 %
STRESS O2 SAT REST: 93 %
STRESS PEAK DIAS BP: 65 MMHG
STRESS PEAK SYS BP: 145 MMHG
STRESS PERCENT HR ACHIEVED: 63 %
STRESS POST PEAK HR: 80 BPM
STRESS RATE PRESSURE PRODUCT: NORMAL BPM*MMHG
STRESS TARGET HR: 126 BPM
TROPONIN I SERPL-MCNC: 0.25 NG/ML
TROPONIN I SERPL-MCNC: 0.31 NG/ML
TROPONIN I SERPL-MCNC: 0.35 NG/ML
TSH SERPL DL<=0.05 MIU/L-ACNC: 0.41 UIU/ML (ref 0.36–3.74)
VENTRICULAR RATE, ECG03: 90 BPM
WBC # BLD AUTO: 6.4 K/UL (ref 3.6–11)
WBC # BLD AUTO: 6.5 K/UL (ref 3.6–11)
WBC # BLD AUTO: 8.1 K/UL (ref 3.6–11)
WBC # BLD AUTO: 8.1 K/UL (ref 3.6–11)

## 2021-01-01 PROCEDURE — 74011250636 HC RX REV CODE- 250/636: Performed by: EMERGENCY MEDICINE

## 2021-01-01 PROCEDURE — 99283 EMERGENCY DEPT VISIT LOW MDM: CPT

## 2021-01-01 PROCEDURE — 97530 THERAPEUTIC ACTIVITIES: CPT

## 2021-01-01 PROCEDURE — 84443 ASSAY THYROID STIM HORMONE: CPT

## 2021-01-01 PROCEDURE — 74011250637 HC RX REV CODE- 250/637: Performed by: INTERNAL MEDICINE

## 2021-01-01 PROCEDURE — 80048 BASIC METABOLIC PNL TOTAL CA: CPT

## 2021-01-01 PROCEDURE — 85379 FIBRIN DEGRADATION QUANT: CPT

## 2021-01-01 PROCEDURE — 87635 SARS-COV-2 COVID-19 AMP PRB: CPT

## 2021-01-01 PROCEDURE — 94760 N-INVAS EAR/PLS OXIMETRY 1: CPT

## 2021-01-01 PROCEDURE — 74011250637 HC RX REV CODE- 250/637: Performed by: STUDENT IN AN ORGANIZED HEALTH CARE EDUCATION/TRAINING PROGRAM

## 2021-01-01 PROCEDURE — G8536 NO DOC ELDER MAL SCRN: HCPCS | Performed by: INTERNAL MEDICINE

## 2021-01-01 PROCEDURE — 36415 COLL VENOUS BLD VENIPUNCTURE: CPT

## 2021-01-01 PROCEDURE — 93306 TTE W/DOPPLER COMPLETE: CPT

## 2021-01-01 PROCEDURE — 84484 ASSAY OF TROPONIN QUANT: CPT

## 2021-01-01 PROCEDURE — G8427 DOCREV CUR MEDS BY ELIG CLIN: HCPCS | Performed by: FAMILY MEDICINE

## 2021-01-01 PROCEDURE — 65660000000 HC RM CCU STEPDOWN

## 2021-01-01 PROCEDURE — 74011000258 HC RX REV CODE- 258: Performed by: STUDENT IN AN ORGANIZED HEALTH CARE EDUCATION/TRAINING PROGRAM

## 2021-01-01 PROCEDURE — 85027 COMPLETE CBC AUTOMATED: CPT

## 2021-01-01 PROCEDURE — 93017 CV STRESS TEST TRACING ONLY: CPT

## 2021-01-01 PROCEDURE — 71250 CT THORAX DX C-: CPT

## 2021-01-01 PROCEDURE — 1090F PRES/ABSN URINE INCON ASSESS: CPT | Performed by: INTERNAL MEDICINE

## 2021-01-01 PROCEDURE — 83880 ASSAY OF NATRIURETIC PEPTIDE: CPT

## 2021-01-01 PROCEDURE — G8536 NO DOC ELDER MAL SCRN: HCPCS | Performed by: FAMILY MEDICINE

## 2021-01-01 PROCEDURE — 1090F PRES/ABSN URINE INCON ASSESS: CPT | Performed by: FAMILY MEDICINE

## 2021-01-01 PROCEDURE — 83735 ASSAY OF MAGNESIUM: CPT

## 2021-01-01 PROCEDURE — 80178 ASSAY OF LITHIUM: CPT

## 2021-01-01 PROCEDURE — G8420 CALC BMI NORM PARAMETERS: HCPCS | Performed by: FAMILY MEDICINE

## 2021-01-01 PROCEDURE — 82553 CREATINE MB FRACTION: CPT

## 2021-01-01 PROCEDURE — 99214 OFFICE O/P EST MOD 30 MIN: CPT | Performed by: FAMILY MEDICINE

## 2021-01-01 PROCEDURE — 74011250636 HC RX REV CODE- 250/636: Performed by: STUDENT IN AN ORGANIZED HEALTH CARE EDUCATION/TRAINING PROGRAM

## 2021-01-01 PROCEDURE — 97162 PT EVAL MOD COMPLEX 30 MIN: CPT

## 2021-01-01 PROCEDURE — 1111F DSCHRG MED/CURRENT MED MERGE: CPT | Performed by: INTERNAL MEDICINE

## 2021-01-01 PROCEDURE — 97165 OT EVAL LOW COMPLEX 30 MIN: CPT

## 2021-01-01 PROCEDURE — 74011000250 HC RX REV CODE- 250: Performed by: STUDENT IN AN ORGANIZED HEALTH CARE EDUCATION/TRAINING PROGRAM

## 2021-01-01 PROCEDURE — 70450 CT HEAD/BRAIN W/O DYE: CPT

## 2021-01-01 PROCEDURE — 93005 ELECTROCARDIOGRAM TRACING: CPT

## 2021-01-01 PROCEDURE — 74011250636 HC RX REV CODE- 250/636: Performed by: INTERNAL MEDICINE

## 2021-01-01 PROCEDURE — 77010033678 HC OXYGEN DAILY

## 2021-01-01 PROCEDURE — 75810000215 HC WOUND REPAIR OTHER

## 2021-01-01 PROCEDURE — 78452 HT MUSCLE IMAGE SPECT MULT: CPT

## 2021-01-01 PROCEDURE — 99213 OFFICE O/P EST LOW 20 MIN: CPT | Performed by: INTERNAL MEDICINE

## 2021-01-01 PROCEDURE — 99496 TRANSJ CARE MGMT HIGH F2F 7D: CPT | Performed by: FAMILY MEDICINE

## 2021-01-01 PROCEDURE — 74011000636 HC RX REV CODE- 636: Performed by: INTERNAL MEDICINE

## 2021-01-01 PROCEDURE — 97116 GAIT TRAINING THERAPY: CPT

## 2021-01-01 PROCEDURE — 99222 1ST HOSP IP/OBS MODERATE 55: CPT | Performed by: INTERNAL MEDICINE

## 2021-01-01 PROCEDURE — 71045 X-RAY EXAM CHEST 1 VIEW: CPT

## 2021-01-01 PROCEDURE — 85025 COMPLETE CBC W/AUTO DIFF WBC: CPT

## 2021-01-01 PROCEDURE — 99233 SBSQ HOSP IP/OBS HIGH 50: CPT | Performed by: INTERNAL MEDICINE

## 2021-01-01 PROCEDURE — 99285 EMERGENCY DEPT VISIT HI MDM: CPT

## 2021-01-01 PROCEDURE — 97530 THERAPEUTIC ACTIVITIES: CPT | Performed by: OCCUPATIONAL THERAPIST

## 2021-01-01 PROCEDURE — 91300 COVID-19, MRNA, LNP-S, PF, 30MCG/0.3ML DOSE(PFIZER): CPT

## 2021-01-01 PROCEDURE — 74011000250 HC RX REV CODE- 250: Performed by: EMERGENCY MEDICINE

## 2021-01-01 PROCEDURE — 74011250637 HC RX REV CODE- 250/637: Performed by: EMERGENCY MEDICINE

## 2021-01-01 PROCEDURE — 1101F PT FALLS ASSESS-DOCD LE1/YR: CPT | Performed by: INTERNAL MEDICINE

## 2021-01-01 PROCEDURE — G0463 HOSPITAL OUTPT CLINIC VISIT: HCPCS | Performed by: INTERNAL MEDICINE

## 2021-01-01 PROCEDURE — G8427 DOCREV CUR MEDS BY ELIG CLIN: HCPCS | Performed by: INTERNAL MEDICINE

## 2021-01-01 PROCEDURE — G8420 CALC BMI NORM PARAMETERS: HCPCS | Performed by: INTERNAL MEDICINE

## 2021-01-01 PROCEDURE — 97535 SELF CARE MNGMENT TRAINING: CPT | Performed by: OCCUPATIONAL THERAPIST

## 2021-01-01 PROCEDURE — 74011250637 HC RX REV CODE- 250/637: Performed by: NURSE PRACTITIONER

## 2021-01-01 PROCEDURE — 80053 COMPREHEN METABOLIC PANEL: CPT

## 2021-01-01 PROCEDURE — G8428 CUR MEDS NOT DOCUMENT: HCPCS | Performed by: FAMILY MEDICINE

## 2021-01-01 PROCEDURE — 71275 CT ANGIOGRAPHY CHEST: CPT

## 2021-01-01 PROCEDURE — 2709999900 HC NON-CHARGEABLE SUPPLY

## 2021-01-01 PROCEDURE — 93005 ELECTROCARDIOGRAM TRACING: CPT | Performed by: INTERNAL MEDICINE

## 2021-01-01 PROCEDURE — APPSS60 APP SPLIT SHARED TIME 46-60 MINUTES: Performed by: NURSE PRACTITIONER

## 2021-01-01 PROCEDURE — 77030005513 HC CATH URETH FOL11 MDII -B

## 2021-01-01 PROCEDURE — G9717 DOC PT DX DEP/BP F/U NT REQ: HCPCS | Performed by: FAMILY MEDICINE

## 2021-01-01 PROCEDURE — 97535 SELF CARE MNGMENT TRAINING: CPT

## 2021-01-01 PROCEDURE — 84145 PROCALCITONIN (PCT): CPT

## 2021-01-01 PROCEDURE — 0202U NFCT DS 22 TRGT SARS-COV-2: CPT

## 2021-01-01 PROCEDURE — G0463 HOSPITAL OUTPT CLINIC VISIT: HCPCS | Performed by: FAMILY MEDICINE

## 2021-01-01 PROCEDURE — 96372 THER/PROPH/DIAG INJ SC/IM: CPT

## 2021-01-01 PROCEDURE — G9717 DOC PT DX DEP/BP F/U NT REQ: HCPCS | Performed by: INTERNAL MEDICINE

## 2021-01-01 PROCEDURE — 72125 CT NECK SPINE W/O DYE: CPT

## 2021-01-01 PROCEDURE — 93010 ELECTROCARDIOGRAM REPORT: CPT | Performed by: INTERNAL MEDICINE

## 2021-01-01 PROCEDURE — 1101F PT FALLS ASSESS-DOCD LE1/YR: CPT | Performed by: FAMILY MEDICINE

## 2021-01-01 PROCEDURE — 99232 SBSQ HOSP IP/OBS MODERATE 35: CPT | Performed by: INTERNAL MEDICINE

## 2021-01-01 RX ORDER — ACETAMINOPHEN 325 MG/1
650 TABLET ORAL
Status: DISCONTINUED | OUTPATIENT
Start: 2021-01-01 | End: 2021-01-01

## 2021-01-01 RX ORDER — LITHIUM CARBONATE 450 MG/1
450 TABLET ORAL
Status: DISCONTINUED | OUTPATIENT
Start: 2021-01-01 | End: 2021-01-01 | Stop reason: HOSPADM

## 2021-01-01 RX ORDER — PAROXETINE HYDROCHLORIDE 20 MG/1
20 TABLET, FILM COATED ORAL DAILY
Status: DISCONTINUED | OUTPATIENT
Start: 2021-01-01 | End: 2021-01-01 | Stop reason: HOSPADM

## 2021-01-01 RX ORDER — FUROSEMIDE 40 MG/1
40 TABLET ORAL DAILY
Qty: 30 TAB | Refills: 5 | Status: SHIPPED | OUTPATIENT
Start: 2021-01-01 | End: 2021-01-01 | Stop reason: SDUPTHER

## 2021-01-01 RX ORDER — LIDOCAINE HYDROCHLORIDE AND EPINEPHRINE 10; 10 MG/ML; UG/ML
1.5 INJECTION, SOLUTION INFILTRATION; PERINEURAL ONCE
Status: COMPLETED | OUTPATIENT
Start: 2021-01-01 | End: 2021-01-01

## 2021-01-01 RX ORDER — FUROSEMIDE 40 MG/1
40 TABLET ORAL DAILY
Qty: 30 TABLET | Refills: 5 | Status: SHIPPED | OUTPATIENT
Start: 2021-01-01

## 2021-01-01 RX ORDER — MIRTAZAPINE 15 MG/1
15 TABLET, FILM COATED ORAL
Status: DISCONTINUED | OUTPATIENT
Start: 2021-01-01 | End: 2021-01-01 | Stop reason: HOSPADM

## 2021-01-01 RX ORDER — BUDESONIDE 0.5 MG/2ML
500 INHALANT ORAL
Status: DISCONTINUED | OUTPATIENT
Start: 2021-01-01 | End: 2021-01-01 | Stop reason: ALTCHOICE

## 2021-01-01 RX ORDER — METOPROLOL TARTRATE 25 MG/1
12.5 TABLET, FILM COATED ORAL EVERY 12 HOURS
Qty: 30 TABLET | Refills: 5 | Status: SHIPPED | OUTPATIENT
Start: 2021-01-01

## 2021-01-01 RX ORDER — LORAZEPAM 0.5 MG/1
0.5 TABLET ORAL
COMMUNITY

## 2021-01-01 RX ORDER — BUDESONIDE 0.5 MG/2ML
500 INHALANT ORAL
Status: DISCONTINUED | OUTPATIENT
Start: 2021-01-01 | End: 2021-01-01

## 2021-01-01 RX ORDER — GUAIFENESIN 100 MG/5ML
81 LIQUID (ML) ORAL DAILY
Status: DISCONTINUED | OUTPATIENT
Start: 2021-01-01 | End: 2021-01-01 | Stop reason: HOSPADM

## 2021-01-01 RX ORDER — OLANZAPINE 10 MG/1
10 TABLET ORAL EVERY EVENING
Status: DISCONTINUED | OUTPATIENT
Start: 2021-01-01 | End: 2021-01-01 | Stop reason: HOSPADM

## 2021-01-01 RX ORDER — IPRATROPIUM BROMIDE AND ALBUTEROL SULFATE 2.5; .5 MG/3ML; MG/3ML
3 SOLUTION RESPIRATORY (INHALATION)
Status: DISCONTINUED | OUTPATIENT
Start: 2021-01-01 | End: 2021-01-01

## 2021-01-01 RX ORDER — MEMANTINE HYDROCHLORIDE 10 MG/1
10 TABLET ORAL DAILY
Status: DISCONTINUED | OUTPATIENT
Start: 2021-01-01 | End: 2021-01-01 | Stop reason: HOSPADM

## 2021-01-01 RX ORDER — FUROSEMIDE 40 MG/1
40 TABLET ORAL DAILY
Qty: 30 TABLET | Refills: 5 | Status: SHIPPED | OUTPATIENT
Start: 2021-01-01 | End: 2021-01-01 | Stop reason: SDUPTHER

## 2021-01-01 RX ORDER — OLANZAPINE 2.5 MG/1
2.5 TABLET ORAL
COMMUNITY
Start: 2021-01-01

## 2021-01-01 RX ORDER — FLUOXETINE 10 MG/1
10 CAPSULE ORAL DAILY
COMMUNITY
Start: 2021-01-01

## 2021-01-01 RX ORDER — POLYETHYLENE GLYCOL 3350 17 G/17G
17 POWDER, FOR SOLUTION ORAL DAILY PRN
Status: DISCONTINUED | OUTPATIENT
Start: 2021-01-01 | End: 2021-01-01 | Stop reason: HOSPADM

## 2021-01-01 RX ORDER — ENOXAPARIN SODIUM 100 MG/ML
60 INJECTION SUBCUTANEOUS
Status: COMPLETED | OUTPATIENT
Start: 2021-01-01 | End: 2021-01-01

## 2021-01-01 RX ORDER — OLANZAPINE 2.5 MG/1
2.5 TABLET ORAL EVERY EVENING
Status: DISCONTINUED | OUTPATIENT
Start: 2021-01-01 | End: 2021-01-01 | Stop reason: HOSPADM

## 2021-01-01 RX ORDER — LITHIUM CARBONATE 300 MG/1
300 TABLET, FILM COATED, EXTENDED RELEASE ORAL
Qty: 30 TAB | Refills: 4 | Status: SHIPPED | OUTPATIENT
Start: 2021-01-01 | End: 2021-01-01 | Stop reason: ALTCHOICE

## 2021-01-01 RX ORDER — SODIUM CHLORIDE 0.9 % (FLUSH) 0.9 %
10 SYRINGE (ML) INJECTION AS NEEDED
Status: DISCONTINUED | OUTPATIENT
Start: 2021-01-01 | End: 2021-01-01 | Stop reason: HOSPADM

## 2021-01-01 RX ORDER — LITHIUM CARBONATE 300 MG
150 TABLET ORAL 2 TIMES DAILY
Status: DISCONTINUED | OUTPATIENT
Start: 2021-01-01 | End: 2021-01-01 | Stop reason: HOSPADM

## 2021-01-01 RX ORDER — SODIUM CHLORIDE 0.9 % (FLUSH) 0.9 %
5-40 SYRINGE (ML) INJECTION EVERY 8 HOURS
Status: DISCONTINUED | OUTPATIENT
Start: 2021-01-01 | End: 2021-01-01 | Stop reason: HOSPADM

## 2021-01-01 RX ORDER — METOPROLOL TARTRATE 25 MG/1
12.5 TABLET, FILM COATED ORAL EVERY 12 HOURS
Status: DISCONTINUED | OUTPATIENT
Start: 2021-01-01 | End: 2021-01-01 | Stop reason: HOSPADM

## 2021-01-01 RX ORDER — ACETAMINOPHEN 325 MG/1
650 TABLET ORAL
Status: DISCONTINUED | OUTPATIENT
Start: 2021-01-01 | End: 2021-01-01 | Stop reason: HOSPADM

## 2021-01-01 RX ORDER — ONDANSETRON 4 MG/1
4 TABLET, ORALLY DISINTEGRATING ORAL
Status: DISCONTINUED | OUTPATIENT
Start: 2021-01-01 | End: 2021-01-01 | Stop reason: HOSPADM

## 2021-01-01 RX ORDER — ONDANSETRON 2 MG/ML
4 INJECTION INTRAMUSCULAR; INTRAVENOUS
Status: DISCONTINUED | OUTPATIENT
Start: 2021-01-01 | End: 2021-01-01 | Stop reason: HOSPADM

## 2021-01-01 RX ORDER — FUROSEMIDE 10 MG/ML
40 INJECTION INTRAMUSCULAR; INTRAVENOUS DAILY
Status: DISCONTINUED | OUTPATIENT
Start: 2021-01-01 | End: 2021-01-01

## 2021-01-01 RX ORDER — METOPROLOL TARTRATE 25 MG/1
12.5 TABLET, FILM COATED ORAL EVERY 12 HOURS
Qty: 30 TAB | Refills: 5 | Status: SHIPPED | OUTPATIENT
Start: 2021-01-01 | End: 2021-01-01 | Stop reason: SDUPTHER

## 2021-01-01 RX ORDER — IPRATROPIUM BROMIDE AND ALBUTEROL SULFATE 2.5; .5 MG/3ML; MG/3ML
3 SOLUTION RESPIRATORY (INHALATION)
Status: DISCONTINUED | OUTPATIENT
Start: 2021-01-01 | End: 2021-01-01 | Stop reason: ALTCHOICE

## 2021-01-01 RX ORDER — FUROSEMIDE 40 MG/1
40 TABLET ORAL DAILY
Qty: 30 TAB | Refills: 5 | Status: CANCELLED | OUTPATIENT
Start: 2021-01-01

## 2021-01-01 RX ORDER — SODIUM CHLORIDE 0.9 % (FLUSH) 0.9 %
5-40 SYRINGE (ML) INJECTION AS NEEDED
Status: DISCONTINUED | OUTPATIENT
Start: 2021-01-01 | End: 2021-01-01 | Stop reason: HOSPADM

## 2021-01-01 RX ORDER — HYDROXYZINE 25 MG/1
25 TABLET, FILM COATED ORAL ONCE
Status: COMPLETED | OUTPATIENT
Start: 2021-01-01 | End: 2021-01-01

## 2021-01-01 RX ORDER — FUROSEMIDE 40 MG/1
40 TABLET ORAL DAILY
Status: DISCONTINUED | OUTPATIENT
Start: 2021-01-01 | End: 2021-01-01 | Stop reason: HOSPADM

## 2021-01-01 RX ORDER — OLANZAPINE 10 MG/1
10 TABLET ORAL
COMMUNITY
Start: 2021-01-01

## 2021-01-01 RX ORDER — GUAIFENESIN 100 MG/5ML
81 LIQUID (ML) ORAL DAILY
Qty: 30 TAB | Refills: 5 | Status: SHIPPED
Start: 2021-01-01

## 2021-01-01 RX ORDER — ACETAMINOPHEN 650 MG/1
650 SUPPOSITORY RECTAL
Status: DISCONTINUED | OUTPATIENT
Start: 2021-01-01 | End: 2021-01-01 | Stop reason: HOSPADM

## 2021-01-01 RX ORDER — ASPIRIN 325 MG
325 TABLET ORAL ONCE
Status: COMPLETED | OUTPATIENT
Start: 2021-01-01 | End: 2021-01-01

## 2021-01-01 RX ORDER — ENOXAPARIN SODIUM 100 MG/ML
30 INJECTION SUBCUTANEOUS EVERY 24 HOURS
Status: DISCONTINUED | OUTPATIENT
Start: 2021-01-01 | End: 2021-01-01 | Stop reason: HOSPADM

## 2021-01-01 RX ORDER — FUROSEMIDE 10 MG/ML
40 INJECTION INTRAMUSCULAR; INTRAVENOUS 2 TIMES DAILY
Status: DISCONTINUED | OUTPATIENT
Start: 2021-01-01 | End: 2021-01-01

## 2021-01-01 RX ORDER — AMOXICILLIN AND CLAVULANATE POTASSIUM 875; 125 MG/1; MG/1
1 TABLET, FILM COATED ORAL EVERY 12 HOURS
Qty: 14 TAB | Refills: 0 | Status: SHIPPED | OUTPATIENT
Start: 2021-01-01 | End: 2021-01-01

## 2021-01-01 RX ORDER — FUROSEMIDE 10 MG/ML
40 INJECTION INTRAMUSCULAR; INTRAVENOUS
Status: COMPLETED | OUTPATIENT
Start: 2021-01-01 | End: 2021-01-01

## 2021-01-01 RX ORDER — LEVOTHYROXINE SODIUM 75 UG/1
75 TABLET ORAL
Status: DISCONTINUED | OUTPATIENT
Start: 2021-01-01 | End: 2021-01-01 | Stop reason: HOSPADM

## 2021-01-01 RX ADMIN — LITHIUM CARBONATE 450 MG: 450 TABLET, EXTENDED RELEASE ORAL at 22:27

## 2021-01-01 RX ADMIN — FUROSEMIDE 40 MG: 40 TABLET ORAL at 16:18

## 2021-01-01 RX ADMIN — Medication 10 ML: at 05:37

## 2021-01-01 RX ADMIN — CEFTRIAXONE 1 G: 1 INJECTION, POWDER, FOR SOLUTION INTRAMUSCULAR; INTRAVENOUS at 18:06

## 2021-01-01 RX ADMIN — Medication 10 ML: at 17:23

## 2021-01-01 RX ADMIN — LEVOTHYROXINE SODIUM 75 MCG: 0.07 TABLET ORAL at 05:22

## 2021-01-01 RX ADMIN — Medication 5 ML: at 21:33

## 2021-01-01 RX ADMIN — Medication 10 ML: at 22:00

## 2021-01-01 RX ADMIN — METOPROLOL TARTRATE 12.5 MG: 25 TABLET, FILM COATED ORAL at 20:55

## 2021-01-01 RX ADMIN — FUROSEMIDE 40 MG: 10 INJECTION, SOLUTION INTRAMUSCULAR; INTRAVENOUS at 08:48

## 2021-01-01 RX ADMIN — Medication 10 ML: at 17:24

## 2021-01-01 RX ADMIN — FUROSEMIDE 40 MG: 10 INJECTION, SOLUTION INTRAMUSCULAR; INTRAVENOUS at 08:18

## 2021-01-01 RX ADMIN — PAROXETINE HYDROCHLORIDE 20 MG: 20 TABLET, FILM COATED ORAL at 08:18

## 2021-01-01 RX ADMIN — AZITHROMYCIN MONOHYDRATE 500 MG: 500 INJECTION, POWDER, LYOPHILIZED, FOR SOLUTION INTRAVENOUS at 18:52

## 2021-01-01 RX ADMIN — Medication 10 ML: at 06:00

## 2021-01-01 RX ADMIN — PAROXETINE HYDROCHLORIDE 20 MG: 20 TABLET, FILM COATED ORAL at 16:18

## 2021-01-01 RX ADMIN — PAROXETINE HYDROCHLORIDE 20 MG: 20 TABLET, FILM COATED ORAL at 08:56

## 2021-01-01 RX ADMIN — OLANZAPINE 10 MG: 10 TABLET, FILM COATED ORAL at 18:10

## 2021-01-01 RX ADMIN — MEMANTINE HYDROCHLORIDE 10 MG: 10 TABLET ORAL at 08:48

## 2021-01-01 RX ADMIN — METOPROLOL TARTRATE 12.5 MG: 25 TABLET, FILM COATED ORAL at 08:48

## 2021-01-01 RX ADMIN — ENOXAPARIN SODIUM 30 MG: 30 INJECTION SUBCUTANEOUS at 17:22

## 2021-01-01 RX ADMIN — REGADENOSON 0.4 MG: 0.08 INJECTION, SOLUTION INTRAVENOUS at 12:37

## 2021-01-01 RX ADMIN — ENOXAPARIN SODIUM 30 MG: 30 INJECTION SUBCUTANEOUS at 17:20

## 2021-01-01 RX ADMIN — MIRTAZAPINE 15 MG: 15 TABLET, FILM COATED ORAL at 21:06

## 2021-01-01 RX ADMIN — CEFTRIAXONE 1 G: 1 INJECTION, POWDER, FOR SOLUTION INTRAMUSCULAR; INTRAVENOUS at 17:22

## 2021-01-01 RX ADMIN — AZITHROMYCIN MONOHYDRATE 500 MG: 500 INJECTION, POWDER, LYOPHILIZED, FOR SOLUTION INTRAVENOUS at 18:03

## 2021-01-01 RX ADMIN — LEVOTHYROXINE SODIUM 75 MCG: 0.07 TABLET ORAL at 06:10

## 2021-01-01 RX ADMIN — CEFTRIAXONE 1 G: 1 INJECTION, POWDER, FOR SOLUTION INTRAMUSCULAR; INTRAVENOUS at 17:24

## 2021-01-01 RX ADMIN — ASPIRIN 325 MG: 325 TABLET ORAL at 16:02

## 2021-01-01 RX ADMIN — OLANZAPINE 10 MG: 10 TABLET, FILM COATED ORAL at 17:24

## 2021-01-01 RX ADMIN — METOPROLOL TARTRATE 12.5 MG: 25 TABLET, FILM COATED ORAL at 21:32

## 2021-01-01 RX ADMIN — MEMANTINE HYDROCHLORIDE 10 MG: 10 TABLET ORAL at 16:18

## 2021-01-01 RX ADMIN — LEVOTHYROXINE SODIUM 75 MCG: 0.07 TABLET ORAL at 06:00

## 2021-01-01 RX ADMIN — BUDESONIDE 500 MCG: 0.5 INHALANT RESPIRATORY (INHALATION) at 20:54

## 2021-01-01 RX ADMIN — ENOXAPARIN SODIUM 60 MG: 60 INJECTION SUBCUTANEOUS at 18:01

## 2021-01-01 RX ADMIN — Medication 5 ML: at 05:21

## 2021-01-01 RX ADMIN — MEMANTINE HYDROCHLORIDE 10 MG: 10 TABLET ORAL at 08:56

## 2021-01-01 RX ADMIN — MEMANTINE HYDROCHLORIDE 10 MG: 10 TABLET ORAL at 08:18

## 2021-01-01 RX ADMIN — OLANZAPINE 2.5 MG: 2.5 TABLET, FILM COATED ORAL at 20:27

## 2021-01-01 RX ADMIN — METOPROLOL TARTRATE 12.5 MG: 25 TABLET, FILM COATED ORAL at 20:50

## 2021-01-01 RX ADMIN — FUROSEMIDE 40 MG: 10 INJECTION, SOLUTION INTRAMUSCULAR; INTRAVENOUS at 16:54

## 2021-01-01 RX ADMIN — IPRATROPIUM BROMIDE AND ALBUTEROL SULFATE 3 ML: .5; 3 SOLUTION RESPIRATORY (INHALATION) at 20:27

## 2021-01-01 RX ADMIN — MIRTAZAPINE 15 MG: 15 TABLET, FILM COATED ORAL at 22:27

## 2021-01-01 RX ADMIN — OLANZAPINE 2.5 MG: 2.5 TABLET, FILM COATED ORAL at 17:24

## 2021-01-01 RX ADMIN — PAROXETINE HYDROCHLORIDE 20 MG: 20 TABLET, FILM COATED ORAL at 08:48

## 2021-01-01 RX ADMIN — Medication 10 ML: at 16:18

## 2021-01-01 RX ADMIN — MIRTAZAPINE 15 MG: 15 TABLET, FILM COATED ORAL at 21:32

## 2021-01-01 RX ADMIN — OLANZAPINE 10 MG: 10 TABLET, FILM COATED ORAL at 20:27

## 2021-01-01 RX ADMIN — Medication 10 ML: at 12:37

## 2021-01-01 RX ADMIN — AZITHROMYCIN MONOHYDRATE 500 MG: 500 INJECTION, POWDER, LYOPHILIZED, FOR SOLUTION INTRAVENOUS at 18:12

## 2021-01-01 RX ADMIN — HYDROXYZINE HYDROCHLORIDE 25 MG: 25 TABLET, FILM COATED ORAL at 04:35

## 2021-01-01 RX ADMIN — Medication 10 ML: at 18:50

## 2021-01-01 RX ADMIN — METOPROLOL TARTRATE 12.5 MG: 25 TABLET, FILM COATED ORAL at 22:27

## 2021-01-01 RX ADMIN — CEFTRIAXONE 1 G: 1 INJECTION, POWDER, FOR SOLUTION INTRAMUSCULAR; INTRAVENOUS at 17:13

## 2021-01-01 RX ADMIN — METOPROLOL TARTRATE 12.5 MG: 25 TABLET, FILM COATED ORAL at 08:18

## 2021-01-01 RX ADMIN — ENOXAPARIN SODIUM 30 MG: 30 INJECTION SUBCUTANEOUS at 17:24

## 2021-01-01 RX ADMIN — METOPROLOL TARTRATE 12.5 MG: 25 TABLET, FILM COATED ORAL at 08:56

## 2021-01-01 RX ADMIN — OLANZAPINE 2.5 MG: 2.5 TABLET, FILM COATED ORAL at 18:10

## 2021-01-01 RX ADMIN — AZITHROMYCIN MONOHYDRATE 500 MG: 500 INJECTION, POWDER, LYOPHILIZED, FOR SOLUTION INTRAVENOUS at 20:55

## 2021-01-01 RX ADMIN — OLANZAPINE 2.5 MG: 2.5 TABLET, FILM COATED ORAL at 18:59

## 2021-01-01 RX ADMIN — IOPAMIDOL 100 ML: 755 INJECTION, SOLUTION INTRAVENOUS at 17:00

## 2021-01-01 RX ADMIN — LITHIUM CARBONATE 150 MG: 300 TABLET ORAL at 20:50

## 2021-01-01 RX ADMIN — MIRTAZAPINE 15 MG: 15 TABLET, FILM COATED ORAL at 22:24

## 2021-01-01 RX ADMIN — FUROSEMIDE 40 MG: 40 TABLET ORAL at 08:56

## 2021-01-01 RX ADMIN — OLANZAPINE 10 MG: 10 TABLET, FILM COATED ORAL at 18:59

## 2021-01-01 RX ADMIN — LIDOCAINE HYDROCHLORIDE,EPINEPHRINE BITARTRATE 15 MG: 10; .01 INJECTION, SOLUTION INFILTRATION; PERINEURAL at 01:30

## 2021-01-01 RX ADMIN — LITHIUM CARBONATE 150 MG: 300 TABLET ORAL at 08:56

## 2021-01-01 RX ADMIN — LEVOTHYROXINE SODIUM 75 MCG: 0.07 TABLET ORAL at 05:37

## 2021-01-01 RX ADMIN — LITHIUM CARBONATE 450 MG: 450 TABLET, EXTENDED RELEASE ORAL at 22:44

## 2021-01-01 RX ADMIN — Medication 10 ML: at 20:55

## 2021-03-11 NOTE — TELEPHONE ENCOUNTER
#[phone]  Amalia with [facility] states pt has had two falls this week. Skin tear and bruising is the result of falls. Pt is also having incontinence. Therese Abraham states both of these are unusual for the pt. She did collect a urine sample for UA & culture, so please be on the outlook for those lab results.

## 2021-03-11 NOTE — TELEPHONE ENCOUNTER
Spoke with pt home health nurse   2 pt identifiers   Pt has fallen twice in the past week, significant bruising in upper up and skin tears leg that is open area,  Last night pt had a incontinent episode that is out of sorts for her   Home health nurse took urine sample from the pt and sending them off to lab yusuf.   Home health nurse is to send pictures of bruising and skin tears via [employer] or faxing   Pt daughter wants pt to get checked out by the daughter but pt refused  Home health nurse states she has been with the patient for the past three months and these recent episodes are out of sorts for and are raising concerns to the nurse   Advised the home health nurse of [facility] virtual visits schedule, home health nurse is going to talk to patient.

## 2021-03-15 NOTE — TELEPHONE ENCOUNTER
#[phone] [facility] states that pt's skin tear is now infected. They are sending a picture through My Chart so doctor can see this. Pt's urine culture results were faxed to you as well this morning. Pt may need an antibiotic for both wound/uti? Please call Valentín Priest as soon as possible. Valentín Priest states she or family must hear from someone as soon as possible as this can not wait. Pt uses [facility] [address]

## 2021-03-15 NOTE — TELEPHONE ENCOUNTER
I spoke to the patient's daughter and informed her of the antibiotic that was sent to the pharmacy. She does not have any current labs in [facility]. Her last CMP was in 2019. I do not have an updated creatinine and her kidney function in 2019 was normal.  A prescription for Augmentin was sent to her pharmacy. Daughter was advised to monitor the leg wound over the next 24 hours and take her to the ER if the erythema is spreading down the leg.   From: Isak Mancuso  To: Chari Castillo MD  Sent: 3/15/2021 10:33 AM EDT  Subject: Non-Urgent Medical Question    Picture of right lower leg wound 3/15

## 2021-03-15 NOTE — TELEPHONE ENCOUNTER
Spoke with Negro Leonardo for [facility] and informed her that Dr. Tony Thompson  Has seen test results and is working on prescribing treatment for patient.

## 2021-03-16 NOTE — TELEPHONE ENCOUNTER
Spoke to [facility] at [facility], made aware of  Augmentin prescription. [facility] will contact patient and family.

## 2021-03-30 NOTE — TELEPHONE ENCOUNTER
Yes. She can come into the office. When did she last see a neurologist for the dementia? She can get a U/A in the office to r/o UTI. Is she dizzy or just weak?

## 2021-03-30 NOTE — TELEPHONE ENCOUNTER
[phone] [facility] states she needs a call back to discuss Plan of Care for patient having Frequent Falls. Please call.  Thank you

## 2021-03-30 NOTE — TELEPHONE ENCOUNTER
#[phone]  [facility] states she is returning your call

## 2021-03-30 NOTE — TELEPHONE ENCOUNTER
Spoke with Myriam Sanchez  From [place]. Patient fell today, incontinent issues, this is the forth fall in past three weeks with injury latisha is concerned about decline in patient's  Condition, and wants to know if  Patient can have an in office visit?

## 2021-04-02 PROBLEM — N17.9 AKI (ACUTE KIDNEY INJURY) (HCC): Status: ACTIVE | Noted: 2021-01-01

## 2021-04-02 PROBLEM — I21.4 NSTEMI (NON-ST ELEVATED MYOCARDIAL INFARCTION) (HCC): Status: ACTIVE | Noted: 2021-01-01

## 2021-04-02 PROBLEM — R09.02 HYPOXIA: Status: ACTIVE | Noted: 2021-01-01

## 2021-04-02 PROBLEM — I50.9 CHF EXACERBATION (HCC): Status: ACTIVE | Noted: 2021-01-01

## 2021-04-02 NOTE — ED PROVIDER NOTES
EMERGENCY DEPARTMENT HISTORY AND PHYSICAL EXAM 
 
 
Date: 4/2/2021 Patient Name: Benigno Ramos History of Presenting Illness Chief Complaint Patient presents with  Cough  
  hurts when she coughs; trouble breathing; some confusion this month. pt doctor recommended pt to ED  Fall  
  three falls in past month daughter reports denies loc with falls History Provided By: Patient and Patient's Daughter HPI: Benigno Ramos, 80 y.o. female presents to the ED with cc of cough, shortness of breath. Patient was brought to the emergency department accompanied with her daughter for evaluation of shortness of breath. Per the daughter patient has no prior lung or cardiac history. Daughter had noted over the last 2 weeks patient seemed to be getting more short of breath especially with exertion. She states that she has a pulse ox at home for herself and she has been putting it on her mother noting pulse ox is dropping into the low to mid 80s with exertion at times. Patient has not had any chest pain. She denies any fever or chills. She states that she has had a cough unproductive. She denies any recent wheezing. She denies any abdominal pain, nausea, vomiting or diarrhea. There has been no recent pain or swelling lower extremities. Per the daughter patient has had a couple falls recently however no injuries associated with it. Patient shortness of breath moderate in severity at rest and worsens with any exertion. Patient has had her first vaccine for COVID and has had no known COVID exposures. There are no other complaints, changes, or physical findings at this time. PCP: Valarie Pacheco MD 
 
No current facility-administered medications on file prior to encounter. Current Outpatient Medications on File Prior to Encounter Medication Sig Dispense Refill  levothyroxine (SYNTHROID) 75 mcg tablet TAKE 1 TABLET DAILY BEFORE BREAKFAST 90 Tab 3  
 mirtazapine (REMERON) 15 mg tablet Take  by mouth nightly. Take one and one half tablets daily  PARoxetine (PAXIL) 20 mg tablet Take 20 mg by mouth daily.  memantine (NAMENDA) 10 mg tablet Take 1 Tab by mouth daily. (Patient taking differently: Take 10 mg by mouth nightly.) 30 Tab 11  
 lithium CR (ESKALITH CR) 450 mg CR tablet Take 450 mg by mouth nightly.  OLANZapine (ZyPREXA) 2.5 mg tablet  OLANZapine (ZyPREXA) 10 mg tablet  [DISCONTINUED] ARIPiprazole (ABILIFY) 5 mg tablet Take 5 mg by mouth nightly. Past History Past Medical History: 
Past Medical History:  
Diagnosis Date  Depression   
 psychiatrist: Dr Luis Best (on Lithium)  Fracture of wrist   
 slipped and fx left wrist, surgery scheduled 12/21/16  Full dentures   
 upper and lower  Hypothyroid  Parkinson's disease (tremor, stiffness, slow motion, unstable posture) ([facility] [address] 5/23/2019 Past Surgical History: 
Past Surgical History:  
Procedure Laterality Date  HX KYPHOPLASTY  2013  HX ORTHOPAEDIC  12/2016 Wrist surgery  HX WRIST FRACTURE TX Left 12/2016 Family History: 
Family History Problem Relation Age of Onset Fredo Zaleski Other Mother Inefficient wound healing  Cancer Sister Stomach CA  
 Diabetes Brother  Alzheimer Sister  Hypertension Daughter  Thyroid Disease Daughter  Other Son Hip replacement  Depression Son  Liver Disease Son  Diabetes Son   
 Heart Disease Son  Depression Son   
 
 
Social History: 
Social History Tobacco Use  Smoking status: Former Smoker  Smokeless tobacco: Never Used Substance Use Topics  Alcohol use: No  
 Drug use: No  
 
 
Allergies: Allergies Allergen Reactions  Sinemet [Carbidopa-Levodopa] Other (comments) \"Made patient much worse\"  Sulfa (Sulfonamide Antibiotics) Hives Review of Systems Review of Systems Constitutional: Negative.   Negative for appetite change, chills, fatigue and fever.  
HENT: Negative. Negative for congestion, rhinorrhea, sinus pressure and sore throat. Eyes: Negative. Respiratory: Positive for cough and shortness of breath. Negative for choking, chest tightness and wheezing. Cardiovascular: Negative. Negative for chest pain, palpitations and leg swelling. Gastrointestinal: Negative for abdominal pain, constipation, diarrhea, nausea and vomiting. Endocrine: Negative. Genitourinary: Negative. Negative for difficulty urinating, dysuria, flank pain and urgency. Musculoskeletal: Negative. Skin: Negative. Neurological: Negative. Negative for dizziness, speech difficulty, weakness, light-headedness, numbness and headaches. Psychiatric/Behavioral: Negative. All other systems reviewed and are negative. Physical Exam  
Physical Exam 
Vitals signs and nursing note reviewed. Constitutional:   
   General: She is not in acute distress. Appearance: She is well-developed. She is not diaphoretic. Comments: Elderly female, kyphotic HENT:  
   Head: Normocephalic and atraumatic. Mouth/Throat:  
   Mouth: Mucous membranes are moist.  
   Pharynx: No oropharyngeal exudate. Eyes:  
   Extraocular Movements: Extraocular movements intact. Conjunctiva/sclera: Conjunctivae normal.  
   Pupils: Pupils are equal, round, and reactive to light. Neck: Musculoskeletal: Normal range of motion and neck supple. Vascular: No JVD. Trachea: No tracheal deviation. Cardiovascular:  
   Rate and Rhythm: Normal rate and regular rhythm. Heart sounds: Normal heart sounds. No murmur. Pulmonary:  
   Breath sounds: No stridor. No wheezing or rales. Comments: Increase resp rate, scattered crackles, diminished in bases Abdominal:  
   General: There is no distension. Palpations: Abdomen is soft. Tenderness: There is no abdominal tenderness. There is no guarding or rebound.   
Musculoskeletal: Normal range of motion. General: No tenderness. Skin: 
   General: Skin is warm and dry. Capillary Refill: Capillary refill takes less than 2 seconds. Neurological:  
   Mental Status: She is alert and oriented to person, place, and time. Cranial Nerves: No cranial nerve deficit. Comments: No gross motor or sensory deficits Psychiatric:     
   Mood and Affect: Mood normal.     
   Behavior: Behavior normal.  
 
 
 
Diagnostic Study Results Labs - Recent Results (from the past 12 hour(s)) CBC WITH AUTOMATED DIFF Collection Time: 04/02/21  1:48 PM  
Result Value Ref Range WBC 8.1 3.6 - 11.0 K/uL  
 RBC 4.24 3.80 - 5.20 M/uL  
 HGB 12.1 11.5 - 16.0 g/dL HCT 38.5 35.0 - 47.0 % MCV 90.8 80.0 - 99.0 FL  
 MCH 28.5 26.0 - 34.0 PG  
 MCHC 31.4 30.0 - 36.5 g/dL  
 RDW 13.5 11.5 - 14.5 % PLATELET 855 617 - 432 K/uL MPV 10.9 8.9 - 12.9 FL  
 NRBC 0.0 0  WBC ABSOLUTE NRBC 0.00 0.00 - 0.01 K/uL NEUTROPHILS 63 32 - 75 % LYMPHOCYTES 19 12 - 49 % MONOCYTES 10 5 - 13 % EOSINOPHILS 7 0 - 7 % BASOPHILS 1 0 - 1 % IMMATURE GRANULOCYTES 0 0.0 - 0.5 % ABS. NEUTROPHILS 5.2 1.8 - 8.0 K/UL  
 ABS. LYMPHOCYTES 1.5 0.8 - 3.5 K/UL  
 ABS. MONOCYTES 0.8 0.0 - 1.0 K/UL  
 ABS. EOSINOPHILS 0.5 (H) 0.0 - 0.4 K/UL  
 ABS. BASOPHILS 0.1 0.0 - 0.1 K/UL  
 ABS. IMM. GRANS. 0.0 0.00 - 0.04 K/UL  
 DF AUTOMATED METABOLIC PANEL, COMPREHENSIVE Collection Time: 04/02/21  1:48 PM  
Result Value Ref Range Sodium 138 136 - 145 mmol/L Potassium 4.3 3.5 - 5.1 mmol/L Chloride 109 (H) 97 - 108 mmol/L  
 CO2 25 21 - 32 mmol/L Anion gap 4 (L) 5 - 15 mmol/L Glucose 99 65 - 100 mg/dL BUN 21 (H) 6 - 20 MG/DL Creatinine 1.24 (H) 0.55 - 1.02 MG/DL  
 BUN/Creatinine ratio 17 12 - 20 GFR est AA 49 (L) >60 ml/min/1.73m2 GFR est non-AA 40 (L) >60 ml/min/1.73m2 Calcium 8.6 8.5 - 10.1 MG/DL  Bilirubin, total 0.7 0.2 - 1.0 MG/DL  
 ALT (SGPT) 22 12 - 78 U/L  
 AST (SGOT) 33 15 - 37 U/L Alk. phosphatase 128 (H) 45 - 117 U/L Protein, total 7.6 6.4 - 8.2 g/dL Albumin 3.4 (L) 3.5 - 5.0 g/dL Globulin 4.2 (H) 2.0 - 4.0 g/dL A-G Ratio 0.8 (L) 1.1 - 2.2    
TROPONIN I Collection Time: 04/02/21  1:48 PM  
Result Value Ref Range Troponin-I, Qt. 0.31 (H) <0.05 ng/mL NT-PRO BNP Collection Time: 04/02/21  1:48 PM  
Result Value Ref Range NT pro-BNP 10,710 (H) <450 PG/ML  
EKG, 12 LEAD, INITIAL Collection Time: 04/02/21  1:54 PM  
Result Value Ref Range Ventricular Rate 90 BPM  
 Atrial Rate 90 BPM  
 P-R Interval 190 ms QRS Duration 86 ms  
 Q-T Interval 382 ms QTC Calculation (Bezet) 467 ms Calculated P Axis 13 degrees Calculated R Axis -30 degrees Calculated T Axis -48 degrees Diagnosis Sinus rhythm with premature atrial complexes Left axis deviation Moderate voltage criteria for LVH, may be normal variant ST & Nonspecific ST abnormality Confirmed by Carlos Parker ([id number]) on 4/2/2021 4:03:46 PM 
  
SARS-COV-2 Collection Time: 04/02/21  4:19 PM  
Result Value Ref Range SARS-CoV-2 Please find results under separate order Radiologic Studies -  
XR CHEST PORT Final Result Subtle interstitial process. No confluent infiltrate. Incidental  
hiatal hernia. CT Results  (Last 48 hours) None CXR Results  (Last 48 hours) 04/02/21 1445  XR CHEST PORT Final result Impression:  Subtle interstitial process. No confluent infiltrate. Incidental  
hiatal hernia. Narrative:  EXAM: XR CHEST PORT INDICATION: cough for a week or more. COMPARISON: 6/4/2019 FINDINGS: A portable AP radiograph of the chest was obtained at 1434 hours. The  
patient is on a cardiac monitor. There is a large hiatal hernia. Kyphoplasty  
changes are noted in the lower thoracic spine. The lungs are hyperexpanded with  
a slight accentuation of interstitial markings.   
   
  
  
 
 
Medical Decision Making I am the first provider for this patient. I reviewed the vital signs, available nursing notes, past medical history, past surgical history, family history and social history. Vital Signs-Reviewed the patient's vital signs. Patient Vitals for the past 12 hrs: 
 Temp Pulse Resp BP SpO2  
04/02/21 1344 97.9 °F (36.6 °C) 88 18 132/61 94 % EKG interpretation: (Preliminary) Sinus, PACs, left axis, normal pr/qrs, ST depression I, aVF. Records Reviewed: Nursing Notes, Old Medical Records, Previous electrocardiograms, Previous Radiology Studies and Previous Laboratory Studies Provider Notes (Medical Decision Making): DDx- Pneumonia, New onset CHF, bronchitis, COVID- low suspicion, ACS 
 
ED Course:  
Initial assessment performed. The patients presenting problems have been discussed, and they are in agreement with the care plan formulated and outlined with them. I have encouraged them to ask questions as they arise throughout their visit. Pt appears dyspneic at rest, pulse ox RA 89-91% at rest, will order oxygen 2L by NC. Troponin elevated,, will order ASA. Pt with no active chest pain. CXR shows interstitial edema, NT-BNP added to labs, IV Lasix ordered CONSULT:  
Case discussed with the hospitalist they will evaluate and admit. CONSULT:  
Case discussed with Dr. Chance Ruffin, [place] [facility]. He will see patient in consult states one-time dose of Lovenox for now. I have updated the patient and her daughter with plans for admission and current diagnosis and treatment plan. Critical Care Time: CRITICAL CARE NOTE : 
 
4:30 PM 
 
IMPENDING DETERIORATION -Respiratory and Cardiovascular ASSOCIATED RISK FACTORS - Hypoxia and Dysrhythmia MANAGEMENT- Bedside Assessment and Supervision of Care INTERPRETATION -  Xrays, ECG, Blood Pressure, Cardiac Output Measures  and labs INTERVENTIONS - Oxygen, ASA, Lasix, Lovenox CASE REVIEW - Hospitalist/Intensivist, Medical Sub-Specialist, Nursing and Family TREATMENT RESPONSE -Stable PERFORMED BY - Self NOTES   : 
I have spent 40 minutes of critical care time involved in lab review, consultations with specialist, family decision- making, bedside attention and documentation. This time excludes time spent in any separate billed procedures. During this entire length of time I was immediately available to the patient . Eric Lowery DO Disposition: 
Admit Diagnosis Clinical Impression: 1. Acute respiratory failure with hypoxia ([facility] [address] 2. NSTEMI (non-ST elevated myocardial infarction) ([facility] [address] 3. Acute pulmonary edema (HCC) Attestations: 
 
Eric Lowery DO Please note that this dictation was completed with [employer], the computer voice recognition software. Quite often unanticipated grammatical, syntax, homophones, and other interpretive errors are inadvertently transcribed by the computer software. Please disregard these errors. Please excuse any errors that have escaped final proofreading. Thank you.

## 2021-04-02 NOTE — H&P
Hospitalist Admission Note NAME: Albin Christensen :  1926 MRN:  [id number] Date/Time:  2021 5:31 PM 
 
Patient PCP: Julio Mcneil MD 
______________________________________________________________________ Given the patient's current clinical presentation, I have a high level of concern for decompensation if discharged from the emergency department. Complex decision making was performed, which includes reviewing the patient's available past medical records, laboratory results, and x-ray films. My assessment of this patient's clinical condition and my plan of care is as follows. Assessment / Plan: 
 
Acute hypoxic respiratory failure NSTEMI 
CHF exacerbation ? Community-acquired pneumonia Chest x-raySubtle interstitial process. No confluent infiltrate. Incidental 
hiatal hernia. -Currently on 2 L nasal cannula 
-CT chest pending. Trop 5.31, will trend. BNP 33676, procalcitonin 0.20. 
-On Rocephin and azithromycin. DuoNeb 4 times daily. Lovenox 60 mg x 1 dose given 
-Cardiology consulted. Echo pending. -TSH pending. Got aspirin 325 mg in the ER, A1c and lipid panel in a.m. Got Lasix 40 mg IV 1 dose, start on Lasix 40 mg IV daily. Patient has never been on diuresis before. Echo in 2019 shows EF 55 to 04%, normal diastolic function. Acute kidney injury BUN/creatinine21/1.24, BMP in a.m. History of multiple falls at home PT and OT consulted, case management consulted. History of hypothyroidism Bipolar disorder Dementia with neurocognitive deficit Depression Insomnia Continue the home medications. Patient may benefit from the outpatient psychiatry consultation. Code Status: DNR Surrogate Decision Maker: Daughter DVT Prophylaxis: Lovenox GI Prophylaxis: not indicated Baseline: Ambulatory with assistance at home Subjective: CHIEF COMPLAINT: Shortness of breath, chest pain HISTORY OF PRESENT ILLNESS:    
Chari Rose Devora Guerrero is a 80 y.o.  female who presents with numbness of breath and chest pain over the last 1 week. Patient past medical history of bipolar disorder, depression, insomnia, hypertension, dementia. Daughter states that the patient complains of the shortness of breath along with chest pain over the last 1 week, shortness of breath worse on exertion. Patient has fallen multiple times over the last 1 week injuring the arms and the leg. Patient does not use oxygen at home but currently on 2 L nasal cannula. No fever and chills, no nausea and vomiting, no diarrhea and constipation, and no history of sick contacts. We were asked to admit for work up and evaluation of the above problems. Past Medical History:  
Diagnosis Date  Depression   
 psychiatrist: Dr Zac Duong (on Lithium)  Fracture of wrist   
 slipped and fx left wrist, surgery scheduled 12/21/16  Full dentures   
 upper and lower  Hypothyroid  Parkinson's disease (tremor, stiffness, slow motion, unstable posture) ([facility] [address] 5/23/2019 Past Surgical History:  
Procedure Laterality Date  HX KYPHOPLASTY  2013  HX ORTHOPAEDIC  12/2016 Wrist surgery  HX WRIST FRACTURE TX Left 12/2016 Social History Tobacco Use  Smoking status: Former Smoker  Smokeless tobacco: Never Used Substance Use Topics  Alcohol use: No  
  
 
Family History Problem Relation Age of Onset [facility] Other Mother Inefficient wound healing  Cancer Sister Stomach CA  
 Diabetes Brother  Alzheimer Sister  Hypertension Daughter  Thyroid Disease Daughter  Other Son Hip replacement  Depression Son  Liver Disease Son  Diabetes Son   
 Heart Disease Son  Depression Son Allergies Allergen Reactions  Sinemet [Carbidopa-Levodopa] Other (comments) \"Made patient much worse\"  Sulfa (Sulfonamide Antibiotics) Hives Prior to Admission medications Medication Sig Start Date End Date Taking? Authorizing Provider  
levothyroxine (SYNTHROID) 75 mcg tablet TAKE 1 TABLET DAILY BEFORE BREAKFAST 12/11/20  Yes Julio Mcneil MD  
mirtazapine (REMERON) 15 mg tablet Take  by mouth nightly. Take one and one half tablets daily   Yes Provider, Historical  
PARoxetine (PAXIL) 20 mg tablet Take 20 mg by mouth daily. Yes Provider, Historical  
memantine (NAMENDA) 10 mg tablet Take 1 Tab by mouth daily. Patient taking differently: Take 10 mg by mouth nightly. 5/23/19  Yes Shyann Bacon MD  
lithium CR (ESKALITH CR) 450 mg CR tablet Take 450 mg by mouth nightly. Yes Provider, Historical  
OLANZapine (ZyPREXA) 2.5 mg tablet  2/18/21   Other, MD Isaiah  
OLANZapine (ZyPREXA) 10 mg tablet  2/18/21   Other, MD Isaiah  
 
 
REVIEW OF SYSTEMS:    
I am not able to complete the review of systems because: The patient is intubated and sedated The patient has altered mental status due to his acute medical problems The patient has baseline aphasia from prior stroke(s) The patient has baseline dementia and is not reliable historian The patient is in acute medical distress and unable to provide information Total of 12 systems reviewed as follows:   
   POSITIVE= underlined text  Negative = text not underlined General:  fever, chills, sweats, generalized weakness, weight loss/gain,  
   loss of appetite Eyes:    blurred vision, eye pain, loss of vision, double vision ENT:    rhinorrhea, pharyngitis Respiratory:   cough, sputum production, SOB, SHERMAN, wheezing, pleuritic pain  
Cardiology:   chest pain, palpitations, orthopnea, PND, edema, syncope Gastrointestinal:  abdominal pain , N/V, diarrhea, dysphagia, constipation, bleeding Genitourinary:  frequency, urgency, dysuria, hematuria, incontinence Muskuloskeletal :  arthralgia, myalgia, back pain Hematology:  easy bruising, nose or gum bleeding, lymphadenopathy Dermatological: rash, ulceration, pruritis, color change / jaundice Endocrine:   hot flashes or polydipsia Neurological:  headache, dizziness, confusion, focal weakness, paresthesia, Speech difficulties, memory loss, gait difficulty Psychological: Feelings of anxiety, depression, agitation Objective: VITALS:   
Visit Vitals /61 Pulse 88 Temp 97.9 °F (36.6 °C) Resp 18 Ht 5' 4\" (1.626 m) Wt 60.8 kg (134 lb 0.6 oz) SpO2 94% BMI 23.01 kg/m² PHYSICAL EXAM: 
 
 
_______________________________________________________________________ Care Plan discussed with: 
  Comments Patient x Family  x   
RN x Care Manager Consultant:  x   
_______________________________________________________________________ Expected  Disposition:  
Home with Family HH/PT/OT/RN x  
SNF/LTC ? THU   
________________________________________________________________________ TOTAL TIME:  60 Minutes Critical Care Provided     Minutes non procedure based Comments   Reviewed previous records  
>50% of visit spent in counseling and coordination of care  Discussion with patient and/or family and questions answered 
  
 
________________________________________________________________________ Signed: Zarina Wang MD 
 
Procedures: see electronic medical records for all procedures/Xrays and details which were not copied into this note but were reviewed prior to creation of Plan. LAB DATA REVIEWED:   
Recent Results (from the past 24 hour(s)) CBC WITH AUTOMATED DIFF Collection Time: 04/02/21  1:48 PM  
Result Value Ref Range WBC 8.1 3.6 - 11.0 K/uL  
 RBC 4.24 3.80 - 5.20 M/uL  
 HGB 12.1 11.5 - 16.0 g/dL HCT 38.5 35.0 - 47.0 % MCV 90.8 80.0 - 99.0 FL  
 MCH 28.5 26.0 - 34.0 PG  
 MCHC 31.4 30.0 - 36.5 g/dL  
 RDW 13.5 11.5 - 14.5 % PLATELET 617 312 - 926 K/uL MPV 10.9 8.9 - 12.9 FL  
 NRBC 0.0 0  WBC ABSOLUTE NRBC 0.00 0.00 - 0.01 K/uL NEUTROPHILS 63 32 - 75 % LYMPHOCYTES 19 12 - 49 % MONOCYTES 10 5 - 13 % EOSINOPHILS 7 0 - 7 % BASOPHILS 1 0 - 1 % IMMATURE GRANULOCYTES 0 0.0 - 0.5 % ABS. NEUTROPHILS 5.2 1.8 - 8.0 K/UL  
 ABS. LYMPHOCYTES 1.5 0.8 - 3.5 K/UL  
 ABS. MONOCYTES 0.8 0.0 - 1.0 K/UL  
 ABS. EOSINOPHILS 0.5 (H) 0.0 - 0.4 K/UL  
 ABS. BASOPHILS 0.1 0.0 - 0.1 K/UL  
 ABS. IMM. GRANS. 0.0 0.00 - 0.04 K/UL  
 DF AUTOMATED METABOLIC PANEL, COMPREHENSIVE Collection Time: 04/02/21  1:48 PM  
Result Value Ref Range Sodium 138 136 - 145 mmol/L Potassium 4.3 3.5 - 5.1 mmol/L Chloride 109 (H) 97 - 108 mmol/L  
 CO2 25 21 - 32 mmol/L Anion gap 4 (L) 5 - 15 mmol/L Glucose 99 65 - 100 mg/dL BUN 21 (H) 6 - 20 MG/DL Creatinine 1.24 (H) 0.55 - 1.02 MG/DL  
 BUN/Creatinine ratio 17 12 - 20 GFR est AA 49 (L) >60 ml/min/1.73m2 GFR est non-AA 40 (L) >60 ml/min/1.73m2 Calcium 8.6 8.5 - 10.1 MG/DL Bilirubin, total 0.7 0.2 - 1.0 MG/DL  
 ALT (SGPT) 22 12 - 78 U/L  
 AST (SGOT) 33 15 - 37 U/L Alk. phosphatase 128 (H) 45 - 117 U/L Protein, total 7.6 6.4 - 8.2 g/dL  Albumin 3.4 (L) 3.5 - 5.0 g/dL  
 Globulin 4.2 (H) 2.0 - 4.0 g/dL A-G Ratio 0.8 (L) 1.1 - 2.2    
TROPONIN I Collection Time: 04/02/21  1:48 PM  
Result Value Ref Range Troponin-I, Qt. 0.31 (H) <0.05 ng/mL NT-PRO BNP Collection Time: 04/02/21  1:48 PM  
Result Value Ref Range NT pro-BNP 10,710 (H) <450 PG/ML  
EKG, 12 LEAD, INITIAL Collection Time: 04/02/21  1:54 PM  
Result Value Ref Range Ventricular Rate 90 BPM  
 Atrial Rate 90 BPM  
 P-R Interval 190 ms QRS Duration 86 ms  
 Q-T Interval 382 ms QTC Calculation (Bezet) 467 ms Calculated P Axis 13 degrees Calculated R Axis -30 degrees Calculated T Axis -48 degrees Diagnosis Sinus rhythm with premature atrial complexes Left axis deviation Moderate voltage criteria for LVH, may be normal variant ST & Nonspecific ST abnormality Confirmed by Amy Dalton ([id number]) on 4/2/2021 4:03:46 PM 
  
PROCALCITONIN Collection Time: 04/02/21  3:56 PM  
Result Value Ref Range Procalcitonin 0.20 ng/mL SARS-COV-2 Collection Time: 04/02/21  4:19 PM  
Result Value Ref Range SARS-CoV-2 Please find results under separate order COVID-19 RAPID TEST Collection Time: 04/02/21  4:19 PM  
Result Value Ref Range Specimen source Nasopharyngeal    
 COVID-19 rapid test Not detected NOTD    
D DIMER Collection Time: 04/02/21  4:53 PM  
Result Value Ref Range  D-dimer 4.32 (H) 0.00 - 0.65 mg/L FEU

## 2021-04-02 NOTE — TELEPHONE ENCOUNTER
HIPAA verified with patient 'ss daughter Vivienne Martines. Attempted to make appointment  For patient to come in the office,per Marisol patient will not come out of the house and said that Dr. Cheli Ramírez can do nothing for her. Discussed the use of [facility] for  evaluation of altered mental status and UTI symptoms, multiple falls. Marisol agrees but  Patient declines to have someone come in. Daughter did not have a container in the house to obtain a urine sample to check for UTI. Spoke with Darius Brennan with [address] will get patient's Primary [facility] nurse schedule for nurse visit next week to collect urine for testing.

## 2021-04-02 NOTE — ACP (ADVANCE CARE PLANNING)
Advance Care Planning Note NAME: Sung Mccarthy :  1926 MRN:  [id number] Date/Time:  2021 5:42 PM 
 
Active Diagnoses: 
Hospital Problems  Date Reviewed: 2020 Codes Class Noted POA Hypoxia ICD-10-CM: R09.02 
ICD-9-CM: 799.02  2021 Unknown NSTEMI (non-ST elevated myocardial infarction) ([facility] [address] ICD-10-CM: I21.4 ICD-9-CM: 410.70  2021 Unknown CHF exacerbation (HCC) ICD-10-CM: I50.9 ICD-9-CM: 428.0  2021 Unknown TAWANA (acute kidney injury) ([facility] ICD-10-CM: N17.9 ICD-9-CM: 584.9  2021 Unknown These active diagnoses are of sufficient risk that focused discussion on advance care planning is indicated in order to allow the patient to thoughtfully consider personal goals of care, and if situations arise that prevent the ability to personally give input, to ensure appropriate representation of their personal desires for different levels and aggressiveness of care. Discussion:  
Code status addressed and wants to be a DNR / DNI. Patient wants central line and vasopressors if needed. Patient  would like to assign Marisol Orozco as the surrogate decision maker. Persons present and participating in discussion: Tona Mcclellan MD, [facility] Time Spent:  
Total time spent face-to-face in education and discussion:   16  minutes.   
 
 
 
Tona Patel MD  
Hospitalist

## 2021-04-02 NOTE — TELEPHONE ENCOUNTER
HIPAA verified with [place], [facility], Patient is at ED at this time for evaluation.

## 2021-04-02 NOTE — CONSULTS
Cardiology Consult Note Date of  Admission: 4/2/2021  3:08 PM  
 
Admission type:Emergency Subjective:  
 
Ms. Lucy Chen is admitted with 2-3 weeks h/o SOB. History from chart review, ER physician. No family at bed side. She is a poor historian. Her troponin, BNP elevated. EKG shows non specific ST depression in lateral leads. She has no prior cardiac history. Chest xray shows no acute findings. CT pending. She does c/o intermittent chest pains, unable to quantify. Patient Active Problem List  
 Diagnosis Date Noted  Hypoxia 04/02/2021  
 NSTEMI (non-ST elevated myocardial infarction) ([facility] [address] 04/02/2021  CHF exacerbation ([address] 04/02/2021  TAWANA (acute kidney injury) ([facility] [address] 04/02/2021  Caregiver stress 05/16/2019  Anxiety and depression 05/16/2019  Counseling regarding advance care planning and goals of care 05/16/2019  Left leg pain 05/13/2019  Hip fracture ([address] 03/18/2019  Bipolar disorder ([facility] [address] 03/18/2019  Auditory hallucinations 09/04/2018  Altered mental status, unspecified 02/02/2018  Bilateral carotid artery stenosis 02/02/2018  Transient ischemic attack involving left internal carotid artery 02/02/2018  Acute metabolic encephalopathy 51/71/3128  Laceration of left arm with complication 37/89/3558  Hypothyroidism 01/02/2014  Depression 01/02/2014 Morgan Mckeon MD 
Past Medical History:  
Diagnosis Date  Depression   
 psychiatrist: Dr Angel Luis Mckeon (on Lithium)  Fracture of wrist   
 slipped and fx left wrist, surgery scheduled 12/21/16  Full dentures   
 upper and lower  Hypothyroid  Parkinson's disease (tremor, stiffness, slow motion, unstable posture) ([facility] [address] 5/23/2019 Past Surgical History:  
Procedure Laterality Date  HX KYPHOPLASTY  2013  HX ORTHOPAEDIC  12/2016 Wrist surgery  HX WRIST FRACTURE TX Left 12/2016 Allergies Allergen Reactions  Sinemet [Carbidopa-Levodopa] Other (comments)   \"Made patient much worse\"  Sulfa (Sulfonamide Antibiotics) Hives Family History Problem Relation Age of Onset Arun Glover Other Mother Inefficient wound healing  Cancer Sister Stomach CA  
 Diabetes Brother  Alzheimer Sister  Hypertension Daughter  Thyroid Disease Daughter  Other Son Hip replacement  Depression Son  Liver Disease Son  Diabetes Son   
 Heart Disease Son  Depression Son   
  
Current Facility-Administered Medications Medication Dose Route Frequency  [START ON 4/3/2021] memantine (NAMENDA) tablet 10 mg  10 mg Oral DAILY  lithium carbonate CR (ESKALITH CR) tablet 450 mg  450 mg Oral QHS  [START ON 4/3/2021] levothyroxine (SYNTHROID) tablet 75 mcg  75 mcg Oral 6am  
 mirtazapine (REMERON) tablet 15 mg  15 mg Oral QHS  [START ON 4/3/2021] PARoxetine (PAXIL) tablet 20 mg  20 mg Oral DAILY  acetaminophen (TYLENOL) tablet 650 mg  650 mg Oral Q6H PRN  
 [START ON 4/3/2021] furosemide (LASIX) injection 40 mg  40 mg IntraVENous DAILY  OLANZapine (ZyPREXA) tablet 2.5 mg  2.5 mg Oral QPM  
 OLANZapine (ZyPREXA) tablet 10 mg  10 mg Oral QPM  
 enoxaparin (LOVENOX) injection 60 mg  60 mg SubCUTAneous NOW  cefTRIAXone (ROCEPHIN) 1 g in 0.9% sodium chloride (MBP/ADV) 50 mL MBP  1 g IntraVENous Q24H  
 azithromycin (ZITHROMAX) 500 mg in 0.9% sodium chloride 250 mL (VIAL-MATE)  500 mg IntraVENous Q24H  
 albuterol-ipratropium (DUO-NEB) 2.5 MG-0.5 MG/3 ML  3 mL Nebulization QID RT  
 budesonide (PULMICORT) 500 mcg/2 ml nebulizer suspension  500 mcg Nebulization BID RT  
 sodium chloride (NS) flush 5-40 mL  5-40 mL IntraVENous Q8H  
 sodium chloride (NS) flush 5-40 mL  5-40 mL IntraVENous PRN  
 acetaminophen (TYLENOL) tablet 650 mg  650 mg Oral Q6H PRN Or  
 acetaminophen (TYLENOL) suppository 650 mg  650 mg Rectal Q6H PRN  polyethylene glycol (MIRALAX) packet 17 g  17 g Oral DAILY PRN  
 ondansetron (ZOFRAN ODT) tablet 4 mg 4 mg Oral Q8H PRN Or  
 ondansetron (ZOFRAN) injection 4 mg  4 mg IntraVENous Q6H PRN  
 [START ON 4/3/2021] enoxaparin (LOVENOX) injection 40 mg  40 mg SubCUTAneous DAILY Current Outpatient Medications Medication Sig  levothyroxine (SYNTHROID) 75 mcg tablet TAKE 1 TABLET DAILY BEFORE BREAKFAST  mirtazapine (REMERON) 15 mg tablet Take  by mouth nightly. Take one and one half tablets daily  PARoxetine (PAXIL) 20 mg tablet Take 20 mg by mouth daily.  memantine (NAMENDA) 10 mg tablet Take 1 Tab by mouth daily. (Patient taking differently: Take 10 mg by mouth nightly.)  lithium CR (ESKALITH CR) 450 mg CR tablet Take 450 mg by mouth nightly.  OLANZapine (ZyPREXA) 2.5 mg tablet  OLANZapine (ZyPREXA) 10 mg tablet Review of Symptoms: 
Review of systems not obtained due to patient factors. Physical Exam 
 
Visit Vitals /61 Pulse 88 Temp 97.9 °F (36.6 °C) Resp 18 Ht 5' 4\" (1.626 m) Wt 134 lb 0.6 oz (60.8 kg) SpO2 94% BMI 23.01 kg/m² General Appearance:  Well developed, well nourished,alert and oriented x 1, and individual in no acute distress. Ears/Nose/Mouth/Throat:   Hearing grossly normal. 
  
    Neck: Supple. Chest:   Lungs rales bilaterally. Cardiovascular:  Regular rate and rhythm, S1, S2 normal, SE murmur. Abdomen:   Soft, non-tender, bowel sounds are active. Extremities: trace edema bilaterally. Skin: Warm and dry. Cardiographics Telemetry: normal sinus rhythm ECG: normal EKG, normal sinus rhythm, unchanged from previous tracings, nonspecific ST and T waves changes Echocardiogram: Not done Labs:  
Recent Results (from the past 24 hour(s)) CBC WITH AUTOMATED DIFF Collection Time: 04/02/21  1:48 PM  
Result Value Ref Range WBC 8.1 3.6 - 11.0 K/uL  
 RBC 4.24 3.80 - 5.20 M/uL  
 HGB 12.1 11.5 - 16.0 g/dL HCT 38.5 35.0 - 47.0 %  MCV 90.8 80.0 - 99.0 FL  
 MCH 28.5 26.0 - 34.0 PG  
 MCHC 31.4 30.0 - 36.5 g/dL  
 RDW 13.5 11.5 - 14.5 % PLATELET 883 097 - 089 K/uL MPV 10.9 8.9 - 12.9 FL  
 NRBC 0.0 0  WBC ABSOLUTE NRBC 0.00 0.00 - 0.01 K/uL NEUTROPHILS 63 32 - 75 % LYMPHOCYTES 19 12 - 49 % MONOCYTES 10 5 - 13 % EOSINOPHILS 7 0 - 7 % BASOPHILS 1 0 - 1 % IMMATURE GRANULOCYTES 0 0.0 - 0.5 % ABS. NEUTROPHILS 5.2 1.8 - 8.0 K/UL  
 ABS. LYMPHOCYTES 1.5 0.8 - 3.5 K/UL  
 ABS. MONOCYTES 0.8 0.0 - 1.0 K/UL  
 ABS. EOSINOPHILS 0.5 (H) 0.0 - 0.4 K/UL  
 ABS. BASOPHILS 0.1 0.0 - 0.1 K/UL  
 ABS. IMM. GRANS. 0.0 0.00 - 0.04 K/UL  
 DF AUTOMATED METABOLIC PANEL, COMPREHENSIVE Collection Time: 04/02/21  1:48 PM  
Result Value Ref Range Sodium 138 136 - 145 mmol/L Potassium 4.3 3.5 - 5.1 mmol/L Chloride 109 (H) 97 - 108 mmol/L  
 CO2 25 21 - 32 mmol/L Anion gap 4 (L) 5 - 15 mmol/L Glucose 99 65 - 100 mg/dL BUN 21 (H) 6 - 20 MG/DL Creatinine 1.24 (H) 0.55 - 1.02 MG/DL  
 BUN/Creatinine ratio 17 12 - 20 GFR est AA 49 (L) >60 ml/min/1.73m2 GFR est non-AA 40 (L) >60 ml/min/1.73m2 Calcium 8.6 8.5 - 10.1 MG/DL Bilirubin, total 0.7 0.2 - 1.0 MG/DL  
 ALT (SGPT) 22 12 - 78 U/L  
 AST (SGOT) 33 15 - 37 U/L Alk. phosphatase 128 (H) 45 - 117 U/L Protein, total 7.6 6.4 - 8.2 g/dL Albumin 3.4 (L) 3.5 - 5.0 g/dL Globulin 4.2 (H) 2.0 - 4.0 g/dL A-G Ratio 0.8 (L) 1.1 - 2.2    
TROPONIN I Collection Time: 04/02/21  1:48 PM  
Result Value Ref Range Troponin-I, Qt. 0.31 (H) <0.05 ng/mL NT-PRO BNP Collection Time: 04/02/21  1:48 PM  
Result Value Ref Range NT pro-BNP 10,710 (H) <450 PG/ML  
EKG, 12 LEAD, INITIAL Collection Time: 04/02/21  1:54 PM  
Result Value Ref Range Ventricular Rate 90 BPM  
 Atrial Rate 90 BPM  
 P-R Interval 190 ms QRS Duration 86 ms  
 Q-T Interval 382 ms QTC Calculation (Bezet) 467 ms Calculated P Axis 13 degrees Calculated R Axis -30 degrees Calculated T Axis -48 degrees Diagnosis Sinus rhythm with premature atrial complexes Left axis deviation Moderate voltage criteria for LVH, may be normal variant ST & Nonspecific ST abnormality Confirmed by Damaris Triplett ([id number]) on 4/2/2021 4:03:46 PM 
  
PROCALCITONIN Collection Time: 04/02/21  3:56 PM  
Result Value Ref Range Procalcitonin 0.20 ng/mL SARS-COV-2 Collection Time: 04/02/21  4:19 PM  
Result Value Ref Range SARS-CoV-2 Please find results under separate order COVID-19 RAPID TEST Collection Time: 04/02/21  4:19 PM  
Result Value Ref Range Specimen source Nasopharyngeal    
 COVID-19 rapid test Not detected NOTD    
TROPONIN I Collection Time: 04/02/21  4:53 PM  
Result Value Ref Range Troponin-I, Qt. 0.35 (H) <0.05 ng/mL TSH 3RD GENERATION Collection Time: 04/02/21  4:53 PM  
Result Value Ref Range TSH 0.41 0.36 - 3.74 uIU/mL  
D DIMER Collection Time: 04/02/21  4:53 PM  
Result Value Ref Range D-dimer 4.32 (H) 0.00 - 0.65 mg/L FEU Assessment: 
 
 Assessment:  
  
  
Hospital Problems  Date Reviewed: 1/16/2020 Codes Class Noted POA Hypoxia ICD-10-CM: R09.02 
ICD-9-CM: 799.02  4/2/2021 Unknown NSTEMI (non-ST elevated myocardial infarction) ([facility] ICD-10-CM: I21.4 ICD-9-CM: 410.70  4/2/2021 Unknown CHF exacerbation (HCC) ICD-10-CM: I50.9 ICD-9-CM: 428.0  4/2/2021 Unknown TAWANA (acute kidney injury) ([facility] ICD-10-CM: N17.9 ICD-9-CM: 584.9  4/2/2021 Unknown Plan:  
 
Elevated troponin- ACS. Will start ASA, low dose betablocker if she can tolerate. Has received a dose of lovenox. Follow serial markers. Check echo. Further management based on her clinical course. CHF- acute. Systolic v/s diastolic. Gentle diuresis. Will follow. Thank you for the consult.  
 
Anuradha Leggett MD

## 2021-04-03 NOTE — PROGRESS NOTES
Cardiology Progress Note 4/3/2021 1:52 PM 
 
Admit Date: 4/2/2021 Admit Diagnosis: CHF exacerbation ([facility] [I50.9];NSTEMI (non-ST elevated myocardial infarction) ([facility] [I21.4]; TAWANA (acute kidney injury) ([facility] [N17.9]; Hypoxia [R09.02] Subjective:  
 
Dasha Peers denies any chest pain, SOB. Vague historian. Troponin mildly elevated. Visit Vitals BP (!) 154/69 (BP 1 Location: Left upper arm, BP Patient Position: At rest) Pulse (P) 68 Temp 98.3 °F (36.8 °C) Resp 20 Ht 5' 4\" (1.626 m) Wt 134 lb 0.6 oz (60.8 kg) SpO2 (P) 96% BMI 23.01 kg/m² Current Facility-Administered Medications Medication Dose Route Frequency  ipratropium-albuterol (COMBIVENT RESPIMAT) 20 mcg-100 mcg inhalation spray  1 Puff Inhalation Q4H PRN  
 fluticasone furoate (ARNUITY ELLIPTA) 100 mcg/puff  1 Puff Inhalation BID PRN  
 memantine (NAMENDA) tablet 10 mg  10 mg Oral DAILY  lithium carbonate CR (ESKALITH CR) tablet 450 mg  450 mg Oral QHS  levothyroxine (SYNTHROID) tablet 75 mcg  75 mcg Oral 6am  
 mirtazapine (REMERON) tablet 15 mg  15 mg Oral QHS  PARoxetine (PAXIL) tablet 20 mg  20 mg Oral DAILY  furosemide (LASIX) injection 40 mg  40 mg IntraVENous DAILY  OLANZapine (ZyPREXA) tablet 2.5 mg  2.5 mg Oral QPM  
 OLANZapine (ZyPREXA) tablet 10 mg  10 mg Oral QPM  
 cefTRIAXone (ROCEPHIN) 1 g in 0.9% sodium chloride (MBP/ADV) 50 mL MBP  1 g IntraVENous Q24H  
 sodium chloride (NS) flush 5-40 mL  5-40 mL IntraVENous Q8H  
 sodium chloride (NS) flush 5-40 mL  5-40 mL IntraVENous PRN  
 acetaminophen (TYLENOL) tablet 650 mg  650 mg Oral Q6H PRN Or  
 acetaminophen (TYLENOL) suppository 650 mg  650 mg Rectal Q6H PRN  polyethylene glycol (MIRALAX) packet 17 g  17 g Oral DAILY PRN  
 ondansetron (ZOFRAN ODT) tablet 4 mg  4 mg Oral Q8H PRN Or  
 ondansetron (ZOFRAN) injection 4 mg  4 mg IntraVENous Q6H PRN  
 enoxaparin (LOVENOX) injection 30 mg  30 mg SubCUTAneous Q24H  azithromycin (ZITHROMAX) 500 mg in 0.9% sodium chloride 250 mL (VIAL-MATE)  500 mg IntraVENous Q24H  
 metoprolol tartrate (LOPRESSOR) tablet 12.5 mg  12.5 mg Oral Q12H Objective:  
  
Physical Exam: 
Visit Vitals /60 Pulse (!) 56 Temp 98.2 °F (36.8 °C) Resp 18 Ht 5' 4\" (1.626 m) Wt 134 lb 0.6 oz (60.8 kg) SpO2 99% BMI 23.01 kg/m² General Appearance:  Well developed, well nourished,alert and oriented x 1, and individual in no acute distress. Ears/Nose/Mouth/Throat:   Hearing grossly normal. 
  
    Neck: Supple. Chest:   Lungs clear to auscultation bilaterally. Cardiovascular:  Regular rate and rhythm, S1, S2 normal, no murmur. Abdomen:   Soft, non-tender, bowel sounds are active. Extremities: No edema bilaterally. Skin: Warm and dry. Data Review:  
Labs:   
Recent Results (from the past 24 hour(s)) EKG, 12 LEAD, INITIAL Collection Time: 04/02/21  1:54 PM  
Result Value Ref Range Ventricular Rate 90 BPM  
 Atrial Rate 90 BPM  
 P-R Interval 190 ms QRS Duration 86 ms  
 Q-T Interval 382 ms QTC Calculation (Bezet) 467 ms Calculated P Axis 13 degrees Calculated R Axis -30 degrees Calculated T Axis -48 degrees Diagnosis Sinus rhythm with premature atrial complexes Left axis deviation Moderate voltage criteria for LVH, may be normal variant ST & Nonspecific ST abnormality Confirmed by Brandin Weiss ([id number]) on 4/2/2021 4:03:46 PM 
  
PROCALCITONIN Collection Time: 04/02/21  3:56 PM  
Result Value Ref Range Procalcitonin 0.20 ng/mL SARS-COV-2 Collection Time: 04/02/21  4:19 PM  
Result Value Ref Range SARS-CoV-2 Please find results under separate order COVID-19 RAPID TEST Collection Time: 04/02/21  4:19 PM  
Result Value Ref Range Specimen source Nasopharyngeal    
 COVID-19 rapid test Not detected NOTD    
TROPONIN I Collection Time: 04/02/21  4:53 PM  
Result Value Ref Range  Troponin-I, Qt. 0.35 (H) <0.05 ng/mL TSH 3RD GENERATION Collection Time: 04/02/21  4:53 PM  
Result Value Ref Range TSH 0.41 0.36 - 3.74 uIU/mL  
D DIMER Collection Time: 04/02/21  4:53 PM  
Result Value Ref Range D-dimer 4.32 (H) 0.00 - 0.65 mg/L FEU  
ECHO ADULT COMPLETE Collection Time: 04/02/21  6:04 PM  
Result Value Ref Range IVSd 1.02 (A) 0.60 - 0.90 cm LVIDd 3.45 (A) 3.90 - 5.30 cm LVIDs 2.55 cm  
 LVOT d 1.63 cm  
 LVPWd 1.00 (A) 0.60 - 0.90 cm  
 LVOT Cardiac Output 2.47 liter/minute LVOT Peak Gradient 3.03 mmHg LVOT Peak Gradient 2.97 mmHg Left Ventricular Outflow Tract Mean Gradient 1.70 mmHg LVOT SV 44.7 mL  
 LVOT Peak Velocity 86.23 cm/s LVOT Peak Velocity 87.07 cm/s LVOT VTI 21.29 cm RVSP 41.26 mmHg Left Atrium Major Axis 2.37 cm Right Atrial Area 4C 18.31 cm2 Est. RA Pressure 10.00 mmHg Aortic Valve Area by Continuity of Peak Velocity 1.71 cm2 Aortic Valve Area by Continuity of Peak Velocity 1.73 cm2 Aortic Valve Area by Continuity of Peak Velocity 1.63 cm2 Aortic Valve Area by Continuity of Peak Velocity 1.62 cm2 Aortic Valve Area by Continuity of VTI 2.12 cm2  
 AV R PG 22.89 mmHg Aortic Regurgitant Pressure Half-time 1,329.98 ms  
 AR Max Godfrey 233.77 cm/s AoV PG 5.03 mmHg AoV PG 4.50 mmHg Aortic Valve Systolic Mean Gradient 2.46 mmHg Aortic Valve Systolic Peak Velocity 529.61 cm/s Aortic Valve Systolic Peak Velocity 026.68 cm/s AoV VTI 21.05 cm  
 MV A Godfrey 106.23 cm/s Mitral Valve E Wave Deceleration Time 215.90 ms MV E Godfrey 63.35 cm/s  
 E/E' ratio (averaged) 11.50 E/E' lateral 8.19   
 E/E' septal 19.28 LV E' Lateral Velocity 7.73 cm/s LV E' Septal Velocity 3.29 cm/s MVA VTI 1.70 cm2 MR Peak Gradient 31.55 mmHg  
 TR Max Velocity 277.48 cm/s  
 MV Peak Gradient 3.50 mmHg MV Mean Gradient 1.01 mmHg Mitral Valve Pressure Half-time 62.98 ms Mitral Valve Max Velocity 93.56 cm/s  Mitral Valve Annulus Velocity Time Integral 26.27 cm  
 MVA (PHT) 3.49 cm2 Pulmonic Valve Systolic Peak Instantaneous Gradient 1.11 mmHg Pulmonic Valve Max Velocity 52.73 cm/s Triscuspid Valve Regurgitation Peak Gradient 31.26 mmHg  
 TR Max Velocity 277.45 cm/s Ao Root D 2.95 cm METABOLIC PANEL, BASIC Collection Time: 04/03/21  3:40 AM  
Result Value Ref Range Sodium 141 136 - 145 mmol/L Potassium 3.9 3.5 - 5.1 mmol/L Chloride 111 (H) 97 - 108 mmol/L  
 CO2 29 21 - 32 mmol/L Anion gap 1 (L) 5 - 15 mmol/L Glucose 92 65 - 100 mg/dL BUN 25 (H) 6 - 20 MG/DL Creatinine 1.15 (H) 0.55 - 1.02 MG/DL  
 BUN/Creatinine ratio 22 (H) 12 - 20 GFR est AA 53 (L) >60 ml/min/1.73m2 GFR est non-AA 44 (L) >60 ml/min/1.73m2 Calcium 8.0 (L) 8.5 - 10.1 MG/DL MAGNESIUM Collection Time: 04/03/21  3:40 AM  
Result Value Ref Range Magnesium 2.4 1.6 - 2.4 mg/dL CBC WITH AUTOMATED DIFF Collection Time: 04/03/21  3:40 AM  
Result Value Ref Range WBC 6.4 3.6 - 11.0 K/uL  
 RBC 3.76 (L) 3.80 - 5.20 M/uL  
 HGB 10.6 (L) 11.5 - 16.0 g/dL HCT 34.4 (L) 35.0 - 47.0 % MCV 91.5 80.0 - 99.0 FL  
 MCH 28.2 26.0 - 34.0 PG  
 MCHC 30.8 30.0 - 36.5 g/dL  
 RDW 13.6 11.5 - 14.5 % PLATELET 282 732 - 188 K/uL MPV 11.1 8.9 - 12.9 FL  
 NRBC 0.0 0  WBC ABSOLUTE NRBC 0.00 0.00 - 0.01 K/uL NEUTROPHILS 48 32 - 75 % LYMPHOCYTES 28 12 - 49 % MONOCYTES 11 5 - 13 % EOSINOPHILS 11 (H) 0 - 7 % BASOPHILS 1 0 - 1 % IMMATURE GRANULOCYTES 1 (H) 0.0 - 0.5 % ABS. NEUTROPHILS 3.1 1.8 - 8.0 K/UL  
 ABS. LYMPHOCYTES 1.8 0.8 - 3.5 K/UL  
 ABS. MONOCYTES 0.7 0.0 - 1.0 K/UL  
 ABS. EOSINOPHILS 0.7 (H) 0.0 - 0.4 K/UL  
 ABS. BASOPHILS 0.1 0.0 - 0.1 K/UL  
 ABS. IMM. GRANS. 0.0 0.00 - 0.04 K/UL  
 DF AUTOMATED    
TROPONIN I Collection Time: 04/03/21  3:40 AM  
Result Value Ref Range Troponin-I, Qt. 0.25 (H) <0.05 ng/mL CK W/ CKMB & INDEX  Collection Time: 04/03/21  3:40 AM  
Result Value Ref Range CK - MB 3.8 (H) <3.6 NG/ML  
 CK-MB Index 2.9 (H) 0.0 - 2.5  26 - 192 U/L Telemetry: normal sinus rhythm Assessment:  
 
Hospital Problems  Date Reviewed: 1/16/2020 Codes Class Noted POA Hypoxia ICD-10-CM: R09.02 
ICD-9-CM: 799.02  4/2/2021 Unknown NSTEMI (non-ST elevated myocardial infarction) ([facility] ICD-10-CM: I21.4 ICD-9-CM: 410.70  4/2/2021 Unknown CHF exacerbation ([facility]) ICD-10-CM: I50.9 ICD-9-CM: 428.0  4/2/2021 Unknown TAWANA (acute kidney injury) ([facility] ICD-10-CM: N17.9 ICD-9-CM: 584.9  4/2/2021 Unknown Plan:  
 
Continue diuresis. Echo pending. If EF low, consider cath if family agreeable. If normal EF, stress test Monday only if further w/u planned.  
 
Isabel Clinton MD

## 2021-04-03 NOTE — PROGRESS NOTES
Pharmacy Clarification of Prior to Admission Medication Regimen The patient was NOT interviewed regarding clarification of the prior to admission medication regimen because patient did not  the phone . The patient's daughter was contacted and permission was obtained from patient to discuss drug regimen. The individual(s) listed above were questioned regarding the patient's use of any other inhalers, topical products, over the counter medications, herbal medications, vitamin products or ophthalmic/nasal/otic medication use. Information Obtained From: Daughter Pertinent Pharmacy Findings: 
Updated patients preferred outpatient pharmacy to: [facility] medication list was corrected to the following:  
 
Prior to Admission Medications Prescriptions Last Dose Informant Taking? OLANZapine (ZyPREXA) 10 mg tablet   No  
OLANZapine (ZyPREXA) 2.5 mg tablet   No  
PARoxetine (PAXIL) 20 mg tablet   Yes Sig: Take 20 mg by mouth daily. levothyroxine (SYNTHROID) 75 mcg tablet   Yes Sig: TAKE 1 TABLET DAILY BEFORE BREAKFAST  
lithium CR (ESKALITH CR) 450 mg CR tablet   Yes Sig: Take 450 mg by mouth nightly. memantine (NAMENDA) 10 mg tablet   Yes Sig: Take 1 Tab by mouth daily. Patient taking differently: Take 10 mg by mouth nightly. mirtazapine (REMERON) 15 mg tablet   Yes Sig: Take  by mouth nightly. Take one and one half tablets daily Facility-Administered Medications: None Thank you, Andreas Shultz Pharmacy Intern

## 2021-04-03 NOTE — PROGRESS NOTES
Hospitalist Progress Note NAME: Albin Christensen :  1926 MRN:  [id number] Assessment / Plan: 
Acute hypoxic respiratory failure POA 
NSTEMI 
CHF exacerbation,?diastolic/CHF ? Community-acquired pneumonia Rule out Covid Chest x-raySubtle interstitial process. No confluent infiltrate. Incidental 
hiatal hernia. -CT chest mozaic GGO can be seen due to Pulm edema/infectios process 
-trop Mild elevated, ProBNP high 
-High D dimer 
 
-Presentation likely 2/2 CHF, volume status seems to be improved. -Continue gentle Diuresis, Strict I & Os, daily weight 
-ECHO pending, cardiology on board.  
-aspirin daily 
 
-Rapid covid-19 negative, check Respi panel PCR 
-Continue azithro/rocephin Echo in 2019 shows EF 55 to 29%, normal diastolic function. 
  
Mild elevated creat BUN/creatinine21/1.24, Creat stable 
  
History of multiple falls at home PT and OT consulted, case management consulted. -Check Lithium level History of hypothyroidism Bipolar disorder Dementia with neurocognitive deficit Depression Insomnia Continue the home medications. discussed with dtr 
  
Code Status: DNR Surrogate Decision Maker: Daughter 
  
DVT Prophylaxis: Lovenox GI Prophylaxis: not indicated 
  
Baseline: Ambulatory with assistance at home Subjective: Chief Complaint / Reason for Physician Visit On 3 L nc, sats 98%, pleasant, reports improved SOB Review of Systems: 
Symptom Y/N Comments  Symptom Y/N Comments Fever/Chills    Chest Pain Poor Appetite    Edema Cough    Abdominal Pain Sputum    Joint Pain SOB/SHERMAN    Pruritis/Rash Nausea/vomit    Tolerating PT/OT Diarrhea    Tolerating Diet Constipation    Other Could NOT obtain due to: dementia Objective: VITALS:  
Last 24hrs VS reviewed since prior progress note.  Most recent are: 
Patient Vitals for the past 24 hrs: 
 Temp Pulse Resp BP SpO2  
21 1040 98.3 °F (36.8 °C) 61 20 (!) 154/69 98 % 04/03/21 0758 98.5 °F (36.9 °C) 68 20 (!) 164/67 96 % 04/02/21 2234  62 18 112/68 96 % 04/02/21 2054 97.6 °F (36.4 °C) 77 20 105/67 99 % 04/02/21 1800  73 19 (!) 138/51 94 % 04/02/21 1645  80 22 135/61 96 % 04/02/21 1630  78 22 (!) 142/65 97 % 04/02/21 1615  79 19 (!) 143/82 94 % 04/02/21 1600  80 22 129/70 91 % 04/02/21 1344 97.9 °F (36.6 °C) 88 18 132/61 94 % Intake/Output Summary (Last 24 hours) at 4/3/2021 1241 Last data filed at 4/3/2021 1396 Gross per 24 hour Intake 272 ml Output  Net 272 ml I had a face to face encounter and independently examined this patient on 4/3/2021, as outlined below: PHYSICAL EXAM: 
General: WD, WN. Alert, cooperative, no acute distress EENT:  EOMI. Anicteric sclerae. MMM Resp:  Lungs clear CV:  Regular  rhythm,  No edema GI:  Soft, Non distended, Non tender. +Bowel sounds Neurologic:  Alert and oriented to person and place. Psych:   Fair insight Skin:  No rashes. No jaundice Reviewed most current lab test results and cultures  YES Reviewed most current radiology test results   YES Review and summation of old records today    NO Reviewed patient's current orders and MAR    YES 
PMH/ reviewed - no change compared to H&P 
________________________________________________________________________ Care Plan discussed with: 
  Comments Patient x Family RN x Care Manager Consultant Multidiciplinary team rounds were held today with , nursing, pharmacist and clinical coordinator. Patient's plan of care was discussed; medications were reviewed and discharge planning was addressed. ________________________________________________________________________ Total NON critical care TIME: 35  Minutes Total CRITICAL CARE TIME Spent:   Minutes non procedure based Comments >50% of visit spent in counseling and coordination of care ________________________________________________________________________ Noemi Marx MD  
 
Procedures: see electronic medical records for all procedures/Xrays and details which were not copied into this note but were reviewed prior to creation of Plan. LABS: 
I reviewed today's most current labs and imaging studies. Pertinent labs include: 
Recent Labs 04/03/21 
0340 04/02/21 
1348 WBC 6.4 8.1 HGB 10.6* 12.1 HCT 34.4* 38.5  171 Recent Labs 04/03/21 
0340 04/02/21 
1348  138  
K 3.9 4.3 * 109* CO2 29 25 GLU 92 99 BUN 25* 21* CREA 1.15* 1.24* CA 8.0* 8.6 MG 2.4  --   
ALB  --  3.4* TBILI  --  0.7 ALT  --  22 Signed: Noemi Marx MD

## 2021-04-03 NOTE — PROGRESS NOTES
Bedside and Verbal shift change report given to Mac (oncoming nurse) by Robert Cabrera (offgoing nurse). Report included the following information SBAR, Kardex, ED Summary, Procedure Summary, MAR and Recent Results.

## 2021-04-03 NOTE — PROGRESS NOTES
Problem: Self Care Deficits Care Plan (Adult) Goal: *Acute Goals and Plan of Care (Insert Text) Description:  
FUNCTIONAL STATUS PRIOR TO ADMISSION: Patient was modified independent using a rollator for functional mobility. HOME SUPPORT: The patient lived alone with family to provide assistance. Pt [facility] in [facility] Occupational Therapy Goals Initiated 4/3/2021 1. Patient will perform grooming standing or sitting with supervision/set-up within 7 day(s). 2.  Patient will perform bathing UB sitting or standing with moderate assistance  within 7 day(s). 3.  Patient will perform lower body dressing with moderate assistance with AE as needed  within 7 day(s). 4.  Patient will perform toilet transfers with supervision/set-up within 7 day(s). 5.  Patient will perform all aspects of toileting with supervision/set-up within 7 day(s). 6.  Patient will participate in upper extremity therapeutic exercise/activities with supervision/set-up for 5 minutes within 7 day(s). 7.  Patient will utilize energy conservation techniques during functional activities with verbal cues within 7 day(s). Outcome: Not Met OCCUPATIONAL THERAPY EVALUATION Patient: Albin Christensen (48 y.o. female) Date: 4/3/2021 Primary Diagnosis: CHF exacerbation ([facility] [I50.9] NSTEMI (non-ST elevated myocardial infarction) ([facility] [I21.4] TAWANA (acute kidney injury) ([facility] [N17.9] Hypoxia [R09.02] Precautions: fall ASSESSMENT Based on the objective data described below, the patient presents with decreased endurance, strength , functional mobility, ADLs and pt is very anxious, and SOB but on RA. Pt was living alone in her apt, at [facility], [facility], and stated that she was using rollator, and able to bathe and dress self with sponge bathing lately. Pt was independent with dressing self but stated that shoes/socks were difficult .  Pt has functional range in BUE and her strength is functional. Pt was mod of 2 to come to sitting from supine and max to scoot to EOb. She was able to stand with min of 1 and became very nervous and SOB and her O2% remained 94-95%. Pt was min to transfer to chair and pt was very uncomfortable in chair and remained very SOB. Pt was put back to bed and left  with HOB elevated and worked on PLB. Pts O2% was stable at 94-95% and her BP was stable but pt was very uncomfortable in the chair. Current Level of Function Impacting Discharge (ADLs/self-care): max for ADLs Functional Outcome Measure: The patient scored 35/100 on the Barthel outcome measure which is indicative of max for ADLs . Other factors to consider for discharge: pt will need rehab since she lives alone Patient will benefit from skilled therapy intervention to address the above noted impairments. PLAN : 
Recommendations and Planned Interventions: self care training, functional mobility training, therapeutic exercise, balance training, therapeutic activities, endurance activities, patient education, and home safety training Frequency/Duration: Patient will be followed by occupational therapy 3 times a week to address goals. Recommendation for discharge: (in order for the patient to meet his/her long term goals) To be determined: pending progress, home with 24/7 or rehab This discharge recommendation: 
Has been made in collaboration with the attending provider and/or case management IF patient discharges home will need the following DME: tbd SUBJECTIVE:  
Patient stated I dont think I can sit here .  OBJECTIVE DATA SUMMARY:  
HISTORY:  
Past Medical History:  
Diagnosis Date Depression   
 psychiatrist: Dr Angelica Stover (on Lithium) Fracture of wrist   
 slipped and fx left wrist, surgery scheduled 12/21/16 Full dentures   
 upper and lower Hypothyroid Parkinson's disease (tremor, stiffness, slow motion, unstable posture) ([facility] 5/23/2019 Past Surgical History: Procedure Laterality Date HX KYPHOPLASTY  2013 HX ORTHOPAEDIC  12/2016 Wrist surgery HX WRIST FRACTURE TX Left 12/2016 Expanded or extensive additional review of patient history:  
 
Home Situation Home Environment: Private residence([facility]) # Steps to Enter: 0 One/Two Story Residence: One story Living Alone: Yes Support Systems: Family member(s) Current DME Used/Available at Home: Shower chair, Walker, rollator Tub or Shower Type: Tub/Shower combination Hand dominance: Right EXAMINATION OF PERFORMANCE DEFICITS: 
Cognitive/Behavioral Status: 
Neurologic State: Alert Orientation Level: Oriented X4 Cognition: Follows commands Perception: Appears intact Perseveration: No perseveration noted Safety/Judgement: Awareness of environment Skin: in fair health Edema: none Hearing: Auditory Auditory Impairment: None Vision/Perceptual:   
    
    
    
  
   Intact Range of Motion: 
 
AROM: Generally decreased, functional 
PROM: Generally decreased, functional 
  
  
  
  
  
  
 
Strength: 
 
Strength: Generally decreased, functional 
  
  
  
  
 
Coordination: 
Coordination: Within functional limits Fine Motor Skills-Upper: Left Intact; Right Intact Gross Motor Skills-Upper: Left Intact; Right Intact Tone & Sensation: 
 
Tone: Normal 
Sensation: Intact Balance: 
Sitting: Intact Standing: Impaired; Without support Standing - Static: Poor;Constant support; Fair 
Standing - Dynamic : Poor;Constant support Functional Mobility and Transfers for ADLs: 
Bed Mobility: 
Rolling: Moderate assistance;Assist x1 Supine to Sit: Moderate assistance;Assist x1 Scooting: Maximum assistance; Moderate assistance;Assist x1 Transfers: 
Sit to Stand: Moderate assistance;Minimum assistance;Assist x1 Stand to Sit: Moderate assistance;Minimum assistance;Assist x1 Bed to Chair: Moderate assistance;Minimum assistance;Assist x1 Toilet Transfer : Moderate assistance;Minimum assistance;Assist x1 ADL Assessment: 
Feeding: Setup Oral Facial Hygiene/Grooming: Setup Bathing: Maximum assistance;Minimum assistance Upper Body Dressing: Minimum assistance;Setup Lower Body Dressing: Maximum assistance Toileting: Moderate assistance;Minimum assistance Cognitive Retraining Safety/Judgement: Awareness of environment Functional Measure: 
Barthel Index: 
 
Bathin Bladder: 0 Bowels: 10 
Groomin Dressin Feeding: 10 Mobility: 0 Stairs: 0 Toilet Use: 5 Transfer (Bed to Chair and Back): 5 Total: 35/100 The Barthel ADL Index: Guidelines 1. The index should be used as a record of what a patient does, not as a record of what a patient could do. 2. The main aim is to establish degree of independence from any help, physical or verbal, however minor and for whatever reason. 3. The need for supervision renders the patient not independent. 4. A patient's performance should be established using the best available evidence. Asking the patient, friends/relatives and nurses are the usual sources, but direct observation and common sense are also important. However direct testing is not needed. 5. Usually the patient's performance over the preceding 24-48 hours is important, but occasionally longer periods will be relevant. 6. Middle categories imply that the patient supplies over 50 per cent of the effort. 7. Use of aids to be independent is allowed. Dez Crum., Barthel, D.W. ([id number]). Functional evaluation: the Barthel Index. [address] [facility] (14)2. MEJIA Green, Norman Howe.[address] (). Measuring the change indisability after inpatient rehabilitation; comparison of the responsiveness of the Barthel Index and Functional [place] Measure. Journal of Neurology, Neurosurgery, and Psychiatry, 66(4), 537-497. Cynthia Karimi, [place]ABDELRAHMAN, RANJEET Hansen, Elba De La O M.A. (2004.) Assessment of post-stroke quality of life in cost-effectiveness studies: The usefulness of the Barthel Index and the EuroQoL-5D. [facility], 13, 906-95 Occupational Therapy Evaluation Charge Determination History Examination Decision-Making MEDIUM Complexity : Expanded review of history including physical, cognitive and psychosocial  history  MEDIUM Complexity : 3-5 performance deficits relating to physical, cognitive , or psychosocial skils that result in activity limitations and / or participation restrictions LOW Complexity : No comorbidities that affect functional and no verbal or physical assistance needed to complete eval tasks Based on the above components, the patient evaluation is determined to be of the following complexity level: MEDIUM Pain Rating: 
none Activity Tolerance:  
Poor After treatment patient left in no apparent distress:   
Supine in bed, Call bell within reach, and Bed / chair alarm activated COMMUNICATION/EDUCATION:  
The patients plan of care was discussed with: Physical therapist and Registered nurse. Patient/family agree to work toward stated goals and plan of care. This patients plan of care is appropriate for delegation to MARIBEL. Thank you for this referral. 
Inocencio Vann OT Time Calculation: 36 mins

## 2021-04-03 NOTE — PROGRESS NOTES
TRANSFER - IN REPORT: 
 
Verbal report received from [facility] (name) on Sachin Hu  being received from ED (unit) for routine progression of care Report consisted of patients Situation, Background, Assessment and  
Recommendations(SBAR). Information from the following report(s) SBAR, Kardex, ED Summary, [facility] and Recent Results was reviewed with the receiving nurse. Opportunity for questions and clarification was provided. Assessment completed upon patients arrival to unit and care assumed. Primary Nurse Beckie Zarate RN and Jose Roberto Doss RN performed a dual skin assessment on this patient No impairment noted Mike score is 17

## 2021-04-03 NOTE — PROGRESS NOTES
Problem: Mobility Impaired (Adult and Pediatric) Goal: *Acute Goals and Plan of Care (Insert Text) Description: FUNCTIONAL STATUS PRIOR TO ADMISSION: pt was living alone and I with daughter checking on her and driving her places HOME SUPPORT PRIOR TO ADMISSION: The patient lived alone with daughter to provide assistance. Physical Therapy Goals Initiated 4/3/2021 1. Patient will move from supine to sit and sit to supine  in bed with modified independence within 7 day(s). 2.  Patient will transfer from bed to chair and chair to bed with minimal assistance/contact guard assist using the least restrictive device within 7 day(s). 3.  Patient will perform sit to stand with minimal assistance/contact guard assist within 7 day(s). 4.  Patient will ambulate with minimal assistance/contact guard assist for 75 feet with the least restrictive device within 7 day(s). 5. Patient will learn good, effective breathing techniques and be able to raise sats as needed within 7 days. 6.  Pt will be able to recognize the importance of her posture in breathing as well as \"relax\" as she is SOB. Outcome: Not Met PHYSICAL THERAPY EVALUATION Patient: Tonio Bateman (70 y.o. female) Date: 4/3/2021 Primary Diagnosis: CHF exacerbation ([facility] [I50.9] NSTEM[facility] (non-ST elevated [facility]) [facility] [I21.4] TAWANA (acute kidney injury) [facility] [N17.9] Hypoxia [R09.02] Precautions: covid rule out, fall, SOB, anxious ASSESSMENT Based on the objective data described below, the patient presents with very sweet cooperative patient who at 80 is very well oriented and is upset about her limited ability to breathe. Pt is generally very mobile, with good strength and endurance but presently isSOB and anxious   With any acitivity pt becomes anxious and has a rapid breathing pattern. Pt needs skilled care to improve bed mobility, gait, transfers and balance. Covid tests pending.   O2 sats actually were in 90s throughout treatment with PT/OT but pt was distressed and treatment of eval , sitting on EOB, and transfer to chair was modified to putting pt back into bed supine due to her obvious distress. Patient was educated on pursed lip breathing and she did fairly well first time with this but needs reinforcement. Current Level of Function Impacting Discharge (mobility/balance): mod A transfer Functional Outcome Measure: The patient scored 35 on the Barthel outcome measure which is indicative of dependence. Other factors to consider for discharge: lives alone Patient will benefit from skilled therapy intervention to address the above noted impairments. PLAN : 
Recommendations and Planned Interventions: bed mobility training, transfer training, gait training, therapeutic exercises, neuromuscular re-education, patient and family training/education, and therapeutic activities Frequency/Duration: Patient will be followed by physical therapy:  3 times a week to address goals. Recommendation for discharge: (in order for the patient to meet his/her long term goals) Physical therapy at least 2 days/week in the home living with daughter? ? If possible - if not then SNF for rehab This discharge recommendation: 
Has not yet been discussed the attending provider and/or case management IF patient discharges home will need the following DME: to be determined (TBD) SUBJECTIVE:  
Patient stated I live in an apartment. 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  OBJECTIVE DATA SUMMARY:  
HISTORY:   
Past Medical History:  
Diagnosis Date Depression   
 psychiatrist: Dr Zac Duong (on Lithium) Fracture of wrist   
 slipped and fx left wrist, surgery scheduled 12/21/16 Full dentures   
 upper and lower Hypothyroid Parkinson's disease (tremor, stiffness, slow motion, unstable posture) ([facility] 5/23/2019 Past Surgical History:  
Procedure Laterality Date  
 HX KYPHOPLASTY  2013 HX ORTHOPAEDIC  12/2016 Wrist surgery HX WRIST FRACTURE TX Left 12/2016 Personal factors and/or comorbidities impacting plan of care:  
 
Home Situation Home Environment: Private residence([facility]) # Steps to Enter: 0 One/Two Story Residence: One story Living Alone: Yes Support Systems: Family member(s) Current DME Used/Available at Home: Shower chair, Walker, rollator Tub or Shower Type: Tub/Shower combination EXAMINATION/PRESENTATION/DECISION MAKING:  
Critical Behavior: 
Neurologic State: Alert Orientation Level: Oriented X4 Cognition: Follows commands Safety/Judgement: Awareness of environment Hearing: Auditory Auditory Impairment: None Skin:  intact Edema: no 
Range Of Motion: 
AROM: Generally decreased, functional 
  
  
  
PROM: Generally decreased, functional 
  
  
  
Strength:   
Strength: Generally decreased, functional 
  
  
  
  
  
  
Tone & Sensation:  
Tone: Normal 
  
  
  
  
Sensation: Intact Coordination: 
Coordination: Within functional limits Vision:  
  
Functional Mobility: 
Bed Mobility: 
Rolling: Moderate assistance;Assist x1 Supine to Sit: Moderate assistance;Assist x1 Scooting: Maximum assistance; Moderate assistance;Assist x1 Transfers: 
Sit to Stand: Moderate assistance Stand to Sit: Moderate assistance Bed to Chair: Moderate assistance Balance:  
Sitting: Intact Standing: Impaired; Without support Standing - Static: Poor;Constant support Standing - Dynamic : Poor;Constant support Ambulation/Gait Training: 
Distance (ft): 3 Feet (ft) Ambulation - Level of Assistance: Maximum assistance Gait Description (WDL): Exceptions to [facility] Gait Abnormalities: Step to gait Right Side Weight Bearing: Full Left Side Weight Bearing: Full Base of Support: Narrowed Speed/Sandy: Shuffled; Slow Therapeutic Exercises:  
Sitting - try to sit with upright posture Pursed lip breathing Knee extensions x 3 Functional Measure: 
Barthel Index: 
 
Bathin Bladder: 0 Bowels: 10 
Groomin Dressin Feeding: 10 Mobility: 0 Stairs: 0 Toilet Use: 5 Transfer (Bed to Chair and Back): 5 Total: 35/100 The Barthel ADL Index: Guidelines 1. The index should be used as a record of what a patient does, not as a record of what a patient could do. 2. The main aim is to establish degree of independence from any help, physical or verbal, however minor and for whatever reason. 3. The need for supervision renders the patient not independent. 4. A patient's performance should be established using the best available evidence. Asking the patient, friends/relatives and nurses are the usual sources, but direct observation and common sense are also important. However direct testing is not needed. 5. Usually the patient's performance over the preceding 24-48 hours is important, but occasionally longer periods will be relevant. 6. Middle categories imply that the patient supplies over 50 per cent of the effort. 7. Use of aids to be independent is allowed. Jorge Smith., Barthel, D.W. (0205). Functional evaluation: the Barthel Index. [address] [facility] (14)2. Jazmin Nuñez, MEJIA, Kun Montano., Mary Anthony., Jemima Kay, [address] [place] [address] (). Measuring the change indisability after inpatient rehabilitation; comparison of the responsiveness of the Barthel Index and Functional [place] Measure. Journal of Neurology, Neurosurgery, and Psychiatry, 66(4), 702-651. Nik Casey, N.J.A, RANJEET Hansen, & Loretta Johnson M.A. (2004.) Assessment of post-stroke quality of life in cost-effectiveness studies: The usefulness of the Barthel Index and the EuroQoL-5D. [facility], 13, 751-98 Physical Therapy Evaluation Charge Determination History Examination Presentation Decision-Making MEDIUM  Complexity : 1-2 comorbidities / personal factors will impact the outcome/ POC  MEDIUM Complexity : 3 Standardized tests and measures addressing body structure, function, activity limitation and / or participation in recreation  MEDIUM Complexity : Evolving with changing characteristics  MEDIUM Complexity : FOTO score of 26-74 Based on the above components, the patient evaluation is determined to be of the following complexity level: MEDIUM Pain Rating: 
No c/o Activity Tolerance:  
Fair After treatment patient left in no apparent distress:  
Supine in bed, Call bell within reach, and Side rails x 3 
 
COMMUNICATION/EDUCATION:  
The patients plan of care was discussed with: Occupational therapist and Registered nurse. Patient/family agree to work toward stated goals and plan of care. Thank you for this referral. 
Madelyn Gordon, PT Time Calculation: 45 mins

## 2021-04-04 NOTE — PROGRESS NOTES
Bedside and Verbal shift change report given to Mac (oncoming nurse) by Carli Farfan (offgoing nurse). Report included the following information SBAR, Kardex, ED Summary, Procedure Summary, MAR and Recent Results.

## 2021-04-04 NOTE — PROGRESS NOTES
Cardiology Progress Note 4/4/2021 10:04 AM 
 
Admit Date: 4/2/2021 Admit Diagnosis: [facility] [[facility]];NSTEMI (non-ST elevated myocardial infarction) [facility] [I21.4]; TAWANA (acute kidney injury) ([facility] [N17.9]; Hypoxia [R09.02] Subjective:  
 
Rose Wang is more awake. Denies any chest pain, SOB. Echo showed normal EF, moderate pulmonary HTN. Visit Vitals BP (!) 136/56 Pulse (!) 55 Temp 98.6 °F (37 °C) Resp 16 Ht 5' 4\" (1.626 m) Wt 126 lb 12.2 oz (57.5 kg) SpO2 96% BMI 21.76 kg/m² Current Facility-Administered Medications Medication Dose Route Frequency  [START ON 4/5/2021] furosemide (LASIX) tablet 40 mg  40 mg Oral DAILY  ipratropium-albuterol (COMBIVENT RESPIMAT) 20 mcg-100 mcg inhalation spray  1 Puff Inhalation Q4H PRN  
 fluticasone furoate (ARNUITY ELLIPTA) 100 mcg/puff  1 Puff Inhalation BID PRN  
 memantine (NAMENDA) tablet 10 mg  10 mg Oral DAILY  lithium carbonate CR (ESKALITH CR) tablet 450 mg  450 mg Oral QHS  levothyroxine (SYNTHROID) tablet 75 mcg  75 mcg Oral 6am  
 mirtazapine (REMERON) tablet 15 mg  15 mg Oral QHS  PARoxetine (PAXIL) tablet 20 mg  20 mg Oral DAILY  OLANZapine (ZyPREXA) tablet 2.5 mg  2.5 mg Oral QPM  
 OLANZapine (ZyPREXA) tablet 10 mg  10 mg Oral QPM  
 cefTRIAXone (ROCEPHIN) 1 g in 0.9% sodium chloride (MBP/ADV) 50 mL MBP  1 g IntraVENous Q24H  
 sodium chloride (NS) flush 5-40 mL  5-40 mL IntraVENous Q8H  
 sodium chloride (NS) flush 5-40 mL  5-40 mL IntraVENous PRN  
 acetaminophen (TYLENOL) tablet 650 mg  650 mg Oral Q6H PRN Or  
 acetaminophen (TYLENOL) suppository 650 mg  650 mg Rectal Q6H PRN  polyethylene glycol (MIRALAX) packet 17 g  17 g Oral DAILY PRN  
 ondansetron (ZOFRAN ODT) tablet 4 mg  4 mg Oral Q8H PRN  Or  
 ondansetron (ZOFRAN) injection 4 mg  4 mg IntraVENous Q6H PRN  
 enoxaparin (LOVENOX) injection 30 mg  30 mg SubCUTAneous Q24H  
 azithromycin (ZITHROMAX) 500 mg in 0.9% sodium chloride 250 mL (VIAL-MATE)  500 mg IntraVENous Q24H  
 metoprolol tartrate (LOPRESSOR) tablet 12.5 mg  12.5 mg Oral Q12H Objective:  
  
Physical Exam: 
Visit Vitals /74 Pulse 70 Temp 99.8 °F (37.7 °C) Resp 18 Ht 5' 4\" (1.626 m) Wt 126 lb 12.2 oz (57.5 kg) SpO2 94% BMI 21.76 kg/m² General Appearance:  Well developed, well nourished,alert and oriented x 3, and individual in no acute distress. Ears/Nose/Mouth/Throat:   Hearing grossly normal. 
  
    Neck: Supple. Chest:   Lungs clear to auscultation bilaterally. Cardiovascular:  Regular rate and rhythm, S1, S2 normal, no murmur. Abdomen:   Soft, non-tender, bowel sounds are active. Extremities: No edema bilaterally. Skin: Warm and dry. Data Review:  
Labs:   
Recent Results (from the past 24 hour(s)) RESPIRATORY VIRUS PANEL W/COVID-19, PCR Collection Time: 04/03/21 11:52 AM  
 Specimen: Nasopharyngeal  
Result Value Ref Range Adenovirus Not detected NOTD Coronavirus 229E Not detected NOTD Coronavirus HKU1 Not detected NOTD Coronavirus CVNL63 Not detected NOTD Coronavirus OC43 Not detected NOTD Metapneumovirus Not detected NOTD Rhinovirus and Enterovirus Not detected NOTD Influenza A Not detected NOTD Influenza A, subtype H1 Not detected NOTD Influenza A, subtype H3 Not detected NOTD INFLUENZA A H1N1 PCR Not detected NOTD Influenza B Not detected NOTD Parainfluenza 1 Not detected NOTD Parainfluenza 2 Not detected NOTD Parainfluenza 3 Not detected NOTD Parainfluenza virus 4 Not detected NOTD    
 RSV by PCR Not detected NOTD B. parapertussis, PCR Not detected NOTD Bordetella pertussis - PCR Not detected NOTD Chlamydophila pneumoniae DNA, QL, PCR Not detected NOTD Mycoplasma pneumoniae DNA, QL, PCR Not detected NOTD    
 SARS-CoV-2, PCR Not detected NOTD    
ECHO ADULT COMPLETE  Collection Time: 04/03/21  2:49 PM  
Result Value Ref Range IVSd 1.02 (A) 0.60 - 0.90 cm LVIDd 3.45 (A) 3.90 - 5.30 cm LVIDs 2.55 cm  
 LVOT d 1.63 cm  
 LVPWd 1.00 (A) 0.60 - 0.90 cm  
 LVOT Cardiac Output 2.47 liter/minute LVOT Peak Gradient 3.03 mmHg LVOT Peak Gradient 2.97 mmHg Left Ventricular Outflow Tract Mean Gradient 1.70 mmHg LVOT SV 44.7 mL  
 LVOT Peak Velocity 86.23 cm/s LVOT Peak Velocity 87.07 cm/s LVOT VTI 21.29 cm RVSP 41.26 mmHg Left Atrium Major Axis 2.37 cm Right Atrial Area 4C 18.31 cm2 Est. RA Pressure 10.00 mmHg Aortic Valve Area by Continuity of Peak Velocity 1.71 cm2 Aortic Valve Area by Continuity of Peak Velocity 1.73 cm2 Aortic Valve Area by Continuity of Peak Velocity 1.63 cm2 Aortic Valve Area by Continuity of Peak Velocity 1.62 cm2 Aortic Valve Area by Continuity of VTI 2.12 cm2  
 AV R PG 22.89 mmHg Aortic Regurgitant Pressure Half-time 1,329.98 ms  
 AR Max Godfrey 233.77 cm/s AoV PG 5.03 mmHg AoV PG 4.50 mmHg Aortic Valve Systolic Mean Gradient 4.05 mmHg Aortic Valve Systolic Peak Velocity 440.12 cm/s Aortic Valve Systolic Peak Velocity 897.87 cm/s AoV VTI 21.05 cm  
 MV A Godfrey 106.23 cm/s Mitral Valve E Wave Deceleration Time 215.90 ms MV E Godfrey 63.35 cm/s  
 E/E' ratio (averaged) 13.73   
 E/E' lateral 8.20 E/E' septal 19.26 LV E' Lateral Velocity 7.73 cm/s LV E' Septal Velocity 3.29 cm/s MVA VTI 1.70 cm2 TR Max Velocity 277.48 cm/s  
 MV Peak Gradient 3.50 mmHg MV Mean Gradient 1.01 mmHg Mitral Valve Pressure Half-time 62.98 ms Mitral Valve Max Velocity 93.56 cm/s Mitral Valve Annulus Velocity Time Integral 26.27 cm  
 MVA (PHT) 3.49 cm2 Pulmonic Valve Systolic Peak Instantaneous Gradient 1.11 mmHg Pulmonic Valve Max Velocity 52.73 cm/s Triscuspid Valve Regurgitation Peak Gradient 31.26 mmHg  
 TR Max Velocity 277.45 cm/s Ao Root D 2.95 cm  
 MV E/A 0.60  LV Mass .6 67.0 - 162.0 g  
 LV Mass AL Index 62.2 43.0 - 95.0 g/m2 Left Atrium Minor Axis 1.44 cm  
 FLETCHER/BSA VTI 1.3 cm2/m2 CBC W/O DIFF Collection Time: 04/04/21  2:27 AM  
Result Value Ref Range WBC 6.5 3.6 - 11.0 K/uL  
 RBC 3.98 3.80 - 5.20 M/uL  
 HGB 11.0 (L) 11.5 - 16.0 g/dL HCT 35.9 35.0 - 47.0 % MCV 90.2 80.0 - 99.0 FL  
 MCH 27.6 26.0 - 34.0 PG  
 MCHC 30.6 30.0 - 36.5 g/dL  
 RDW 13.2 11.5 - 14.5 % PLATELET 462 032 - 507 K/uL MPV 11.0 8.9 - 12.9 FL  
 NRBC 0.0 0  WBC ABSOLUTE NRBC 0.00 0.00 - 0.01 K/uL Telemetry: normal sinus rhythm Assessment:  
 
Hospital Problems  Date Reviewed: 1/16/2020 Codes Class Noted POA Hypoxia ICD-10-CM: R09.02 
ICD-9-CM: 799.02  4/2/2021 Unknown NSTEMI (non-ST elevated myocardial infarction) ([facility] ICD-10-CM: I21.4 ICD-9-CM: 410.70  4/2/2021 Unknown CHF exacerbation ([facility]) ICD-10-CM: I50.9 ICD-9-CM: 428.0  4/2/2021 Unknown TAWANA (acute kidney injury) ([facility] ICD-10-CM: N17.9 ICD-9-CM: 584.9  4/2/2021 Unknown Plan:  
 
Stress test tomorrow to evaluate for ischemia. Diurese as tolerated.  
 
Isabel Clinton MD

## 2021-04-04 NOTE — PROGRESS NOTES
Lithium Information Note Medication: Lithium Carbonate 450 mg QHS Recent Labs 04/03/21 
0340 04/02/21 
1348 CREA 1.15* 1.24* BUN 25* 21* Creatinine Clearance (ml/min): 25.8 Impression/Plan:  
Lithium level drawn 4 hours post dose, during distribution phase. Levels are recommended to be drawn/monitored 12 hours post dose Anticipated 12 hour post dose level for 450 mg daily is 1.1 meq/L Aria BealD.

## 2021-04-04 NOTE — PROGRESS NOTES
Reason for Admission: SOB/Chest Pain RUR Score: 19% PCP: First and Last name:   Mary Grace Gomez MD 
 Name of Practice:  
 Are you a current patient: Yes/No: Yes Approximate date of last visit: The patient's daughter stated that she spoke with the patient's PCP office twice this past week Can you participate in a virtual visit if needed: Yes Do you (patient/family) have any concerns for transition/discharge? The patient lives in a 1 level home with 2 steps to enter with her daughter and son-in-law. The patient has 2 sons that live in [facility]. Her daughter does not work and is with her 24/7 Plan for utilizing home health: The patient is currently receiving [facility] RN through [facility]. The patient's daughter reported that the patient refuses to work with HHPT/OT Current Advanced Directive/Advance Care Plan:  DNR Healthcare Decision Maker: Tana Bruno, Daughter, Phone: [phone] Primary Decision Maker (Active): Marisol Orozco - Child - [phone] Transition of Care Plan:       
CM spoke with the patient's daughter via phone to complete assessment. The patient resides in a 1 level home with 2 steps to enter with her daughter and son-in-law. She has 2 sons that live in [facility]. Her daughter is her primary caregiver and is with her 24/7. She reports that the patient is typically pretty independent with her ADLs. She occasionally has to assist her with getting in and out of the bathtub. She uses a rollator when ambulating. She does not drive and her daughter assists her with transportation needs. She uses [facility] for mot of her medications and she uses the [facility] on [address] [employer] for her short-term medications. The patient's daughter reported that the patient went to [address] [place] [address] about 2 years ago after she had a hip fracture. She is currently receiving [address] with [facility].  The patient's daughter stated that the patient refuses to work with HHPT/OT. She would be interested in the patient doing HHPT/OT but she does not feel that the patient will cooperate. CM will continue to follow the patient for dispo planning. Care Management Interventions PCP Verified by CM: Yes Mode of Transport at Discharge: Other (see comment)(The patient's daughter will assist with d/c transportation ) Transition of Care Consult (CM Consult): Discharge Planning MyChart Signup: No 
Discharge Durable Medical Equipment: No 
Physical Therapy Consult: Yes Occupational Therapy Consult: Yes Speech Therapy Consult: No 
Current Support Network: Other(The patient lives with her daughter and son-in-law ) Confirm Follow Up Transport: Family The Patient and/or Patient Representative was Provided with a Choice of Provider and Agrees with the Discharge Plan?: Yes Name of the Patient Representative Who was Provided with a Choice of Provider and Agrees with the Discharge Plan: Pipe Livingston (Daughter) Freedom of Choice List was Provided with Basic Dialogue that Supports the Patient's Individualized Plan of Care/Goals, Treatment Preferences and Shares the Quality Data Associated with the Providers?: Yes [employer] Information Provided?: No 
Discharge Location Discharge Placement: Home with home health Kristy Saavedra LCSW

## 2021-04-04 NOTE — PROGRESS NOTES
Received notification from bedside RN about patient with regards to: agitation, climbing out of bed, awake all night VS: /59, HR 59, RR 19, O2 sat 95% on NC 3 L Intervention given: Atarax 25 mg PO x 1 dose ordered

## 2021-04-05 NOTE — PROGRESS NOTES
Nuclear Medicine/Stress lab- MPI study on HOLD until nurse and stress lab get consent from daughter. [place]-Stress lab will contact [facility] when to begin study

## 2021-04-05 NOTE — PROGRESS NOTES
Problem: Mobility Impaired (Adult and Pediatric) Goal: *Acute Goals and Plan of Care (Insert Text) Description: FUNCTIONAL STATUS PRIOR TO ADMISSION: pt was living alone and I with daughter checking on her and driving her places HOME SUPPORT PRIOR TO ADMISSION: The patient lived alone with daughter to provide assistance. Physical Therapy Goals Initiated 4/3/2021 1. Patient will move from supine to sit and sit to supine  in bed with modified independence within 7 day(s). 2.  Patient will transfer from bed to chair and chair to bed with minimal assistance/contact guard assist using the least restrictive device within 7 day(s). 3.  Patient will perform sit to stand with minimal assistance/contact guard assist within 7 day(s). 4.  Patient will ambulate with minimal assistance/contact guard assist for 75 feet with the least restrictive device within 7 day(s). 5. Patient will learn good, effective breathing techniques and be able to raise sats as needed within 7 days. 6.  Pt will be able to recognize the importance of her posture in breathing as well as \"relax\" as she is SOB. Outcome: Progressing Towards Goal 
 PHYSICAL THERAPY TREATMENT Patient: Tonio Bateman (29 y.o. female) Date: 4/5/2021 Diagnosis: CHF exacerbation ([facility] [I50.9] NSTEMI (non-ST elevated myocardial infarction) ([facility] [I21.4] TAWANA (acute kidney injury) ([facility] [N17.9] Hypoxia [R09.02] <principal problem not specified> Precautions:   
Chart, physical therapy assessment, plan of care and goals were reviewed. ASSESSMENT Patient continues with skilled PT services and is progressing towards goals. Pt received supine in bed and agreeable to therapy. Pt tolerated session well and making good progress towards goals. Pt able to tolerate increased gait distance this date with RW with CGA. Noted increased work of breathing post gait while seated in the chair for recovery. SpO2 unable to obtain due to cold hands.  Pt stabilized after ~1 minute of rest. Pt will continue to benefit from HHPT +family/24 hour supervision upon discharge for safety. .  
 
Current Level of Function Impacting Discharge (mobility/balance): CGA transfers with RW, gait training x 15 ft with RW Other factors to consider for discharge: bnone PLAN : 
Patient continues to benefit from skilled intervention to address the above impairments. Continue treatment per established plan of care. to address goals. Recommendation for discharge: (in order for the patient to meet his/her long term goals) Physical therapy at least 2 days/week in the home AND ensure assist and/or supervision for safety with mobility This discharge recommendation: 
Has been made in collaboration with the attending provider and/or case management IF patient discharges home will need the following DME: patient owns DME required for discharge SUBJECTIVE:  
Patient stated I was so hungry.  OBJECTIVE DATA SUMMARY:  
Critical Behavior: 
Neurologic State: Alert, Confused Orientation Level: Disoriented to time, Disoriented to situation, Oriented to person, Oriented to place Cognition: Follows commands, Impaired decision making Safety/Judgement: Decreased awareness of need for safety Functional Mobility Training: 
Bed Mobility: 
  
Supine to Sit: Contact guard assistance Scooting: Contact guard assistance Transfers: 
Sit to Stand: Contact guard assistance Stand to Sit: Contact guard assistance Balance: 
Sitting: Intact Standing: Impaired; With support Standing - Static: Good Standing - Dynamic : Fair Ambulation/Gait Training: 
Distance (ft): 15 Feet (ft) Assistive Device: Gait belt;Walker, rolling Ambulation - Level of Assistance: Contact guard assistance;Minimal assistance Gait Abnormalities: Decreased step clearance;Trunk sway increased Base of Support: Narrowed Stance: Right increased; Left increased Speed/Sandy: Pace decreased (<100 feet/min) Stairs: Therapeutic Exercises:  
 
Pain Rating: 
Pt denied pain Activity Tolerance:  
Good and Fair After treatment patient left in no apparent distress:  
Sitting in chair, Call bell within reach, Bed / chair alarm activated, and Side rails x 3 
 
COMMUNICATION/COLLABORATION:  
The patients plan of care was discussed with: Occupational therapist and Registered nurse. Shawna Qiu, PT, DPT Time Calculation: 23 mins

## 2021-04-05 NOTE — PROGRESS NOTES
Bedside shift change report given to [address] [place] [address] Pricila (oncoming nurse) by Mayra Christensen RN (offgoing nurse). Report included the following information SBAR, Intake/Output, MAR and Recent Results Shift worked:  7a -7p Shift summary and any significant changes:  
  Stress test done today. Results pending Concerns for physician to address:  none Zone phone for oncoming shift:   [phone] Mayra Christensen, RN

## 2021-04-05 NOTE — PROGRESS NOTES
Hospitalist Progress Note NAME: Albin Christensen :  1926 MRN:  [id number] Assessment / Plan: 
Acute hypoxic respiratory failure POA 
NSTEMI 
CHF exacerbation,Diastolic, 
Community-acquired pneumonia Chest x-raySubtle interstitial process. No confluent infiltrate. Incidental 
hiatal hernia. -CT chest mozaic GGO can be seen due to Pulm edema/infectios process 
-trop Mild elevated, ProBNP high 
-D-dimer elevated likely from current illness 
 
-Presentation likely 2/2 CHF, volume status seems to be improved. -Continue gentle Diuresis, Strict I & Os, daily weight 
-ECHO reviewed 
-stress test today 
-aspirin daily 
-due to elevated d dimer, CTA negative for PE 
 
-Rapid covid-19 negative, respi panel PCR negative 
-Continue azithro/rocephin Echo in 2019 shows EF 55 to 96%, normal diastolic function. 
  
Mild elevated creat, stable BUN/creatinine21/1.24, Creat stable 
  
History of multiple falls at home PT and OT consulted, case management consulted. 
-Lithium level slightly elevated at 1.4, should be less than 1.2 Will hold lithium for now, recheck level better. Can resume lithium at lower dose at home History of hypothyroidism Bipolar disorder Dementia with neurocognitive deficit Depression Insomnia Continue the home medications. discussed with dtr 
  
Code Status: DNR Surrogate Decision Maker: Daughter 
  
DVT Prophylaxis: Lovenox GI Prophylaxis: not indicated 
  
Baseline: Ambulatory with assistance at home Dispo - accordingly stress test result. Subjective: Chief Complaint / Reason for Physician Visit On RA, doing well, no SOB , getting stress test today Review of Systems: 
Symptom Y/N Comments  Symptom Y/N Comments Fever/Chills    Chest Pain Poor Appetite    Edema Cough    Abdominal Pain Sputum    Joint Pain SOB/SHERMAN    Pruritis/Rash Nausea/vomit    Tolerating PT/OT Diarrhea    Tolerating Diet Constipation Other Could NOT obtain due to: dementia Objective: VITALS:  
Last 24hrs VS reviewed since prior progress note. Most recent are: 
Patient Vitals for the past 24 hrs: 
 Temp Pulse Resp BP SpO2  
04/05/21 1046 98.4 °F (36.9 °C) (!) 59 18 (!) 154/76 93 % 04/05/21 0803 98.8 °F (37.1 °C) 61 18 (!) 172/85 93 % 04/05/21 0220 98.2 °F (36.8 °C) 66 19 133/60 95 % 04/04/21 2132 97.8 °F (36.6 °C) 69 18 (!) 186/75   
04/04/21 1926 98.2 °F (36.8 °C) 73 18 118/66 95 % Intake/Output Summary (Last 24 hours) at 4/5/2021 1453 Last data filed at 4/4/2021 1516 Gross per 24 hour Intake 708 ml Output  Net 708 ml I had a face to face encounter and independently examined this patient on 4/5/2021, as outlined below: PHYSICAL EXAM: 
General: WD, WN. Alert, cooperative, no acute distress EENT:  EOMI. Anicteric sclerae. MMM Resp:  Scattered rales b/l CV:  Regular  rhythm,  No edema GI:  Soft, Non distended, Non tender. +Bowel sounds Neurologic:  Alert and oriented to person and place. Psych:   Fair insight Skin:  No rashes. No jaundice Reviewed most current lab test results and cultures  YES Reviewed most current radiology test results   YES Review and summation of old records today    NO Reviewed patient's current orders and MAR    YES 
PMH/ reviewed - no change compared to H&P 
________________________________________________________________________ Care Plan discussed with: 
  Comments Patient x Family RN x Care Manager Consultant Multidiciplinary team rounds were held today with , nursing, pharmacist and clinical coordinator. Patient's plan of care was discussed; medications were reviewed and discharge planning was addressed. ________________________________________________________________________ Total NON critical care TIME: 35  Minutes Total CRITICAL CARE TIME Spent:   Minutes non procedure based   Comments >50% of visit spent in counseling and coordination of care    
________________________________________________________________________ Benito Carreno MD  
 
Procedures: see electronic medical records for all procedures/Xrays and details which were not copied into this note but were reviewed prior to creation of Plan. LABS: 
I reviewed today's most current labs and imaging studies. Pertinent labs include: 
Recent Labs 04/05/21 
0134 04/04/21 
4464 04/03/21 
0340 WBC 8.1 6.5 6.4 HGB 11.0* 11.0* 10.6* HCT 35.5 35.9 34.4*  
 183 151 Recent Labs 04/05/21 
0134 04/03/21 
0340  141  
K 3.8 3.9 * 111* CO2 26 29 * 92 BUN 24* 25* CREA 1.07* 1.15* CA 8.1* 8.0*  
MG  --  2.4 Signed: Benito Carreno MD

## 2021-04-05 NOTE — PROGRESS NOTES
End of Shift Note Bedside shift change report given to Neo (oncoming nurse) by Calvin Mckay RN (offgoing nurse). Report included the following information Kardex, MAR and Recent Results Shift worked:  7p-7a Shift summary and any significant changes:  
  no significant changes Concerns for physician to address:    
Zone phone for oncoming shift:   [phone] Activity: 
Activity Level: Up with Assistance Number times ambulated in hallways past shift: 0 Number of times OOB to chair past shift: 0 Cardiac:  
Cardiac Monitoring: Yes     
Cardiac Rhythm: Sinus bradycardia Access:  
Current line(s): PIV Genitourinary:  
Urinary status: voiding Respiratory:  
O2 Device: Room air Chronic home O2 use?:  
Incentive spirometer at bedside:  
  
GI: 
Last Bowel Movement Date: 04/02/21 Current diet:  DIET CARDIAC Regular DIET NPO 
DIET NPO Passing flatus: Tolerating current diet: YES 
% Diet Eaten: 75 % Pain Management:  
Patient states pain is manageable on current regimen: N/A, no c/o pain Skin: 
Mike Score: 17 Interventions: float heels Patient Safety: 
Fall Score: Total Score: 5 Interventions: gripper socks, pt to call before getting OOB, stay with me (per policy) and tele sitter High Fall Risk: Yes Length of Stay: 
Expected LOS: - - - Actual LOS: 2 Calvin Mckay RN

## 2021-04-05 NOTE — PROGRESS NOTES
Problem: Falls - Risk of 
Goal: *Absence of Falls Description: Document Alyssa Godfrey Fall Risk and appropriate interventions in the flowsheet. Outcome: Progressing Towards Goal 
Note: Fall Risk Interventions: 
Mobility Interventions: Bed/chair exit alarm, Communicate number of staff needed for ambulation/transfer, Patient to call before getting OOB, Utilize walker, cane, or other assistive device Mentation Interventions: Bed/chair exit alarm, Door open when patient unattended, More frequent rounding, Reorient patient, Room close to nurse's station, Toileting rounds, Update white board Medication Interventions: Bed/chair exit alarm, Patient to call before getting OOB, Teach patient to arise slowly Elimination Interventions: Bed/chair exit alarm, Call light in reach, Elevated toilet seat, Stay With Me (per policy), Toileting schedule/hourly rounds History of Falls Interventions: Bed/chair exit alarm, Door open when patient unattended, Room close to nurse's station, Utilize gait belt for transfer/ambulation

## 2021-04-05 NOTE — PROGRESS NOTES
Physician Progress Note PATIENT:               EDDY LARA 
CSN #:                  [id number] :                       1926 ADMIT DATE:       2021 3:08 PM 
[address] [place] DATE: 
RESPONDING 
PROVIDER #:        Shayla Santos MD 
 
 
 
 
QUERY TEXT: 
 
Dr Won Villegas: 
 
Pt admitted with NSTEMI, acute CHF exacerbation & acute respiratory failure  and has  dementia documented. Pt noted with agitation, climbing out of bed, awake  all night on . If possible, please document in the progress notes and discharge summary if you are evaluating and / or treating any of the following: The medical record reflects the following: 
Risk Factors: 89yoF w/ MI, Acute CHF, hypoxia, & dementia w/ neurocognitive deficit Clinical Indicators:  currently pt is pleasantly confused; on  pt became agitated, climbing out of bed, awake all night Treatment: continue zyprexa, namenda, tele sitter, bed alarm, O2 support. thank you, 
Dilcia Guidry RN, CDS Options provided: 
-- Dementia without behavioral disturbance 
-- Dementia with behavioral disturbance 
-- Other - I will add my own diagnosis -- Disagree - Not applicable / Not valid -- Disagree - Clinically unable to determine / Unknown 
-- Refer to Clinical Documentation Reviewer PROVIDER RESPONSE TEXT: 
 
This patient has dementia with behavioral disturbances.  
 
Query created by: Gracie Najera on 2021 5:06 PM 
 
 
Electronically signed by:  Shayla Santos MD 2021 5:55 PM

## 2021-04-05 NOTE — PROGRESS NOTES
[address] [place] [address]  [phone] Cardiology Progress Note 4/5/2021 1230 PM 
 
Admit Date: 4/2/2021 Admit Diagnosis: [facility] [I50.9] NSTEMI (non-ST elevated myocardial infarction) [facility] [I21.4] TAWANA (acute kidney injury) ([facility] [N17.9] Hypoxia [R09.02] Interval History/Subjective:  
 
Christa Fitzgerald is a 80 y.o. female admitted for CHF exacerbation ([facility] [I50.9] NSTEMI (non-ST elevated myocardial infarction) ([facility] [I21.4] TAWANA (acute kidney injury) ([facility] [N17.9] Hypoxia [R09.02] 
 
-BP elevated 
-creat down to 1.07 
-weight up 2#; no I/O 
-Ms. Chambers in stress test and states she's feeling fine other than being hungry. She denies pain or SOB. [facility] Visit Vitals BP (!) 154/76 Pulse (!) 59 Temp 98.4 °F (36.9 °C) Resp 18 Ht 5' 4\" (1.626 m) Wt 58.2 kg (128 lb 4.8 oz) SpO2 93% BMI 22.02 kg/m² Current Facility-Administered Medications Medication Dose Route Frequency  sodium chloride (NS) flush 10 mL  10 mL IntraVENous PRN  
 furosemide (LASIX) tablet 40 mg  40 mg Oral DAILY  ipratropium-albuterol (COMBIVENT RESPIMAT) 20 mcg-100 mcg inhalation spray  1 Puff Inhalation Q4H PRN  
 fluticasone furoate (ARNUITY ELLIPTA) 100 mcg/puff  1 Puff Inhalation BID PRN  
 memantine (NAMENDA) tablet 10 mg  10 mg Oral DAILY  [Held by provider] lithium carbonate CR (ESKALITH CR) tablet 450 mg  450 mg Oral QHS  levothyroxine (SYNTHROID) tablet 75 mcg  75 mcg Oral 6am  
 mirtazapine (REMERON) tablet 15 mg  15 mg Oral QHS  PARoxetine (PAXIL) tablet 20 mg  20 mg Oral DAILY  OLANZapine (ZyPREXA) tablet 2.5 mg  2.5 mg Oral QPM  
 OLANZapine (ZyPREXA) tablet 10 mg  10 mg Oral QPM  
 cefTRIAXone (ROCEPHIN) 1 g in 0.9% sodium chloride (MBP/ADV) 50 mL MBP  1 g IntraVENous Q24H  
 sodium chloride (NS) flush 5-40 mL  5-40 mL IntraVENous Q8H  
 sodium chloride (NS) flush 5-40 mL  5-40 mL IntraVENous PRN  
 acetaminophen (TYLENOL) tablet 650 mg  650 mg Oral Q6H PRN Or  
 acetaminophen (TYLENOL) suppository 650 mg  650 mg Rectal Q6H PRN  polyethylene glycol (MIRALAX) packet 17 g  17 g Oral DAILY PRN  
 ondansetron (ZOFRAN ODT) tablet 4 mg  4 mg Oral Q8H PRN Or  
 ondansetron (ZOFRAN) injection 4 mg  4 mg IntraVENous Q6H PRN  
 enoxaparin (LOVENOX) injection 30 mg  30 mg SubCUTAneous Q24H  
 azithromycin (ZITHROMAX) 500 mg in 0.9% sodium chloride 250 mL (VIAL-MATE)  500 mg IntraVENous Q24H  
 metoprolol tartrate (LOPRESSOR) tablet 12.5 mg  12.5 mg Oral Q12H Objective:  
  
Physical Exam: 
General Appearance:  pleasant, elderly  female resting on stretcher in NAD. [place] Chest:   dim bases Cardiovascular:  Regular rate and rhythm, no murmur. Abdomen:   Soft, non-tender, bowel sounds are active. Extremities: no edema Skin:  Warm and dry. Data Review:  
Recent Labs 04/05/21 
0134 04/04/21 
6903 04/03/21 
0340 WBC 8.1 6.5 6.4 HGB 11.0* 11.0* 10.6* HCT 35.5 35.9 34.4*  
 183 151 Recent Labs 04/05/21 
0134 04/03/21 
0340 04/02/21 
1348  141 138  
K 3.8 3.9 4.3 * 111* 109* CO2 26 29 25 * 92 99 BUN 24* 25* 21* CREA 1.07* 1.15* 1.24* CA 8.1* 8.0* 8.6 MG  --  2.4  --   
ALB  --   --  3.4* TBILI  --   --  0.7 ALT  --   --  22 Recent Labs 04/03/21 
0340 04/02/21 
1653 04/02/21 
1348 TROIQ 0.25* 0.35* 0.31*   --   --   
CKMB 3.8*  --   -- Intake/Output Summary (Last 24 hours) at 4/5/2021 1316 Last data filed at 4/4/2021 1516 Gross per 24 hour Intake 708 ml Output  Net 708 ml Telemetry: SR 
EKG: SR with PAC;  Nonspecific ST 
ECHO: EF 50-55%; mild to mod AI; mod pHTN 
CT chest: \"Nonspecific multilobar alveolitis. Sclerotic 5 mm focus in the T5 vertebral body. No reference for comparison Scheuermann's disease. Large hiatal hernia, coronary artery disease\" Assessment:  
 
Active Problems: Hypoxia (4/2/2021) NSTEMI (non-ST elevated myocardial infarction) ([facility] (4/2/2021) CHF exacerbation ([facility] (4/2/2021) TAWANA (acute kidney injury) ([facility] (4/2/2021) Plan:  
 
Nstemi:  Mild, flat troponin. EF 50-55% with mild to mod AI 
· On ASA and BB · Stress test pending Acute diastolic heart failure:  improved · On PO diuretic now · HTN not optimal, but hasn't had meds yet today. Continue BB Pamela Borden NP 
DNP, RN, [facility] Cardiology 4/5/2021 Patient seen, examined by me personally. Plan discussed as detailed. Agree with note as outlined by  NP with modifications as noted. My independent physical exam reveals : Physical Exam  
Neck: Neck supple. Cardiovascular: Normal rate. No murmur heard. Pulmonary/Chest: Effort normal and breath sounds normal.  
Musculoskeletal:     
   General: No edema. Neurological: She is alert. Skin: Skin is warm. Psychiatric: She has a normal mood and affect. Nursing note and vitals reviewed. No additional findings noted. Agree with plan as outlined above with modifications as noted.   
 
Andre Foster MD

## 2021-04-05 NOTE — PROGRESS NOTES
Problem: Self Care Deficits Care Plan (Adult) Goal: *Acute Goals and Plan of Care (Insert Text) Description:  
FUNCTIONAL STATUS PRIOR TO ADMISSION: Patient was modified independent using a rollator for functional mobility. Addendum 4/5: Patient has 24 hour supervision from daughter. HOME SUPPORT: The patient lived alone with family to provide assistance. Pt lives in [facility]. Addendum 4/5: Patient lives with daughter and son-in-law. Daughter is with the patient 24/7. Occupational Therapy Goals Initiated 4/3/2021 1. Patient will perform grooming standing or sitting with supervision/set-up within 7 day(s). 2.  Patient will perform bathing UB sitting or standing with moderate assistance  within 7 day(s). 3.  Patient will perform lower body dressing with moderate assistance with AE as needed  within 7 day(s). 4.  Patient will perform toilet transfers with supervision/set-up within 7 day(s). 5.  Patient will perform all aspects of toileting with supervision/set-up within 7 day(s). 6.  Patient will participate in upper extremity therapeutic exercise/activities with supervision/set-up for 5 minutes within 7 day(s). 7.  Patient will utilize energy conservation techniques during functional activities with verbal cues within 7 day(s). Outcome: Progressing Towards Goal 
 
OCCUPATIONAL THERAPY TREATMENT Patient: Roro Meneses (10 y.o. female) Date: 4/5/2021 Diagnosis: [facility] [I50.9] NSTEMI (non-ST elevated myocardial infarction) ([facility] [I21.4] TAWANA (acute kidney injury) ([facility] [N17.9] Hypoxia [R09.02] <principal problem not specified> Precautions:   
Chart, occupational therapy assessment, plan of care, and goals were reviewed. ASSESSMENT Patient continues with skilled OT services and is progressing towards goals. Patient was in bed finishing her lunch; agreeable to therapy. She is pleasantly confused.  [place] that the patient actually lives with her daughter and son-in-law and has 24 hour supervision. She was short of breath with activity on room air. Difficulty obtaining SpO2 due to cold hands, but she recovered quickly once seated in the chair. She is making nice progress in ADL, and is likely approaching her baseline level of function. Current Level of Function Impacting Discharge (ADLs): CGA to S Other factors to consider for discharge: N/A 
    
 
PLAN : 
Patient continues to benefit from skilled intervention to address the above impairments. Continue treatment per established plan of care to address goals. Recommendation for discharge: (in order for the patient to meet his/her long term goals) Home health vs None This discharge recommendation: A follow-up discussion with the attending provider and/or case management is planned IF patient discharges home will need the following DME: TBD SUBJECTIVE:  
Patient stated It's July, isn't it?  OBJECTIVE DATA SUMMARY:  
Cognitive/Behavioral Status: 
Neurologic State: Alert;Confused Orientation Level: Disoriented to time;Disoriented to situation;Oriented to person;Oriented to place Cognition: Follows commands; Impaired decision making Perception: Appears intact Perseveration: No perseveration noted Safety/Judgement: Decreased awareness of need for safety Functional Mobility and Transfers for ADLs: 
Bed Mobility: 
Supine to Sit: Contact guard assistance Scooting: Contact guard assistance Transfers: 
Sit to Stand: Contact guard assistance Functional Transfers Toilet Transfer : Contact guard assistance Cues: Verbal cues provided Balance: 
Sitting: Intact Standing: Impaired; With support Standing - Static: Good Standing - Dynamic : Fair ADL Intervention: 
Feeding Container Management: Minimum assistance Cutting Food: Minimum assistance Food to Mouth: Independent Drink to Mouth: Independent Lower Body Dressing Assistance Underpants: Stand-by assistance Socks: Stand-by assistance Position Performed: Seated in chair;Standing Cues: Verbal cues provided Toileting Bladder Hygiene: Modified independent Clothing Management: Stand-by assistance Cognitive Retraining Orientation Retraining: Time;Situation Problem Solving: Identifying the problem;General alternative solution Executive Functions: Executing cognitive plans Organizing/Sequencing: Breaking task down Safety/Judgement: Decreased awareness of need for safety Cues: Verbal cues provided Pain: No complaints Activity Tolerance:  
Fair and observed SOB with activity After treatment patient left in no apparent distress:  
Sitting in chair, Call bell within reach, and Bed / chair alarm activated COMMUNICATION/COLLABORATION:  
The patients plan of care was discussed with: Physical therapist.  
 
Carlos Eduardo Cowart OTR/L Time Calculation: 24 mins

## 2021-04-06 NOTE — PROGRESS NOTES
Transition of Care Plan: 
 
RUR: 20% Disposition: Home with resumption of [address] Follow up appointments: PCP, Cardiologist 
DME needed: No needs identified Transportation at Discharge: Pt's daughter Keys or means to access home:  Pt's daughter has access IM Medicare letter: Received on 4/6/21 Caregiver Contact: Daughter, Nikkie Hamm ([phone]) Discharge Caregiver contacted prior to discharge? CM contacted cg CM acknowledged d/c order. Pt will resume [facility] services with Neeta Cook as that is her daughter's preference. Pt's daughter signed [address]. CM sent referrals to Neeta Cook via [employer]. Pt's f/u appointment information is on AVS. Medicare pt has received, reviewed, and signed 2nd  letter informing them of their right to appeal the discharge. Signed copy has been placed on pt bedside chart. No further needs identified at this time. Pt's daughter is at bedside and will transport pt home. Patient is ready to d/c on a CM standpoint. RN aware. Care Management Interventions PCP Verified by CM: Yes Mode of Transport at Discharge: Other (see comment)(Pt's daughter) Transition of Care Consult (CM Consult): Home Health [address]: No 
Reason Outside [place]: Patient already serviced by other home care/hospice agency [facility] Signup: No 
Discharge Durable Medical Equipment: No 
Physical Therapy Consult: Yes Occupational Therapy Consult: Yes Speech Therapy Consult: No 
Current Support Network: Other(Lives with daughter) Confirm Follow Up Transport: Family The Patient and/or Patient Representative was Provided with a Choice of Provider and Agrees with the Discharge Plan?: Yes Name of the Patient Representative Who was Provided with a Choice of Provider and Agrees with the Discharge Plan: Nikkie Hamm (Daughter) Freedom of Choice List was Provided with Basic Dialogue that Supports the Patient's Individualized Plan of Care/Goals, Treatment Preferences and Shares the Quality Data Associated with the Providers?: Yes [employer] Information Provided?: No 
Discharge Location Discharge Placement: Home with home health KIRIT Valerio Care Manager [facility] 
[phone]

## 2021-04-06 NOTE — DISCHARGE SUMMARY
Hospitalist Discharge Note NAME: Josee Reina :  1926 MRN:  [id number] Admit date: 2021 Discharge date: 21 PCP: Rodrick Delgado MD 
 
Discharge Diagnoses: 
 
Acute hypoxic respiratory failure POA Acute on chronic diastolic CHF exacerbation POA Community-acquired pneumonia POA Elevated troponin peak 0.35 POA stress test negative, no PE Mild to mod aortic regurgitation POA Mod pulmonary hypertension POA PA pressure 52 mm Hg 
  
Stage 3 chronic kidney disease POA GFR close to 30 History of multiple falls at home History of hypothyroidism Bipolar disorder Dementia with neurocognitive deficit Depression Insomnia 
  
Code Status: DNR Discharge Medications: 
Current Discharge Medication List  
  
START taking these medications Details  
furosemide (LASIX) 40 mg tablet Take 1 Tab by mouth daily. Qty: 30 Tab, Refills: 5  
  
metoprolol tartrate (LOPRESSOR) 25 mg tablet Take 0.5 Tabs by mouth every twelve (12) hours. Qty: 30 Tab, Refills: 5  
  
ipratropium-albuteroL (COMBIVENT RESPIMAT)  mcg/actuation inhaler Take 1 Puff by inhalation four (4) times daily. Qty: 1 Inhaler, Refills: 3  
  
aspirin 81 mg chewable tablet Take 1 Tab by mouth daily. Qty: 30 Tab, Refills: 5 CONTINUE these medications which have CHANGED Details  
lithium carbonate SR (LITHOBID) 300 mg CR tablet Take 1 Tab by mouth nightly. Qty: 30 Tab, Refills: 4 CONTINUE these medications which have NOT CHANGED Details LORazepam (ATIVAN) 0.5 mg tablet Take 0.5 mg by mouth daily as needed for Anxiety. !! OLANZapine (ZyPREXA) 2.5 mg tablet   
  
!! OLANZapine (ZyPREXA) 10 mg tablet   
  
levothyroxine (SYNTHROID) 75 mcg tablet TAKE 1 TABLET DAILY BEFORE BREAKFAST Qty: 90 Tab, Refills: 3  
  
mirtazapine (REMERON) 15 mg tablet Take  by mouth nightly. Take one and one half tablets daily PARoxetine (PAXIL) 20 mg tablet Take 20 mg by mouth daily. memantine (NAMENDA) 10 mg tablet Take 1 Tab by mouth daily. Qty: 30 Tab, Refills: 11  
 Associated Diagnoses: Transient ischemic attack involving left internal carotid artery; Bilateral carotid artery stenosis; Benign essential tremor syndrome; Late onset Alzheimer's disease with behavioral disturbance ([facility] [address] Acute metabolic encephalopathy; Anxiety and depression; Auditory hallucinations; Altered mental status, unspecified altered mental status type; Left leg pain  
  
 !! - Potential duplicate medications found. Please discuss with provider. Follow-up Information Follow up With Specialties Details Why Contact Info Alexandra Curran MD Family Medicine Schedule an appointment as soon as possible for a visit in 1 week  . [address] D[address] [facility] 
[phone] Nj Ricardo MD Cardiology, [address] [place] [address] Vascular Surgery, Internal Medicine Call in 2 weeks cardiology follow up [address] 
[phone] Time spent on discharge:   I spent greater than 30 minutes on discharge, seeing and examining the patient, reconciling home meds and new meds, coordinating care with case management, doing the discharge papers and the D/C summary Discharge disposition: home with home health Discharge Condition: Stable Summary of admission H+P(copied from Dr Latosha Fernandez Note): CHIEF COMPLAINT: Shortness of breath, chest pain 
  
HISTORY OF PRESENT ILLNESS:    
Amadeo De La Cruz is a 80 y.o.  female who presents with numbness of breath and chest pain over the last 1 week. Patient past medical history of bipolar disorder, depression, insomnia, hypertension, dementia. Daughter states that the patient complains of the shortness of breath along with chest pain over the last 1 week, shortness of breath worse on exertion. Patient has fallen multiple times over the last 1 week injuring the arms and the leg.   Patient does not use oxygen at home but currently on 2 L nasal cannula. No fever and chills, no nausea and vomiting, no diarrhea and constipation, and no history of sick contacts. 
  
We were asked to admit for work up and evaluation of the above problems.  
  
    
Past Medical History:  
Diagnosis Date  Depression    
  psychiatrist: Dr Angel Luis Mckeon (on Lithium)  Fracture of wrist    
  slipped and fx left wrist, surgery scheduled 12/21/16  Full dentures    
  upper and lower  Hypothyroid    
 Parkinson's disease (tremor, stiffness, slow motion, unstable posture) ([facility] [address] 5/23/2019 Admit pCXR read by radiology FINDINGS:  
A portable AP radiograph of the chest was obtained at 1434 hours. The 
patient is on a cardiac monitor. There is a large hiatal hernia. Kyphoplasty 
changes are noted in the lower thoracic spine. The lungs are hyperexpanded with 
a slight accentuation of interstitial markings. IMPRESSION Subtle interstitial process. No confluent infiltrate. Incidental hiatal hernia. Admit Chest CT without IV contrast FINDINGS: 
CHEST WALL: No mass or axillary lymphadenopathy. THYROID: No nodule. MEDIASTINUM: No mass or lymphadenopathy. KALYN: No mass or lymphadenopathy. THORACIC AORTA: No aneurysm. MAIN PULMONARY ARTERY: Normal in caliber. 3 cm. TRACHEA/BRONCHI: Patent. ESOPHAGUS: No wall thickening or dilatation. HEART: Normal in size. Coronary artery calcification. PLEURA: No effusion or pneumothorax. LUNGS: There are mosaic areas of groundglass opacity scattered throughout the 
lungs. Within the right lower lobe, there is a 3 mm nodule (series 4, image 57). A 3 mm granuloma is noted in the left lower lobe (series 4, image 42). INCIDENTALLY IMAGED UPPER ABDOMEN: Large hiatal hernia. BONES: No destructive bone lesion. Treated kyphoplasty segments at L1 and L2. Kyphosis with the multiple Schmorl's nodes. Sclerotic 5 mm focus in the T5 
vertebral body. IMPRESSION Nonspecific multilobar alveolitis. Sclerotic 5 mm focus in the T5 vertebral body. No reference for comparison Scheuermann's disease. Large hiatal hernia, coronary artery disease Echo TTE read by cardiology Left Ventricle Normal cavity size and wall thickness. The estimated EF is 50 - 55%. Visually measured ejection fraction. Left Atrium Normal cavity size. Right Ventricle Normal cavity size, wall thickness and global systolic function. Right Atrium Dilated right atrium. Aortic Valve Trileaflet valve structure and no stenosis. Aortic valve sclerosis with no significant stenosis. Mild to moderate aortic valve regurgitation. Mitral Valve Normal valve structure and no stenosis. Mild regurgitation. Tricuspid Valve Normal valve structure and no stenosis. Mild regurgitation. Pulmonic Valve Pulmonic valve not well visualized, but normal doppler findings. No stenosis and no regurgitation. Aorta Normal aortic root, ascending aortic, and aortic arch. Pulmonary Artery Pulmonary arterial systolic pressure (PASP) is 52 mmHg. Pulmonary hypertension found to be moderate. IVC/Hepatic Veins Dilated inferior vena cava. Pericardium Trivial pericardial effusion noted. CTA chest on 4/4/2021 FINDINGS:  
This is a limited quality study for the evaluation of pulmonary 
embolism to the proximal segmental arterial level. There is no pulmonary 
embolism to this level. THYROID: No nodule. MEDIASTINUM: No mass or lymphadenopathy. KALYN: No mass or lymphadenopathy. THORACIC AORTA: No aneurysm. HEART: Coronary atherosclerotic disease ESOPHAGUS: Large hiatal hernia TRACHEA/BRONCHI: Patent. PLEURA: No effusion or pneumothorax. LUNGS: 5 mm nodule right middle lobe unchanged. 4 mm nodule in the right major 
fissure unchanged. Dependent atelectasis. UPPER ABDOMEN: Diffuse atherosclerotic disease in the abdominal aorta BONES: Compression deformities in the midthoracic spine. Kyphoplasty changes in 
the upper lumbar spine. IMPRESSION 1. No evidence of pulmonary embolus given the limitations of the study. 2.  Large hiatal hernia. 3.  No acute cardiopulmonary process. NS cardiac stress test  
A Lexiscan myocardial SPECT gated wall motion study was performed utilizing 10 mCi of Tc MYOVIEW for rest and 30 mCi for stress. SPECT imaging at stress and rest demonstrates no definite evidence of ischemia and/or infarction. There is likely thinning of the inferior wall more pronounced at stress most likely attenuation artifact. Left ventricular ejection fraction equals 80%. Left ventricular wall motion and thickening are within normal limits. IMPRESSION: 
1. No definite evidence of ischemia and/or infarction. 2. LVEF equals 80%. Left ventricular wall motion and thickening is normal. 
 
Hospital course:  
 
Acute hypoxic respiratory failure POA Acute on chronic diastolic CHF exacerbation POA Community-acquired pneumonia POA Elevated troponin peak 0.35 POA Mild to mod AR POA Mod pulmonary hypertension POA PA pressure 52 mm Hg Chest x-raySubtle interstitial process. No confluent infiltrate. Incidental hiatal hernia. -CT chest mozaic GGO can be seen due to Pulm edema/infectious process - ProBNP 10,710 
-Presentation likely 2/2 CHF, volume status seems to be improved. IV to PO lasix in house - Echo TTE LVEF 55-60%, aortic sclerosis without stenosis, mild to mod AR Normal RV function Mild MR, PA pressure 52 mm Hg 
-stress test without ischemia or infarct 
-aspirin daily 
-Elevated d dimer, CTA negative for PE 
-Rapid covid-19 negative, COVID-19 PCR and resp panel PCR negative 
-Continue azithro/rocephin x 5 days - Clinically improved, feels read for discharge - Weaned off O2 before discharge 
  
Stage 3 chronic kidney disease POA GFR close to 30 Calculated GFR close to 30 with age and weight Creat stable after IV contrast and IV diuretics Some concern about lithium use with the reduced GFR and lasix Reduced dose to 300 mg qHS 
D/W pt and daughter to follow up with Psych outpatient ? Able to stop lithium, I am concerned about toxicity risk with reduced GFR Daughter reports she has been on it for over 20 years 
  
History of multiple falls at home PT and OT consulted, case management consulted. History of hypothyroidism Bipolar disorder Dementia with neurocognitive deficit Depression Insomnia Continue the home medications. - Reduced dose of the lithium 
  
Code Status: DNR Surrogate Decision Maker: Daughter 
  
DVT Prophylaxis: Lovenox GI Prophylaxis: not indicated 
  
Baseline: Ambulatory with assistance at home Dispo - accordingly stress test result. Subjective: Chief Complaint / Reason for Physician Visit On RA, doing well, no SOB or CP Feels ready to go home Spoke with daughter at bedside Review of Systems: 
Symptom Y/N Comments  Symptom Y/N Comments Fever/Chills n   Chest Pain n   
Poor Appetite    Edema Cough nn   Abdominal Pain n   
Sputum    Joint Pain SOB/SHERMAN n   Pruritis/Rash Nausea/vomit n   Tolerating PT/OT Diarrhea n   Tolerating Diet y Constipation    Other Could NOT obtain due to:   
 
Objective: VITALS:  
Last 24hrs VS reviewed since prior progress note. Most recent are: 
Patient Vitals for the past 24 hrs: 
 Temp Pulse Resp BP SpO2  
04/06/21 0829 99.1 °F (37.3 °C) 68 18 (!) 142/77 93 % 04/06/21 0301 98.8 °F (37.1 °C) 72 18 (!) 147/61 91 % 04/06/21 0022 97.5 °F (36.4 °C) 78 20 101/64 92 % 04/05/21 2050 98 °F (36.7 °C) 72 18 (!) 141/61   
04/05/21 1930  75 18 133/71 93 % 04/05/21 1532 98.1 °F (36.7 °C) 78 18 (!) 157/69 94 % No intake or output data in the 24 hours ending 04/06/21 1127 I had a face to face encounter and independently examined this patient on 4/6/2021, as outlined below: PHYSICAL EXAM: 
General: WD, WN. Alert, cooperative, no acute distress EENT:  Anicteric sclerae. MMM 
Resp:  No accessory muscle use, crackles at bases bilaterally, no wheezing CV:  Regular  rhythm,  No edema GI:  Soft, Non distended, Non tender. +Bowel sounds Neurologic:  Alert and oriented to person and place. Psych:   Fair insight Skin:  No rashes. No jaundice Reviewed most current lab test results and cultures  YES Reviewed most current radiology test results   YES Review and summation of old records today    NO Reviewed patient's current orders and MAR    YES 
PMH/SH reviewed - no change compared to H&P 
________________________________________________________________________ Care Plan discussed with: 
  Comments Patient x Family  x Daughter RN x Care Manager Consultant Multidiciplinary team rounds were held today with , nursing, pharmacist and clinical coordinator. Patient's plan of care was discussed; medications were reviewed and discharge planning was addressed. ________________________________________________________________________ Comments >50% of visit spent in counseling and coordination of care    
________________________________________________________________________ Kiarra Bueno MD  
 
Procedures: see electronic medical records for all procedures/Xrays and details which were not copied into this note but were reviewed prior to creation of Plan. LABS: 
I reviewed today's most current labs and imaging studies. Pertinent labs include: 
Recent Labs 04/05/21 0134 04/04/21 
0443 WBC 8.1 6.5 HGB 11.0* 11.0*  
HCT 35.5 35.9  183 Recent Labs 04/06/21 
0257 04/05/21 
0134  141  
K 3.8 3.8 * 109* CO2 28 26 * 109* BUN 21* 24* CREA 1.03* 1.07* CA 7.9* 8.1* Signed: Kiarra Bueno MD

## 2021-04-06 NOTE — PROGRESS NOTES
I have reviewed discharge instructions with the caregiver (patient's daughter). The caregiver verbalized understanding. IV removed, telemetry box disconnected and returned. Belongings gathered and sent home with patient and patient's daughter.

## 2021-04-06 NOTE — DISCHARGE INSTRUCTIONS
Patient Discharge Instructions    Марина Pacheco / [id number] : 1926    Admitted 2021 Discharged: 2021         DISCHARGE DIAGNOSIS:       Hypoxia (2021) = low oxygen     Acute diastolic Congestive heart failure exacerbation ([address] (2021)      What to do at Home    1. Recommended diet: Cardiac Diet, low salt    2. Recommended activity: Activity as tolerated    3. If you experience any of the following symptoms then please call your primary care physician or return to the emergency room if you cannot get hold of your doctor:   Fevers > 100.5, chills   Nausea or vomiting, persistent diarrhea > 24 hours   Blood in stool or black stools   Chest pain or SOB      Follow-up Information     Follow up With Specialties Details Why Contact Info    Morgan Mckeon MD Family Medicine Schedule an appointment as soon as possible for a visit in 1 week  [address] [facility]  [address] [facility]      Magui Esposito MD Cardiology, [address] [facility] Vascular Surgery, Internal Medicine Call in 2 weeks cardiology follow up [phone]  [phone]          CHF specific discharge instructions    Weight:  · Daily weights, Notify your Doctor if Wt gain of 3 lb in a day or 5 lb in a week     Diet :  · Low salt cardiac diet   · Limit Sodium to 2500 mg per day         Talk with your psychiatrist about stopping the lithium, you have some chronic kidney disease and this places you at higher risk of lithium toxicity    Information obtained by :  I understand that if any problems occur once I am at home I am to contact my physician. I understand and acknowledge receipt of the instructions indicated above.                                                                                                                                            Physician's or R.N.'s Signature Date/Time                                                                                                                                              Patient or Representative Signature                                                          Date/Time

## 2021-04-06 NOTE — PROGRESS NOTES
End of Shift Note Bedside shift change report given to Neo (oncoming nurse) by Kunal Giraldo RN (offgoing nurse). Report included the following information Kardex, MAR and Recent Results Shift worked:  7p-7a Shift summary and any significant changes:  
  no significant changes Concerns for physician to address:    
Zone phone for oncoming shift:   [phone] Activity: 
Activity Level: Up with Assistance Number times ambulated in hallways past shift: 0 Number of times OOB to chair past shift: 0 Cardiac:  
Cardiac Monitoring: Yes     
Cardiac Rhythm: Normal sinus rhythm Access:  
Current line(s): PIV Genitourinary:  
Urinary status: voiding Respiratory:  
O2 Device: Room air Chronic home O2 use?: NO Incentive spirometer at bedside:  
  
GI: 
Last Bowel Movement Date: 04/02/21 Current diet:  DIET CARDIAC Regular Passing flatus: Tolerating current diet: YES 
% Diet Eaten: 75 % Pain Management:  
Patient states pain is manageable on current regimen: N/A, no c/o pain Skin: 
Mike Score: 17 Interventions: float heels and PT/OT consult Patient Safety: 
Fall Score: Total Score: 5 Interventions: assistive device (walker, cane, etc), gripper socks, pt to call before getting OOB, stay with me (per policy) and tele sitter High Fall Risk: Yes Length of Stay: 
Expected LOS: 4d 2h Actual LOS: 3 Kunal Giraldo RN

## 2021-04-07 NOTE — Clinical Note
Please add this patient to list to get the 2nd covid vaccine. She was in the hospital and missed her appointment.

## 2021-04-07 NOTE — PROGRESS NOTES
Care Transitions Initial Follow Up Call Call within 2 business days of discharge: Yes Patient: Urban Gant Patient : 1926 MRN: [id number] Last Discharge [address] Complaint Diagnosis Description Type Department Provider 21 Cough; Fall Acute respiratory failure with hypoxia ([facility] . .. ED to Hosp-Admission (Discharged) (ADMIT) Nathan Owen, Homero Navarrete MD; Nikunj Yoon,... Was this an external facility discharge? No  
 
Challenges to be reviewed by the provider Additional needs identified to be addressed with provider yes Working on finding [employer] vaccine she was to get on 3/5/21 but was admitted. Method of communication with provider : staff message Discussed COVID-19 related testing which was available at this time. Test results were negative. Patient informed of results, if available? yes Advance Care Planning:  
Does patient have an Advance Directive: yes, reviewed and current Inpatient Readmission Risk score: Unplanned Readmit Risk Score: 21 Was this a readmission? no  
Patient stated reason for the admission:  
 
Patients top risk factors for readmission: medical condition Interventions to address risk factors: Scheduled appointment with PCP- at 2:15pm, Scheduled appointment with Specialist-Dr Haro Officer 21 at 10:30am. and Assessment and support for treatment adherence and medication management-Daily weight, SOB, cough, leg edema. Care Transition Nurse (CTN) contacted the family by telephone to perform post hospital discharge assessment. Verified name and  with family as identifiers. Provided introduction to self, and explanation of the CTN role. CTN reviewed discharge instructions, medical action plan and red flags with family who verbalized understanding. Were discharge instructions available to patient? yes.  Reviewed appropriate site of care based on symptoms and resources available to patient including: PCP and Specialist. Family given an opportunity to ask questions and does not have any further questions or concerns at this time. The family agrees to contact the PCP office for questions related to their healthcare. Medication reconciliation was performed with family, who verbalizes understanding of administration of home medications. Referral to Pharm D needed: no  
 
Home Health/Outpatient orders at discharge: home health care, PT and OT Home Health company: [employer] Date of initial visit: Scheduled for today Durable Medical Equipment ordered at discharge: None Covid Risk Education Patient has following risk factors of: heart failure. Education provided regarding infection prevention, and signs and symptoms of COVID-19 and when to seek medical attention with family who verbalized understanding. Discussed exposure protocols and quarantine From CDC: Are you at higher risk for severe illness?  and given an opportunity for questions and concerns. The family agrees to contact the COVID-19 hotline [phone] or PCP office for questions related to COVID-19. For more information on steps you can take to protect yourself, see CDC's How to Protect Yourself Was patient discharged with a pulse oximeter? no  
 
 
Discussed follow-up appointments. If no appointment was previously scheduled, appointment scheduling offered: yes Is follow up appointment scheduled within 7 days of discharge? Daughter wants to call and schedule MARTHA appt virtually [phone] follow up appointment(s):  
Future Appointments Date Time Provider Ehsan Shi 4/7/2021  2:15 PM Kirsten Molina MD [facility] BS AMB  
4/23/2021 10:30 AM Bk Mortensen MD [facility] BS AMB [facility] follow up appointment(s): None Plan for follow-up call in 7-10 days based on severity of symptoms and risk factors. Plan for next call: self management-Daily weight, monitor for weight gains, SOB, cough or leg edema.  and follow up appointment-PC 4/7 and Cardiology 4/23 CTN provided contact information for future needs. Goals Addressed This Visit's Progress  Attends follow-up appointments as directed. 04/07/21  
-Patient has an appt with Dr López  on 4/7/21 at 2:15pm 
-Patient has an appt with Dr Yury Shah on 4/23/21 at 10:30am 
-Patient has received her 1st does of [address], was to get 2nd grubbs on 4/5/21 but was admitted. [facility] where she received 1st dose and they are out [employer], called the state # and she is not registered there. Looked at My Vaccine on line and no openings in our area that I can find. Monitor that patient attend PCP appt, and still has her Cardiology appt and has found site for 2nd Pfizer vaccine on next call. Steve Salazar MSN, RN, CCM / Care Transition Nurse / [phone]  Understands red flags post discharge. 04/07/21 Called and spoke to patient daughter [facility] verified by patient name and birth date. 
-Denies any SOB, cough, C/P or leg edema. 
-They do have a scale and is agreeable to weighing daily.   
-Reviewed what 3 lb or 5 lb in 1 week weight gain means and what to do. -[address] and they are planning on seeing patient today. Monitor daily weights, SOB cough or leg edema and Home Health status on next call. Steve Salazar MSN, RN, CCM / Care Transition Nurse / [phone]

## 2021-04-07 NOTE — PROGRESS NOTES
Rose Wang is a 80 y.o. female who was seen by synchronous (real-time) audio-video technology on 4/7/2021 for No chief complaint on file. Assessment & Plan:  
Diagnoses and all orders for this visit: 1. Frequent falls 
-     [facility] 2. Acute on chronic diastolic congestive heart failure ([facility] [address] 3. Dementia without behavioral disturbance, unspecified dementia type ([facility] [address] 4. Weakness of both lower extremities 
-     REFERRAL TO HOME HEALTH 
 
5. Osteoarthritis of multiple joints, unspecified osteoarthritis type 
-     [facility] 6. Hospital discharge follow-up 1. Frequent Falls I will contact physical therapy for further support. I referred pt to Home Health. 2. Acute on Chronic diastolic Congestive Heart Failure I encouraged her to try mixing apple juice and water to help with her fluid intake. I advised the daughter help the pt increase and monitor her hydration. 3. Dementia without Behavioral Disturbance Appears to be well-managed. 4. Weakness of Both Lower Extremities I will contact physical therapy for further support. I referred pt to Home Health. 5. Osteoarthritis of Multiple Joints I will contact physical therapy for further support. I referred pt to Home Health. I will work on scheduling her for her second COVID shot. Subjective:  
 
Patient presents virtually today for a hospital follow-up. She is accompanied by her daughter who provides the history. Pt is a poor historian. Hospital Summary: Pt presented to [facility] TELE c/o SOB and CP. [de-identified] note stated a suspicion of community acquired pneumonia and heart failure. CXR showed subtle interstitial process and Chest CT was normal. She was discharged on 04/06/2021 with a diagnosis of CHF and prescribed Lasix 40 mg tablet, Lopressor 25 mg tablet, Combivent Respimat inhaler and aspirin 81 mg chewable tablet.  
 
Frequent Falls/Weakness/Osteoarthritis: Daughter states the pt has had four falls. Daughter notes pt is weak while ambulating around and the pt expresses disinterest in walking supports. Daughter notes the pt has not been doing physical therapy but pt is agreeable to starting physical therapy. CHF: Daughter reports the pt appears to be feeling better since starting the medication prescribed at the ED. Pt comments her appetite and fluid intake has decreased. Daughter notes the follow-up with the cardiologist is in two weeks. PREVENTATIVE: 
COVID-19 vaccine: 1 of 2 completed [employer] series, supposed to receive second on 04/05/2021). Prior to Admission medications Medication Sig Start Date End Date Taking? Authorizing Provider  
lithium carbonate SR (LITHOBID) 300 mg CR tablet Take 1 Tab by mouth nightly. 4/6/21   Parker Aguilar MD  
furosemide (LASIX) 40 mg tablet Take 1 Tab by mouth daily. 4/6/21   Parker Aguilar MD  
metoprolol tartrate (LOPRESSOR) 25 mg tablet Take 0.5 Tabs by mouth every twelve (12) hours. 4/6/21   Parker Aguilar MD  
ipratropium-albuteroL (COMBIVENT RESPIMAT)  mcg/actuation inhaler Take 1 Puff by inhalation four (4) times daily. 4/6/21   Parker Aguilar MD  
aspirin 81 mg chewable tablet Take 1 Tab by mouth daily. 4/6/21   Parker Aguilar MD  
LORazepam (ATIVAN) 0.5 mg tablet Take 0.5 mg by mouth daily as needed for Anxiety. Provider, Historical  
OLANZapine (ZyPREXA) 2.5 mg tablet  2/18/21   Other, MD Isaiah  
OLANZapine (ZyPREXA) 10 mg tablet  2/18/21   Other, MD Isaiah  
levothyroxine (SYNTHROID) 75 mcg tablet TAKE 1 TABLET DAILY BEFORE BREAKFAST 12/11/20   Florance Aschoff, MD  
mirtazapine (REMERON) 15 mg tablet Take  by mouth nightly. Take one and one half tablets daily    Provider, Historical  
PARoxetine (PAXIL) 20 mg tablet Take 20 mg by mouth daily. Provider, Historical  
memantine (NAMENDA) 10 mg tablet Take 1 Tab by mouth daily. Patient taking differently: Take 10 mg by mouth nightly.  5/23/19 Colonel Nuria MD  
 
Patient Active Problem List  
 Diagnosis Date Noted  Hypoxia 04/02/2021  
 NSTEMI (non-ST elevated myocardial infarction) ([facility] 04/02/2021  CHF exacerbation ([facility] 04/02/2021  TAWANA (acute kidney injury) ([facility] 04/02/2021  Caregiver stress 05/16/2019  Anxiety and depression 05/16/2019  Counseling regarding advance care planning and goals of care 05/16/2019  Left leg pain 05/13/2019  Hip fracture ([facility] 03/18/2019  Bipolar disorder ([facility] 03/18/2019  Auditory hallucinations 09/04/2018  Altered mental status, unspecified 02/02/2018  Bilateral carotid artery stenosis 02/02/2018  Transient ischemic attack involving left internal carotid artery 02/02/2018  Acute metabolic encephalopathy 20/21/9632  Laceration of left arm with complication 48/43/9696  Hypothyroidism 01/02/2014  Depression 01/02/2014 Current Outpatient Medications Medication Sig Dispense Refill  lithium carbonate SR (LITHOBID) 300 mg CR tablet Take 1 Tab by mouth nightly. 30 Tab 4  furosemide (LASIX) 40 mg tablet Take 1 Tab by mouth daily. 30 Tab 5  
 metoprolol tartrate (LOPRESSOR) 25 mg tablet Take 0.5 Tabs by mouth every twelve (12) hours. 30 Tab 5  ipratropium-albuteroL (COMBIVENT RESPIMAT)  mcg/actuation inhaler Take 1 Puff by inhalation four (4) times daily. 1 Inhaler 3  
 aspirin 81 mg chewable tablet Take 1 Tab by mouth daily. 30 Tab 5  
 LORazepam (ATIVAN) 0.5 mg tablet Take 0.5 mg by mouth daily as needed for Anxiety.  OLANZapine (ZyPREXA) 2.5 mg tablet  OLANZapine (ZyPREXA) 10 mg tablet  levothyroxine (SYNTHROID) 75 mcg tablet TAKE 1 TABLET DAILY BEFORE BREAKFAST 90 Tab 3  
 mirtazapine (REMERON) 15 mg tablet Take  by mouth nightly. Take one and one half tablets daily  PARoxetine (PAXIL) 20 mg tablet Take 20 mg by mouth daily.  memantine (NAMENDA) 10 mg tablet Take 1 Tab by mouth daily.  (Patient taking differently: Take 10 mg by mouth nightly.) 30 Tab 11 Allergies Allergen Reactions  Sinemet [Carbidopa-Levodopa] Other (comments) \"Made patient much worse\"  Sulfa (Sulfonamide Antibiotics) Hives Past Medical History:  
Diagnosis Date  Depression   
 psychiatrist: Dr Rubio Abebe (on Lithium)  Fracture of wrist   
 slipped and fx left wrist, surgery scheduled 12/21/16  Full dentures   
 upper and lower  Hypothyroid  Parkinson's disease (tremor, stiffness, slow motion, unstable posture) ([address] 5/23/2019 Past Surgical History:  
Procedure Laterality Date  HX KYPHOPLASTY  2013  HX ORTHOPAEDIC  12/2016 Wrist surgery  HX WRIST FRACTURE TX Left 12/2016 Family History Problem Relation Age of Onset [place] Other Mother Inefficient wound healing  Cancer Sister Stomach CA  
 Diabetes Brother  Alzheimer Sister  Hypertension Daughter  Thyroid Disease Daughter  Other Son Hip replacement  Depression Son  Liver Disease Son  Diabetes Son   
 Heart Disease Son  Depression Son   
 
Social History Tobacco Use  Smoking status: Former Smoker  Smokeless tobacco: Never Used Substance Use Topics  Alcohol use: No  
 
 
Review of Systems Respiratory: Negative for shortness of breath. Cardiovascular: Negative for chest pain. Objective:  
 
Patient-Reported Vitals 4/7/2021 Patient-Reported Weight 128.4 Patient-Reported Height 5.0 Patient-Reported Pulse 71 Patient-Reported Temperature 98.2 Patient-Reported SpO2 94 Patient-Reported Systolic  953 Patient-Reported Diastolic 62  
  
 
[INSTRUCTIONS:  \"[x]\" Indicates a positive item  \"[]\" Indicates a negative item  -- DELETE ALL ITEMS NOT EXAMINED] Constitutional: [x] Appears well-developed and well-nourished [x] No apparent distress   
  [] Abnormal - Mental status: [x] Alert and awake  [x] Oriented to person/place/time [x] Able to follow commands   
[] Abnormal -  
 Eyes:   EOM    [x]  Normal    [] Abnormal -  
Sclera  [x]  Normal    [] Abnormal - 
        Discharge [x]  None visible   [] Abnormal - HENT: [x] Normocephalic, atraumatic  [] Abnormal -  
[x] Mouth/Throat: Mucous membranes are moist 
 
External Ears [x] Normal  [] Abnormal - Neck: [x] No visualized mass [] Abnormal - Pulmonary/Chest: [x] Respiratory effort normal   [x] No visualized signs of difficulty breathing or respiratory distress 
      [] Abnormal - Musculoskeletal:   [x] Normal gait with no signs of ataxia [x] Normal range of motion of neck [] Abnormal -  
 
Neurological:        [x] No Facial Asymmetry (Cranial nerve 7 motor function) (limited exam due to video visit) [x] No gaze palsy  
     [] Abnormal -   
     
Skin:        [x] No significant exanthematous lesions or discoloration noted on facial skin   
     [] Abnormal - Psychiatric:       [x] Normal Affect [] Abnormal -  
     [x] No Hallucinations Other pertinent observable physical exam findings:- 
 
 
 
We discussed the expected course, resolution and complications of the diagnosis(es) in detail. Medication risks, benefits, costs, interactions, and alternatives were discussed as indicated. I advised her to contact the office if her condition worsens, changes or fails to improve as anticipated. She expressed understanding with the diagnosis(es) and plan. Alfie Alvarenga, was evaluated through a synchronous (real-time) audio-video encounter. The patient (or guardian if applicable) is aware that this is a billable service. Verbal consent to proceed has been obtained within the past 12 months. The visit was conducted pursuant to the emergency declaration under the [address] [place] [address] and the [employer] General Act.   Patient identification was verified, and a caregiver was present when appropriate. The patient was located in a state where the provider was credentialed to provide care.  
 
 
MD Kailyn Ryan, as dictated by Tiesha Eduardo MD.

## 2021-04-10 NOTE — PROGRESS NOTES
Physician Progress Note PATIENT:               EDDY LARA 
CSN #:                  [id number] :                       1926 ADMIT DATE:       2021 3:08 PM 
[address] DATE:        2021 12:10 PM 
RESPONDING 
PROVIDER #:        Kristin Morales MD 
 
 
 
 
QUERY TEXT: 
 
Dr Sejal Sepulveda: 
 
Patient admitted with SOB & cough noted to have elevated Troponin &BNP. Documentation noted on the H&P & Problem list ''NSTEMI\" not documented on the DS. DS notes, \"Elevated troponin peak 0.35 POA stress test negative. \". If possible, please document in the progress notes and discharge summary if *** was: The medical record reflects the following: 
Risk Factors: 89yoF w/ Acute CHF, former smoker, no cardiac hx Clinical Indicators: per Cardiology note \"c/o intermittent chest pains, unable to quantify.'. Troponin peaked @ 0.35. Cardiology notes 'mild, flat troponins. Treatment: trend troponin, Echo, stress test, aspirin Thank you, 
Alexa Mcnally RN, CDS Options provided: 
-- NSTEMI confirmed after study -- NSTEMI ruled out after study -- Other - I will add my own diagnosis -- Disagree - Not applicable / Not valid -- Disagree - Clinically unable to determine / Unknown 
-- Refer to Clinical Documentation Reviewer PROVIDER RESPONSE TEXT: 
 
NSTEMI ruled out after study.  
 
Query created by: Karolina Khan on 2021 9:14 AM 
 
 
Electronically signed by:  Kristin Morales MD 2021 9:54 PM

## 2021-04-12 NOTE — PROGRESS NOTES
Spoke w/ daughter. Daughter said [facility] will call about r/s her 2nd dose within the next 24hrs. I did inform pt that we cannot r/s 2nd dose and she will have to call [facility]. If 2nd dose appts are missed pt will need to redo the entire process of the covid vaccine. Pt's daughter will wait till end of day today and if she does not hear back, will call [facility].

## 2021-04-13 NOTE — TELEPHONE ENCOUNTER
----- Message from Nel Harden sent at 4/13/2021 11:15 AM EDT -----  Regarding: Dr. Yue Aguero Message/Vendor Calls    Caller's first and last name:Stephanie- Physical Therapist       Reason for call: Verbal Orders/ Fax Orders      Callback required yes/no and why:no      Best contact number(s): [phone]      Details to clarify the request: Saw pt today and will need to continue to see her 2x a week for endurence, Will fax over orders for signature or can call back with a verbal.      Message from [facility]

## 2021-04-23 NOTE — PROGRESS NOTES
1. Have you been to the ER, urgent care clinic since your last visit? Hospitalized since your last visit? Seen on 4/2/21. 2. Have you seen or consulted any other health care providers outside of the [address] [place] [address] since your last visit? Include any pap smears or colon screening. No. 
 
 
Chief Complaint Patient presents with  
[facility] Follow Up  
  pt denies any cardiac symptoms  CHF

## 2021-04-23 NOTE — LETTER
4/24/2021 Patient: Chanel Stover YOB: 1926 Date of Visit: 4/23/2021 Melinda Chiu MD 
[address] [facility] [address] [address] [address] [employer] Dear Melinda Chiu MD, Thank you for referring Ms. Nancy Freitas to [facility] for evaluation. My notes for this consultation are attached. If you have questions, please do not hesitate to call me. I look forward to following your patient along with you. Sincerely, Reynaldo Tovar MD

## 2021-04-24 NOTE — PROGRESS NOTES
Subjective/HPI:  
 
Sachin Hu is a 80 y.o. female is here for routine f/u. She has a PMHx of NSTEMI, depression, diastolic CHF. Recently hospitalized with respiratory failure, NSTEMI. Stress test was normal. Echo showed normal EF. She has no complaints today. Denies any chest pain, edema, SOB. PCP Provider Lilibeth Sue MD 
 
Past Medical History:  
Diagnosis Date  Depression   
 psychiatrist: Dr Mellissa Blackman (on Lithium)  Fracture of wrist   
 slipped and fx left wrist, surgery scheduled 12/21/16  Full dentures   
 upper and lower  Hypothyroid  Parkinson's disease (tremor, stiffness, slow motion, unstable posture) ([address] 5/23/2019 Allergies Allergen Reactions  Sinemet [Carbidopa-Levodopa] Other (comments) \"Made patient much worse\"  Sulfa (Sulfonamide Antibiotics) Hives Outpatient Encounter Medications as of 4/23/2021 Medication Sig Dispense Refill  lithium carbonate SR (LITHOBID) 300 mg CR tablet Take 1 Tab by mouth nightly. 30 Tab 4  furosemide (LASIX) 40 mg tablet Take 1 Tab by mouth daily. 30 Tab 5  
 metoprolol tartrate (LOPRESSOR) 25 mg tablet Take 0.5 Tabs by mouth every twelve (12) hours. 30 Tab 5  ipratropium-albuteroL (COMBIVENT RESPIMAT)  mcg/actuation inhaler Take 1 Puff by inhalation four (4) times daily. 1 Inhaler 3  
 aspirin 81 mg chewable tablet Take 1 Tab by mouth daily. 30 Tab 5  
 LORazepam (ATIVAN) 0.5 mg tablet Take 0.5 mg by mouth daily as needed for Anxiety.  OLANZapine (ZyPREXA) 2.5 mg tablet Take 2.5 mg by mouth nightly.  OLANZapine (ZyPREXA) 10 mg tablet Take 10 mg by mouth nightly.  levothyroxine (SYNTHROID) 75 mcg tablet TAKE 1 TABLET DAILY BEFORE BREAKFAST 90 Tab 3  
 mirtazapine (REMERON) 15 mg tablet Take  by mouth nightly. Take one and one half tablets daily  PARoxetine (PAXIL) 20 mg tablet Take 20 mg by mouth daily.     
 memantine (NAMENDA) 10 mg tablet Take 1 Tab by mouth daily. (Patient taking differently: Take 10 mg by mouth nightly.) 30 Tab 11 No facility-administered encounter medications on file as of 4/23/2021. Review of Symptoms: 
 
Review of Systems Constitutional: Negative. Negative for chills and fever. HENT: Negative. Negative for hearing loss. Respiratory: Negative. Negative for cough, hemoptysis and shortness of breath. Cardiovascular: Negative. Negative for chest pain, palpitations, orthopnea, claudication, leg swelling and PND. Gastrointestinal: Negative. Negative for nausea and vomiting. Musculoskeletal: Negative for myalgias. Skin: Negative. Negative for rash. Neurological: Negative. Negative for dizziness, loss of consciousness and headaches. Physical Exam:   
 
General: Well developed, in no acute distress, cooperative and alert HEENT: No carotid bruits, no JVD, trach is midline. Neck Supple, PEERL, EOM intact. Heart:  reg rate and rhythm; normal S1/S2; no murmurs, no gallops or rubs. Respiratory: Clear bilaterally x 4, no wheezing or rales Abdomen:   Soft, non-tender, no distention, no masses. + BS. Extremities:  Normal cap refill, no cyanosis, atraumatic. No edema. Neuro: A&Ox3, speech clear, gait stable. Skin: Skin color is normal. No rashes or lesions. Non diaphoretic Vascular: 2+ pulses symmetric in all extremities Visit Vitals /72 (BP 1 Location: Left upper arm, BP Patient Position: Sitting, BP Cuff Size: Adult) Pulse 65 Resp 16 Ht 5' 4\" (1.626 m) Wt 127 lb 3.2 oz (57.7 kg) SpO2 94% BMI 21.83 kg/m² ECG: sinus rythm Cardiology Labs: 
 
Lab Results Component Value Date/Time Cholesterol, total 299 (H) 03/14/2019 11:33 AM  
 HDL Cholesterol 58 03/14/2019 11:33 AM  
 LDL, calculated 204 (H) 03/14/2019 11:33 AM  
 VLDL, calculated 37 03/14/2019 11:33 AM  
 
 
No results found for: HBA1C, [id number], [id number] Lab Results Component Value Date/Time  Sodium 141 04/06/2021 02:57 AM  
 Potassium 3.8 04/06/2021 02:57 AM  
 Chloride 110 (H) 04/06/2021 02:57 AM  
 CO2 28 04/06/2021 02:57 AM  
 Glucose 111 (H) 04/06/2021 02:57 AM  
 BUN 21 (H) 04/06/2021 02:57 AM  
 Creatinine 1.03 (H) 04/06/2021 02:57 AM  
 BUN/Creatinine ratio 20 04/06/2021 02:57 AM  
 GFR est AA >60 04/06/2021 02:57 AM  
 GFR est non-AA 50 (L) 04/06/2021 02:57 AM  
 Calcium 7.9 (L) 04/06/2021 02:57 AM  
 Anion gap 3 (L) 04/06/2021 02:57 AM  
 Bilirubin, total 0.7 04/02/2021 01:48 PM  
 ALT (SGPT) 22 04/02/2021 01:48 PM  
 Alk. phosphatase 128 (H) 04/02/2021 01:48 PM  
 Protein, total 7.6 04/02/2021 01:48 PM  
 Albumin 3.4 (L) 04/02/2021 01:48 PM  
 Globulin 4.2 (H) 04/02/2021 01:48 PM  
 A-G Ratio 0.8 (L) 04/02/2021 01:48 PM  
 
 
 
 Assessment: ICD-10-CM ICD-9-CM 1. Bilateral carotid artery stenosis  I65.23 433.10 AMB POC EKG ROUTINE W/ 12 LEADS, INTER & REP  
  433.30   
2. Chronic diastolic congestive heart failure (HCC)  I50.32 428.32   
  428.0 3. NSTEMI (non-ST elevated myocardial infarction) ([facility]  I21.4 410.70 Plan: 1. Bilateral carotid artery stenosis - AMB POC EKG ROUTINE W/ 12 LEADS, INTER & REP 2. Chronic diastolic congestive heart failure ([facility] Compensated. No volume overload. 3. NSTEMI (non-ST elevated myocardial infarction) ([facility] Normal stress test. She is asymptomatic. Continue current therapy and f/u in 6 months. Discussed with family.  
 
 
Merissa Henriquez MD

## 2021-05-04 NOTE — TELEPHONE ENCOUNTER
Medication Refill     Caller (if not patient): Marisol Orozco       Relationship of caller (if not patient): Daughter       Best contact number(s):  [phone]       Name of medication and dosage if known: Furosemide and metoprolol tartrate (LOPRESSOR) 25 mg tablet       Is patient out of this medication (yes/no): yes       Pharmacy name: [facility] in [address] [place] [address] listed in chart? (yes/no): yes   Pharmacy phone number: [phone]       Details to clarify the request: Requesting callback to discuss these two medications - directions and if PT needs to continue taking them.        Message from [facility]

## 2021-05-04 NOTE — TELEPHONE ENCOUNTER
----- Message from Sharon Lee sent at 5/4/2021 10:22 AM EDT -----  Regarding: Dr. Marshall Go: [phone]  Medication Refill    Caller (if not patient): Marisol Orozco      Relationship of caller (if not patient): Daughter      Best contact number(s):  [phone]      Name of medication and dosage if known: Furosemide and metoprolol tartrate (LOPRESSOR) 25 mg tablet       Is patient out of this medication (yes/no): yes      Pharmacy name: [facility] in [address] listed in chart? (yes/no): yes  Pharmacy phone number: [phone]      Details to clarify the request: Requesting callback to discuss these two medications - directions and if PT needs to continue taking them.        Sharon Lee

## 2021-05-04 NOTE — TELEPHONE ENCOUNTER
Medication Refill     Caller (if not patient): kalyn Mcfarland       Relationship of caller (if not patient): [facility] tech with [facility]       Best contact number(s): [phone] reference number [id number]       Name of medication and dosage if known: ''furosemide 40 mg\"       Is patient out of this medication (yes/no): unknown       [facility]     Pharmacy listed in chart? (yes/no): yes   Pharmacy phone number: on file       Details to clarify the request:       Message from [facility]

## 2021-05-07 NOTE — PROGRESS NOTES
Goals  Attends follow-up appointments as directed. 04/07/21  
-Patient has an appt with Dr Carolann Whiteside on 4/7/21 at 2:15pm 
-Patient has an appt with Dr Christiane Chew on 4/23/21 at 10:30am 
-Patient has received her 1st does of [address], was to get 2nd grubbs on 4/5/21 but was admitted. [facility] where she received 1st dose and they are out [employer], called the state # and she is not registered there. Looked at My Vaccine on line and no openings in our area that I can find. Called [phone] and they will call patient with reschedule date and time. Monitor that patient attend PCP appt, and still has her Cardiology appt and has found site for 2nd Pfizer vaccine on next call. Darya HERNANDEZ, RN, CCM / Care Transition Nurse / [phone]  
 
04/20/21  
-Patient was seen by Dr Carolann Whiteside on 4/7 as schedule. 
-Patient has an appt with Dr Christiane Chew on 4/23/21 at 10:30 am.   
-Patient has not been contacted for her [address] vaccine. Called [phone] and waiting on call back from Supervisor. Darya HERNANDEZ, RN, CCM / Care Transition Nurse / [phone]  Understands red flags post discharge. 04/07/21 Called and spoke to patient daughter Marcia German verified by patient name and birth date. 
-Denies any SOB, cough, C/P or leg edema. 
-They do have a scale and is agreeable to weighing daily.   
-Reviewed what 3 lb or 5 lb in 1 week weight gain means and what to do. -[address] and they are planning on seeing patient today. Monitor daily weights, SOB cough or leg edema and Home Health status on next call. Darya HERNANDEZ, RN, CCM / Care Transition Nurse / [phone]  
 
04/20/21  
-Called and spoke to patient daughter Klaudia Lewis was out walking but stated that her mothers weight has been stable. Patient does get SOB and uses inhaler and gets relief. Denies cough or leg edema. 
-Patient continues to receive PT from [facility].    
 
Monitor for SOB, cough and leg edema and status of PT on next call. Young Medellin MSN, RN, CCM / Care Transition Nurse / [phone]

## 2021-05-07 NOTE — PROGRESS NOTES
Patient has graduated from the Transitions of Care Coordination  program on 5/7/21. Patient/family has the ability to self-manage at this time Care management goals have been completed. Patient was not referred to the [facility] team for further management. Goals Addressed This Visit's Progress  COMPLETED: Attends follow-up appointments as directed. 04/07/21  
-Patient has an appt with Dr Ovidio Gaspar on 4/7/21 at 2:15pm 
-Patient has an appt with Dr Ravindra Chavez on 4/23/21 at 10:30am 
-Patient has received her 1st does of [address], was to get 2nd grubbs on 4/5/21 but was admitted. [facility] where she received 1st dose and they are out [employer], called the state # and she is not registered there. Looked at My Vaccine on line and no openings in our area that I can find. Called [phone] and they will call patient with reschedule date and time. Monitor that patient attend PCP appt, and still has her Cardiology appt and has found site for 2nd Pfizer vaccine on next call. Benito Mijares MSN, RN, CCM / Care Transition Nurse / [phone]  
 
04/20/21  
-Patient was seen by Dr Ovidio Gaspar on 4/7 as schedule. 
-Patient has an appt with Dr Ravindra Chavez on 4/23/21 at 10:30 am.   
-Patient has not been contacted for her [address] vaccine. Called [phone] and waiting on call back from Supervisor. Benito Mijares MSN, RN, CCM / Care Transition Nurse / [phone]  
 
05/07/21 Patient did attend her Cardiology appt as scheduled and has received her 2nd COVID vaccine. Benito Mijares MSN, RN, CCM / Care Transition Nurse / [phone]  COMPLETED: Understands red flags post discharge. 04/07/21 Called and spoke to patient daughter Elie Smalls verified by patient name and birth date. 
-Denies any SOB, cough, C/P or leg edema. 
-They do have a scale and is agreeable to weighing daily.   
-Reviewed what 3 lb or 5 lb in 1 week weight gain means and what to do.    
-[facility] and they are planning on seeing patient today. Monitor daily weights, SOB cough or leg edema and Home Health status on next call. Ricky Luciano MSN, RN, CCM / Care Transition Nurse / [phone]  
 
04/20/21  
-Called and spoke to patient daughter Shelly Del Real was out walking but stated that her mothers weight has been stable. Patient does get SOB and uses inhaler and gets relief. Denies cough or leg edema. 
-Patient continues to receive PT from [facility]. Monitor for SOB, cough and leg edema and status of PT on next call. Ricky HERNANDEZ, RN, CCM / Care Transition Nurse / [phone]  
 
05/07/21 Spoke with patient daughter Ramiro Chowdary, patient has just finished with Sn with [facility] and continues to receive PT. Has not had any more falls lately. Has received her 2nd COVID Vaccine. Denies SOB, cough or leg edema. Appetite is a little better, discussed adding crystal light to water for some more favor. Ricky HERNANDEZ, RN, [facility] / Care Transition Nurse / [phone] Patient has Care Transition Nurse's contact information for any further questions, concerns, or needs. Patients upcoming visits:   
Future Appointments Date Time Provider Ehsan Shi 10/25/2021 10:45 AM Suzanne Mortensen MD [facility] BS AMB

## 2021-05-21 NOTE — TELEPHONE ENCOUNTER
[phone]  Mary Grace Madera , with [facility]    States completed re-eval for physical therapy and will see her for 1-2 ( one time for weeks with holidays) times a week for 9 weeks.

## 2021-05-28 NOTE — TELEPHONE ENCOUNTER
Future Appointments:  Future Appointments   Date Time Provider Ehsan Shi   10/25/2021 10:45 AM Star Ramos MD [facility] BS AMB        Last Appointment With Me:  4/7/2021     Requested Prescriptions     Pending Prescriptions Disp Refills    furosemide (LASIX) 40 mg tablet 30 Tablet 5     Sig: Take 1 Tablet by mouth daily.

## 2021-07-27 NOTE — TELEPHONE ENCOUNTER
Future Appointments:  Future Appointments   Date Time Provider Ehsan Shi   10/25/2021 10:45 AM Lashay Liang MD [facility] AMB        Last Appointment With Me:  4/7/2021     Requested Prescriptions     Pending Prescriptions Disp Refills    furosemide (LASIX) 40 mg tablet 30 Tablet 5     Sig: Take 1 Tablet by mouth daily.

## 2021-07-27 NOTE — TELEPHONE ENCOUNTER
Future Appointments:  Future Appointments   Date Time Provider Ehsan Shi   10/25/2021 10:45 AM Lexy Aparicio MD [facility] AMB        Last Appointment With Me:  4/7/2021     Requested Prescriptions     Pending Prescriptions Disp Refills    metoprolol tartrate (LOPRESSOR) 25 mg tablet 30 Tablet 5     Sig: Take 0.5 Tablets by mouth every twelve (12) hours.  furosemide (LASIX) 40 mg tablet 30 Tablet 5     Sig: Take 1 Tablet by mouth daily.

## 2021-08-19 NOTE — PROGRESS NOTES
.Bedside shift change report given to [address] [place] [address] Ne (oncoming nurse) by Vinnie Metz (offgoing nurse). Report included the following information SBAR, Kardex and Cardiac Rhythm NSR. Vaginal delivery

## 2021-08-23 NOTE — TELEPHONE ENCOUNTER
**triage call**   Kira called in. Two pt identifiers confirmed. Vonnie Cavanaugh wanted to know what labs were order, so they can be drawn on patient today when she visits. Vonnie Cavanaugh information of labs ordered. Vonnie Cavanaugh verbalized understanding of information discussed w/ no further questions at this time.

## 2021-08-23 NOTE — PROGRESS NOTES
Félix Aburto is a 80 y.o. female who was seen by synchronous (real-time) audio-video technology on 8/23/2021 for Labs (Discuss Labs)        Assessment & Plan:   Diagnoses and all orders for this visit:    1. Dementia without behavioral disturbance, unspecified dementia type (HCC)  -     METABOLIC PANEL, COMPREHENSIVE; Future  -     CBC WITH AUTOMATED DIFF; Future    2. Other specified hypothyroidism  -     TSH 3RD GENERATION; Future  -     T4, FREE; Future       1. Dementia without behavioral disturbance   Recheck CMP and CBC. 2. Other specified hypothyroidism   Recheck TSH 3rd generation and T4, Free. Subjective:     Patient presents today virtually to discuss labs. Pt's daughter provides pt information during today's visit. Pt reports her appetite is not good. She endorses drinking milkshakes daily. Pt indicates foods have no taste to her so she is not enjoying eating. Pt's daughter notes her overall weakness has progressed. Pt's daughter remarks the pt's depression and anxiety have worsened as well. She indicates she should be receiving Prozac to start soon. Pt's daughter reports home health will do the blood work they next come in. Prior to Admission medications    Medication Sig Start Date End Date Taking? Authorizing Provider   FLUoxetine (PROzac) 10 mg capsule Take 10 mg by mouth daily. 8/18/21  Yes Provider, Historical   furosemide (LASIX) 40 mg tablet Take 1 Tablet by mouth daily. 7/28/21  Yes Carlos Machado MD   metoprolol tartrate (LOPRESSOR) 25 mg tablet Take 0.5 Tablets by mouth every twelve (12) hours. 7/28/21  Yes Carlos Machado MD   furosemide (LASIX) 40 mg tablet Take 1 Tablet by mouth daily. 7/28/21  Yes Carlos Machado MD   ipratropium-albuteroL (COMBIVENT RESPIMAT)  mcg/actuation inhaler Take 1 Puff by inhalation four (4) times daily. 4/6/21  Yes Gilbert Vasquez MD   aspirin 81 mg chewable tablet Take 1 Tab by mouth daily.  4/6/21  Yes Maeve Mendez Riaz Burdick MD   LORazepam (ATIVAN) 0.5 mg tablet Take 0.5 mg by mouth daily as needed for Anxiety. Yes Provider, Historical   OLANZapine (ZyPREXA) 2.5 mg tablet Take 2.5 mg by mouth nightly. Take 2 tablets by mouth at bedtime 2/18/21  Yes Other, MD Isaiah   OLANZapine (ZyPREXA) 10 mg tablet Take 10 mg by mouth nightly. 2/18/21  Yes Other, MD Isaiah   levothyroxine (SYNTHROID) 75 mcg tablet TAKE 1 TABLET DAILY BEFORE BREAKFAST 12/11/20  Yes Deysi Francisco MD   mirtazapine (REMERON) 15 mg tablet Take 45 mg by mouth nightly. Take three tablets (45mg) by mouth at night   Yes Provider, Historical   PARoxetine (PAXIL) 20 mg tablet Take 20 mg by mouth daily. Yes Provider, Historical   memantine (NAMENDA) 10 mg tablet Take 1 Tab by mouth daily. Patient taking differently: Take 10 mg by mouth nightly. 5/23/19  Yes Rigo Soler MD   lithium carbonate SR (LITHOBID) 300 mg CR tablet Take 1 Tab by mouth nightly.   Patient not taking: Reported on 8/23/2021 4/6/21 8/23/21  Rafael Sullivan MD     Patient Active Problem List    Diagnosis Date Noted    Hypoxia 04/02/2021    NSTEMI (non-ST elevated myocardial infarction) ([facility] [address] 04/02/2021    CHF exacerbation ([facility] [address] 04/02/2021    TAWANA (acute kidney injury) ([facility] [address] 04/02/2021    Caregiver stress 05/16/2019    Anxiety and depression 05/16/2019    Counseling regarding advance care planning and goals of care 05/16/2019    Left leg pain 05/13/2019    Hip fracture ([address] 03/18/2019    Bipolar disorder ([facility] [address] 03/18/2019    Auditory hallucinations 09/04/2018    Altered mental status, unspecified 02/02/2018    Bilateral carotid artery stenosis 02/02/2018    Transient ischemic attack involving left internal carotid artery 02/02/2018    Acute metabolic encephalopathy 77/16/8512    Laceration of left arm with complication 04/90/7855    Hypothyroidism 01/02/2014    Depression 01/02/2014     Current Outpatient Medications   Medication Sig Dispense Refill    FLUoxetine (PROzac) 10 mg capsule Take 10 mg by mouth daily.  furosemide (LASIX) 40 mg tablet Take 1 Tablet by mouth daily. 30 Tablet 5    metoprolol tartrate (LOPRESSOR) 25 mg tablet Take 0.5 Tablets by mouth every twelve (12) hours. 30 Tablet 5    furosemide (LASIX) 40 mg tablet Take 1 Tablet by mouth daily. 30 Tablet 5    ipratropium-albuteroL (COMBIVENT RESPIMAT)  mcg/actuation inhaler Take 1 Puff by inhalation four (4) times daily. 1 Inhaler 3    aspirin 81 mg chewable tablet Take 1 Tab by mouth daily. 30 Tab 5    LORazepam (ATIVAN) 0.5 mg tablet Take 0.5 mg by mouth daily as needed for Anxiety.  OLANZapine (ZyPREXA) 2.5 mg tablet Take 2.5 mg by mouth nightly. Take 2 tablets by mouth at bedtime      OLANZapine (ZyPREXA) 10 mg tablet Take 10 mg by mouth nightly.  levothyroxine (SYNTHROID) 75 mcg tablet TAKE 1 TABLET DAILY BEFORE BREAKFAST 90 Tab 3    mirtazapine (REMERON) 15 mg tablet Take 45 mg by mouth nightly. Take three tablets (45mg) by mouth at night      PARoxetine (PAXIL) 20 mg tablet Take 20 mg by mouth daily.  memantine (NAMENDA) 10 mg tablet Take 1 Tab by mouth daily.  (Patient taking differently: Take 10 mg by mouth nightly.) 30 Tab 11     Allergies   Allergen Reactions    Sinemet [Carbidopa-Levodopa] Other (comments)     \"Made patient much worse\"    Sulfa (Sulfonamide Antibiotics) Hives     Past Medical History:   Diagnosis Date    Depression     psychiatrist: Dr Rhesa Pallas (on Lithium)    Fracture of wrist     slipped and fx left wrist, surgery scheduled 12/21/16    Full dentures     upper and lower    Hypothyroid     Parkinson's disease (tremor, stiffness, slow motion, unstable posture) ([facility] [address] 5/23/2019     Past Surgical History:   Procedure Laterality Date    HX KYPHOPLASTY  2013    HX ORTHOPAEDIC  12/2016    Wrist surgery    HX WRIST FRACTURE TX Left 12/2016     Family History   Problem Relation Age of Onset    Other Mother         Inefficient wound healing    Cancer Sister         Stomach CA    Diabetes Brother     Alzheimer Sister     Hypertension Daughter     Thyroid Disease Daughter     Other Son         Hip replacement    Depression Son     Liver Disease Son     Diabetes Son     Heart Disease Son     Depression Son      Social History     Tobacco Use    Smoking status: Former Smoker     Types: Cigarettes     Quit date:      Years since quittin.6    Smokeless tobacco: Never Used   Substance Use Topics    Alcohol use: No       Review of Systems   Constitutional: Positive for malaise/fatigue. HENT: Negative. Eyes: Negative. Respiratory: Negative. Cardiovascular: Negative. Gastrointestinal: Negative. Genitourinary: Negative. Musculoskeletal: Negative. Skin: Negative. Neurological: Positive for weakness. Endo/Heme/Allergies: Negative. Psychiatric/Behavioral: Negative.         Objective:     Patient-Reported Vitals 2021   Patient-Reported Weight 128.4   Patient-Reported Height 5.0   Patient-Reported Pulse 71   Patient-Reported Temperature 98.2   Patient-Reported SpO2 94   Patient-Reported Systolic  651   Patient-Reported Diastolic 62        [INSTRUCTIONS:  \"[x]\" Indicates a positive item  \"[]\" Indicates a negative item  -- DELETE ALL ITEMS NOT EXAMINED]    Constitutional: [x] Appears well-developed and well-nourished [x] No apparent distress      [] Abnormal -     Mental status: [x] Alert and awake  [x] Oriented to person/place/time [x] Able to follow commands    [] Abnormal -     Eyes:   EOM    [x]  Normal    [] Abnormal -   Sclera  [x]  Normal    [] Abnormal -          Discharge [x]  None visible   [] Abnormal -     HENT: [x] Normocephalic, atraumatic  [] Abnormal -   [x] Mouth/Throat: Mucous membranes are moist    External Ears [x] Normal  [] Abnormal -    Neck: [x] No visualized mass [] Abnormal -     Pulmonary/Chest: [x] Respiratory effort normal   [x] No visualized signs of difficulty breathing or respiratory distress        [] Abnormal -      Musculoskeletal:   [x] Normal gait with no signs of ataxia         [x] Normal range of motion of neck        [] Abnormal -     Neurological:        [x] No Facial Asymmetry (Cranial nerve 7 motor function) (limited exam due to video visit)          [x] No gaze palsy        [] Abnormal -          Skin:        [x] No significant exanthematous lesions or discoloration noted on facial skin         [] Abnormal -            Psychiatric:       [x] Normal Affect [] Abnormal -        [x] No Hallucinations    Other pertinent observable physical exam findings:-        We discussed the expected course, resolution and complications of the diagnosis(es) in detail. Medication risks, benefits, costs, interactions, and alternatives were discussed as indicated. I advised her to contact the office if her condition worsens, changes or fails to improve as anticipated. She expressed understanding with the diagnosis(es) and plan. Yisel Tigrejoanna, was evaluated through a synchronous (real-time) audio-video encounter. The patient (or guardian if applicable) is aware that this is a billable service. Verbal consent to proceed has been obtained within the past 12 months. The visit was conducted pursuant to the emergency declaration under the [facility], [address] [place] [address] authority and the [employer] and [employer] General Act. Patient identification was verified, and a caregiver was present when appropriate. The patient was located in a state where the provider was credentialed to provide care.       Pati Alvarado, as dictated by Dandy Booth MD.

## 2021-11-23 NOTE — DISCHARGE INSTRUCTIONS
It was a pleasure taking care of you in our Emergency Department today. We know that when you come to [facility], you are entrusting us with your health, comfort, and safety. Our physicians and nurses honor that trust, and truly appreciate the opportunity to care for you and your loved ones. We also value your feedback. If you receive a survey about your Emergency Department experience today, please fill it out. We care about our patients' feedback, and we listen to what you have to say. Thank you!       Dr. Luis Hernandez MD.

## 2021-11-23 NOTE — ED TRIAGE NOTES
Pt presents to the ED via EMS post ground level fall c/o head injury. Per EMS pt was walking to the bathroom with her rolling walker and slipped falling hitting her head pt has sustained two lacerations on the right side of head. Denies LOC pt reports taking 1 baby ASA daily. Pt is A/O x 4. Bleeding controlled.

## 2021-11-23 NOTE — ED PROVIDER NOTES
EMERGENCY DEPARTMENT HISTORY AND PHYSICAL EXAM     ------------------------------------------------------------------------------------------------------  Please note that this dictation was completed with [employer], the [employer] voice recognition software. Quite often unanticipated grammatical, syntax, homophones, and other interpretive errors are inadvertently transcribed by the computer software. Please disregard these errors. Please excuse any errors that have escaped final proofreading.  -----------------------------------------------------------------------------------------------------------------    Date: 11/22/2021  Patient Name: Mike Lerma    History of Presenting Illness     Chief Complaint   Patient presents with    Head Injury       History Provided By: Patient    HPI: Mike Lerma is a 80 y.o. female, with significant pmhx of Parkinson's, hypothyroidism, who presents via  EMS to the ED with c/o ground-level fall. Noted to have lacerations to the right side of her head that are hemostatic at time of my evaluation. Denies loss of consciousness, visual disturbance, nausea or vomiting since her fall. Notes that she tripped over her walker crossing the threshold from her bathroom. Pt also specifically denies any recent fevers, chills, CP, SOB, nausea, vomiting, diarrhea, abd pain, changes in BM, urinary sxs. PCP: Jaydon Howe MD    Social Hx: denies tobacco, denies EtOH, denies recreational/ Illicit Drugs     There are no other complaints, changes, or physical findings at this time. Allergies   Allergen Reactions    Sinemet [Carbidopa-Levodopa] Other (comments)     \"Made patient much worse\"    Sulfa (Sulfonamide Antibiotics) Hives         Current Outpatient Medications   Medication Sig Dispense Refill    FLUoxetine (PROzac) 10 mg capsule Take 10 mg by mouth daily.  furosemide (LASIX) 40 mg tablet Take 1 Tablet by mouth daily.  30 Tablet 5    metoprolol tartrate (LOPRESSOR) 25 mg tablet Take 0.5 Tablets by mouth every twelve (12) hours. 30 Tablet 5    furosemide (LASIX) 40 mg tablet Take 1 Tablet by mouth daily. 30 Tablet 5    ipratropium-albuteroL (COMBIVENT RESPIMAT)  mcg/actuation inhaler Take 1 Puff by inhalation four (4) times daily. 1 Inhaler 3    aspirin 81 mg chewable tablet Take 1 Tab by mouth daily. 30 Tab 5    LORazepam (ATIVAN) 0.5 mg tablet Take 0.5 mg by mouth daily as needed for Anxiety.  OLANZapine (ZyPREXA) 2.5 mg tablet Take 2.5 mg by mouth nightly. Take 2 tablets by mouth at bedtime      OLANZapine (ZyPREXA) 10 mg tablet Take 10 mg by mouth nightly.  levothyroxine (SYNTHROID) 75 mcg tablet TAKE 1 TABLET DAILY BEFORE BREAKFAST 90 Tab 3    mirtazapine (REMERON) 15 mg tablet Take 45 mg by mouth nightly. Take three tablets (45mg) by mouth at night      PARoxetine (PAXIL) 20 mg tablet Take 20 mg by mouth daily.  memantine (NAMENDA) 10 mg tablet Take 1 Tab by mouth daily.  (Patient taking differently: Take 10 mg by mouth nightly.) 30 Tab 11       Past History     Past Medical History:  Past Medical History:   Diagnosis Date    Depression     psychiatrist: Dr Sarah Ferguson (on Lithium)    Fracture of wrist     slipped and fx left wrist, surgery scheduled 12/21/16    Full dentures     upper and lower    Hypothyroid     Parkinson's disease (tremor, stiffness, slow motion, unstable posture) ([facility] [address] 5/23/2019       Past Surgical History:  Past Surgical History:   Procedure Laterality Date    HX KYPHOPLASTY  2013    HX ORTHOPAEDIC  12/2016    Wrist surgery    HX WRIST FRACTURE TX Left 12/2016       Family History:  Family History   Problem Relation Age of Onset    Other Mother         Inefficient wound healing    Cancer Sister         Stomach CA    Diabetes Brother     Alzheimer's Disease Sister     Hypertension Daughter     Thyroid Disease Daughter     Other Son         Hip replacement    Depression Son     Liver Disease Son     Diabetes Son  Heart Disease Son     Depression Son        Social History:  Social History     Tobacco Use    Smoking status: Former Smoker     Types: Cigarettes     Quit date:      Years since quittin.9    Smokeless tobacco: Never Used   Vaping Use    Vaping Use: Never used   Substance Use Topics    Alcohol use: No    Drug use: No       Allergies: Allergies   Allergen Reactions    Sinemet [Carbidopa-Levodopa] Other (comments)     \"Made patient much worse\"    Sulfa (Sulfonamide Antibiotics) Hives         Review of Systems   Review of Systems   Constitutional: Negative. Negative for fever. Eyes: Negative. Respiratory: Negative. Negative for shortness of breath. Cardiovascular: Negative for chest pain. Gastrointestinal: Negative for abdominal pain, nausea and vomiting. Endocrine: Negative. Genitourinary: Negative. Negative for difficulty urinating, dysuria and hematuria. Musculoskeletal: Negative. Skin: Positive for wound (Right-sided head lacerations). Neurological: Negative. Psychiatric/Behavioral: Negative for suicidal ideas. All other systems reviewed and are negative. Physical Exam   Physical Exam  Vitals and nursing note reviewed. Constitutional:       General: She is not in acute distress. Appearance: She is well-developed. She is not diaphoretic. HENT:      Head: Normocephalic. Laceration present. Nose: Nose normal.   Eyes:      General: No scleral icterus. Conjunctiva/sclera: Conjunctivae normal.   Neck:      Trachea: No tracheal deviation. Cardiovascular:      Rate and Rhythm: Normal rate and regular rhythm. Heart sounds: Normal heart sounds. No murmur heard. No friction rub. Pulmonary:      Effort: Pulmonary effort is normal. No respiratory distress. Breath sounds: Normal breath sounds. No stridor. No wheezing or rales. Abdominal:      General: Bowel sounds are normal. There is no distension. Palpations: Abdomen is soft. Tenderness: There is no abdominal tenderness. There is no rebound. Musculoskeletal:         General: No tenderness. Normal range of motion. Cervical back: Normal range of motion. Skin:     General: Skin is warm and dry. Findings: Laceration and lesion (Skin tear to arm) present. No rash. Neurological:      Mental Status: She is alert and oriented to person, place, and time. Cranial Nerves: No cranial nerve deficit. Psychiatric:         Speech: Speech normal.         Behavior: Behavior normal.         Thought Content: Thought content normal.         Judgment: Judgment normal.           Diagnostic Study Results     Labs -   No results found for this or any previous visit (from the past 12 hour(s)). Radiologic Studies -   CT HEAD WO CONT   Final Result      No acute intracranial abnormality on this noncontrast head CT. CT SPINE CERV WO CONT   Final Result      1. C4-C5 kyphosis and moderate right foraminal stenosis. 2. No fracture. CT Results  (Last 48 hours)               11/22/21 1494  CT HEAD WO CONT Final result    Impression:      No acute intracranial abnormality on this noncontrast head CT. Narrative:  EXAM: CT HEAD WO CONT       INDICATION: Head injury. COMPARISON: CT head on 6/4/2019 and 2/2/2018       TECHNIQUE: Noncontrast head CT. Coronal and sagittal reformats. CT dose   reduction was achieved through the use of a standardized protocol tailored for   this examination and automatic exposure control for dose modulation. FINDINGS: The volume loss is increased, moderate without hydrocephalus. There is   no mass effect or midline shift. There is no intracranial hemorrhage or   extra-axial fluid collection. Chronic microvascular ischemic disease is minimal   for age. No acute infarct. No change. The calvarium is intact. The visualized paranasal sinuses and mastoid air cells   are clear.            11/22/21 7635  CT SPINE CERV WO CONT Final result    Impression:      1. C4-C5 kyphosis and moderate right foraminal stenosis. 2. No fracture. Narrative:  EXAM:  CT CERVICAL SPINE WITHOUT CONTRAST       INDICATION: Fall and head injury. COMPARISON: None. CONTRAST:  None. TECHNIQUE: Multislice helical CT of the cervical spine was performed without   intravenous contrast administration. Sagittal and coronal reformats were   generated. CT dose reduction was achieved through use of a standardized   protocol tailored for this examination and automatic exposure control for dose   modulation. FINDINGS:       1 mm anterior subluxation of C4 on C5. Cervical kyphosis is centered at C4-C5. There is no fracture or compression deformity. The odontoid process is intact. The craniocervical junction is within normal limits. Degenerative disc disease   is moderate at C4-5, C5-6, and C6-7. Carotid bifurcation atherosclerosis is visible. C2-C3: There is no spinal canal or neural foraminal stenosis. C3-C4: There is no spinal canal or neural foraminal stenosis. C4-C5: Posterior disc osteophyte complex. Mild central spinal canal stenosis. Moderate right foraminal stenosis. C5-C6: Posterior disc osteophyte complex. Mild bilateral foraminal stenosis. C6-C7: There is no spinal canal or neural foraminal stenosis. C7-T1: There is no spinal canal or neural foraminal stenosis. CXR Results  (Last 48 hours)    None            Medical Decision Making   I am the first provider for this patient. I reviewed the vital signs, available nursing notes, past medical history, past surgical history, family history and social history. Vital Signs-Reviewed the patient's vital signs.   Patient Vitals for the past 12 hrs:   Pulse Resp BP SpO2   11/22/21 2319 (!) 58 20 (!) 136/49 98 %       Pulse Oximetry Analysis - 98% on RA Normal    Records Reviewed/Interpretted: Nursing Notes from triage and Old Medical Records, any previous primary care visits for dementia without behavioral disturbance    Provider Notes (Medical Decision Making):     DDX:  Ground-level fall, closed injury, intracranial bleed, scalp lacerations    Plan:  CT head and neck, laceration repair    Impression:  Closed head injury, scalp lacerations    ED Course:   Initial assessment performed. The patients presenting problems have been discussed, and they are in agreement with the care plan formulated and outlined with them. I have encouraged them to ask questions as they arise throughout their visit. I reviewed our electronic medical record system for any past medical records that were available that may contribute to the patients current condition, the nursing notes and and vital signs from today's visit  Nursing notes will be reviewed as they become available in realtime while the pt has been in the ED. Eric Tellez MD    I personally reviewed/interpreted pt's imaging. Agree with official read by radiology as noted above. Eric Tellez MD    Procedure Note - Wound Repair:    Performed by Eric Tellez MD .     Immediately prior to the procedure, the patient was reevaluated and found suitable for the planned procedure and any planned medications. Immediately prior to the procedure a time out was called to verify the correct patient, procedure, equipment, staff, and marking as appropriate. Neurovascular function was Intact. Site prepped with ChloraPrep and Sterile draping. Anesthesia was obtained via local infiltration of 5 mL lidocaine 1% with epinephrine. Wound irrigated with copious amounts of normal saline and explored. Wound was located on the right scalp, measured 4 cm and was linear. Level of complexity was: simple. Wound was closed using 4 staples. Foreign body was not suspected. Foreign body was not found. Procedure was tolerated well.     Procedure Note - Wound Repair:    Performed by Eric Tellez MD . Immediately prior to the procedure, the patient was reevaluated and found suitable for the planned procedure and any planned medications. Immediately prior to the procedure a time out was called to verify the correct patient, procedure, equipment, staff, and marking as appropriate. Neurovascular function was Intact. Site prepped with ChloraPrep and Sterile draping. Anesthesia was obtained via local infiltration of 5 mL lidocaine 1% with epinephrine. Wound irrigated with copious amounts of normal saline and explored. Wound was located on the right scalp, measured 3   cm and was linear. Level of complexity was: simple. Wound was closed using 3 staples. Foreign body was not suspected. Foreign body was not found. Procedure was tolerated well. Progress note:  Pt noted to be feeling better, ready for discharge. Discussed lab and imaging findings with pt, specifically noting NO ICH. Pt will follow up with pcp as instructed. All questions have been answered, pt voiced understanding and agreement with plan. Specific return precautions provided in addition to instructions for pt to return to the ED immediately should sx worsen at any time. Ashish Ledesma MD             Critical Care Time:     none      Diagnosis     Clinical Impression:   1. Fall, initial encounter    2. Laceration of scalp, initial encounter        PLAN:  1. Discharge Medication List as of 11/23/2021  1:31 AM        2.    Follow-up Information     Follow up With Specialties Details Why Contact Info    Deandra Lee MD [facility] Medicine Schedule an appointment as soon as possible for a visit in 1 week For suture removal [address] [facility]  [phone]      [facility] EMERGENCY DEPT Emergency Medicine  As needed [address]  [address] [facility]  [phone]        Return to ED if worse     Disposition:       The patient's results have been reviewed with family and/or caregiver. They verbally convey their understanding and agreement of the patient's signs, symptoms, diagnosis, treatment and prognosis and additionally agree to follow up as recommended in the discharge instructions or to return to the Emergency Room should the patient's condition change prior to their follow-up appointment. The family and/or caregiver verbally agrees with the care-plan and all of their questions have been answered. The discharge instructions have also been provided to the them with educational information regarding the patient's diagnosis as well a list of reasons why the patient would want to return to the ER prior to their follow-up appointment should their condition change.   Beth Samson MD

## 2021-11-23 NOTE — ED NOTES
Pt discharged home, discharge and follow up care instructions given to daughter, daughter verbalized understanding.

## 2021-11-23 NOTE — TELEPHONE ENCOUNTER
Do you want to see the patient? pt was seen at the [facility] ED   last night for a fall and hit her head. she has staples in her head that   need to removed within 5 days, but daughter says that Monday 11/29 would be fine. appt still needs to be scheduled for the removal of the staples also, Daughter Palmer Goff was told to give pt something for pain but not sure what she can give her something like tylenol, being that she is on a daily aspirin.

## 2021-11-29 NOTE — PROGRESS NOTES
Colby Deleon is a 80 y.o. female  Patient here today to have staples removed from right side of scalp. Per verbal read back from Dr. Earl Roberts [facility] order was given to remove staples from right side of scalp . Laceration on right side of scalp  Towards the front  Of head clean dry, closed no drainage, odor, or redness around the sight. Area cleaned with alcohol prep. 3 staples removed. Second laceration on center of head toward the top  Of head  4 staples removed. Laceration closed , no signs of infection. Patient tolerated procedure well. Patient was seen in  ED on 11/22/21 and discharged. Patient had a fall and acquired 2 lacerations.

## 2021-11-30 NOTE — TELEPHONE ENCOUNTER
Followed up with patient in regard to staples being removed, her daughter reports they were removed yesterday and patient is doing well.

## 2021-12-07 NOTE — TELEPHONE ENCOUNTER
Wing Schulz with [facility]  [phone]        States pt seen 3 weeks . States past couple days pt hasn't wanted to get out of bed, she is eating however daughter reports to her that pt cries. States daughter says pt is making up stories about finances etc.    States also called pt psychiatrist to update    States please follow up with her or with pt daughter. States pt daughter is struggling, states that she notices a lot of caregiver role strain.

## 2021-12-08 NOTE — TELEPHONE ENCOUNTER
Called, spoke with Katelynn Cadena w/ [facility]. Two pt identifiers confirmed. Naomy informed Dr. Lottie Haywood did talk to pt's daughter. Naomy asked if they can do labs for Dr. Karlie Mercado stated send order to [phone] and she will make sure they get done. Naomy verbalized understanding of information discussed w/ no further questions at this time.      Izzy Haywood

## 2021-12-08 NOTE — TELEPHONE ENCOUNTER
Please add a TSH and FT4 to her verbal order. This patient needs a urinalysis and cx. Please request these test be done by  Marty Gomez as well as a CBC and CMP. I will call her daughter in the morning due to the current time. I spoke with the patient's daughter this morning to inform her of the pending lab tests. If everything is normal she will need an evaluation at [facility].

## 2021-12-15 NOTE — TELEPHONE ENCOUNTER
Farhad Bazzi MD  You 36 minutes ago (1:09 PM)     RL    Yes. Do they want a verbal order? Please give the verbal order. Left message for Benjamin Cantu with [facility] advising that per Dr. Richar Sánchez it is ok to place this patient in hospice care. Advised to contact the office if anything additional is needed.   Kvng Trevino LPN

## 2021-12-15 NOTE — TELEPHONE ENCOUNTER
César Lima with [facility] states that family would like pt to be on Hospice now. Please call with that order. Thanks.

## 2021-12-15 NOTE — TELEPHONE ENCOUNTER
Joy//[facility] states she received your call back & needs to get Hospice Order in writing/Faxed over as she is not able to take a Verbal Order. Please call if any further Questions.  Thank you      Fax# is [phone]    ATTN:  Sonia Frye

## 2022-03-04 ENCOUNTER — TELEPHONE (OUTPATIENT)
Dept: INTERNAL MEDICINE CLINIC | Age: 87
End: 2022-03-04

## 2022-03-04 NOTE — TELEPHONE ENCOUNTER
----- Message from Terell. Shari sent at 3/3/2022  5:57 PM EST -----  Regarding: Death  My mom Johanna sommers  2022. How do I close out her my chart. Id like to stop all notifications for her. Thank you.   Marisol Orozco

## 2023-08-23 NOTE — PROGRESS NOTES
Reviewed record in preparation for visit and have obtained necessary documentation. Identified pt with two pt identifiers(name and ). Chief Complaint Patient presents with  Thyroid Problem Health Maintenance Due Topic Date Due  Shingles Vaccine (1 of 2) 1976  Glaucoma Screening   1991  Bone Mineral Density   1991  Flu Vaccine  2018 Ms. Pebbles Sosa has a reminder for a \"due or due soon\" health maintenance. I have asked that she discuss this further with her primary care provider for follow-up on this health maintenance. Coordination of Care Questionnaire: 
:  
 
1) Have you been to an emergency room, urgent care clinic since your last visit? no  
 
Hospitalized since your last visit? no          
 
2) Have you seen or consulted any other health care providers outside of [address] [place] [address] since your last visit? no  (Include any pap smears or colon screenings in this section.) 3) In the event something were to happen to you and you were unable to speak on your behalf, do you have an Advance Directive/ Living Will in place stating your wishes? YES Do you have an Advance Directive on file? yes 4) Are you interested in receiving information on Advance Directives? NO Patient is accompanied by self I have received verbal consent from Loli Ferraro to discuss any/all medical information while they are present in the room. Xolair Pregnancy And Lactation Text: This medication is Pregnancy Category B and is considered safe during pregnancy. This medication is excreted in breast milk.

## 2023-09-19 NOTE — MR AVS SNAPSHOT
[address] [address], 
[id number], [address] [facility] 
[phone] Patient: Francisco Sullivan MRN: [id number] Visit Information Date & Time Provider Department Dept. Phone Encounter #  
 3/6/2018  1:30 PM Hema Hoff NP Neurology Clinic at [facility] [phone] [id number] Your Appointments 2018  2:00 PM  
ROUTINE CARE with Gill Garcia MD  
[address]) Appt Note: f/up on thyroid sc 18  
 [address] [facility]  
[phone]  
  
   
 [address] 235 [facility] [address] [place] [address]  
  
    
 2018 10:40 AM  
Follow Up with Arvind Beck MD  
Neurology Clinic at [address]) Appt Note: Follow up $0CP tdb 3/6/18  
 [facility], 
[address], [address] [facility] [address]  
[address] [facility], [address] [facility], [address] [facility] [address] Upcoming Health Maintenance Date Due ZOSTER VACCINE AGE 60> 1986 GLAUCOMA SCREENING Q2Y 1991 OSTEOPOROSIS SCREENING (DEXA) 1991 Influenza Age 5 to Adult 2017 MEDICARE YEARLY EXAM 3/7/2018 DTaP/Tdap/Td series (2 - Td) 2026 Allergies as of 3/6/2018  Review Complete On: 3/6/2018 By: Jaime Marie, OMAR Severity Noted Reaction Type Reactions Sulfa (Sulfonamide Antibiotics)  2010    Hives Current Immunizations  Reviewed on 3/6/2017 Name Date Influenza High Dose Vaccine PF 10/15/2016 Pneumococcal Conjugate (PCV-13) 3/6/2017 Pneumococcal Vaccine (Unspecified Type) 3/9/2017 Tdap 2016  1:23 PM  
  
 Not reviewed this visit You Were Diagnosed With   
  
 Codes Comments Late onset Alzheimer's disease with behavioral disturbance    -  Primary ICD-10-CM: G30.1, F02.81 ICD-9-CM: 331.0, 294.11   
 September 19, 2023       Belinda Osorio DO  [address] [facility] [address]  Via In Basket      Patient: Heydi Purvis   YOB: 1983   Date of Visit: 9/19/2023       Dear Dr. Osorio:    I saw your patient, July Purvis, for an evaluation. Below are my notes for this visit with her.    If you have questions, please do not hesitate to call me.      Sincerely,        José Olivas MD        CC: No Recipients    José Olivas MD  9/19/2023  2:17 PM  Signed  Patient:  Heydi Purvis  YOB: 1983  Date of Visit:  09/19/23        Belinda Osorio DO  [address] [place] [address]    Dear Belinda Osorio DO    This 40 year old female presents for   ED Diagnosis   1. Oligoastrocytoma of frontal lobe (CMD)            History of Present Illness:  HPI    I had the pleasure of following up with July.  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  She then completed proton beam radiation on November 8, 2012.  She is followed closely by neuro-oncology at [facility].  Her most recent MRI of the brain was performed yesterday on September 18, 2023 and is brought in for review.    She states she is doing very well.  She will get mild headaches every couple weeks based on stress and weather she says and these are well-known to her not changing.  She has not had a seizure and she has no other issues.  She is busy with her children.  She has 2 kids.    MRI study performed yesterday demonstrates postsurgical changes with FLAIR signal abnormalities unchanged from September 26, 2022.  There is no Chiari malformation.  There is no evidence of tumor recurrence.  Left maxillary sinus is totally opacified with fluid.  In the past there is a small mucosal retention cyst present but this now has grown her fluid is now occupied the entire sinus.    She denies any sinus symptoms of concern.  She has no drainage no fever no pressure over her  Benign essential tremor syndrome     ICD-10-CM: G25.0 ICD-9-CM: 333.1 Vitals BP Pulse Height(growth percentile) Weight(growth percentile) SpO2 BMI  
 158/64 75 5' (1.524 m) 125 lb (56.7 kg) 96% 24.41 kg/m2 OB Status Smoking Status Postmenopausal Former Smoker Vitals History BMI and BSA Data Body Mass Index Body Surface Area  
 24.41 kg/m 2 1.55 m 2 Preferred Pharmacy Pharmacy Name Phone [facility] [address], [address] [place] [address] [phone] Your Updated Medication List  
  
   
This list is accurate as of 3/6/18  2:27 PM.  Always use your most recent med list.  
  
  
  
  
 aspirin 81 mg chewable tablet Take 81 mg by mouth daily. clonazePAM 0.5 mg tablet Commonly known as:  Michelle Deis Take 0.5 mg by mouth every evening. levothyroxine 75 mcg tablet Commonly known as:  SYNTHROID  
  
 lithium carbonate  mg CR tablet Commonly known as:  ESKALITH CR Take 450 mg by mouth nightly. multivitamin tablet Commonly known as:  ONE A DAY Take 1 Tab by mouth daily. PARoxetine 40 mg tablet Commonly known as:  PAXIL Take 40 mg by mouth daily. rivastigmine tartrate 1.5 mg capsule Commonly known as:  EXELON  
  
 VITAMIN D3 2,000 unit Tab Generic drug:  cholecalciferol (vitamin D3) Take 1 Tab by mouth daily. We Performed the Following [address] [facility] [address] Comments:  
 [facility], home PT eval and treat for memory care, safety, fall prevention Referral Information Referral ID Referred By Referred To  
  
 [id number] Kadeem Gan V Not Available Visits Status Start Date End Date 1 New Request 3/6/18 3/6/19 If your referral has a status of pending review or denied, additional information will be sent to support the outcome of this decision. Patient Instructions PRESCRIPTION REFILL POLICY sinuses.    Patient Active Problem List   Diagnosis   • Oligoastrocytoma of frontal lobe (CMD)   • Gastroesophageal reflux disease without esophagitis   • ADHD (attention deficit hyperactivity disorder)   • Migraine   • Family history of breast cancer in mother   • Left breast mass   • Routine adult health maintenance   • Breast cancer screening, high risk patient   • FARRAH (generalized anxiety disorder)       Past Medical History:   Diagnosis Date   • ADHD    • Oligoastrocytoma (CMD)        Past Surgical History:   Procedure Laterality Date   • Brain tumor excision  2012    DR Olivas removal of L frontal oligoastrocytoma   • Brain tumor excision      s/p excision and proton therapy   • Vaginal delivery      x 2       Family History   Problem Relation Age of Onset   • Cancer, Breast Mother 45   • Hypertension Mother    • Hyperlipidemia Mother    • Cancer Mother 44        breast, then Bladder cancer    • Myocardial Infarction Father 49        Not much contact with father   • Other Sister         thyroid nodules   • Cancer Sister         cervical cancer   • Cancer Maternal Cousin         cervical   • Cancer, Colon Neg Hx        Social History     Socioeconomic History   • Marital status: /Civil Union     Spouse name: Not on file   • Number of children: 2   • Years of education: Not on file   • Highest education level: Not on file   Occupational History   • Occupation: radiology tech     Comment: PT- [employer]   Tobacco Use   • Smoking status: Former     Current packs/day: 0.00     Types: Cigarettes     Quit date:      Years since quittin.7   • Smokeless tobacco: Never   Vaping Use   • Vaping Use: never used   Substance and Sexual Activity   • Alcohol use: Yes     Comment: rare   • Drug use: No   • Sexual activity: Yes     Partners: Male     Birth control/protection: Other-See Comments     Comment:  with vasectomy    Other Topics Concern   • Not on file   Social History Narrative    In  [facility] Statement to Patients April 1, 2014 In an effort to ensure the large volume of patient prescription refills is processed in the most efficient and expeditious manner, we are asking our patients to assist us by calling your Pharmacy for all prescription refills, this will include also your  Mail Order Pharmacy. The pharmacy will contact our office electronically to continue the refill process. Please do not wait until the last minute to call your pharmacy. We need at least 48 hours (2days) to fill prescriptions. We also encourage you to call your pharmacy before going to  your prescription to make sure it is ready. With regard to controlled substance prescription refill requests (narcotic refills) that need to be picked up at our office, we ask your cooperation by providing us with at least 72 hours (3days) notice that you will need a refill. We will not refill narcotic prescription refill requests after 4:00pm on any weekday, Monday through Thursday, or after 2:00pm on Fridays, or on the weekends. We encourage everyone to explore another way of getting your prescription refill request processed using [employer], our patient web portal through our electronic medical record system. [employer] is an efficient and effective way to communicate your medication request directly to the office and  downloadable as an phuc on your smart phone . [employer] also features a review functionality that allows you to view your medication list as well as leave messages for your physician. Are you ready to get connected? If so please review the attatched instructions or speak to any of our staff to get you set up right away! Thank you so much for your cooperation. Should you have any questions please contact our Practice Administrator. The Physicians and Staff,  [facility] A Healthy Lifestyle: Care Instructions Your Care Instructions emergency sps Frank to make decisions     Social Determinants of Health     Financial Resource Strain: Not on file   Food Insecurity: Not on file   Transportation Needs: Not on file   Physical Activity: Sufficiently Active (8/18/2020)    Exercise Vital Sign    • Days of Exercise per Week: 6 days    • Minutes of Exercise per Session: 30 min   Stress: Not on file   Social Connections: Not on file   Intimate Partner Violence: Not At Risk (1/31/2023)    Intimate Partner Violence    • Social Determinants: Intimate Partner Violence Past Fear: No    • Social Determinants: Intimate Partner Violence Current Fear: No       Preferred language is English [1].    ALLERGIES:   Allergen Reactions   • Hydromorphone Hcl Nausea & Vomiting     Sensitive       Review of Systems   Constitutional: Negative.    HENT: Negative.    Eyes: Negative.    Respiratory: Negative.    Cardiovascular: Negative.    Gastrointestinal: Negative.  Negative for vomiting.   Endocrine: Negative.    Genitourinary: Negative.    Musculoskeletal: Negative.    Skin: Negative.    Allergic/Immunologic: Negative.    Neurological:        See HPI   Hematological: Negative.    Psychiatric/Behavioral: Negative.        There were no vitals filed for this visit.    Neurologic Exam     Mental Status   Oriented to person, place, and time.   Speech: speech is normal   Level of consciousness: alert    Cranial Nerves     CN III, IV, VI   Pupils are equal, round, and reactive to light.  Extraocular motions are normal.     CN V   Facial sensation intact.     CN VII   Facial expression full, symmetric.     CN VIII   CN VIII normal.     CN IX, X   CN IX normal.     CN XI   CN XI normal.     CN XII   CN XII normal.     Motor Exam   Muscle bulk: normal  Overall muscle tone: normal    Strength   Strength 5/5 throughout.     Sensory Exam   Light touch normal.     Gait, Coordination, and Reflexes     Gait  Gait: normal    Coordination   Finger to nose coordination: normal  Tandem  A healthy lifestyle can help you feel good, stay at a healthy weight, and have plenty of energy for both work and play. A healthy lifestyle is something you can share with your whole family. A healthy lifestyle also can lower your risk for serious health problems, such as high blood pressure, heart disease, and diabetes. You can follow a few steps listed below to improve your health and the health of your family. Follow-up care is a key part of your treatment and safety. Be sure to make and go to all appointments, and call your doctor if you are having problems. It's also a good idea to know your test results and keep a list of the medicines you take. How can you care for yourself at home? · Do not eat too much sugar, fat, or fast foods. You can still have dessert and treats now and then. The goal is moderation. · Start small to improve your eating habits. Pay attention to portion sizes, drink less juice and soda pop, and eat more fruits and vegetables. ¨ Eat a healthy amount of food. A 3-ounce serving of meat, for example, is about the size of a deck of cards. Fill the rest of your plate with vegetables and whole grains. ¨ Limit the amount of soda and sports drinks you have every day. Drink more water when you are thirsty. ¨ Eat at least 5 servings of fruits and vegetables every day. It may seem like a lot, but it is not hard to reach this goal. A serving or helping is 1 piece of fruit, 1 cup of vegetables, or 2 cups of leafy, raw vegetables. Have an apple or some carrot sticks as an afternoon snack instead of a candy bar. Try to have fruits and/or vegetables at every meal. 
· Make exercise part of your daily routine. You may want to start with simple activities, such as walking, bicycling, or slow swimming. Try to be active 30 to 60 minutes every day. You do not need to do all 30 to 60 minutes all at once.  For example, you can exercise 3 times a day for 10 or walking coordination: normal    Tremor   Resting tremor: absent    Reflexes   Reflexes 2+ except as noted.       Assessment/Plan:  Assessment that there is no evidence of tumor recurrence.  There has been progressive feeling of her left maxillary sinus which I suggested she seek ENT evaluation of.  This was present over the last couple MRIs but it is progressive now her left maxillary sinus is totally full of fluid.    She will follow-up with neuro-oncology and scans obtained at intervals at their discretion.  I would like to see her back once a year or with any concerns along the way.  Best wishes    Current Outpatient Medications   Medication Sig Dispense Refill   • spironolactone (ALDACTONE) 50 MG tablet TAKE 1 TABLET BY MOUTH TWICE DAILY. INCREASE WATER INTAKE     • tranexamic acid (LYSTEDA) 650 MG tablet Take 2 tablets by mouth in the morning and 2 tablets at noon and 2 tablets in the evening. Take with food for only the heaviest days and for no more than 5 days at a time 30 tablet 3   • lisdexamfetamine (VYVANSE) 50 MG capsule Take 50 mg by mouth every morning.     • desvenlafaxine (PRISTIQ) 50 MG 24 hr tablet Take 50 mg by mouth daily.     • Multiple Vitamin (MULTI-VITAMIN DAILY PO)      • ibuprofen (MOTRIN) 600 MG tablet Take 1 tablet by mouth every 6 hours as needed for Pain. 30 tablet 2     No current facility-administered medications for this visit.       No follow-ups on file.    A copy of this consultation has been sent electronically to:   Belinda Perez,   [address] [place] [address]             20 minutes. Moderate exercise is safe for most people, but it is always a good idea to talk to your doctor before starting an exercise program. 
· Keep moving. Alonzo Quintero the lawn, work in the garden, or [employer]. Take the stairs instead of the elevator at work. · If you smoke, quit. People who smoke have an increased risk for heart attack, stroke, cancer, and other lung illnesses. Quitting is hard, but there are ways to boost your chance of quitting tobacco for good. ¨ Use nicotine gum, patches, or lozenges. ¨ Ask your doctor about stop-smoking programs and medicines. ¨ Keep trying. In addition to reducing your risk of diseases in the future, you will notice some benefits soon after you stop using tobacco. If you have shortness of breath or asthma symptoms, they will likely get better within a few weeks after you quit. · Limit how much alcohol you drink. Moderate amounts of alcohol (up to 2 drinks a day for men, 1 drink a day for women) are okay. But drinking too much can lead to liver problems, high blood pressure, and other health problems. Family health If you have a family, there are many things you can do together to improve your health. · Eat meals together as a family as often as possible. · Eat healthy foods. This includes fruits, vegetables, lean meats and dairy, and whole grains. · Include your family in your fitness plan. Most people think of activities such as jogging or tennis as the way to fitness, but there are many ways you and your family can be more active. Anything that makes you breathe hard and gets your heart pumping is exercise. Here are some tips: 
¨ Walk to do errands or to take your child to school or the bus. ¨ Go for a family bike ride after dinner instead of watching TV. Where can you learn more? Go to http://ro-lizz.info/. Enter J980 in the search box to learn more about \"A Healthy Lifestyle: Care Instructions. \" Current as of: May 12, 2017 Content Version: 11.4 © 2946-9520 Healthwise, [employer]. Care instructions adapted under license by [employer] (which disclaims liability or warranty for this information). If you have questions about a medical condition or this instruction, always ask your healthcare professional. [address] any warranty or liability for your use of this information. Introducing [facility]! [facility] introduces [employer] patient portal. Now you can access parts of your medical record, email your doctor's office, and request medication refills online. 1. In your internet browser, go to [email or web address] 2. Click on the First Time User? Click Here link in the Sign In box. You will see the New Member Sign Up page. 3. Enter your [employer] Access Code exactly as it appears below. You will not need to use this code after youve completed the sign-up process. If you do not sign up before the expiration date, you must request a new code. · [employer] Access Code: [email or web address] Expires: 5/4/2018  9:38 AM 
 
4. Enter the last four digits of your Social Security Number (xxxx) and Date of Birth (mm/dd/yyyy) as indicated and click Submit. You will be taken to the next sign-up page. 5. Create a [employer] ID. This will be your [employer] login ID and cannot be changed, so think of one that is secure and easy to remember. 6. Create a [employer] password. You can change your password at any time. 7. Enter your Password Reset Question and Answer. This can be used at a later time if you forget your password. 8. Enter your e-mail address. You will receive e-mail notification when new information is available in [address] 9. Click Sign Up. You can now view and download portions of your medical record. 10. Click the Download Summary menu link to download a portable copy of your medical information. If you have questions, please visit the Frequently Asked Questions section of the [employer] website. Remember, [employer] is NOT to be used for urgent needs. For medical emergencies, dial 911. Now available from your iPhone and Android! Please provide this summary of care documentation to your next provider. Your primary care clinician is listed as Erick Mckeon. If you have any questions after today's visit, please call [phone].

## (undated) DEVICE — SOLUTION IV 1000ML 0.9% SOD CHL

## (undated) DEVICE — KENDALL SCD EXPRESS SLEEVES, KNEE LENGTH, MEDIUM: Brand: KENDALL SCD

## (undated) DEVICE — DRAPE SHT 3 QTR PROXIMA 53X77 --

## (undated) DEVICE — STERILE POLYISOPRENE POWDER-FREE SURGICAL GLOVES: Brand: PROTEXIS

## (undated) DEVICE — SURGICAL PROCEDURE PACK BASIN MAJ SET CUST NO CAUT

## (undated) DEVICE — COVER,MAYO STAND,STERILE: Brand: MEDLINE

## (undated) DEVICE — BIT DRL L330MM DIA4.2MM CALIB 100MM 3 FLUT QUIK CPL

## (undated) DEVICE — Device

## (undated) DEVICE — SUTURE VCRL SZ 2-0 L36IN ABSRB VLT L36MM CT-1 1/2 CIR J345H

## (undated) DEVICE — COVER,TABLE,60X90,STERILE: Brand: MEDLINE

## (undated) DEVICE — WATERPROOF, BACTERIA PROOF DRESSING WITH ABSORBENT SEE THROUGH PAD: Brand: OPSITE POST-OP VISIBLE 10X8CM CTN 20

## (undated) DEVICE — GOWN,SIRUS,NONRNF,SETINSLV,XL,20/CS: Brand: MEDLINE

## (undated) DEVICE — DRAPE XR C ARM 41X74IN LF --

## (undated) DEVICE — (D)PREP SKN CHLRAPRP APPL 26ML -- CONVERT TO ITEM 371833

## (undated) DEVICE — 6619 2 PTNT ISO SYS INCISE AREA&LT;(&GT;&&LT;)&GT;P: Brand: STERI-DRAPE™ IOBAN™ 2

## (undated) DEVICE — 3.2MM GUIDE WIRE 400MM

## (undated) DEVICE — HANDLE LT SNAP ON ULT DURABLE LENS FOR TRUMPF ALC DISPOSABLE

## (undated) DEVICE — BANDAGE COMPR SELF ADH 5 YDX6 IN TAN STRL PREMIERPRO LF

## (undated) DEVICE — WATERPROOF, BACTERIA PROOF DRESSING WITH ABSORBENT SEE THROUGH PAD: Brand: OPSITE POST-OP VISIBLE 15X10CM CTN 20

## (undated) DEVICE — SUTURE VCRL SZ 0 L27IN ABSRB VLT L36MM CT-1 1/2 CIR J340H